# Patient Record
Sex: MALE | Race: WHITE | NOT HISPANIC OR LATINO
[De-identification: names, ages, dates, MRNs, and addresses within clinical notes are randomized per-mention and may not be internally consistent; named-entity substitution may affect disease eponyms.]

---

## 2017-04-03 ENCOUNTER — APPOINTMENT (OUTPATIENT)
Dept: PAIN MANAGEMENT | Facility: CLINIC | Age: 32
End: 2017-04-03

## 2017-04-26 ENCOUNTER — APPOINTMENT (OUTPATIENT)
Dept: PAIN MANAGEMENT | Facility: CLINIC | Age: 32
End: 2017-04-26

## 2017-05-01 ENCOUNTER — APPOINTMENT (OUTPATIENT)
Dept: PAIN MANAGEMENT | Facility: CLINIC | Age: 32
End: 2017-05-01

## 2017-12-29 ENCOUNTER — EMERGENCY (EMERGENCY)
Facility: HOSPITAL | Age: 32
LOS: 1 days | Discharge: HOME | End: 2017-12-29
Attending: EMERGENCY MEDICINE | Admitting: EMERGENCY MEDICINE
Payer: COMMERCIAL

## 2017-12-29 VITALS
SYSTOLIC BLOOD PRESSURE: 155 MMHG | RESPIRATION RATE: 18 BRPM | DIASTOLIC BLOOD PRESSURE: 99 MMHG | HEIGHT: 72 IN | HEART RATE: 56 BPM | WEIGHT: 195.11 LBS | TEMPERATURE: 98 F | OXYGEN SATURATION: 96 %

## 2017-12-29 LAB
RAPID RVP RESULT: DETECTED
RV+EV RNA SPEC QL NAA+PROBE: DETECTED

## 2017-12-29 PROCEDURE — 87486 CHLMYD PNEUM DNA AMP PROBE: CPT

## 2017-12-29 PROCEDURE — 87581 M.PNEUMON DNA AMP PROBE: CPT

## 2017-12-29 PROCEDURE — 87633 RESP VIRUS 12-25 TARGETS: CPT

## 2017-12-29 PROCEDURE — 99283 EMERGENCY DEPT VISIT LOW MDM: CPT | Mod: 25

## 2017-12-29 PROCEDURE — 99283 EMERGENCY DEPT VISIT LOW MDM: CPT

## 2017-12-29 PROCEDURE — 87798 DETECT AGENT NOS DNA AMP: CPT

## 2017-12-29 RX ORDER — ALBUTEROL 90 UG/1
2 AEROSOL, METERED ORAL
Qty: 1 | Refills: 0
Start: 2017-12-29 | End: 2018-01-27

## 2017-12-29 RX ORDER — SODIUM CHLORIDE 9 MG/ML
1000 INJECTION INTRAMUSCULAR; INTRAVENOUS; SUBCUTANEOUS ONCE
Qty: 0 | Refills: 0 | Status: DISCONTINUED | OUTPATIENT
Start: 2017-12-29 | End: 2017-12-29

## 2017-12-29 NOTE — ED ADULT TRIAGE NOTE - CHIEF COMPLAINT QUOTE
Patient complaining of general weakness, productive cough, sore throat with swollen glands, and body aches since this AM. Denies N/V/D. Afebrile

## 2017-12-29 NOTE — ED PROVIDER NOTE - OBJECTIVE STATEMENT
31yo male who presents with cough and sore throat since last nite. pt c/o upper respiratory congestion, no fever, no headache, +sore throat, no vomiting or diarrhea, pt did no take anything for the pain.

## 2020-04-25 ENCOUNTER — MESSAGE (OUTPATIENT)
Age: 35
End: 2020-04-25

## 2020-05-06 LAB
SARS-COV-2 IGG SERPL IA-ACNC: 1.7 AU/ML
SARS-COV-2 IGG SERPL QL IA: NEGATIVE

## 2020-06-05 ENCOUNTER — NON-APPOINTMENT (OUTPATIENT)
Age: 35
End: 2020-06-05

## 2020-06-05 ENCOUNTER — APPOINTMENT (OUTPATIENT)
Dept: OPHTHALMOLOGY | Facility: CLINIC | Age: 35
End: 2020-06-05
Payer: COMMERCIAL

## 2020-06-05 PROCEDURE — 92012 INTRM OPH EXAM EST PATIENT: CPT

## 2020-08-21 ENCOUNTER — TRANSCRIPTION ENCOUNTER (OUTPATIENT)
Age: 35
End: 2020-08-21

## 2020-10-29 ENCOUNTER — APPOINTMENT (OUTPATIENT)
Dept: DERMATOLOGY | Facility: CLINIC | Age: 35
End: 2020-10-29
Payer: COMMERCIAL

## 2020-10-29 DIAGNOSIS — Z91.89 OTHER SPECIFIED PERSONAL RISK FACTORS, NOT ELSEWHERE CLASSIFIED: ICD-10-CM

## 2020-10-29 DIAGNOSIS — D22.9 MELANOCYTIC NEVI, UNSPECIFIED: ICD-10-CM

## 2020-10-29 DIAGNOSIS — L72.0 EPIDERMAL CYST: ICD-10-CM

## 2020-10-29 PROCEDURE — 99203 OFFICE O/P NEW LOW 30 MIN: CPT

## 2020-10-29 PROCEDURE — 99072 ADDL SUPL MATRL&STAF TM PHE: CPT

## 2020-10-29 NOTE — HISTORY OF PRESENT ILLNESS
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

## 2020-10-29 NOTE — PHYSICAL EXAM
[Alert] : alert [Oriented x 3] : ~L oriented x 3 [Well Nourished] : well nourished [Conjunctiva Non-injected] : conjunctiva non-injected [No Visual Lymphadenopathy] : no visual  lymphadenopathy [No Clubbing] : no clubbing [No Edema] : no edema [No Bromhidrosis] : no bromhidrosis [No Chromhidrosis] : no chromhidrosis [Full Body Skin Exam Performed] : performed [FreeTextEntry3] : subcutaneous nodule back\par scattered brown macules and papules trunk and extremities\par

## 2020-11-12 ENCOUNTER — APPOINTMENT (OUTPATIENT)
Dept: DERMATOLOGY | Facility: CLINIC | Age: 35
End: 2020-11-12

## 2020-11-27 ENCOUNTER — TRANSCRIPTION ENCOUNTER (OUTPATIENT)
Age: 35
End: 2020-11-27

## 2021-01-14 ENCOUNTER — RX RENEWAL (OUTPATIENT)
Age: 36
End: 2021-01-14

## 2021-01-28 ENCOUNTER — APPOINTMENT (OUTPATIENT)
Dept: DERMATOLOGY | Facility: CLINIC | Age: 36
End: 2021-01-28
Payer: COMMERCIAL

## 2021-01-28 ENCOUNTER — LABORATORY RESULT (OUTPATIENT)
Age: 36
End: 2021-01-28

## 2021-01-28 DIAGNOSIS — D48.7 NEOPLASM OF UNCERTAIN BEHAVIOR OF OTHER SPECIFIED SITES: ICD-10-CM

## 2021-01-28 PROCEDURE — 64650 CHEMODENERV ECCRINE GLANDS: CPT

## 2021-01-28 PROCEDURE — 11403 EXC TR-EXT B9+MARG 2.1-3CM: CPT

## 2021-01-28 PROCEDURE — 99072 ADDL SUPL MATRL&STAF TM PHE: CPT

## 2021-01-28 PROCEDURE — 12032 INTMD RPR S/A/T/EXT 2.6-7.5: CPT | Mod: 59

## 2021-02-02 ENCOUNTER — TRANSCRIPTION ENCOUNTER (OUTPATIENT)
Age: 36
End: 2021-02-02

## 2021-02-17 ENCOUNTER — EMERGENCY (EMERGENCY)
Facility: HOSPITAL | Age: 36
LOS: 1 days | Discharge: HOME | End: 2021-02-17
Admitting: EMERGENCY MEDICINE
Payer: COMMERCIAL

## 2021-02-17 VITALS
RESPIRATION RATE: 18 BRPM | OXYGEN SATURATION: 99 % | TEMPERATURE: 98 F | HEART RATE: 75 BPM | DIASTOLIC BLOOD PRESSURE: 78 MMHG | SYSTOLIC BLOOD PRESSURE: 140 MMHG | HEIGHT: 72 IN

## 2021-02-17 DIAGNOSIS — J02.9 ACUTE PHARYNGITIS, UNSPECIFIED: ICD-10-CM

## 2021-02-17 DIAGNOSIS — Z20.822 CONTACT WITH AND (SUSPECTED) EXPOSURE TO COVID-19: ICD-10-CM

## 2021-02-17 LAB — S PYO AG SPEC QL IA: NEGATIVE — SIGNIFICANT CHANGE UP

## 2021-02-17 PROCEDURE — 99284 EMERGENCY DEPT VISIT MOD MDM: CPT

## 2021-02-17 RX ORDER — LIDOCAINE 4 G/100G
5 CREAM TOPICAL
Qty: 100 | Refills: 0
Start: 2021-02-17 | End: 2021-02-21

## 2021-02-17 RX ORDER — AZITHROMYCIN 500 MG/1
1 TABLET, FILM COATED ORAL
Qty: 1 | Refills: 0
Start: 2021-02-17 | End: 2021-02-21

## 2021-02-17 RX ORDER — IBUPROFEN 200 MG
1 TABLET ORAL
Qty: 15 | Refills: 0
Start: 2021-02-17 | End: 2021-02-21

## 2021-02-17 NOTE — ED PROVIDER NOTE - OBJECTIVE STATEMENT
Patient presenting to the Emergency Department requesting COVID-19 testing due to concern for exposure. Also reports having sore throat x2 weeks. Was found strep +, no abx, however not improving. Denies the following symptoms: fever, chills, cough, shortness of breath, chest pain or bodyaches.

## 2021-02-17 NOTE — ED PROVIDER NOTE - PHYSICAL EXAMINATION
CONSTITUTIONAL:  Generally well appearing, no acute distress, alert, awake and oriented  HEENT:  Moist mucous membranes, normal voice, airway patent, no exudates, no tonsilar swelling, uvula midline, erythema to posterior pharynx  PULM:  No accessory muscle use, speaking in full sentences  SKIN:  Normal in appearance, normal color

## 2021-02-17 NOTE — ED PROVIDER NOTE - PATIENT PORTAL LINK FT
You can access the FollowMyHealth Patient Portal offered by [facility] by registering at the following website: [email or web address]. By joining [facility]’s FollowMyHealth portal, you will also be able to view your health information using other applications (apps) compatible with our system. 1 or 2

## 2021-02-17 NOTE — ED PROVIDER NOTE - NS ED ROS FT
CONSTITUTIONAL:  No fever, chills or bodyaches  HEENT:  No nasal congestion, reports sore throat  PULM:  No cough or shortness of breath  CARD: No chest pain or palpitations  GI:  No diarrhea or vomiting

## 2021-02-17 NOTE — ED PROVIDER NOTE - CLINICAL SUMMARY MEDICAL DECISION MAKING FREE TEXT BOX
Patient is presenting to the Emergency Department requesting a COVID-19 testing and strep.  Patient looks well and non-septic appearing. Nasopharyngeal PCR, strep swab and culture has been obtained and patient has been guided on how to obtain their results.  General COVID-19 discharge instructions have been given to the patient.

## 2021-02-18 LAB — SARS-COV-2 RNA SPEC QL NAA+PROBE: SIGNIFICANT CHANGE UP

## 2021-07-15 ENCOUNTER — APPOINTMENT (OUTPATIENT)
Dept: DERMATOLOGY | Facility: CLINIC | Age: 36
End: 2021-07-15

## 2021-09-29 ENCOUNTER — RX RENEWAL (OUTPATIENT)
Age: 36
End: 2021-09-29

## 2021-11-24 ENCOUNTER — APPOINTMENT (OUTPATIENT)
Dept: NEUROLOGY | Facility: CLINIC | Age: 36
End: 2021-11-24
Payer: COMMERCIAL

## 2021-11-24 PROCEDURE — 99204 OFFICE O/P NEW MOD 45 MIN: CPT | Mod: 95

## 2021-11-24 NOTE — HISTORY OF PRESENT ILLNESS
[FreeTextEntry1] : Brijesh is a 35 yo man for evaluation for treatment of adult ADHD.\par \par Reports having had ADHD formally diagnosed while in [place] school, when he was seen to be disruptive in classes.\par Seen by neurologist at [facility] - spoke to people from school and parents.\par \par He was started on vyvanse, but he cannot recall the dose, perhaps 10-20mg.  He felt a bit jittery on it, wired.  But is was able to get him through.  Also loss of appetite, no mood.  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  When stressed he will tend to become very impulsive.\par \par His boss has noted he does so well when there are multiple issues that needs to be done right away, but with some of the administrative portions should improve.\par \par No strong family history - mother may have more symptoms.  \par Brother without symptoms as far.\par

## 2021-11-24 NOTE — DISCUSSION/SUMMARY
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  Finding strategies to function but very effortful.\par \par Plan:\par 1) trial of methylphenidate, start at 5mg, increase by 5mg to 20mg/d.\par 2) second option is addition/substitution, and consideration to atomoxteine

## 2021-12-02 ENCOUNTER — APPOINTMENT (OUTPATIENT)
Dept: DERMATOLOGY | Facility: CLINIC | Age: 36
End: 2021-12-02
Payer: COMMERCIAL

## 2021-12-02 PROCEDURE — 64650 CHEMODENERV ECCRINE GLANDS: CPT

## 2021-12-02 PROCEDURE — 99213 OFFICE O/P EST LOW 20 MIN: CPT | Mod: 25

## 2021-12-02 NOTE — HISTORY OF PRESENT ILLNESS
[FreeTextEntry1] : fu acne, hyperhidrosis [de-identified] : here for botox axillae\par wore off after 4 mos last visit, happy with results\par blackheads on nose, breaking out on back

## 2021-12-02 NOTE — PHYSICAL EXAM
[Alert] : alert [Oriented x 3] : ~L oriented x 3 [Well Nourished] : well nourished [Conjunctiva Non-injected] : conjunctiva non-injected [No Visual Lymphadenopathy] : no visual  lymphadenopathy [No Clubbing] : no clubbing [No Edema] : no edema [No Bromhidrosis] : no bromhidrosis [No Chromhidrosis] : no chromhidrosis [FreeTextEntry3] : open comedones nose\par inflammatory papules scattered on back

## 2021-12-16 ENCOUNTER — EMERGENCY (EMERGENCY)
Facility: HOSPITAL | Age: 36
LOS: 1 days | Discharge: HOME | End: 2021-12-16
Attending: EMERGENCY MEDICINE | Admitting: EMERGENCY MEDICINE
Payer: COMMERCIAL

## 2021-12-16 VITALS
HEART RATE: 104 BPM | SYSTOLIC BLOOD PRESSURE: 158 MMHG | HEIGHT: 72 IN | DIASTOLIC BLOOD PRESSURE: 110 MMHG | TEMPERATURE: 101 F | RESPIRATION RATE: 18 BRPM | OXYGEN SATURATION: 97 % | WEIGHT: 214.95 LBS

## 2021-12-16 VITALS
DIASTOLIC BLOOD PRESSURE: 86 MMHG | HEART RATE: 96 BPM | OXYGEN SATURATION: 96 % | RESPIRATION RATE: 16 BRPM | SYSTOLIC BLOOD PRESSURE: 135 MMHG | TEMPERATURE: 99 F

## 2021-12-16 DIAGNOSIS — R50.9 FEVER, UNSPECIFIED: ICD-10-CM

## 2021-12-16 DIAGNOSIS — U07.1 COVID-19: ICD-10-CM

## 2021-12-16 LAB
ALBUMIN SERPL ELPH-MCNC: 4.4 G/DL — SIGNIFICANT CHANGE UP (ref 3.3–5)
ALP SERPL-CCNC: 68 U/L — SIGNIFICANT CHANGE UP (ref 40–120)
ALT FLD-CCNC: 19 U/L — SIGNIFICANT CHANGE UP (ref 10–45)
ANION GAP SERPL CALC-SCNC: 11 MMOL/L — SIGNIFICANT CHANGE UP (ref 5–17)
ANISOCYTOSIS BLD QL: SLIGHT — SIGNIFICANT CHANGE UP
AST SERPL-CCNC: 22 U/L — SIGNIFICANT CHANGE UP (ref 10–40)
BASOPHILS # BLD AUTO: 0 K/UL — SIGNIFICANT CHANGE UP (ref 0–0.2)
BASOPHILS NFR BLD AUTO: 0 % — SIGNIFICANT CHANGE UP (ref 0–2)
BILIRUB SERPL-MCNC: 0.2 MG/DL — SIGNIFICANT CHANGE UP (ref 0.2–1.2)
BUN SERPL-MCNC: 9 MG/DL — SIGNIFICANT CHANGE UP (ref 7–23)
CALCIUM SERPL-MCNC: 9.1 MG/DL — SIGNIFICANT CHANGE UP (ref 8.4–10.5)
CHLORIDE SERPL-SCNC: 104 MMOL/L — SIGNIFICANT CHANGE UP (ref 96–108)
CO2 SERPL-SCNC: 26 MMOL/L — SIGNIFICANT CHANGE UP (ref 22–31)
CREAT SERPL-MCNC: 1.14 MG/DL — SIGNIFICANT CHANGE UP (ref 0.5–1.3)
CRP SERPL-MCNC: 6.5 MG/L — HIGH (ref 0–4)
D DIMER BLD IA.RAPID-MCNC: 228 NG/ML DDU — SIGNIFICANT CHANGE UP
EOSINOPHIL # BLD AUTO: 0.09 K/UL — SIGNIFICANT CHANGE UP (ref 0–0.5)
EOSINOPHIL NFR BLD AUTO: 1.8 % — SIGNIFICANT CHANGE UP (ref 0–6)
FERRITIN SERPL-MCNC: 94 NG/ML — SIGNIFICANT CHANGE UP (ref 30–400)
GIANT PLATELETS BLD QL SMEAR: PRESENT — SIGNIFICANT CHANGE UP
GLUCOSE SERPL-MCNC: 113 MG/DL — HIGH (ref 70–99)
HCT VFR BLD CALC: 48.7 % — SIGNIFICANT CHANGE UP (ref 39–50)
HGB BLD-MCNC: 15.6 G/DL — SIGNIFICANT CHANGE UP (ref 13–17)
LYMPHOCYTES # BLD AUTO: 0.48 K/UL — LOW (ref 1–3.3)
LYMPHOCYTES # BLD AUTO: 9.6 % — LOW (ref 13–44)
MANUAL SMEAR VERIFICATION: SIGNIFICANT CHANGE UP
MCHC RBC-ENTMCNC: 28.9 PG — SIGNIFICANT CHANGE UP (ref 27–34)
MCHC RBC-ENTMCNC: 32 GM/DL — SIGNIFICANT CHANGE UP (ref 32–36)
MCV RBC AUTO: 90.4 FL — SIGNIFICANT CHANGE UP (ref 80–100)
MICROCYTES BLD QL: SLIGHT — SIGNIFICANT CHANGE UP
MONOCYTES # BLD AUTO: 0.58 K/UL — SIGNIFICANT CHANGE UP (ref 0–0.9)
MONOCYTES NFR BLD AUTO: 11.4 % — SIGNIFICANT CHANGE UP (ref 2–14)
NEUTROPHILS # BLD AUTO: 3.72 K/UL — SIGNIFICANT CHANGE UP (ref 1.8–7.4)
NEUTROPHILS NFR BLD AUTO: 71.1 % — SIGNIFICANT CHANGE UP (ref 43–77)
NEUTS BAND # BLD: 2.6 % — SIGNIFICANT CHANGE UP (ref 0–8)
NRBC # BLD: 1 /100 — HIGH (ref 0–0)
NRBC # BLD: SIGNIFICANT CHANGE UP /100 WBCS (ref 0–0)
OVALOCYTES BLD QL SMEAR: SLIGHT — SIGNIFICANT CHANGE UP
PLAT MORPH BLD: ABNORMAL
PLATELET # BLD AUTO: 170 K/UL — SIGNIFICANT CHANGE UP (ref 150–400)
POIKILOCYTOSIS BLD QL AUTO: SLIGHT — SIGNIFICANT CHANGE UP
POLYCHROMASIA BLD QL SMEAR: SLIGHT — SIGNIFICANT CHANGE UP
POTASSIUM SERPL-MCNC: 4.3 MMOL/L — SIGNIFICANT CHANGE UP (ref 3.5–5.3)
POTASSIUM SERPL-SCNC: 4.3 MMOL/L — SIGNIFICANT CHANGE UP (ref 3.5–5.3)
PROCALCITONIN SERPL-MCNC: 0.07 NG/ML — SIGNIFICANT CHANGE UP (ref 0.02–0.1)
PROT SERPL-MCNC: 7.4 G/DL — SIGNIFICANT CHANGE UP (ref 6–8.3)
RBC # BLD: 5.39 M/UL — SIGNIFICANT CHANGE UP (ref 4.2–5.8)
RBC # FLD: 14.6 % — HIGH (ref 10.3–14.5)
RBC BLD AUTO: ABNORMAL
SODIUM SERPL-SCNC: 141 MMOL/L — SIGNIFICANT CHANGE UP (ref 135–145)
VARIANT LYMPHS # BLD: 3.5 % — SIGNIFICANT CHANGE UP (ref 0–6)
WBC # BLD: 5.05 K/UL — SIGNIFICANT CHANGE UP (ref 3.8–10.5)
WBC # FLD AUTO: 5.05 K/UL — SIGNIFICANT CHANGE UP (ref 3.8–10.5)

## 2021-12-16 PROCEDURE — 36415 COLL VENOUS BLD VENIPUNCTURE: CPT

## 2021-12-16 PROCEDURE — 99285 EMERGENCY DEPT VISIT HI MDM: CPT | Mod: 25

## 2021-12-16 PROCEDURE — 93010 ELECTROCARDIOGRAM REPORT: CPT

## 2021-12-16 PROCEDURE — 84145 PROCALCITONIN (PCT): CPT

## 2021-12-16 PROCEDURE — 71045 X-RAY EXAM CHEST 1 VIEW: CPT | Mod: 26

## 2021-12-16 PROCEDURE — 85379 FIBRIN DEGRADATION QUANT: CPT

## 2021-12-16 PROCEDURE — 71045 X-RAY EXAM CHEST 1 VIEW: CPT

## 2021-12-16 PROCEDURE — 80053 COMPREHEN METABOLIC PANEL: CPT

## 2021-12-16 PROCEDURE — 85025 COMPLETE CBC W/AUTO DIFF WBC: CPT

## 2021-12-16 PROCEDURE — 96375 TX/PRO/DX INJ NEW DRUG ADDON: CPT

## 2021-12-16 PROCEDURE — 96374 THER/PROPH/DIAG INJ IV PUSH: CPT

## 2021-12-16 PROCEDURE — 82728 ASSAY OF FERRITIN: CPT

## 2021-12-16 PROCEDURE — 86140 C-REACTIVE PROTEIN: CPT

## 2021-12-16 PROCEDURE — 93005 ELECTROCARDIOGRAM TRACING: CPT

## 2021-12-16 RX ORDER — KETOROLAC TROMETHAMINE 30 MG/ML
30 SYRINGE (ML) INJECTION ONCE
Refills: 0 | Status: DISCONTINUED | OUTPATIENT
Start: 2021-12-16 | End: 2021-12-16

## 2021-12-16 RX ORDER — SODIUM CHLORIDE 9 MG/ML
1000 INJECTION INTRAMUSCULAR; INTRAVENOUS; SUBCUTANEOUS ONCE
Refills: 0 | Status: COMPLETED | OUTPATIENT
Start: 2021-12-16 | End: 2021-12-16

## 2021-12-16 RX ORDER — SODIUM CHLORIDE 9 MG/ML
1000 INJECTION INTRAMUSCULAR; INTRAVENOUS; SUBCUTANEOUS
Refills: 0 | Status: DISCONTINUED | OUTPATIENT
Start: 2021-12-16 | End: 2021-12-16

## 2021-12-16 RX ORDER — AZITHROMYCIN 500 MG/1
1 TABLET, FILM COATED ORAL
Qty: 6 | Refills: 0
Start: 2021-12-16 | End: 2021-12-20

## 2021-12-16 RX ORDER — ACETAMINOPHEN 500 MG
1000 TABLET ORAL ONCE
Refills: 0 | Status: COMPLETED | OUTPATIENT
Start: 2021-12-16 | End: 2021-12-16

## 2021-12-16 RX ADMIN — SODIUM CHLORIDE 1000 MILLILITER(S): 9 INJECTION INTRAMUSCULAR; INTRAVENOUS; SUBCUTANEOUS at 16:17

## 2021-12-16 RX ADMIN — Medication 30 MILLIGRAM(S): at 17:08

## 2021-12-16 RX ADMIN — Medication 400 MILLIGRAM(S): at 16:17

## 2021-12-16 NOTE — ED PROVIDER NOTE - NSFOLLOWUPINSTRUCTIONS_ED_ALL_ED_FT
COVID-19 (Coronavirus Disease 2019)    WHAT YOU NEED TO KNOW:    What do I need to know about COVID-19? COVID-19 is the disease caused by a coronavirus first discovered in December 2019. Coronaviruses generally cause upper respiratory (nose, throat, and lung) infections, such as a cold. The 2019 virus spreads quickly and easily. It can be spread starting 2 to 3 days before symptoms even begin.    What do I need to know about variants? The virus has changed into several new forms (called variants) since it was discovered. The variants may be more contagious (easily spread) than the original form. Some may also cause more severe illness than others.    What are the signs and symptoms of COVID-19? You may not develop any signs or symptoms. Signs and symptoms usually start about 5 days after infection but can take 2 to 14 days. You may feel like you have the flu or a bad cold. Some signs and symptoms go away in a few days. Others can last weeks, months, or possibly years. You may have any of the following:   •A cough      •Shortness of breath or trouble breathing that may become severe      •A fever      •Chills that might include shaking      •Muscle pain, body aches, or a headache      •A sore throat      •Sudden changes or loss of your taste or smell      •Feeling mentally and physically tired (fatigue)      •Congestion (stuffy head and nose), or a runny nose      •Diarrhea, nausea, or vomiting      How is COVID-19 diagnosed? Testing is offered at many sites. You may need to quarantine until you get your results. Any of the following tests may be used:   •A viral PCR test shows if you have a current infection. A sample is taken from your nose or throat with a swab. You may need to wait 1 or more days to get the test results.       •An antigen test shows if you have a protein from the COVID-19 virus. This test is often called a rapid test because the results can be available in 30 minutes or less.      •An antibody test shows if you had a recent or past infection. Blood samples are used for this test. Antibodies are made by your immune system to fight the virus that causes COVID-19. Antibodies form 1 to 3 weeks after you are infected. This test is not used to show if you are immune to the virus.       •A CT, MRI, ultrasound, or x-ray may be used to check for complications of COVID-19. These may include pneumonia, blood clots, or other complications.      How is COVID-19 treated?   •Mild symptoms may get better on their own. Some treatments have emergency use authorization (EUA). Examples include monoclonal antibodies and convalescent plasma. These may be given to help prevent worsening of your symptoms. You may also need any of the following:?Decongestants help reduce nasal congestion and help you breathe more easily. If you take decongestant pills, they may make you feel restless or cause problems with your sleep. Do not use decongestant sprays for more than a few days.      ?Cough suppressants help reduce coughing. Ask your healthcare provider which type of cough medicine is best for you.      ?To soothe a sore throat, gargle with warm salt water, or use throat lozenges or a throat spray. Drink more liquids to thin and loosen mucus and to prevent dehydration.      ?NSAIDs or acetaminophen can help lower a fever and relieve body aches or a headache. Follow directions. If not taken correctly, NSAIDs can cause kidney damage and acetaminophen can cause liver damage.      •Severe or life-threatening symptoms are treated in the hospital. You may need any of the following: ?Medicines may be given to fight the virus or treat inflammation.      ?Blood thinners help prevent or treat blood clots. If you have a deep vein thrombosis (DVT) or pulmonary embolism (PE), you may need to use blood thinners for at least 3 months.      ?Extra oxygen may be given if you have respiratory failure. This means your lungs cannot get enough oxygen into your blood and out to your organs.      ?A ventilator may be used to help you breathe.        What do I need to know about health problems the virus may cause? You may develop long-term health problems caused by the virus. Your risk is higher if you are 65 or older. A weak immune system, obesity, diabetes, chronic kidney disease, or a heart or lung condition can also increase your risk. Your risk is also higher if you are a current or former cigarette smoker. COVID-19 can lead to any of the following:  •Multisymptom inflammatory syndrome in adults (MIS-A) or in children (MIS-C), causing inflammation in the heart, digestive system, skin, or brain      •Shortness of breath, serious lower respiratory conditions, such as pneumonia or acute respiratory distress syndrome (ARDS)      •Blood clots or blood vessel damage      •Organ damage from a lack of oxygen or from blood clots      •Sleep problems      •Problems thinking clearly, remembering information, or concentrating      •Mood changes, depression, or anxiety      •Long-term problems tasting or smelling      •Loss of appetite and weight loss      •Nerve pain      •Fatigue (feeling mentally and physically tired)      How does the 2019 coronavirus spread?   •Droplets are the main way all coronaviruses spread. The virus travels in droplets that form when a person talks, sings, coughs, or sneezes. The droplets can also float in the air for minutes or hours. Infection happens when you breathe in the droplets or get them in your eyes or nose. Close personal contact with an infected person increases your risk for infection. This means being within 6 feet (2 meters) of the person for at least 15 minutes over 24 hours.      •Person-to-person contact can spread the virus. For example, a person with the virus on his or her hands can spread it by shaking hands with someone.      •The virus can stay on objects and surfaces for up to 3 days. You may become infected by touching the object or surface and then touching your eyes or mouth.      What do I need to know about COVID-19 vaccines? Healthcare providers recommend a COVID-19 vaccine, even if you have already had COVID-19. You are considered fully vaccinated against COVID-19 two weeks after the final dose of any COVID-19 vaccine. Let your healthcare provider know when you have received the final dose of the vaccine. Make a copy of your vaccination card. Keep the original with you in case you need to show it. Keep the copy in a safe place.  •COVID-19 vaccines are given as a shot in 1 or 2 doses. The vaccine is recommended for everyone 12 years or older.      •A third dose is recommended for adults with a weakened immune system who get a 2-dose vaccine. The third dose is given at least 28 days after the second.      •A booster (additional) dose is being recommended for other people as well. This is usually given 6 months after the initial doses are complete. Continue social distancing and other measures, even after you get the vaccine. Although it is not common, you can become infected after you get the vaccine. You may also be able to pass the virus to others without knowing you are infected.      •After you get the vaccine, check local, national, and international travel rules. You may need to be tested before you travel. Some countries require proof of a negative test before you travel. You may also need to quarantine after you return.      How can I help lower the risk for COVID-19?   •Wash your hands often throughout the day. Use soap and water. Rub your soapy hands together, lacing your fingers, for at least 20 seconds. Rinse with warm, running water. Dry your hands with a clean towel or paper towel. Use hand  that contains alcohol if soap and water are not available. Teach children how to wash their hands and use hand .  Handwashing           •Cover sneezes and coughs. Turn your face away and cover your mouth and nose with a tissue. Throw the tissue away. Use the bend of your arm if a tissue is not available. Then wash your hands well with soap and water or use hand . Teach children how to cover a cough or sneeze.      •Wear a face covering (mask) when needed. Use a cloth covering with at least 2 layers. You can also create layers by putting a cloth covering over a disposable non-medical mask. Cover your mouth and your nose.  How to Wear a Face Covering (Mask)           •Follow worldwide, national, and local social distancing guidelines. Keep at least 6 feet (2 meters) between you and others.      •Try not to touch your face. If you get the virus on your hands, you can transfer it to your eyes, nose, or mouth and become infected. You can also transfer it to objects, surfaces, or people.      •Clean and disinfect high-touch surfaces and objects often. Use disinfecting wipes, or make a solution of 4 teaspoons of bleach in 1 quart (4 cups) of water.      •Ask about other vaccines you may need. Get the influenza (flu) vaccine as soon as recommended each year, usually starting in September or October. Get the pneumonia vaccine if recommended. Your healthcare provider can tell you if you should also get other vaccines, and when to get them.    Prevent COVID-19 Infection         How do I follow social distancing guidelines to help lower the risk for COVID-19? National and local social distancing rules vary. Rules and restrictions may change over time as restrictions are lifted. The following are general guidelines:   •Stay home if you are sick or think you may have COVID-19. It is important to stay home if you are waiting for a testing appointment or for test results.      •Avoid close physical contact with anyone who does not live in your home. Do not shake hands with, hug, or kiss a person as a greeting. If you must use public transportation (such as a bus or subway), try to sit or stand away from others. Wear your face covering.      •Avoid in-person gatherings and crowds. Attend virtually if possible.      Where can I find more information?   •Centers for Disease Control and Prevention  [address] [place] [address]  Phone: 1-857.716.1837  Web Address: http://www.cdc.gov        Call your local emergency number (911 in the [place]) if:   •You have trouble breathing or shortness of breath at rest.      •You have chest pain or pressure that lasts longer than 5 minutes.      •You become confused or hard to wake.      •Your lips or face are blue.      When should I seek immediate care?   •You have a fever of 104°F (40°C) or higher.          When should I call my doctor?   •You have symptoms of COVID-19.      •You have questions or concerns about your condition or care.      CARE AGREEMENT:    You have the right to help plan your care. Learn about your health condition and how it may be treated. Discuss treatment options with your healthcare providers to decide what care you want to receive. You always have the right to refuse treatment.        © Copyright [employer] 2021

## 2021-12-16 NOTE — ED PROVIDER NOTE - CLINICAL SUMMARY MEDICAL DECISION MAKING FREE TEXT BOX
37 y/o M presents with 1-2 days of fevers, SOB, productive cough, and tachycardia in setting of testing positive for Covid-19. Will Covid swab, send labs, inflammatory markers, give IV fluids and IV Tylenol. 35 y/o M presents with 1-2 days of fevers, SOB, productive cough, and tachycardia in setting of testing positive for Covid-19. Will Covid swab, send labs including inflammatory markers, give IV fluids and IV Tylenol.

## 2021-12-16 NOTE — ED ADULT NURSE NOTE - OBJECTIVE STATEMENT
Pt presents to ED C/O fever, cough, generalized body aches, and fatigue. Tested positive for COVID today. Placed in ISO room, made comfortable, placed on pluse-ox. Arsh ALDRICH, N/V/D, CP.

## 2021-12-16 NOTE — ED PROVIDER NOTE - PATIENT PORTAL LINK FT
You can access the FollowMyHealth Patient Portal offered by [facility] by registering at the following website: [email or web address]. By joining [facility]’s FollowMyHealth portal, you will also be able to view your health information using other applications (apps) compatible with our system.

## 2021-12-16 NOTE — ED PROVIDER NOTE - OBJECTIVE STATEMENT
37 y/o M presents with 1-2 days of fevers, SOB, productive cough, and tachycardia. Positive at home test for Covid-19 so comes to ED for further evaluation. Pt most concerned about fevers.

## 2022-03-03 ENCOUNTER — RX RENEWAL (OUTPATIENT)
Age: 37
End: 2022-03-03

## 2022-03-03 ENCOUNTER — APPOINTMENT (OUTPATIENT)
Dept: DERMATOLOGY | Facility: CLINIC | Age: 37
End: 2022-03-03

## 2022-03-23 ENCOUNTER — APPOINTMENT (OUTPATIENT)
Dept: GASTROENTEROLOGY | Facility: CLINIC | Age: 37
End: 2022-03-23
Payer: COMMERCIAL

## 2022-03-23 VITALS
DIASTOLIC BLOOD PRESSURE: 100 MMHG | BODY MASS INDEX: 29.26 KG/M2 | HEART RATE: 67 BPM | TEMPERATURE: 97.7 F | WEIGHT: 216 LBS | OXYGEN SATURATION: 97 % | HEIGHT: 72 IN | SYSTOLIC BLOOD PRESSURE: 157 MMHG

## 2022-03-23 PROCEDURE — 99203 OFFICE O/P NEW LOW 30 MIN: CPT

## 2022-03-23 RX ORDER — METHYLPHENIDATE HYDROCHLORIDE 10 MG/1
10 TABLET ORAL TWICE DAILY
Qty: 60 | Refills: 0 | Status: DISCONTINUED | COMMUNITY
Start: 2021-11-24 | End: 2022-03-23

## 2022-03-23 RX ORDER — CLINDAMYCIN PHOSPHATE 10 MG/ML
1 LOTION TOPICAL
Qty: 1 | Refills: 11 | Status: DISCONTINUED | COMMUNITY
Start: 2021-12-02 | End: 2022-03-23

## 2022-03-23 RX ORDER — TRETINOIN 0.5 MG/G
0.05 CREAM TOPICAL
Qty: 1 | Refills: 2 | Status: DISCONTINUED | COMMUNITY
Start: 2021-12-02 | End: 2022-03-23

## 2022-03-23 RX ORDER — ATOMOXETINE 40 MG/1
40 CAPSULE ORAL
Qty: 30 | Refills: 2 | Status: DISCONTINUED | COMMUNITY
Start: 2022-01-20 | End: 2022-03-23

## 2022-03-23 RX ORDER — CHROMIUM 200 MCG
TABLET ORAL
Refills: 0 | Status: ACTIVE | COMMUNITY

## 2022-03-23 NOTE — ASSESSMENT
[FreeTextEntry1] : 36 yo male with iron deficiency, recent episode of coffee grounds\par \par - EGD and a colonoscopy at [facility]\par - D/w pt regarding need for COVID vaccination documentation for the procedure as well as escort post-procedure\par - Risks of the procedure including bleeding, perforation, etc d/w the patient\par - Suprep split prep\par

## 2022-03-23 NOTE — HISTORY OF PRESENT ILLNESS
[FreeTextEntry1] : 38 yo male with low ferritin on labs for over a year (3/10/22 ferritin was 18).   TSAT is low as well at 9% with iron of 46 and TIBC of 505.  Not vegan or a vegetarian.  Vomited coffee grounds last week secondary to bad reflux.  Once in a while get reflux, especially after drinking.  Not on a PPI.  Normally no N/V.  No diarrhea or constipation.  No weight loss or change in appetite.  No black stools, no BRBPR.  No dysphagia.  Pt has been following low iron and low ferritin for about a year now.  \par \par No hx of an EGD or a colonoscopy.\par \par No famhx of stomach, colon or pancreatic cancer.  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

## 2022-04-19 ENCOUNTER — APPOINTMENT (OUTPATIENT)
Dept: PSYCHIATRY | Facility: CLINIC | Age: 37
End: 2022-04-19

## 2022-05-10 ENCOUNTER — APPOINTMENT (OUTPATIENT)
Dept: GASTROENTEROLOGY | Facility: CLINIC | Age: 37
End: 2022-05-10

## 2022-05-16 RX ORDER — UBROGEPANT 100 MG/1
100 TABLET ORAL DAILY
Qty: 8 | Refills: 2 | Status: ACTIVE | COMMUNITY
Start: 2022-05-11 | End: 1900-01-01

## 2022-06-16 DIAGNOSIS — Z01.812 ENCOUNTER FOR PREPROCEDURAL LABORATORY EXAMINATION: ICD-10-CM

## 2022-07-19 ENCOUNTER — RESULT REVIEW (OUTPATIENT)
Age: 37
End: 2022-07-19

## 2022-07-19 ENCOUNTER — APPOINTMENT (OUTPATIENT)
Dept: GASTROENTEROLOGY | Facility: CLINIC | Age: 37
End: 2022-07-19

## 2022-07-19 PROCEDURE — 43235 EGD DIAGNOSTIC BRUSH WASH: CPT

## 2022-07-19 PROCEDURE — 45385 COLONOSCOPY W/LESION REMOVAL: CPT

## 2022-07-19 PROCEDURE — 45380 COLONOSCOPY AND BIOPSY: CPT | Mod: 59

## 2022-07-21 ENCOUNTER — APPOINTMENT (OUTPATIENT)
Dept: DERMATOLOGY | Facility: CLINIC | Age: 37
End: 2022-07-21

## 2022-07-27 ENCOUNTER — NON-APPOINTMENT (OUTPATIENT)
Age: 37
End: 2022-07-27

## 2022-08-09 ENCOUNTER — RX RENEWAL (OUTPATIENT)
Age: 37
End: 2022-08-09

## 2022-08-18 ENCOUNTER — RX RENEWAL (OUTPATIENT)
Age: 37
End: 2022-08-18

## 2022-08-22 ENCOUNTER — APPOINTMENT (OUTPATIENT)
Dept: DERMATOLOGY | Facility: CLINIC | Age: 37
End: 2022-08-22

## 2022-08-22 DIAGNOSIS — L70.0 ACNE VULGARIS: ICD-10-CM

## 2022-08-22 DIAGNOSIS — B35.3 TINEA PEDIS: ICD-10-CM

## 2022-08-22 DIAGNOSIS — B36.0 PITYRIASIS VERSICOLOR: ICD-10-CM

## 2022-08-22 DIAGNOSIS — Z12.83 ENCOUNTER FOR SCREENING FOR MALIGNANT NEOPLASM OF SKIN: ICD-10-CM

## 2022-08-22 PROCEDURE — 99214 OFFICE O/P EST MOD 30 MIN: CPT | Mod: 25

## 2022-08-22 PROCEDURE — 64650 CHEMODENERV ECCRINE GLANDS: CPT

## 2022-08-22 RX ORDER — KETOCONAZOLE 20.5 MG/ML
2 SHAMPOO, SUSPENSION TOPICAL
Qty: 1 | Refills: 3 | Status: ACTIVE | COMMUNITY
Start: 2022-08-22 | End: 1900-01-01

## 2022-08-22 RX ORDER — KETOCONAZOLE 20 MG/G
2 CREAM TOPICAL TWICE DAILY
Qty: 1 | Refills: 4 | Status: ACTIVE | COMMUNITY
Start: 2022-08-22 | End: 1900-01-01

## 2022-09-13 ENCOUNTER — EMERGENCY (EMERGENCY)
Facility: HOSPITAL | Age: 37
LOS: 1 days | Discharge: HOME | End: 2022-09-13
Attending: EMERGENCY MEDICINE | Admitting: EMERGENCY MEDICINE
Payer: COMMERCIAL

## 2022-09-13 VITALS
RESPIRATION RATE: 20 BRPM | HEART RATE: 82 BPM | DIASTOLIC BLOOD PRESSURE: 89 MMHG | OXYGEN SATURATION: 99 % | TEMPERATURE: 98 F | SYSTOLIC BLOOD PRESSURE: 146 MMHG

## 2022-09-13 VITALS
WEIGHT: 218.04 LBS | HEART RATE: 102 BPM | SYSTOLIC BLOOD PRESSURE: 171 MMHG | TEMPERATURE: 98 F | RESPIRATION RATE: 16 BRPM | DIASTOLIC BLOOD PRESSURE: 128 MMHG | OXYGEN SATURATION: 95 % | HEIGHT: 72 IN

## 2022-09-13 LAB
ALBUMIN SERPL ELPH-MCNC: 4.7 G/DL — SIGNIFICANT CHANGE UP (ref 3.3–5)
ALP SERPL-CCNC: 85 U/L — SIGNIFICANT CHANGE UP (ref 40–120)
ALT FLD-CCNC: 48 U/L — HIGH (ref 10–45)
ANION GAP SERPL CALC-SCNC: 10 MMOL/L — SIGNIFICANT CHANGE UP (ref 5–17)
APPEARANCE UR: ABNORMAL
AST SERPL-CCNC: 36 U/L — SIGNIFICANT CHANGE UP (ref 10–40)
BACTERIA # UR AUTO: SIGNIFICANT CHANGE UP /HPF
BASOPHILS # BLD AUTO: 0.04 K/UL — SIGNIFICANT CHANGE UP (ref 0–0.2)
BASOPHILS NFR BLD AUTO: 0.7 % — SIGNIFICANT CHANGE UP (ref 0–2)
BILIRUB SERPL-MCNC: 0.3 MG/DL — SIGNIFICANT CHANGE UP (ref 0.2–1.2)
BILIRUB UR-MCNC: NEGATIVE — SIGNIFICANT CHANGE UP
BUN SERPL-MCNC: 10 MG/DL — SIGNIFICANT CHANGE UP (ref 7–23)
CALCIUM SERPL-MCNC: 9 MG/DL — SIGNIFICANT CHANGE UP (ref 8.4–10.5)
CHLORIDE SERPL-SCNC: 103 MMOL/L — SIGNIFICANT CHANGE UP (ref 96–108)
CO2 SERPL-SCNC: 27 MMOL/L — SIGNIFICANT CHANGE UP (ref 22–31)
COLOR SPEC: YELLOW — SIGNIFICANT CHANGE UP
COMMENT - URINE: SIGNIFICANT CHANGE UP
CREAT SERPL-MCNC: 1.01 MG/DL — SIGNIFICANT CHANGE UP (ref 0.5–1.3)
DIFF PNL FLD: ABNORMAL
EGFR: 99 ML/MIN/1.73M2 — SIGNIFICANT CHANGE UP
EOSINOPHIL # BLD AUTO: 0.09 K/UL — SIGNIFICANT CHANGE UP (ref 0–0.5)
EOSINOPHIL NFR BLD AUTO: 1.5 % — SIGNIFICANT CHANGE UP (ref 0–6)
EPI CELLS # UR: SIGNIFICANT CHANGE UP /HPF (ref 0–5)
GLUCOSE SERPL-MCNC: 108 MG/DL — HIGH (ref 70–99)
GLUCOSE UR QL: NEGATIVE — SIGNIFICANT CHANGE UP
HCT VFR BLD CALC: 44.2 % — SIGNIFICANT CHANGE UP (ref 39–50)
HGB BLD-MCNC: 14.6 G/DL — SIGNIFICANT CHANGE UP (ref 13–17)
IMM GRANULOCYTES NFR BLD AUTO: 0.2 % — SIGNIFICANT CHANGE UP (ref 0–1.5)
KETONES UR-MCNC: NEGATIVE — SIGNIFICANT CHANGE UP
LACTATE SERPL-SCNC: 1 MMOL/L — SIGNIFICANT CHANGE UP (ref 0.5–2)
LEUKOCYTE ESTERASE UR-ACNC: NEGATIVE — SIGNIFICANT CHANGE UP
LYMPHOCYTES # BLD AUTO: 0.91 K/UL — LOW (ref 1–3.3)
LYMPHOCYTES # BLD AUTO: 15 % — SIGNIFICANT CHANGE UP (ref 13–44)
MCHC RBC-ENTMCNC: 28.1 PG — SIGNIFICANT CHANGE UP (ref 27–34)
MCHC RBC-ENTMCNC: 33 GM/DL — SIGNIFICANT CHANGE UP (ref 32–36)
MCV RBC AUTO: 85 FL — SIGNIFICANT CHANGE UP (ref 80–100)
MONOCYTES # BLD AUTO: 0.72 K/UL — SIGNIFICANT CHANGE UP (ref 0–0.9)
MONOCYTES NFR BLD AUTO: 11.9 % — SIGNIFICANT CHANGE UP (ref 2–14)
NEUTROPHILS # BLD AUTO: 4.3 K/UL — SIGNIFICANT CHANGE UP (ref 1.8–7.4)
NEUTROPHILS NFR BLD AUTO: 70.7 % — SIGNIFICANT CHANGE UP (ref 43–77)
NITRITE UR-MCNC: NEGATIVE — SIGNIFICANT CHANGE UP
NRBC # BLD: 0 /100 WBCS — SIGNIFICANT CHANGE UP (ref 0–0)
PH UR: 6 — SIGNIFICANT CHANGE UP (ref 5–8)
PLATELET # BLD AUTO: 205 K/UL — SIGNIFICANT CHANGE UP (ref 150–400)
POTASSIUM SERPL-MCNC: 4.3 MMOL/L — SIGNIFICANT CHANGE UP (ref 3.5–5.3)
POTASSIUM SERPL-SCNC: 4.3 MMOL/L — SIGNIFICANT CHANGE UP (ref 3.5–5.3)
PROT SERPL-MCNC: 7.6 G/DL — SIGNIFICANT CHANGE UP (ref 6–8.3)
PROT UR-MCNC: ABNORMAL MG/DL
RAPID RVP RESULT: SIGNIFICANT CHANGE UP
RBC # BLD: 5.2 M/UL — SIGNIFICANT CHANGE UP (ref 4.2–5.8)
RBC # FLD: 14.6 % — HIGH (ref 10.3–14.5)
RBC CASTS # UR COMP ASSIST: > 10 /HPF
SARS-COV-2 RNA SPEC QL NAA+PROBE: SIGNIFICANT CHANGE UP
SODIUM SERPL-SCNC: 140 MMOL/L — SIGNIFICANT CHANGE UP (ref 135–145)
SP GR SPEC: 1.02 — SIGNIFICANT CHANGE UP (ref 1–1.03)
UROBILINOGEN FLD QL: 0.2 E.U./DL — SIGNIFICANT CHANGE UP
WBC # BLD: 6.07 K/UL — SIGNIFICANT CHANGE UP (ref 3.8–10.5)
WBC # FLD AUTO: 6.07 K/UL — SIGNIFICANT CHANGE UP (ref 3.8–10.5)
WBC UR QL: < 5 /HPF — SIGNIFICANT CHANGE UP

## 2022-09-13 PROCEDURE — 96375 TX/PRO/DX INJ NEW DRUG ADDON: CPT

## 2022-09-13 PROCEDURE — 87798 DETECT AGENT NOS DNA AMP: CPT

## 2022-09-13 PROCEDURE — 99285 EMERGENCY DEPT VISIT HI MDM: CPT | Mod: 25

## 2022-09-13 PROCEDURE — 87040 BLOOD CULTURE FOR BACTERIA: CPT

## 2022-09-13 PROCEDURE — 0225U NFCT DS DNA&RNA 21 SARSCOV2: CPT

## 2022-09-13 PROCEDURE — 83605 ASSAY OF LACTIC ACID: CPT

## 2022-09-13 PROCEDURE — 99284 EMERGENCY DEPT VISIT MOD MDM: CPT

## 2022-09-13 PROCEDURE — 36415 COLL VENOUS BLD VENIPUNCTURE: CPT

## 2022-09-13 PROCEDURE — 85025 COMPLETE CBC W/AUTO DIFF WBC: CPT

## 2022-09-13 PROCEDURE — 86753 PROTOZOA ANTIBODY NOS: CPT

## 2022-09-13 PROCEDURE — 81001 URINALYSIS AUTO W/SCOPE: CPT

## 2022-09-13 PROCEDURE — 96374 THER/PROPH/DIAG INJ IV PUSH: CPT

## 2022-09-13 PROCEDURE — 86618 LYME DISEASE ANTIBODY: CPT

## 2022-09-13 PROCEDURE — 86666 EHRLICHIA ANTIBODY: CPT

## 2022-09-13 PROCEDURE — 80053 COMPREHEN METABOLIC PANEL: CPT

## 2022-09-13 RX ORDER — KETOROLAC TROMETHAMINE 30 MG/ML
15 SYRINGE (ML) INJECTION ONCE
Refills: 0 | Status: DISCONTINUED | OUTPATIENT
Start: 2022-09-13 | End: 2022-09-13

## 2022-09-13 RX ORDER — SODIUM CHLORIDE 9 MG/ML
1000 INJECTION INTRAMUSCULAR; INTRAVENOUS; SUBCUTANEOUS ONCE
Refills: 0 | Status: COMPLETED | OUTPATIENT
Start: 2022-09-13 | End: 2022-09-13

## 2022-09-13 RX ORDER — ACETAMINOPHEN 500 MG
1000 TABLET ORAL ONCE
Refills: 0 | Status: COMPLETED | OUTPATIENT
Start: 2022-09-13 | End: 2022-09-13

## 2022-09-13 RX ORDER — AMLODIPINE BESYLATE 2.5 MG/1
1 TABLET ORAL
Qty: 30 | Refills: 0
Start: 2022-09-13 | End: 2022-10-12

## 2022-09-13 RX ORDER — AMLODIPINE BESYLATE 2.5 MG/1
5 TABLET ORAL ONCE
Refills: 0 | Status: COMPLETED | OUTPATIENT
Start: 2022-09-13 | End: 2022-09-13

## 2022-09-13 RX ADMIN — Medication 400 MILLIGRAM(S): at 12:16

## 2022-09-13 RX ADMIN — AMLODIPINE BESYLATE 5 MILLIGRAM(S): 2.5 TABLET ORAL at 12:40

## 2022-09-13 RX ADMIN — SODIUM CHLORIDE 1000 MILLILITER(S): 9 INJECTION INTRAMUSCULAR; INTRAVENOUS; SUBCUTANEOUS at 10:33

## 2022-09-13 RX ADMIN — Medication 15 MILLIGRAM(S): at 10:40

## 2022-09-13 RX ADMIN — SODIUM CHLORIDE 1000 MILLILITER(S): 9 INJECTION INTRAMUSCULAR; INTRAVENOUS; SUBCUTANEOUS at 12:12

## 2022-09-13 NOTE — ED PROVIDER NOTE - NSFOLLOWUPINSTRUCTIONS_ED_ALL_ED_FT
Fever    A fever is an increase in the body's temperature above 100.4°F (38°C) or higher. In adults and children older than three months, a brief mild or moderate fever generally has no long-term effect, and it usually does not require treatment. Many times, fevers are the result of viral infections, which are self-resolving.  However, certain symptoms or diagnostic tests may suggest a bacterial infection that may respond to antibiotics. Take medications as directed by your health care provider.    SEEK IMMEDIATE MEDICAL CARE IF YOU OR YOUR CHILD HAVE ANY OF THE FOLLOWING SYMPTOMS : shortness of breath, seizure, rash/stiff neck/headache, severe abdominal pain, persistent vomiting, any signs of dehydration, or if your child has a fever for over five (5) days.     Headache    A headache is pain or discomfort felt around the head or neck area. The specific cause of a headache may not be found as there are many types including tension headaches, migraine headaches, and cluster headaches. Watch your condition for any changes. Things you can do to manage your pain include taking over the counter and prescription medications as instructed by your health care provider, lying down in a dark quiet room, limiting stress, getting regular sleep, and refraining from alcohol and tobacco products.    SEEK IMMEDIATE MEDICAL CARE IF YOU HAVE ANY OF THE FOLLOWING SYMPTOMS: fever, vomiting, stiff neck, loss of vision, problems with speech, muscle weakness, loss of balance, trouble walking, passing out, or confusion.     Hypertension    Hypertension, commonly called high blood pressure, is when the force of blood pumping through your arteries is too strong. Hypertension forces your heart to work harder to pump blood. Your arteries may become narrow or stiff. Having untreated or uncontrolled hypertension for a long period of time can cause heart attack, stroke, kidney disease, and other problems. If started on a medication, take exactly as prescribed by your health care professional. Maintain a healthy lifestyle and follow up with your primary care physician.    SEEK IMMEDIATE MEDICAL CARE IF YOU HAVE ANY OF THE FOLLOWING SYMPTOMS: severe headache, confusion, chest pain, abdominal pain, vomiting, or shortness of breath.     FOLLOW UP WITH YOUR PCP IN 1 WEEK.

## 2022-09-13 NOTE — ED PROVIDER NOTE - NSICDXPASTMEDICALHX_GEN_ALL_CORE_FT
PAST MEDICAL HISTORY:  Hypertension     Hypertension     Migraine     Migraine     No pertinent past medical history

## 2022-09-13 NOTE — ED PROVIDER NOTE - CLINICAL SUMMARY MEDICAL DECISION MAKING FREE TEXT BOX
38 y/o M pt presents to ED with fever x 4 days, body aches, neck stiffness, and head pressure without evidence of meningeal signs. VS noted, pt found to be very HTN (pt reports similar findings when visiting physicians in the past), currently afebrile with last dose of antipyretics taken last night. Do not suspect bacterial meningitis or SAH/stroke given clinical presentation Symptoms likely caused by viral syndrome, will check labs, cultures, UA, tick panel given pt lives in [place], and [place]. Will give IV fluids, IV Toradol, Tylenol, and reassess.

## 2022-09-13 NOTE — ED ADULT NURSE NOTE - OBJECTIVE STATEMENT
pt presents to ER c/o intermittent fevers x 3 days. states he woke up this AM with neck pain, dizziness, and headaches. alert and oriented x3, responding to questions appropriately.

## 2022-09-13 NOTE — ED PROVIDER NOTE - PATIENT PORTAL LINK FT
You can access the FollowMyHealth Patient Portal offered by [facility] by registering at the following website: [email or web address]. By joining [facility]’s FollowMyHealth portal, you will also be able to view your health information using other applications (apps) compatible with our system.

## 2022-09-13 NOTE — ED PROVIDER NOTE - PROGRESS NOTE DETAILS
Pt feeling much better, looks well, ambulating, and continues to not have meningeal signs. Work up as noted, likely suggestive of viral syndrome. Will discharge, give clear instructions for outpatient f/u, including HTN f/u, start Amlodipine given extreme elevation of BP, and have pt f/u PCP. Pt not septic.

## 2022-09-13 NOTE — ED PROVIDER NOTE - NEUROLOGICAL, MLM
Alert and oriented, no focal deficits, no motor or sensory deficits. Patient is alert, oriented x person, place and time.  Cranial nerves 2-12 are intact.  Normal gait and speech.  Cerebellar testing normal:  negative Romberg, normal coordination and normal finger to nose, heal to shin and rapid alternating movements.  Normal proprioception and sensory exam.  No pronator drift.  5/5 bl upper extremity and lower extremity strength.

## 2022-09-13 NOTE — ED PROVIDER NOTE - ENMT, MLM
Airway patent, Nasal mucosa clear. Mouth with normal mucosa. Throat has no vesicles, no oropharyngeal exudates and uvula is midline. TM bilaterally checked and normal. Neck supple, full range of motion, no meningismus. Airway patent, Nasal mucosa clear. Mouth with normal mucosa. Throat has no vesicles, no oropharyngeal exudates and uvula is midline. TM bilaterally checked and normal. Neck supple, full range of motion, no meningismus.  Neg Kernig's and Brudzinski's signs.

## 2022-09-13 NOTE — ED PROVIDER NOTE - OBJECTIVE STATEMENT
36 y/o M pt with PMHx of HTN and migraines presents to ED c/o 4days of fever, tmax of 102, with associated body aches, neck stiffness, mild head pressure when leaning forward, not described as headache, gradual onset, and not thunderclap or "worst headache of my life." Pt denies nausea, vomiting, diarrhea, abdominal pain, cough, nasal congestion, sore throat, ear pain, vision change, dizziness, photophobia, rash, or any other acute complaints. 36 y/o M pt with PMHx of HTN and migraines presents to ED c/o 4days of fever, tmax of 102, with associated body aches, neck stiffness, mild head pressure when leaning forward, not described as headache, which began with gradual onset, and not thunderclap or "worst headache of my life." Pt denies nausea, vomiting, diarrhea, abdominal pain, cough, nasal congestion, sore throat, ear pain, vision change, dizziness, photophobia, rash, or any other acute complaints.

## 2022-09-14 ENCOUNTER — EMERGENCY (EMERGENCY)
Facility: HOSPITAL | Age: 37
LOS: 1 days | Discharge: HOME | End: 2022-09-14
Attending: EMERGENCY MEDICINE | Admitting: EMERGENCY MEDICINE
Payer: COMMERCIAL

## 2022-09-14 VITALS
SYSTOLIC BLOOD PRESSURE: 165 MMHG | HEIGHT: 72 IN | OXYGEN SATURATION: 100 % | RESPIRATION RATE: 18 BRPM | WEIGHT: 218.04 LBS | TEMPERATURE: 98 F | DIASTOLIC BLOOD PRESSURE: 125 MMHG | HEART RATE: 101 BPM

## 2022-09-14 VITALS
HEART RATE: 72 BPM | OXYGEN SATURATION: 98 % | RESPIRATION RATE: 16 BRPM | SYSTOLIC BLOOD PRESSURE: 152 MMHG | TEMPERATURE: 98 F | DIASTOLIC BLOOD PRESSURE: 90 MMHG

## 2022-09-14 LAB
ALBUMIN SERPL ELPH-MCNC: 4.5 G/DL — SIGNIFICANT CHANGE UP (ref 3.3–5)
ALP SERPL-CCNC: 83 U/L — SIGNIFICANT CHANGE UP (ref 40–120)
ALT FLD-CCNC: 44 U/L — SIGNIFICANT CHANGE UP (ref 10–45)
ANION GAP SERPL CALC-SCNC: 9 MMOL/L — SIGNIFICANT CHANGE UP (ref 5–17)
AST SERPL-CCNC: 33 U/L — SIGNIFICANT CHANGE UP (ref 10–40)
BASOPHILS # BLD AUTO: 0.02 K/UL — SIGNIFICANT CHANGE UP (ref 0–0.2)
BASOPHILS NFR BLD AUTO: 0.4 % — SIGNIFICANT CHANGE UP (ref 0–2)
BILIRUB SERPL-MCNC: 0.2 MG/DL — SIGNIFICANT CHANGE UP (ref 0.2–1.2)
BUN SERPL-MCNC: 9 MG/DL — SIGNIFICANT CHANGE UP (ref 7–23)
CALCIUM SERPL-MCNC: 9 MG/DL — SIGNIFICANT CHANGE UP (ref 8.4–10.5)
CHLORIDE SERPL-SCNC: 104 MMOL/L — SIGNIFICANT CHANGE UP (ref 96–108)
CO2 SERPL-SCNC: 26 MMOL/L — SIGNIFICANT CHANGE UP (ref 22–31)
CREAT SERPL-MCNC: 0.87 MG/DL — SIGNIFICANT CHANGE UP (ref 0.5–1.3)
CULTURE RESULTS: SIGNIFICANT CHANGE UP
EGFR: 115 ML/MIN/1.73M2 — SIGNIFICANT CHANGE UP
EOSINOPHIL # BLD AUTO: 0.06 K/UL — SIGNIFICANT CHANGE UP (ref 0–0.5)
EOSINOPHIL NFR BLD AUTO: 1.3 % — SIGNIFICANT CHANGE UP (ref 0–6)
GLUCOSE SERPL-MCNC: 112 MG/DL — HIGH (ref 70–99)
HCT VFR BLD CALC: 44.1 % — SIGNIFICANT CHANGE UP (ref 39–50)
HGB BLD-MCNC: 14.4 G/DL — SIGNIFICANT CHANGE UP (ref 13–17)
IMM GRANULOCYTES NFR BLD AUTO: 0.2 % — SIGNIFICANT CHANGE UP (ref 0–1.5)
LYMPHOCYTES # BLD AUTO: 0.88 K/UL — LOW (ref 1–3.3)
LYMPHOCYTES # BLD AUTO: 19.3 % — SIGNIFICANT CHANGE UP (ref 13–44)
MAGNESIUM SERPL-MCNC: 2 MG/DL — SIGNIFICANT CHANGE UP (ref 1.6–2.6)
MCHC RBC-ENTMCNC: 27.6 PG — SIGNIFICANT CHANGE UP (ref 27–34)
MCHC RBC-ENTMCNC: 32.7 GM/DL — SIGNIFICANT CHANGE UP (ref 32–36)
MCV RBC AUTO: 84.5 FL — SIGNIFICANT CHANGE UP (ref 80–100)
MONOCYTES # BLD AUTO: 0.52 K/UL — SIGNIFICANT CHANGE UP (ref 0–0.9)
MONOCYTES NFR BLD AUTO: 11.4 % — SIGNIFICANT CHANGE UP (ref 2–14)
NEUTROPHILS # BLD AUTO: 3.07 K/UL — SIGNIFICANT CHANGE UP (ref 1.8–7.4)
NEUTROPHILS NFR BLD AUTO: 67.4 % — SIGNIFICANT CHANGE UP (ref 43–77)
NRBC # BLD: 0 /100 WBCS — SIGNIFICANT CHANGE UP (ref 0–0)
PLATELET # BLD AUTO: 210 K/UL — SIGNIFICANT CHANGE UP (ref 150–400)
POTASSIUM SERPL-MCNC: 4.1 MMOL/L — SIGNIFICANT CHANGE UP (ref 3.5–5.3)
POTASSIUM SERPL-SCNC: 4.1 MMOL/L — SIGNIFICANT CHANGE UP (ref 3.5–5.3)
PROT SERPL-MCNC: 7.5 G/DL — SIGNIFICANT CHANGE UP (ref 6–8.3)
RBC # BLD: 5.22 M/UL — SIGNIFICANT CHANGE UP (ref 4.2–5.8)
RBC # FLD: 14.6 % — HIGH (ref 10.3–14.5)
SARS-COV-2 RNA SPEC QL NAA+PROBE: NEGATIVE — SIGNIFICANT CHANGE UP
SODIUM SERPL-SCNC: 139 MMOL/L — SIGNIFICANT CHANGE UP (ref 135–145)
SPECIMEN SOURCE: SIGNIFICANT CHANGE UP
WBC # BLD: 4.56 K/UL — SIGNIFICANT CHANGE UP (ref 3.8–10.5)
WBC # FLD AUTO: 4.56 K/UL — SIGNIFICANT CHANGE UP (ref 3.8–10.5)

## 2022-09-14 PROCEDURE — 36415 COLL VENOUS BLD VENIPUNCTURE: CPT

## 2022-09-14 PROCEDURE — 71045 X-RAY EXAM CHEST 1 VIEW: CPT

## 2022-09-14 PROCEDURE — 93010 ELECTROCARDIOGRAM REPORT: CPT

## 2022-09-14 PROCEDURE — 96375 TX/PRO/DX INJ NEW DRUG ADDON: CPT

## 2022-09-14 PROCEDURE — 70450 CT HEAD/BRAIN W/O DYE: CPT | Mod: MD

## 2022-09-14 PROCEDURE — 83735 ASSAY OF MAGNESIUM: CPT

## 2022-09-14 PROCEDURE — 80053 COMPREHEN METABOLIC PANEL: CPT

## 2022-09-14 PROCEDURE — 71045 X-RAY EXAM CHEST 1 VIEW: CPT | Mod: 26

## 2022-09-14 PROCEDURE — 85025 COMPLETE CBC W/AUTO DIFF WBC: CPT

## 2022-09-14 PROCEDURE — 99285 EMERGENCY DEPT VISIT HI MDM: CPT | Mod: 25

## 2022-09-14 PROCEDURE — 87635 SARS-COV-2 COVID-19 AMP PRB: CPT

## 2022-09-14 PROCEDURE — 96365 THER/PROPH/DIAG IV INF INIT: CPT

## 2022-09-14 PROCEDURE — 99285 EMERGENCY DEPT VISIT HI MDM: CPT

## 2022-09-14 PROCEDURE — 93005 ELECTROCARDIOGRAM TRACING: CPT

## 2022-09-14 PROCEDURE — 70450 CT HEAD/BRAIN W/O DYE: CPT | Mod: 26,MD

## 2022-09-14 RX ORDER — LABETALOL HCL 100 MG
10 TABLET ORAL ONCE
Refills: 0 | Status: COMPLETED | OUTPATIENT
Start: 2022-09-14 | End: 2022-09-14

## 2022-09-14 RX ORDER — KETOROLAC TROMETHAMINE 30 MG/ML
30 SYRINGE (ML) INJECTION ONCE
Refills: 0 | Status: DISCONTINUED | OUTPATIENT
Start: 2022-09-14 | End: 2022-09-14

## 2022-09-14 RX ORDER — MORPHINE SULFATE 50 MG/1
4 CAPSULE, EXTENDED RELEASE ORAL ONCE
Refills: 0 | Status: DISCONTINUED | OUTPATIENT
Start: 2022-09-14 | End: 2022-09-14

## 2022-09-14 RX ORDER — MAGNESIUM SULFATE 500 MG/ML
2 VIAL (ML) INJECTION ONCE
Refills: 0 | Status: COMPLETED | OUTPATIENT
Start: 2022-09-14 | End: 2022-09-14

## 2022-09-14 RX ORDER — DIPHENHYDRAMINE HCL 50 MG
50 CAPSULE ORAL ONCE
Refills: 0 | Status: COMPLETED | OUTPATIENT
Start: 2022-09-14 | End: 2022-09-14

## 2022-09-14 RX ORDER — SODIUM CHLORIDE 9 MG/ML
1000 INJECTION INTRAMUSCULAR; INTRAVENOUS; SUBCUTANEOUS ONCE
Refills: 0 | Status: COMPLETED | OUTPATIENT
Start: 2022-09-14 | End: 2022-09-14

## 2022-09-14 RX ORDER — METOCLOPRAMIDE HCL 10 MG
10 TABLET ORAL ONCE
Refills: 0 | Status: COMPLETED | OUTPATIENT
Start: 2022-09-14 | End: 2022-09-14

## 2022-09-14 RX ADMIN — Medication 50 MILLIGRAM(S): at 11:49

## 2022-09-14 RX ADMIN — Medication 25 GRAM(S): at 12:38

## 2022-09-14 RX ADMIN — MORPHINE SULFATE 4 MILLIGRAM(S): 50 CAPSULE, EXTENDED RELEASE ORAL at 12:10

## 2022-09-14 RX ADMIN — MORPHINE SULFATE 4 MILLIGRAM(S): 50 CAPSULE, EXTENDED RELEASE ORAL at 11:48

## 2022-09-14 RX ADMIN — Medication 10 MILLIGRAM(S): at 11:55

## 2022-09-14 RX ADMIN — Medication 2 GRAM(S): at 13:09

## 2022-09-14 RX ADMIN — SODIUM CHLORIDE 1000 MILLILITER(S): 9 INJECTION INTRAMUSCULAR; INTRAVENOUS; SUBCUTANEOUS at 12:38

## 2022-09-14 RX ADMIN — Medication 30 MILLIGRAM(S): at 13:05

## 2022-09-14 NOTE — ED PROVIDER NOTE - PHYSICAL EXAMINATION
CONSTITUTIONAL: Uncomfortable appearing, has ice pack on head.   ENMT: Airway patent.  EYES: Clear bilaterally.  CARDIAC: Normal rate, regular rhythm.  Heart sounds S1, S2.   RESPIRATORY: Breath sounds clear and equal bilaterally.  GASTROINTESTINAL: Abdomen soft, non-tender, no guarding.  MUSCULOSKELETAL: Spine appears normal, range of motion is not limited, no muscle or joint tenderness. No meningismus noted.  NEUROLOGICAL: Alert and oriented, no focal deficits, no motor or sensory deficits.   SKIN: Skin normal color for race, warm, dry and intact. No evidence of rash. No cellulitis noted.  PSYCHIATRIC: Alert and oriented. normal mood and affect. no apparent risk to self or others. CONSTITUTIONAL: Uncomfortable appearing, has ice pack on head. Eyes closed.   ENMT: Airway patent.  EYES: Clear bilaterally.  CARDIAC: Normal rate, regular rhythm.  Heart sounds S1, S2.   RESPIRATORY: Breath sounds clear and equal bilaterally.  GASTROINTESTINAL: Abdomen soft, non-tender, no guarding.  MUSCULOSKELETAL: Spine appears normal, range of motion is not limited, no muscle or joint tenderness. No meningismus noted.  NEUROLOGICAL: Alert and oriented, no focal deficits   SKIN: Skin normal color for race, warm, dry and intact. No evidence of rash. No cellulitis noted.  PSYCHIATRIC: Alert and oriented. normal mood and affect. no apparent risk to self or others.

## 2022-09-14 NOTE — ED ADULT NURSE NOTE - OBJECTIVE STATEMENT
Pt A&Ox4, ambulatory with steady gait, speaking in clear/full sentences, pt reporting severe headache. B/P elevated. Pt was seen yesterday at [facility] for r/o meningitis. Pt has bilateral periorbital pressure with fevers x 1 week. TMAX 102. Pt has also been having elevated b/p for the last week. Pt denies N/V, diarrhea, CP/SOB.

## 2022-09-14 NOTE — ED ADULT NURSE NOTE - CAS DISCH ACCOMP BY
LVM to patient in regards to normal lab results. Patient to call the office with any questions. Self

## 2022-09-14 NOTE — ED ADULT NURSE NOTE - ED CARDIAC RATE
Give patient ibuprofen or tylenol as needed for pain. Rest, ice, and elevate your hand. Wear ACE wrap for comfort. Follow-up with your pediatrician in 2-3 days. Return to ER if your symptoms worsen in any way.    normal

## 2022-09-14 NOTE — ED ADULT NURSE NOTE - NSFALLRSKOUTCOME_ED_ALL_ED
"Patient Almaz Mora presents to the ED with chest pain and arm numbness and anxiety.  She states that she asked her aunt to call 911 to have her brought to the ED.  During our conversation, she is noted to be tearful, poor eye contact, and quiet.  She states that she has been very stressed at home lately with her aunt and that she drinks vodka daily (pint to a fifth) to "get away".  She states that she has been having thoughts of not living so aunt took her gun away.  Previous attempt of suicide by overdose and cutting at wrists over a year ago but never sought help or hospitalization.  Has been going to outpatient [facility] for "couple years" to see Dr. Naylor.  Not sure of which medications she has taken in the past or which medications she is taking now.  States that she is non-compliant "because none of them worked".  She is able to only recall trying Zoloft in the recent past.  No upcoming appointments.  States that she is depressed, sad, tearful, loss of interest, loss of motivation.  Passive thoughts of not being alive (no plans of suicide).  Worrisome person.  Panic attacks lately with increased alcohol use.  Denies manic or psychotic history but does feel that "reality isn't right".  Sleep has been poor past few days with restless sleep, pacing floors.  Denies alcohol withdrawals or seizures.  Aunt also made statement that patient made threatening comments on Facebook.  When told this to patient, she says, "now that makes me want to hurt her".  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  " Universal Safety Interventions

## 2022-09-14 NOTE — ED ADULT TRIAGE NOTE - CHIEF COMPLAINT QUOTE
pt was referred in for R/O meningitis , severe headache x 1 day and intermittent fever since Saturday, sensitivity to light  , hypertensive in triage, pt was in ER yesterday

## 2022-09-14 NOTE — ED PROVIDER NOTE - NSFOLLOWUPINSTRUCTIONS_ED_ALL_ED_FT
Please follow up with your primary care doctor ASAP. Return to the ER if you develop any concerning symptoms.     Hypertension    Hypertension, commonly called high blood pressure, is when the force of blood pumping through your arteries is too strong. Hypertension forces your heart to work harder to pump blood. Your arteries may become narrow or stiff. Having untreated or uncontrolled hypertension for a long period of time can cause heart attack, stroke, kidney disease, and other problems. If started on a medication, take exactly as prescribed by your health care professional. Maintain a healthy lifestyle and follow up with your primary care physician.    SEEK IMMEDIATE MEDICAL CARE IF YOU HAVE ANY OF THE FOLLOWING SYMPTOMS: severe headache, confusion, chest pain, abdominal pain, vomiting, or shortness of breath.    Headache    A headache is pain or discomfort felt around the head or neck area. The specific cause of a headache may not be found as there are many types including tension headaches, migraine headaches, and cluster headaches. Watch your condition for any changes. Things you can do to manage your pain include taking over the counter and prescription medications as instructed by your health care provider, lying down in a dark quiet room, limiting stress, getting regular sleep, and refraining from alcohol and tobacco products.    SEEK IMMEDIATE MEDICAL CARE IF YOU HAVE ANY OF THE FOLLOWING SYMPTOMS: fever, vomiting, stiff neck, loss of vision, problems with speech, muscle weakness, loss of balance, trouble walking, passing out, or confusion.

## 2022-09-14 NOTE — ED PROVIDER NOTE - NSFOLLOWUPCLINICS_GEN_ALL_ED_FT
[facility]  Family Medicine  [address]  [place], [place] [address]  Phone: [phone]  Fax:   Follow Up Time: 4-6 Days

## 2022-09-14 NOTE — ED PROVIDER NOTE - PATIENT PORTAL LINK FT
You can access the FollowMyHealth Patient Portal offered by [facility] by registering at the following website: [email or web address]. By joining [employer]’s FollowMyHealth portal, you will also be able to view your health information using other applications (apps) compatible with our system.

## 2022-09-14 NOTE — ED PROVIDER NOTE - OBJECTIVE STATEMENT
36 y/o Male with a PMHx of HTN (was on Cozaar, but stopped taking ten years ago because pressure normalized), Migraines (Cymbalta), ADHD (Strattera), Gastric ulcers with GERD (PPI) presenting to the ED c/o severe HA. Pt states that x4 days ago, he began developing fevers (T-max 102) with associated body aches, HA, head pressure and neck stiffness. Evaluated in ED yesterday at which point, he had blood work including COVID-19 and RVP as well as lactate all of which were negative. Pt was DC home. Presents today with worsening HA with photophobia. Pt noted to be hypertensive with /125. States he last had a fever yesterday, however, did not take temperature. Denies nausea, vomiting, coughing, URI symptoms, urinary symptoms or abdominal pain. 36 y/o Male with a PMHx of HTN (was on Cozaar, but stopped taking ten years ago because pressure normalized), Migraines (Cymbalta), ADHD (Strattera), Gastric ulcers with GERD (PPI) presenting to the ED c/o severe HA. Pt states that x4 days ago, he began developing fevers (T-max 102) with associated body aches, HA, head pressure and neck stiffness. Evaluated in ED yesterday at which point, he had blood work including COVID-19 and RVP as well as lactate and blood cxs, all of which were negative. Was found to be hypertensive and dcd with Rx for Norvasc. Presents today with worsening HA with photophobia. Pt noted to be hypertensive with /125. States he last had a subjective fever yesterday, however, did not take temperature yesterday. Denies nausea, vomiting, coughing, URI symptoms, urinary symptoms or abdominal pain.

## 2022-09-14 NOTE — ED PROVIDER NOTE - CLINICAL SUMMARY MEDICAL DECISION MAKING FREE TEXT BOX
38 y/o Male with a PMHx of HTN (was on Cozaar, but stopped taking ten years ago because pressure normalized), Migraines (Cymbalta), ADHD (Strattera), Gastric ulcers with GERD (PPI) presenting to the ED c/o severe HA.    ED Course: All vital signs noted. Pt is afebrile and hypertensive at 165/125, . Plan will be to obtain repeat blood work, CT head, give pt IV fluids, Reglan, Benadryl, Morphine, Magnesium sulfate and Labetalol in ED today. Will re-evaluate. 36 y/o Male with a PMHx of HTN (was on Cozaar, but stopped taking ten years ago because pressure normalized), Migraines (Cymbalta), ADHD (Strattera), Gastric ulcers with GERD (PPI) presenting to the ED c/o severe HA.    ED Course: All vital signs noted. Pt is afebrile and hypertensive at 165/125, . Plan will be to obtain repeat blood work, CT head, give pt IV fluids, Reglan, Benadryl, Morphine, Magnesium sulfate and Labetalol in ED today. Will re-evaluate.    guven rx gor norvasc yesrome 38 y/o Male with a PMHx of HTN (was on Cozaar, but stopped taking ten years ago because pressure normalized), Migraines (Cymbalta), ADHD (Strattera), Gastric ulcers with GERD (PPI) presenting to the ED c/o severe HA. Plan will be to obtain repeat blood work, CT head, give pt IV fluids, Reglan, Benadryl, Morphine, Magnesium sulfate and Labetalol in ED today. Will re-evaluate.    ED Course: All vital signs noted. Pt is afebrile and hypertensive with /125, . Repeat blood work noted and WNL. CBC remains normal. CT head with NAD. Pt with marked improvement of sxs s/p meds. BPs improved. GIven Toradol post CT scan. Advised to take BP meds that have already been prescribed. Non-toxic appearing and stable for discharge. To follow up outpatient. Strict return precautions given.

## 2022-09-15 ENCOUNTER — EMERGENCY (EMERGENCY)
Facility: HOSPITAL | Age: 37
LOS: 1 days | Discharge: HOME | End: 2022-09-15
Attending: EMERGENCY MEDICINE | Admitting: EMERGENCY MEDICINE
Payer: COMMERCIAL

## 2022-09-15 ENCOUNTER — OUTPATIENT (OUTPATIENT)
Dept: OUTPATIENT SERVICES | Facility: HOSPITAL | Age: 37
LOS: 1 days | End: 2022-09-15
Payer: COMMERCIAL

## 2022-09-15 VITALS
RESPIRATION RATE: 16 BRPM | SYSTOLIC BLOOD PRESSURE: 143 MMHG | DIASTOLIC BLOOD PRESSURE: 86 MMHG | OXYGEN SATURATION: 97 % | HEIGHT: 72 IN | HEART RATE: 85 BPM | WEIGHT: 210.1 LBS | TEMPERATURE: 98 F

## 2022-09-15 DIAGNOSIS — R51.9 HEADACHE, UNSPECIFIED: ICD-10-CM

## 2022-09-15 LAB
A PHAGOCYTOPH IGG TITR SER IF: SIGNIFICANT CHANGE UP TITER
ALBUMIN SERPL ELPH-MCNC: 4.7 G/DL — SIGNIFICANT CHANGE UP (ref 3.3–5)
ALP SERPL-CCNC: 90 U/L — SIGNIFICANT CHANGE UP (ref 40–120)
ALT FLD-CCNC: 43 U/L — SIGNIFICANT CHANGE UP (ref 10–45)
ANION GAP SERPL CALC-SCNC: 11 MMOL/L — SIGNIFICANT CHANGE UP (ref 5–17)
APPEARANCE CSF: CLEAR — SIGNIFICANT CHANGE UP
APPEARANCE SPUN FLD: COLORLESS — SIGNIFICANT CHANGE UP
APTT BLD: 35 SEC — SIGNIFICANT CHANGE UP (ref 27.5–35.5)
AST SERPL-CCNC: 29 U/L — SIGNIFICANT CHANGE UP (ref 10–40)
B BURGDOR AB SER QL IA: NEGATIVE — SIGNIFICANT CHANGE UP
B MICROTI IGG TITR SER: SIGNIFICANT CHANGE UP TITER
BASOPHILS # BLD AUTO: 0.03 K/UL — SIGNIFICANT CHANGE UP (ref 0–0.2)
BASOPHILS NFR BLD AUTO: 0.3 % — SIGNIFICANT CHANGE UP (ref 0–2)
BILIRUB SERPL-MCNC: 0.4 MG/DL — SIGNIFICANT CHANGE UP (ref 0.2–1.2)
BUN SERPL-MCNC: 13 MG/DL — SIGNIFICANT CHANGE UP (ref 7–23)
CALCIUM SERPL-MCNC: 9.9 MG/DL — SIGNIFICANT CHANGE UP (ref 8.4–10.5)
CHLORIDE SERPL-SCNC: 101 MMOL/L — SIGNIFICANT CHANGE UP (ref 96–108)
CO2 SERPL-SCNC: 27 MMOL/L — SIGNIFICANT CHANGE UP (ref 22–31)
COLOR CSF: SIGNIFICANT CHANGE UP
CREAT SERPL-MCNC: 1.14 MG/DL — SIGNIFICANT CHANGE UP (ref 0.5–1.3)
CSF PCR RESULT: DETECTED
E CHAFFEENSIS IGG TITR SER IF: SIGNIFICANT CHANGE UP TITER
EGFR: 86 ML/MIN/1.73M2 — SIGNIFICANT CHANGE UP
EOSINOPHIL # BLD AUTO: 0.02 K/UL — SIGNIFICANT CHANGE UP (ref 0–0.5)
EOSINOPHIL NFR BLD AUTO: 0.2 % — SIGNIFICANT CHANGE UP (ref 0–6)
EV RNA CSF QL NAA+PROBE: DETECTED
GLUCOSE CSF-MCNC: 57 MG/DL — SIGNIFICANT CHANGE UP (ref 40–70)
GLUCOSE SERPL-MCNC: 123 MG/DL — HIGH (ref 70–99)
GRAM STN FLD: SIGNIFICANT CHANGE UP
HCT VFR BLD CALC: 46.6 % — SIGNIFICANT CHANGE UP (ref 39–50)
HGB BLD-MCNC: 15.2 G/DL — SIGNIFICANT CHANGE UP (ref 13–17)
IMM GRANULOCYTES NFR BLD AUTO: 0.2 % — SIGNIFICANT CHANGE UP (ref 0–0.9)
INR BLD: 1.22 — HIGH (ref 0.88–1.16)
LYMPHOCYTES # BLD AUTO: 1.44 K/UL — SIGNIFICANT CHANGE UP (ref 1–3.3)
LYMPHOCYTES # BLD AUTO: 16.3 % — SIGNIFICANT CHANGE UP (ref 13–44)
LYMPHOCYTES # CSF: 31 % — LOW (ref 40–80)
MCHC RBC-ENTMCNC: 27.6 PG — SIGNIFICANT CHANGE UP (ref 27–34)
MCHC RBC-ENTMCNC: 32.6 GM/DL — SIGNIFICANT CHANGE UP (ref 32–36)
MCV RBC AUTO: 84.7 FL — SIGNIFICANT CHANGE UP (ref 80–100)
MONOCYTES # BLD AUTO: 0.9 K/UL — SIGNIFICANT CHANGE UP (ref 0–0.9)
MONOCYTES NFR BLD AUTO: 10.2 % — SIGNIFICANT CHANGE UP (ref 2–14)
MONOS+MACROS NFR CSF: 16 % — SIGNIFICANT CHANGE UP (ref 15–45)
NEUTROPHILS # BLD AUTO: 6.4 K/UL — SIGNIFICANT CHANGE UP (ref 1.8–7.4)
NEUTROPHILS # CSF: 1 % — SIGNIFICANT CHANGE UP (ref 0–6)
NEUTROPHILS NFR BLD AUTO: 72.8 % — SIGNIFICANT CHANGE UP (ref 43–77)
NRBC # BLD: 0 /100 WBCS — SIGNIFICANT CHANGE UP (ref 0–0)
NRBC NFR CSF: 48 /UL — HIGH (ref 0–5)
PLATELET # BLD AUTO: 240 K/UL — SIGNIFICANT CHANGE UP (ref 150–400)
POTASSIUM SERPL-MCNC: 4.3 MMOL/L — SIGNIFICANT CHANGE UP (ref 3.5–5.3)
POTASSIUM SERPL-SCNC: 4.3 MMOL/L — SIGNIFICANT CHANGE UP (ref 3.5–5.3)
PROT CSF-MCNC: 53 MG/DL — HIGH (ref 15–45)
PROT SERPL-MCNC: 7.9 G/DL — SIGNIFICANT CHANGE UP (ref 6–8.3)
PROTHROM AB SERPL-ACNC: 14.5 SEC — HIGH (ref 10.5–13.4)
RBC # BLD: 5.5 M/UL — SIGNIFICANT CHANGE UP (ref 4.2–5.8)
RBC # CSF: 3 /UL — HIGH (ref 0–0)
RBC # FLD: 14.6 % — HIGH (ref 10.3–14.5)
SARS-COV-2 RNA SPEC QL NAA+PROBE: NEGATIVE — SIGNIFICANT CHANGE UP
SODIUM SERPL-SCNC: 139 MMOL/L — SIGNIFICANT CHANGE UP (ref 135–145)
SPECIMEN SOURCE: SIGNIFICANT CHANGE UP
TUBE TYPE: SIGNIFICANT CHANGE UP
WBC # BLD: 8.81 K/UL — SIGNIFICANT CHANGE UP (ref 3.8–10.5)
WBC # FLD AUTO: 8.81 K/UL — SIGNIFICANT CHANGE UP (ref 3.8–10.5)

## 2022-09-15 PROCEDURE — A9585: CPT

## 2022-09-15 PROCEDURE — 99291 CRITICAL CARE FIRST HOUR: CPT | Mod: 25

## 2022-09-15 PROCEDURE — 96374 THER/PROPH/DIAG INJ IV PUSH: CPT

## 2022-09-15 PROCEDURE — 99291 CRITICAL CARE FIRST HOUR: CPT

## 2022-09-15 PROCEDURE — 36415 COLL VENOUS BLD VENIPUNCTURE: CPT

## 2022-09-15 PROCEDURE — 87070 CULTURE OTHR SPECIMN AEROBIC: CPT

## 2022-09-15 PROCEDURE — 85610 PROTHROMBIN TIME: CPT

## 2022-09-15 PROCEDURE — 85730 THROMBOPLASTIN TIME PARTIAL: CPT

## 2022-09-15 PROCEDURE — 70553 MRI BRAIN STEM W/O & W/DYE: CPT

## 2022-09-15 PROCEDURE — 86617 LYME DISEASE ANTIBODY: CPT

## 2022-09-15 PROCEDURE — 89051 BODY FLUID CELL COUNT: CPT

## 2022-09-15 PROCEDURE — 87483 CNS DNA AMP PROBE TYPE 12-25: CPT

## 2022-09-15 PROCEDURE — 70553 MRI BRAIN STEM W/O & W/DYE: CPT | Mod: 26

## 2022-09-15 PROCEDURE — 84157 ASSAY OF PROTEIN OTHER: CPT

## 2022-09-15 PROCEDURE — 87205 SMEAR GRAM STAIN: CPT

## 2022-09-15 PROCEDURE — 85025 COMPLETE CBC W/AUTO DIFF WBC: CPT

## 2022-09-15 PROCEDURE — 87635 SARS-COV-2 COVID-19 AMP PRB: CPT

## 2022-09-15 PROCEDURE — 80053 COMPREHEN METABOLIC PANEL: CPT

## 2022-09-15 PROCEDURE — 87529 HSV DNA AMP PROBE: CPT

## 2022-09-15 PROCEDURE — 82945 GLUCOSE OTHER FLUID: CPT

## 2022-09-15 RX ORDER — SODIUM CHLORIDE 9 MG/ML
1000 INJECTION INTRAMUSCULAR; INTRAVENOUS; SUBCUTANEOUS ONCE
Refills: 0 | Status: COMPLETED | OUTPATIENT
Start: 2022-09-15 | End: 2022-09-15

## 2022-09-15 RX ORDER — MORPHINE SULFATE 50 MG/1
2 CAPSULE, EXTENDED RELEASE ORAL ONCE
Refills: 0 | Status: DISCONTINUED | OUTPATIENT
Start: 2022-09-15 | End: 2022-09-15

## 2022-09-15 RX ADMIN — SODIUM CHLORIDE 1000 MILLILITER(S): 9 INJECTION INTRAMUSCULAR; INTRAVENOUS; SUBCUTANEOUS at 18:29

## 2022-09-15 RX ADMIN — SODIUM CHLORIDE 1000 MILLILITER(S): 9 INJECTION INTRAMUSCULAR; INTRAVENOUS; SUBCUTANEOUS at 21:19

## 2022-09-15 RX ADMIN — MORPHINE SULFATE 2 MILLIGRAM(S): 50 CAPSULE, EXTENDED RELEASE ORAL at 19:15

## 2022-09-15 NOTE — ED PROVIDER NOTE - CARE PROVIDER_API CALL
Kyle Weston)  Neurology; Vascular Neurology  [address], [address] [place] [address]  Phone: [phone]  Fax: [phone]  Follow Up Time:

## 2022-09-15 NOTE — ED ADULT TRIAGE NOTE - CHIEF COMPLAINT QUOTE
Pt presents to the ED c/o severe headache x 5 days. Pt sent to ED by Dr. Lopez for LP to r/o viral meningitis.

## 2022-09-15 NOTE — ED PROVIDER NOTE - OBJECTIVE STATEMENT
37M with a h/o HTN and migraines who p/w persistent headache and head/eye pressure with associated body aches, fevers, fatigue, and photophobia since Saturday (5 days ago), pt has been taking tylenol, ibuprofen and his migraines meds at home with minimal relief. This is his 3rd visit for similar sx - he was seen by Dr. Weston today and had a reportedly normal MRI - sent for LP by Dr. Weston today in the ER.

## 2022-09-15 NOTE — ED PROVIDER NOTE - PHYSICAL EXAMINATION
used GEN: Well nourished, well developed, awake, alert, oriented to person, place, time/situation and uncomfortable appearing 2/2 symptoms, NTAF  ENT: Airway patent, Nasal mucosa clear. Mouth with normal mucosa.  EYES: Clear bilaterally. PERRL, EOMI  RESPIRATORY: Breathing comfortably with normal RR.    CARDIAC: Regular rate and rhythm   MSK: Range of motion is not limited, no deformities noted.  NEURO: Alert and oriented x 3. Cn 2-12 intact. Strength 5/5 and sensation intact in all 4 extremities. Gait normal.   SKIN: Skin normal color for race, warm, dry and intact. No evidence of rash.  PSYCH: Alert and oriented to person, place, time/situation. normal mood and affect. no apparent risk to self or others.

## 2022-09-15 NOTE — ED ADULT NURSE REASSESSMENT NOTE - NS ED NURSE REASSESS COMMENT FT1
Pt received in no acute distress, a&ox3, breathing with ease on room air. Pt with 20g heplock to LAC, intact, no edema or redness noted. Pt denies any pain at this time, no nuero deficit noted. Pt pending CSF results and dispo. Pt otherwise comfortable and assisted with all needs. Denies any other c/o. Will monitor.

## 2022-09-15 NOTE — ED ADULT NURSE NOTE - OBJECTIVE STATEMENT
Patient is a 36yo male reporting headache, facial pain, and dizziness x5 days. Pt had MRI that was negative, as per patient. Denies vomiting, vision changes. Reports fever Tmax 101 today.

## 2022-09-15 NOTE — ED PROVIDER NOTE - CLINICAL SUMMARY MEDICAL DECISION MAKING FREE TEXT BOX
37M with a h/o HTN and migraines who p/w persistent headache and head/eye pressure with associated body aches, fevers, fatigue, and photophobia since Saturday (5 days ago), pt has been taking tylenol, ibuprofen and his migraines meds at home with minimal relief. This is his 3rd visit for similar sx - he was seen by Dr. Weston today and had a reportedly normal MRI - sent for LP by Dr. Weston today in the ER.    Pt is well-appearing on exam, VSS, no focal neuro deficits, Labs sent and LP performed in the ED by Dr. Weston. Dispo pending LP results. 37M with a h/o HTN and migraines who p/w persistent headache and head/eye pressure with associated body aches, fevers, fatigue, and photophobia since Saturday (5 days ago), pt has been taking tylenol, ibuprofen and his migraines meds at home with minimal relief. This is his 3rd visit for similar sx - he was seen by Dr. Weston today and had a reportedly normal MRI - sent for LP by Dr. Weston today in the ER.    Pt is well-appearing on exam, VSS, no focal neuro deficits, Labs sent and LP performed in the ED by Dr. Weston. Dispo pending LP results.    LP c/w viral meningitis. CSF PCR +for eneterovirus. D/w Dr. Weston and pt ok for DC with recs for supportive care.   Pt given 2L NS in the ER. Declines additional meds in the ER at this time.     Pt feeling improved, he is happy to have a diagnosis, and is stable for DC. ED evaluation and management discussed with the patient in detail.  Close PMD/neuro follow up encouraged. Patient given the opportunity to ask any questions about their discharge diagnosis and instructions. Patient verbalized understanding.

## 2022-09-15 NOTE — ED PROVIDER NOTE - NSFOLLOWUPINSTRUCTIONS_ED_ALL_ED_FT
Viral Meningitis    WHAT YOU NEED TO KNOW:    Viral meningitis is inflammation of the lining that surrounds and protects your brain and spinal cord. Viral meningitis is also called aseptic meningitis.    DISCHARGE INSTRUCTIONS:    Call your local emergency number (911 in the [place]) or have someone call if:   •You are hard to wake.      •You have a seizure.      Return to the emergency department if:   •Your symptoms get worse.      •You are confused.      •You have a new red or purple rash.      Call your doctor if:   •You have a fever.      •You think someone in your family has viral meningitis.      •You have questions or concerns about your condition or care.      Medicines: You may need any of the following:   •Acetaminophen decreases pain and fever. It is available without a doctor's order. Ask how much to take and how often to take it. Follow directions. Read the labels of all other medicines you are using to see if they also contain acetaminophen, or ask your doctor or pharmacist. Acetaminophen can cause liver damage if not taken correctly.      •Prescription pain medicine may be given. Ask your healthcare provider how to take this medicine safely. Some prescription pain medicines contain acetaminophen. Do not take other medicines that contain acetaminophen without talking to your healthcare provider. Too much acetaminophen may cause liver damage. Prescription pain medicine may cause constipation. Ask your healthcare provider how to prevent or treat constipation.       •Nausea medicine helps calm your stomach and control vomiting.      •Antiviral medicine helps treat an infection caused by a virus.      •Take your medicine as directed. Contact your healthcare provider if you think your medicine is not helping or if you have side effects. Tell your provider if you are allergic to any medicine. Keep a list of the medicines, vitamins, and herbs you take. Include the amounts, and when and why you take them. Bring the list or the pill bottles to follow-up visits. Carry your medicine list with you in case of an emergency.      Manage your symptoms:   •Rest as much as possible. A dark, quiet room is best if you have headaches or your eyes are sensitive to light.      •Drink liquids as directed. You may need extra liquids to help prevent dehydration. Ask how much liquid to drink each day and which liquids are best for you.      Prevent the spread of viral meningitis:          •Wash your hands often. Wash your hands several times each day. Wash after you use the bathroom, change a child's diaper, and before you prepare or eat food. Use soap and water every time. Rub your soapy hands together, lacing your fingers. Wash the front and back of your hands, and in between your fingers. Use the fingers of one hand to scrub under the fingernails of the other hand. Wash for at least 20 seconds. Rinse with warm, running water for several seconds. Then dry your hands with a clean towel or paper towel. Use hand  that contains alcohol if soap and water are not available. Do not touch your eyes, nose, or mouth without washing your hands first.  Handwashing           •Cover a sneeze or cough. Use a tissue that covers your mouth and nose. Throw the tissue away in a trash can right away. Use the bend of your arm if a tissue is not available. Wash your hands well with soap and water or use a hand .      •Do not share items with anyone. This includes food and drinks.      •Get vaccines as directed. Vaccines help protect you and others around you from diseases caused by viruses or bacteria. Get the influenza (flu) vaccine as soon as recommended each year. The flu vaccine is usually available starting in September or October. Flu viruses change, so it is important to get a flu vaccine every year. Get the pneumonia vaccine if recommended. This vaccine is usually recommended every 5 years. Your provider will tell you when to get this vaccine, if needed. He or she can tell you if you should get other vaccines, and when to get them.      Follow up with your doctor as directed: Write down your questions so you remember to ask them during your visits.

## 2022-09-15 NOTE — ED PROVIDER NOTE - PATIENT PORTAL LINK FT
You can access the FollowMyHealth Patient Portal offered by [facility] by registering at the following website: [email or web address]. By joining [employer]’s FollowMyHealth portal, you will also be able to view your health information using other applications (apps) compatible with our system.

## 2022-09-16 DIAGNOSIS — R50.9 FEVER, UNSPECIFIED: ICD-10-CM

## 2022-09-16 DIAGNOSIS — G43.909 MIGRAINE, UNSPECIFIED, NOT INTRACTABLE, WITHOUT STATUS MIGRAINOSUS: ICD-10-CM

## 2022-09-16 DIAGNOSIS — M54.2 CERVICALGIA: ICD-10-CM

## 2022-09-16 DIAGNOSIS — M79.10 MYALGIA, UNSPECIFIED SITE: ICD-10-CM

## 2022-09-16 DIAGNOSIS — Z20.822 CONTACT WITH AND (SUSPECTED) EXPOSURE TO COVID-19: ICD-10-CM

## 2022-09-16 DIAGNOSIS — I10 ESSENTIAL (PRIMARY) HYPERTENSION: ICD-10-CM

## 2022-09-16 LAB
LABORATORY COMMENT REPORT: SIGNIFICANT CHANGE UP
SOURCE HSV 1/2: SIGNIFICANT CHANGE UP

## 2022-09-17 DIAGNOSIS — R51.9 HEADACHE, UNSPECIFIED: ICD-10-CM

## 2022-09-17 DIAGNOSIS — I10 ESSENTIAL (PRIMARY) HYPERTENSION: ICD-10-CM

## 2022-09-17 DIAGNOSIS — Z86.69 PERSONAL HISTORY OF OTHER DISEASES OF THE NERVOUS SYSTEM AND SENSE ORGANS: ICD-10-CM

## 2022-09-17 DIAGNOSIS — Z20.822 CONTACT WITH AND (SUSPECTED) EXPOSURE TO COVID-19: ICD-10-CM

## 2022-09-17 DIAGNOSIS — F90.9 ATTENTION-DEFICIT HYPERACTIVITY DISORDER, UNSPECIFIED TYPE: ICD-10-CM

## 2022-09-17 DIAGNOSIS — I45.10 UNSPECIFIED RIGHT BUNDLE-BRANCH BLOCK: ICD-10-CM

## 2022-09-18 LAB
CULTURE RESULTS: SIGNIFICANT CHANGE UP
CULTURE RESULTS: SIGNIFICANT CHANGE UP
SPECIMEN SOURCE: SIGNIFICANT CHANGE UP
SPECIMEN SOURCE: SIGNIFICANT CHANGE UP

## 2022-09-19 DIAGNOSIS — H53.149 VISUAL DISCOMFORT, UNSPECIFIED: ICD-10-CM

## 2022-09-19 DIAGNOSIS — B34.1 ENTEROVIRUS INFECTION, UNSPECIFIED: ICD-10-CM

## 2022-09-19 DIAGNOSIS — M79.10 MYALGIA, UNSPECIFIED SITE: ICD-10-CM

## 2022-09-19 DIAGNOSIS — R51.9 HEADACHE, UNSPECIFIED: ICD-10-CM

## 2022-09-19 DIAGNOSIS — Z20.822 CONTACT WITH AND (SUSPECTED) EXPOSURE TO COVID-19: ICD-10-CM

## 2022-09-19 DIAGNOSIS — R50.9 FEVER, UNSPECIFIED: ICD-10-CM

## 2022-09-19 DIAGNOSIS — R53.83 OTHER FATIGUE: ICD-10-CM

## 2022-09-19 DIAGNOSIS — I10 ESSENTIAL (PRIMARY) HYPERTENSION: ICD-10-CM

## 2022-09-21 LAB
CULTURE RESULTS: NO GROWTH — SIGNIFICANT CHANGE UP
SPECIMEN SOURCE: SIGNIFICANT CHANGE UP

## 2022-09-27 ENCOUNTER — NON-APPOINTMENT (OUTPATIENT)
Age: 37
End: 2022-09-27

## 2022-09-27 LAB — B BURGDOR AB CSF-ACNC: SIGNIFICANT CHANGE UP

## 2022-09-28 ENCOUNTER — OUTPATIENT (OUTPATIENT)
Dept: OUTPATIENT SERVICES | Facility: HOSPITAL | Age: 37
LOS: 1 days | End: 2022-09-28
Payer: COMMERCIAL

## 2022-09-28 ENCOUNTER — APPOINTMENT (OUTPATIENT)
Dept: MRI IMAGING | Facility: HOSPITAL | Age: 37
End: 2022-09-28

## 2022-09-28 PROCEDURE — 70553 MRI BRAIN STEM W/O & W/DYE: CPT

## 2022-09-28 PROCEDURE — A9585: CPT

## 2022-09-28 PROCEDURE — 70553 MRI BRAIN STEM W/O & W/DYE: CPT | Mod: 26

## 2022-10-25 ENCOUNTER — APPOINTMENT (OUTPATIENT)
Dept: GASTROENTEROLOGY | Facility: CLINIC | Age: 37
End: 2022-10-25

## 2022-11-14 NOTE — ED ADULT TRIAGE NOTE - TEMPERATURE IN FAHRENHEIT (DEGREES F)
98.2 FAMILY HISTORY:  Father  Still living? Unknown  Family history of essential hypertension, Age at diagnosis: Age Unknown

## 2023-01-13 ENCOUNTER — APPOINTMENT (OUTPATIENT)
Dept: NEUROLOGY | Facility: CLINIC | Age: 38
End: 2023-01-13
Payer: COMMERCIAL

## 2023-01-13 VITALS
TEMPERATURE: 98.5 F | WEIGHT: 212 LBS | OXYGEN SATURATION: 98 % | SYSTOLIC BLOOD PRESSURE: 150 MMHG | HEART RATE: 86 BPM | HEIGHT: 72 IN | BODY MASS INDEX: 28.71 KG/M2 | DIASTOLIC BLOOD PRESSURE: 103 MMHG

## 2023-01-13 DIAGNOSIS — H81.4 VERTIGO OF CENTRAL ORIGIN: ICD-10-CM

## 2023-01-13 PROCEDURE — 99213 OFFICE O/P EST LOW 20 MIN: CPT | Mod: 1L

## 2023-01-13 RX ORDER — MECLIZINE HYDROCHLORIDE 12.5 MG/1
12.5 TABLET ORAL
Qty: 90 | Refills: 0 | Status: ACTIVE | COMMUNITY
Start: 2023-01-13 | End: 1900-01-01

## 2023-01-25 ENCOUNTER — EMERGENCY (EMERGENCY)
Facility: HOSPITAL | Age: 38
LOS: 1 days | Discharge: HOME | End: 2023-01-25
Attending: STUDENT IN AN ORGANIZED HEALTH CARE EDUCATION/TRAINING PROGRAM | Admitting: STUDENT IN AN ORGANIZED HEALTH CARE EDUCATION/TRAINING PROGRAM
Payer: COMMERCIAL

## 2023-01-25 VITALS
RESPIRATION RATE: 16 BRPM | DIASTOLIC BLOOD PRESSURE: 94 MMHG | TEMPERATURE: 98 F | HEART RATE: 80 BPM | SYSTOLIC BLOOD PRESSURE: 157 MMHG | OXYGEN SATURATION: 100 %

## 2023-01-25 VITALS
TEMPERATURE: 98 F | HEART RATE: 81 BPM | OXYGEN SATURATION: 100 % | RESPIRATION RATE: 16 BRPM | SYSTOLIC BLOOD PRESSURE: 176 MMHG | DIASTOLIC BLOOD PRESSURE: 113 MMHG | WEIGHT: 104.94 LBS

## 2023-01-25 PROCEDURE — 99284 EMERGENCY DEPT VISIT MOD MDM: CPT

## 2023-01-25 PROCEDURE — 96375 TX/PRO/DX INJ NEW DRUG ADDON: CPT

## 2023-01-25 PROCEDURE — 96374 THER/PROPH/DIAG INJ IV PUSH: CPT

## 2023-01-25 PROCEDURE — 99284 EMERGENCY DEPT VISIT MOD MDM: CPT | Mod: 25

## 2023-01-25 RX ORDER — DIPHENHYDRAMINE HCL 50 MG
25 CAPSULE ORAL ONCE
Refills: 0 | Status: COMPLETED | OUTPATIENT
Start: 2023-01-25 | End: 2023-01-25

## 2023-01-25 RX ORDER — METOCLOPRAMIDE HCL 10 MG
10 TABLET ORAL ONCE
Refills: 0 | Status: COMPLETED | OUTPATIENT
Start: 2023-01-25 | End: 2023-01-25

## 2023-01-25 RX ORDER — KETOROLAC TROMETHAMINE 30 MG/ML
15 SYRINGE (ML) INJECTION ONCE
Refills: 0 | Status: DISCONTINUED | OUTPATIENT
Start: 2023-01-25 | End: 2023-01-25

## 2023-01-25 RX ORDER — BUTALBITAL, ACETAMINOPHEN AND CAFFEINE 300; 50; 40 MG/1; MG/1; MG/1
50-300-40 CAPSULE ORAL EVERY 6 HOURS
Qty: 30 | Refills: 0 | Status: ACTIVE | COMMUNITY
Start: 2023-01-25 | End: 1900-01-01

## 2023-01-25 RX ORDER — SODIUM CHLORIDE 9 MG/ML
1000 INJECTION INTRAMUSCULAR; INTRAVENOUS; SUBCUTANEOUS ONCE
Refills: 0 | Status: COMPLETED | OUTPATIENT
Start: 2023-01-25 | End: 2023-01-25

## 2023-01-25 RX ADMIN — Medication 15 MILLIGRAM(S): at 13:34

## 2023-01-25 RX ADMIN — Medication 25 MILLIGRAM(S): at 13:34

## 2023-01-25 RX ADMIN — SODIUM CHLORIDE 1000 MILLILITER(S): 9 INJECTION INTRAMUSCULAR; INTRAVENOUS; SUBCUTANEOUS at 13:34

## 2023-01-25 RX ADMIN — Medication 104 MILLIGRAM(S): at 13:34

## 2023-01-25 NOTE — ED PROVIDER NOTE - NS ED ATTENDING STATEMENT MOD
This was a shared visit with the DREW. I reviewed and verified the documentation and independently performed the documented:

## 2023-01-25 NOTE — ED PROVIDER NOTE - OBJECTIVE STATEMENT
37yo male with a pmhx of migraines presents with frontal headache, photophobia, mild nausea today. He states this is consistent with prior headaches. He is followed by Dr. Weston, has been taking fioricet for migraines, but is planning to adjust medication. Of note, was diagnosed with viral meningitis in Sept 2022. Reports increased headaches since that time. Denies fever, chills, neck stiffness, vomiting, numbness, weakness, dizziness today.

## 2023-01-25 NOTE — ED PROVIDER NOTE - CLINICAL SUMMARY MEDICAL DECISION MAKING FREE TEXT BOX
Pt hypertensive on arrival, otherwise hemodynamically stable. States he has elevated BP with headaches. Improved after medical management of headache in ED. Denies infectious sx, chest pain, shortness of breath today. Discussed CTH but will defer. Pt has neurology follow up with Dr. Weston. Symptoms improved after IVF, toradol, reglan, benadryl. Return precautions given.

## 2023-01-25 NOTE — ED ADULT NURSE NOTE - OBJECTIVE STATEMENT
39 y/o male c/o migraine since this morning, as per pt worse when in computer, pt denies vision changes and dizziness. 37 y/o male c/o migraine since this morning, as per pt worse when on computer, pt denies vision changes and dizziness.

## 2023-01-25 NOTE — ED PROVIDER NOTE - ATTENDING APP SHARED VISIT CONTRIBUTION OF CARE
37yo male with a pmhx of migraines and menigitis (s/p tx, last year) presents with gradual onset, progress frontal headache, photophobia, mild nausea today that feels similar to prior headaches. no bloodthinner use. denies f/c/rash/cp/sob. On exam, VS wnl, awake alert nad rrr lungs ctab abdomen soft ndnt NEURO: pupils 3 mm, PERRL, EOMI (CN III, IV, VI), facial sensation intact to light touch in all 3 divisions bilat (CN V), face is symmetric with normal eye closure, eye opening, and smile (CN VII), hearing is normal to rubbing fingers (CN VII), palate elevates symmetrically, phonation is normal (CN IX, X),  shoulder shrug intact bilat (CN XI), tongue is midline with nl movements and no atrophy (CN XII), finger to nose test nl bilat, negative pronator drift bilat,, speech is clear; 5/5 motor strength BUE and BLE: deltoids, biceps, triceps, wrist flexors/extensors, hand , hip flexors, knee flexors/extensors, plantar/dorsiflexors, hallux flexors/extensors; sensation intact to light touch BUE and BLE: C5-T1 and L3-S1 gait wnl skin warm and dry no rashes no meningismus. ddx: tension ha vs migraine. no s/s c/w sah or meningitis. pt given pain meds and on assessment states he feels improved will dc with pmd followup

## 2023-01-25 NOTE — ED PROVIDER NOTE - PATIENT PORTAL LINK FT
You can access the FollowMyHealth Patient Portal offered by [facility] by registering at the following website: [email or web address]. By joining [employer]’s FollowMyHealth portal, you will also be able to view your health information using other applications (apps) compatible with our system.

## 2023-01-25 NOTE — ED PROVIDER NOTE - PHYSICAL EXAMINATION
VITAL SIGNS: I have reviewed nursing notes and confirm.  CONSTITUTIONAL: Well-developed; in no acute distress.   SKIN:  warm and dry, no acute rash.   HEAD:  normocephalic, atraumatic.  EYES: PERRL, EOM intact; conjunctiva and sclera clear.  ENT: No nasal discharge; airway clear.   NECK: Supple; non tender.  CARD: S1, S2 normal; no murmurs, gallops, or rubs. Regular rate and rhythm.   RESP:  Clear to auscultation b/l, no wheezes, rales or rhonchi.  ABD: Normal bowel sounds; soft; non-distended; non-tender; no guarding/ rebound.  EXT: Normal ROM. No clubbing, cyanosis or edema. 2+ pulses to b/l ue/le.  NEURO: Alert, oriented, grossly unremarkable. CN II-XII intact. Ambulatory. 5/5 strength in all extremities. Sensation equal and intact.  PSYCH: Cooperative, mood and affect appropriate.

## 2023-01-27 RX ORDER — SODIUM SULFATE, POTASSIUM SULFATE, MAGNESIUM SULFATE 17.5; 3.13; 1.6 G/ML; G/ML; G/ML
17.5-3.13-1.6 SOLUTION, CONCENTRATE ORAL
Qty: 1 | Refills: 0 | Status: DISCONTINUED | COMMUNITY
Start: 2022-03-23 | End: 2023-01-27

## 2023-01-28 DIAGNOSIS — H53.149 VISUAL DISCOMFORT, UNSPECIFIED: ICD-10-CM

## 2023-01-28 DIAGNOSIS — G43.909 MIGRAINE, UNSPECIFIED, NOT INTRACTABLE, WITHOUT STATUS MIGRAINOSUS: ICD-10-CM

## 2023-01-28 DIAGNOSIS — R51.9 HEADACHE, UNSPECIFIED: ICD-10-CM

## 2023-01-28 DIAGNOSIS — I10 ESSENTIAL (PRIMARY) HYPERTENSION: ICD-10-CM

## 2023-01-28 DIAGNOSIS — R11.0 NAUSEA: ICD-10-CM

## 2023-01-31 ENCOUNTER — APPOINTMENT (OUTPATIENT)
Age: 38
End: 2023-01-31

## 2023-01-31 ENCOUNTER — APPOINTMENT (OUTPATIENT)
Dept: GASTROENTEROLOGY | Facility: CLINIC | Age: 38
End: 2023-01-31

## 2023-02-08 NOTE — ED ADULT NURSE NOTE - SUICIDE SCREENING QUESTION 3
Dear Cristiana Curran    After reviewing your responses, I've been able to diagnose you with a urinary tract infection, which is a common infection of the bladder with bacteria.  This is not a sexually transmitted infection, though urinating immediately after intercourse can help prevent infections.  Drinking lots of fluids is also helpful to clear your current infection and prevent the next one.      I have sent a prescription for antibiotics to your pharmacy to treat this infection.    It is important that you take all of your prescribed medication even if your symptoms are improving after a few doses.  Taking all of your medicine helps prevent the symptoms from returning.     If your symptoms worsen, you develop pain in your back or stomach, develop fevers, or are not improving in 5 days, please contact your primary care provider for an appointment or visit any of our convenient Walk-in or Urgent Care Centers to be seen, which can be found on our website here.    Thanks again for choosing us as your health care partner,    Zachariah Davis MD    Urinary Tract Infections in Women  Urinary tract infections (UTIs) are most often caused by bacteria. These bacteria enter the urinary tract. The bacteria may come from inside the body. Or they may travel from the skin outside the rectum or vagina into the urethra. Female anatomy makes it easy for bacteria from the bowel to enter a woman s urinary tract, which is the most common source of UTI. This means women develop UTIs more often than men. Pain in or around the urinary tract is a common UTI symptom. But the only way to know for sure if you have a UTI for the healthcare provider to test your urine. The two tests that may be done are the urinalysis and urine culture.     Types of UTIs    Cystitis. A bladder infection (cystitis) is the most common UTI in women. You may have urgent or frequent need to pee. You may also have pain, burning when you pee, and bloody  urine.    Urethritis. This is an inflamed urethra, which is the tube that carries urine from the bladder to outside the body. You may have lower stomach or back pain. You may also have urgent or frequent need to pee.    Pyelonephritis. This is a kidney infection. If not treated, it can be serious and damage your kidneys. In severe cases, you may need to stay in the hospital. You may have a fever and lower back pain.    Medicines to treat a UTI  Most UTIs are treated with antibiotics. These kill the bacteria. The length of time you need to take them depends on the type of infection. It may be as short as 3 days. If you have repeated UTIs, you may need a low-dose antibiotic for several months. Take antibiotics exactly as directed. Don t stop taking them until all of the medicine is gone. If you stop taking the antibiotic too soon, the infection may not go away. You may also develop a resistance to the antibiotic. This can make it much harder to treat.   Lifestyle changes to treat and prevent UTIs   The lifestyle changes below will help get rid of your UTI. They may also help prevent future UTIs.     Drink plenty of fluids. This includes water, juice, or other caffeine-free drinks. Fluids help flush bacteria out of your body.    Empty your bladder. Always empty your bladder when you feel the urge to pee. And always pee before going to sleep. Urine that stays in your bladder can lead to infection. Try to pee before and after sex as well.    Practice good personal hygiene. Wipe yourself from front to back after using the toilet. This helps keep bacteria from getting into the urethra.    Use condoms during sex. These help prevent UTIs caused by sexually transmitted bacteria. Also don't use spermicides during sex. These can increase the risk for UTIs. Choose other forms of birth control instead. For women who tend to get UTIs after sex, a low-dose of a preventive antibiotic may be used. Be sure to discuss this option with  your healthcare provider.    Follow up with your healthcare provider as directed. He or she may test to make sure the infection has cleared. If needed, more treatment may be started.  [place] last reviewed this educational content on 7/1/2019 2000-2021 [employer]. All rights reserved. This information is not intended as a substitute for professional medical care. Always follow your healthcare professional's instructions.         No

## 2023-02-27 ENCOUNTER — TRANSCRIPTION ENCOUNTER (OUTPATIENT)
Age: 38
End: 2023-02-27

## 2023-03-14 LAB
ALBUMIN SERPL ELPH-MCNC: 4.7 G/DL
ALP BLD-CCNC: 77 U/L
ALT SERPL-CCNC: 35 U/L
ANION GAP SERPL CALC-SCNC: 14 MMOL/L
AST SERPL-CCNC: 34 U/L
BASOPHILS # BLD AUTO: 0.05 K/UL
BASOPHILS NFR BLD AUTO: 0.8 %
BILIRUB SERPL-MCNC: 0.3 MG/DL
BUN SERPL-MCNC: 15 MG/DL
CALCIUM SERPL-MCNC: 9.6 MG/DL
CHLORIDE SERPL-SCNC: 104 MMOL/L
CO2 SERPL-SCNC: 22 MMOL/L
CREAT SERPL-MCNC: 1.07 MG/DL
CRP SERPL HS-MCNC: 1.08 MG/L
EGFR: 93 ML/MIN/1.73M2
EOSINOPHIL # BLD AUTO: 0.09 K/UL
EOSINOPHIL NFR BLD AUTO: 1.5 %
ERYTHROCYTE [SEDIMENTATION RATE] IN BLOOD BY WESTERGREN METHOD: 47 MM/HR
GLUCOSE SERPL-MCNC: 108 MG/DL
HCT VFR BLD CALC: 44 %
HGB BLD-MCNC: 13.8 G/DL
IMM GRANULOCYTES NFR BLD AUTO: 0.3 %
LYMPHOCYTES # BLD AUTO: 1.46 K/UL
LYMPHOCYTES NFR BLD AUTO: 23.9 %
MAN DIFF?: NORMAL
MCHC RBC-ENTMCNC: 26.3 PG
MCHC RBC-ENTMCNC: 31.4 GM/DL
MCV RBC AUTO: 83.8 FL
MONOCYTES # BLD AUTO: 0.46 K/UL
MONOCYTES NFR BLD AUTO: 7.5 %
NEUTROPHILS # BLD AUTO: 4.02 K/UL
NEUTROPHILS NFR BLD AUTO: 66 %
PLATELET # BLD AUTO: 295 K/UL
POTASSIUM SERPL-SCNC: 4.2 MMOL/L
PROT SERPL-MCNC: 7.6 G/DL
RBC # BLD: 5.25 M/UL
RBC # FLD: 14.4 %
SODIUM SERPL-SCNC: 139 MMOL/L
WBC # FLD AUTO: 6.1 K/UL

## 2023-04-05 ENCOUNTER — APPOINTMENT (OUTPATIENT)
Dept: GASTROENTEROLOGY | Facility: CLINIC | Age: 38
End: 2023-04-05
Payer: COMMERCIAL

## 2023-04-05 VITALS
WEIGHT: 210 LBS | OXYGEN SATURATION: 97 % | HEART RATE: 88 BPM | HEIGHT: 72 IN | RESPIRATION RATE: 16 BRPM | TEMPERATURE: 97.8 F | SYSTOLIC BLOOD PRESSURE: 142 MMHG | DIASTOLIC BLOOD PRESSURE: 88 MMHG | BODY MASS INDEX: 28.44 KG/M2

## 2023-04-05 DIAGNOSIS — K20.90 ESOPHAGITIS, UNSPECIFIED WITHOUT BLEEDING: ICD-10-CM

## 2023-04-05 DIAGNOSIS — E61.1 IRON DEFICIENCY: ICD-10-CM

## 2023-04-05 PROCEDURE — 99214 OFFICE O/P EST MOD 30 MIN: CPT

## 2023-04-07 ENCOUNTER — NON-APPOINTMENT (OUTPATIENT)
Age: 38
End: 2023-04-07

## 2023-04-14 PROBLEM — K20.90 ESOPHAGITIS: Status: ACTIVE | Noted: 2022-07-19

## 2023-04-14 PROBLEM — E61.1 IRON DEFICIENCY: Status: ACTIVE | Noted: 2022-03-23

## 2023-04-14 NOTE — ASSESSMENT
[FreeTextEntry1] : 39 yo male here for f/u of esophagitis and poor prep on colonoscopy done for low ferritin\par \par - Continue omeprazole 40 mg PO daily\par - EGD and colon at [facility]\par - D/w pt regarding escort post-procedure\par - Risks of the procedure including bleeding, perforation, etc d/w the patient\par - Two day GoLYTELY split prep, low residue diet for one week prior\par

## 2023-04-14 NOTE — HISTORY OF PRESENT ILLNESS
[de-identified] : 7/19/2022\par Medium-sized hiatal hernia, 5 cm; severe esophagitis, gastric erosions -- repeat in three months (bx --> ulcerated squamous mucosa, largely denuded, with granulation tissue formation and overlying fibrinopurulent exudate) [FreeTextEntry1] : 7/19/2022\par BBPS of 3, normal mucosa noted in the whole colon but a poor prep -- repeat in three months

## 2023-05-03 ENCOUNTER — FORM ENCOUNTER (OUTPATIENT)
Age: 38
End: 2023-05-03

## 2023-05-11 ENCOUNTER — APPOINTMENT (OUTPATIENT)
Dept: NEUROLOGY | Facility: CLINIC | Age: 38
End: 2023-05-11
Payer: COMMERCIAL

## 2023-05-11 PROCEDURE — 64615 CHEMODENERV MUSC MIGRAINE: CPT

## 2023-05-11 RX ORDER — ZOLMITRIPTAN 5 MG/1
5 TABLET, ORALLY DISINTEGRATING ORAL
Qty: 20 | Refills: 11 | Status: ACTIVE | COMMUNITY
Start: 2023-05-11 | End: 1900-01-01

## 2023-05-11 RX ORDER — PREDNISONE 20 MG/1
20 TABLET ORAL AS DIRECTED
Qty: 18 | Refills: 0 | Status: ACTIVE | COMMUNITY
Start: 2023-05-11 | End: 1900-01-01

## 2023-05-11 NOTE — PROCEDURE
[FreeTextEntry1] : Botox for  headaches [FreeTextEntry2] : Severe migraine [FreeTextEntry4] : Ethyl chloride [FreeTextEntry3] : Patient was consented with explanation of risks and benefits including black box warnings and explanation of alternative treatments. \par \par Patient was injected in the sitting position with ethyl chloride spray used before each injection.  not injected to reduce risk of ptosis.\par Muscles injected:\par Procerus - 5 units\par  - 0 units in each side = 0 units\par Frontalis - 5 units in 2 injections on each side = 20 units\par Temporalis - 5 units in 4 injection in each side = 40 units\par Occipitalis - 5 units in 3 injections on each side = 30 units\par Splenius capitis - 5 units in 2 injections on each side = 20\par Trapezius - 5 units in 3 injections on each side = 30\par \par Total used 145 units Onabotulinum toxin\par Total wasted = 55\par Total billable = 200 units\par \par Patient tolerated procedure well with less pain. I have asked the patient to continue keeping track of headaches.\par

## 2023-05-16 ENCOUNTER — NON-APPOINTMENT (OUTPATIENT)
Age: 38
End: 2023-05-16

## 2023-05-16 ENCOUNTER — OUTPATIENT (OUTPATIENT)
Dept: OUTPATIENT SERVICES | Facility: HOSPITAL | Age: 38
LOS: 1 days | Discharge: HOME | End: 2023-05-16

## 2023-05-16 ENCOUNTER — APPOINTMENT (OUTPATIENT)
Dept: PSYCHIATRY | Facility: CLINIC | Age: 38
End: 2023-05-16
Payer: COMMERCIAL

## 2023-05-16 DIAGNOSIS — F90.9 ATTENTION-DEFICIT HYPERACTIVITY DISORDER, UNSPECIFIED TYPE: ICD-10-CM

## 2023-05-16 DIAGNOSIS — I10 ESSENTIAL (PRIMARY) HYPERTENSION: ICD-10-CM

## 2023-05-16 DIAGNOSIS — K25.9 GASTRIC ULCER, UNSPECIFIED AS ACUTE OR CHRONIC, W/OUT HEMORRHAGE OR PERFORATION: ICD-10-CM

## 2023-05-16 PROCEDURE — 90792 PSYCH DIAG EVAL W/MED SRVCS: CPT | Mod: 95

## 2023-05-16 RX ORDER — NORTRIPTYLINE HYDROCHLORIDE 10 MG/1
10 CAPSULE ORAL
Qty: 30 | Refills: 5 | Status: DISCONTINUED | COMMUNITY
Start: 2022-10-07 | End: 2023-05-16

## 2023-05-16 RX ORDER — ATOMOXETINE 40 MG/1
40 CAPSULE ORAL
Qty: 30 | Refills: 0 | Status: DISCONTINUED | COMMUNITY
End: 2023-05-16

## 2023-05-16 NOTE — END OF VISIT
[Time Spent: ___ minutes] : I have spent [unfilled] minutes of time on the encounter. [FreeTextEntry3] : Case discussed with Karolyn Black and patient interviewed.\par \par

## 2023-05-16 NOTE — DISCUSSION/SUMMARY
[Low acute suicide risk] : Low acute suicide risk [No] : No [Not clinically indicated] : Safety Plan completed/updated (for individuals at risk): Not clinically indicated [FreeTextEntry1] : Pt is at low risk for suicide. Static risk factors include family hx of depression, hx of mood disorder, impulsivity, and ADHD. Modifiable risk factors include recent health problems/neurological issues and sequelae, difficulties with sleep, pt report of anxiety and depression. Protective factors include no current/past SI, engaged in work, good social support, good familial relationships, stable income/financial situation.

## 2023-05-16 NOTE — FAMILY HISTORY
[FreeTextEntry1] : PSYCHIATRIC ILLNESS:\par [Depression - mother and brother]\par SUICIDE:\par [Denied]\par SUBSTANCE ABUSE:\par  [Denied]\par

## 2023-05-16 NOTE — PLAN
[Admit to Program     (Add Program Admission information to a new column in the Admit/Discharge Flowsheet)] : Admit to program [FreeTextEntry4] : Patient was advised that psychopharmacological treatment alone is not appropriate for his history and symptoms.  He is currently seeing a psychologist for weekly psychotherapy but agreed to be in psychotherapy while in treatment at [facility].  He requested to be added to [facility] psychotherapy wait list.\par \par Patient will receive psychiatric services from Jay Schumacher MD.

## 2023-05-16 NOTE — RISK ASSESSMENT
[Clinical Interview] : Clinical Interview [No] : No [ADHD] : ADHD [Depressed mood/Anhedonia] : depressed mood/anhedonia [Impulsivity] : impulsivity [History of Impulsivity] : history of impulsivity [Chronic pain/other acute medical condition] : chronic pain or other acute medical condition [Identifies reasons for living] : identifies reasons for living [Supportive social network of family or friends] : supportive social network of family or friends [Cultural, spiritual and/or moral attitudes against suicide] : cultural, spiritual and/or moral attitudes against suicide [Ability to cope with stress] : ability to cope with stress [Responsibility to children, family, or others] : responsibility to children, family, or others [Frustration tolerance] : frustration tolerance [Engaged in work or school] : engaged in work or school [None in the patient's lifetime] : None in the patient's lifetime [None Known] : none known [FreeTextEntry2] : 0 [FreeTextEntry4] : "I can be very direct and aggressive in language if things are not optimal or I feel like I am being taken advantage of, but never physical."

## 2023-05-16 NOTE — PSYCHOSOCIAL ASSESSMENT
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 [had nightmares about the event(s) or thought about the event(s) when you did not want] : did not have nightmares and/or unwanted thoughts about the events [tried hard not to think about the event(s) or went out of your way to avoid situations that reminded you of the event] : did not need to avoid thinking about events, did not need to avoid situations that might remind patient of events [has been constantly on guard, watchful, or easily startled] : has not been constantly on guard, watchful, or easily startled [felt numb or detached from people, activities, or your surrounding] : has not felt numb or detached from people, activities, or surroundings [felt guilty or unable to stop blaming yourself or others for the event(s) or any problems the event(s) may have caused] : has not felt guilty or unable to stop blaming self or others for event(s), or any problems the event(s) may have caused

## 2023-05-16 NOTE — PAST MEDICAL HISTORY
[FreeTextEntry1] : Viral Meningitis in September. Chronic migraines preceded meningitis but exacerbate by it. High blood pressure. Takes Cozaar (50 mg daily). Recently diagnosed with peptic ulcer disease and gastric ulcers in the summer to fall. Will return for follow up in June because still have chronically low iron.\par \par Last annual physical: December 20th, 2022. With Ilana Saunders

## 2023-05-16 NOTE — REASON FOR VISIT
[Telehealth (audio & video) - Individual/Group] : This visit was provided via telehealth using real-time 2-way audio visual technology. [Home] : The patient, [unfilled], was located at home, [unfilled], at the time of the visit. [[facility] Provider/Facility] : [facility] Provider/Facility [Patient] : Patient [Other Location: e.g. Home (Enter Location, City,State)___] : The provider was located at [unfilled]. [Verbal consent obtained from patient/other participant(s)] : Verbal consent for telehealth/telephonic services obtained from patient/other participant(s) [FreeTextEntry4] : 1:15 [FreeTextEntry5] : English [FreeTextEntry7] : He/Him/His [FreeTextEntry2] : Medication management to treat anxiety and feelings of sadness/depression [FreeTextEntry1] : Symptoms of anxiety and depression

## 2023-05-16 NOTE — PHYSICAL EXAM
[Well groomed] : well groomed [Cooperative] : cooperative [Euthymic] : euthymic [Anxious] : anxious [Full] : full [Clear] : clear [Linear/Goal Directed] : linear/goal directed [None] : none [None Reported] : none reported [Average] : average [WNL] : within normal limits [FreeTextEntry8] : "I feel pretty good, anxious, awkward for me if you want to know the truth; overwhelmed, I have a lot going on today."

## 2023-05-16 NOTE — SOCIAL HISTORY
[FreeTextEntry1] : RACE/ETHNICITY:   \par [White]\par GENDER IDENTITY:     \par [Male ]\par SEXUAL IDENTITY:\par [ Straight]\par MARITAL STATUS:  \par [Single ]\par PREFERRED LANGUAGE:    \par [English]\par OTHER LANGUAGES SPOKEN:\par [No ]\par Jehovah's witness AFFILIATION:\par [Quaker]\par PLACE OF BIRTH:\par [[place], [place]]\par DEVELOPMENTAL HISTORY (DELAYED MILESTONES: SPEECH, MOTOR, FINE MOTOR, SOCIAL; HISTORY OF IN UTERO EXPOSURE, BIRTH HISTORY, SIBLING RIVALRY)\par [Normal development; normal delivery]\par SCHOOL TYPE/GRADE:\par [[X ]] PUBLIC\par [[ ]] PRIVATE\par [[ ]] CHARTER\par [[ ]] OTHER:  [ ]\par GRADE:  [ ]\par PROBLEMS IN SCHOOL (LEARNING AND BEHAVIORAL PROBLEMS, HISTORY OF IEP/504):\par [ADHD difficulties - always struggled with keeping attention  and being stimulated in class]\par SIGNIFICANT MEMBERS OF HOUSEHOLD (CHILDREN, PARENTS, SIBLINGS):\par [Mother and brother; parents  when 11 yo, dad lived next town over and came over a lot, "normal, good divorce"]\par PAST/CURRENT RELATIONSHIP WITH FAMILY:\par [ "Fine/normal relationship; parents live in [place]; mom is remarried; brother  with child in [place]; sees them a few times a year; father sick but not diagnosed; 3rd degree heart block and required pacemaker and stent, decompensating in last few years, still having these symptoms that are debilitating him]\par PAST/CURRENT RELATIONSHIPS WITH SIGNIFICANT OTHERS:\par [Dated and had couple long term relationships, single for a few years]\par SIGNIFICANT LOSSES (DIVORCE, NEGLECT, ETC.):\par [parents divorce at 11 yo, grandparents and friends from high school, no immediate family]\par WORK HISTORY (PAST AND CURRENT EMPLOYMENT):\par [Trained as PA in 2010, worked clinically and now administratively since 2013, but always maintained clinical component]\par SERVED IN :\par [No]\par SOCIAL SUPPORT SYSTEM:\par [Good social support, really close friends from high school, live part time in [facility] and [place] on weekends, has house on same block as friends]\par PAST/CURRENT LEGAL PROBLEMS:\par [Denied ]\par OTHER:\par [ ]\par

## 2023-05-17 NOTE — DISCUSSION/SUMMARY
[Initial Plan] : Initial Plan [Able to manage surrounding demands and opportunities] : able to manage surrounding demands and opportunities [Able to exercise self-direction] : able to exercise self-direction [Able to set and pursue goals] : able to set and pursue goals [Articulate] : articulate [Cognitively intact] : cognitively intact [Insightful] : insightful [Motivated to participate in treatment] : motivated to participate in treatment [Health literacy] : health literacy [Financially stable] : financially stable [Has vocational interests or hobbies] : has vocational interests or hobbies [Steady employment] : steady employment [Housing stability] : housing stability [High level of education] : high level of education [Connected to healthcare] : connected to healthcare [Access to safe outdoor spaces] : access to safe outdoor spaces [Social supports] : social supports [Mental Health] : Mental Health [Initial] : Initial [every ___ months] : every [unfilled] months [___ times a week] : [unfilled] times a week [Improvement in symptoms as evidenced by:] : Improvement in symptoms as evidenced by: [Yes] : Yes [Psychiatric Provider/Prescriber] : Psychiatric Provider/Prescriber [FreeTextEntry2] : 6 mo [FreeTextEntry3] : 5/16/23 [FreeTextEntry1] : Anxiety/Depression [FreeTextEntry4] : Reduce anxiety/depression symptoms [de-identified] : Participate in treatment as evidenced by at least 75% attendance [de-identified] : 6 mo [de-identified] : Explore emotions, thoughts, events, and behaviors associated with symptoms [de-identified] : 6 mo [FreeTextEntry5] : Monthly psychopharmacology with Dr. Schumacher and weekly psychodynamic/integrative psychotherapy [de-identified] : patient report of reduced symptoms for more days than not for 6 mo

## 2023-05-24 ENCOUNTER — APPOINTMENT (OUTPATIENT)
Dept: PSYCHIATRY | Facility: CLINIC | Age: 38
End: 2023-05-24

## 2023-05-24 ENCOUNTER — NON-APPOINTMENT (OUTPATIENT)
Age: 38
End: 2023-05-24

## 2023-05-25 ENCOUNTER — APPOINTMENT (OUTPATIENT)
Dept: DERMATOLOGY | Facility: CLINIC | Age: 38
End: 2023-05-25

## 2023-05-25 RX ORDER — SODIUM SULFATE, POTASSIUM SULFATE AND MAGNESIUM SULFATE 1.6; 3.13; 17.5 G/177ML; G/177ML; G/177ML
17.5-3.13-1.6 SOLUTION ORAL
Qty: 1 | Refills: 0 | Status: DISCONTINUED | COMMUNITY
Start: 2022-03-24 | End: 2023-05-25

## 2023-06-06 ENCOUNTER — RESULT REVIEW (OUTPATIENT)
Age: 38
End: 2023-06-06

## 2023-06-06 ENCOUNTER — APPOINTMENT (OUTPATIENT)
Age: 38
End: 2023-06-06
Payer: COMMERCIAL

## 2023-06-06 PROCEDURE — 43239 EGD BIOPSY SINGLE/MULTIPLE: CPT

## 2023-06-06 PROCEDURE — 45378 DIAGNOSTIC COLONOSCOPY: CPT

## 2023-06-08 ENCOUNTER — APPOINTMENT (OUTPATIENT)
Dept: PSYCHIATRY | Facility: CLINIC | Age: 38
End: 2023-06-08
Payer: COMMERCIAL

## 2023-06-08 DIAGNOSIS — F90.9 ATTENTION-DEFICIT HYPERACTIVITY DISORDER, UNSPECIFIED TYPE: ICD-10-CM

## 2023-06-08 PROCEDURE — 99215 OFFICE O/P EST HI 40 MIN: CPT | Mod: 95

## 2023-06-08 NOTE — SOCIAL HISTORY
[FreeTextEntry1] : RACE/ETHNICITY:   \par [White]\par GENDER IDENTITY:     \par [Male ]\par SEXUAL IDENTITY:\par [ Straight]\par MARITAL STATUS:  \par [Single ]\par PREFERRED LANGUAGE:    \par [English]\par OTHER LANGUAGES SPOKEN:\par [No ]\par Mormon AFFILIATION:\par [[employer]]\par PLACE OF BIRTH:\par [[place], [place]]\par DEVELOPMENTAL HISTORY (DELAYED MILESTONES: SPEECH, MOTOR, FINE MOTOR, SOCIAL; HISTORY OF IN UTERO EXPOSURE, BIRTH HISTORY, SIBLING RIVALRY)\par [Normal development; normal delivery]\par SCHOOL TYPE/GRADE:\par [[X ]] PUBLIC\par [[ ]] PRIVATE\par [[ ]] CHARTER\par [[ ]] OTHER:  [ ]\par GRADE:  [ ]\par PROBLEMS IN SCHOOL (LEARNING AND BEHAVIORAL PROBLEMS, HISTORY OF IEP/504):\par [ADHD difficulties - always struggled with keeping attention  and being stimulated in class]\par SIGNIFICANT MEMBERS OF HOUSEHOLD (CHILDREN, PARENTS, SIBLINGS):\par [Mother and brother; parents  when 11 yo, dad lived next town over and came over a lot, "normal, good divorce"]\par PAST/CURRENT RELATIONSHIP WITH FAMILY:\par [ "Fine/normal relationship; parents live in [place]; mom is remarried; brother  with child in [place]; sees them a few times a year; father sick but not diagnosed; 3rd degree heart block and required pacemaker and stent, decompensating in last few years, still having these symptoms that are debilitating him]\par PAST/CURRENT RELATIONSHIPS WITH SIGNIFICANT OTHERS:\par [Dated and had couple long term relationships, single for a few years]\par SIGNIFICANT LOSSES (DIVORCE, NEGLECT, ETC.):\par [parents divorce at 11 yo, grandparents and friends from high school, no immediate family]\par WORK HISTORY (PAST AND CURRENT EMPLOYMENT):\par [Trained as PA in 2010, worked clinically and now administratively since 2013, but always maintained clinical component]\par SERVED IN :\par [No]\par SOCIAL SUPPORT SYSTEM:\par [Good social support, really close friends from high school, live part time in [facility] and [place] on weekends, has house on same block as friends]\par PAST/CURRENT LEGAL PROBLEMS:\par [Denied ]\par OTHER:\par [ ]\par

## 2023-06-08 NOTE — RISK ASSESSMENT
[Clinical Interview] : Clinical Interview [No] : No [ADHD] : ADHD [Depressed mood/Anhedonia] : depressed mood/anhedonia [Impulsivity] : impulsivity [History of Impulsivity] : history of impulsivity [Chronic pain/other acute medical condition] : chronic pain or other acute medical condition [Identifies reasons for living] : identifies reasons for living [Supportive social network of family or friends] : supportive social network of family or friends [Cultural, spiritual and/or moral attitudes against suicide] : cultural, spiritual and/or moral attitudes against suicide [Ability to cope with stress] : ability to cope with stress [Responsibility to children, family, or others] : responsibility to children, family, or others [Frustration tolerance] : frustration tolerance [Engaged in work or school] : engaged in work or school [FreeTextEntry2] : 0 [No, patient denies ideation or behavior] : No, patient denies ideation or behavior

## 2023-06-08 NOTE — DISCUSSION/SUMMARY
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 Pt is seeking medication management for symptoms of anxiety, depression, insomnia that correlate with stress from work.  These symptoms are absent when away from work.   Pt reports related difficulties concentrating since childhood.  Viral meningitis in September 2022 with sequela, including chronic migraines. 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 dose - has erx from PCP)\par continue clonazapam 0.5 mg prns for anxiety and sleep - takes 1-2x/wk\par next annual PE/labs 12/2023\par next IAP 2/2024\par assignment for 1:1 psychotherapy pending\par RT 1 week

## 2023-06-08 NOTE — REASON FOR VISIT
[Telehealth (audio & video) - Individual/Group] : This visit was provided via telehealth using real-time 2-way audio visual technology. [Other Location: e.g. Home (Enter Location, City,State)___] : The provider was located at [unfilled]. [Home] : The patient, [unfilled], was located at home, [unfilled], at the time of the visit. [Verbal consent obtained from patient/other participant(s)] : Verbal consent for telehealth/telephonic services obtained from patient/other participant(s) [FreeTextEntry1] : f/u for treatment of anxiety and depression

## 2023-06-08 NOTE — PSYCHOSOCIAL ASSESSMENT
[All substances negative except as specified below] : All substances negative except as specified below [_____] : First Use: [unfilled] [FreeTextEntry1] : Deneis any negative consequences from alcohol use.  Does not drink and drive.

## 2023-06-08 NOTE — HISTORY OF PRESENT ILLNESS
[FreeTextEntry1] : Past 2 wks:  "Daily anxiety from work stress. My mind races/body tension all of the time." This causes DFA.  "When  away from work - there is a wt off my chest.  After meningitis in October 2023  I I enjoyed not going to work, not being connected.  Was not depressed or anxious and sleep was good.  "Felt like a normal person."  "I wake up to 100 emails  - have to be on the whole day/non-stop."   Was very limited in what could do physically at that time but "felt nice mentally."  Tried to continue this mood/sleep but changed once returned to work stress.  Off shore fishing "because there is no cell phone service" - has a device to be able to text.  80% of time is weekend and only uses for tracking purposes.  "Feels amazing during this time away, sureal, freeing"  Buying things provides some relief from stress.  Anxiety is situational to the trip, only.  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  Feels that difficulty concentrating is present even when away from work.  Does not think ADD impacts professional functioning.  viral meningitis in September with sequela of HAs, fatigue, and vestibular sx - recently had botox shots.   including chronic migraines.\par Was reported for saying something inappropriate after a holiday party by a female (unknown who it was) - this was not at meetings. This was late night while still under influence of alcohol.  Does not recall it.   Response from [facility] was a . \par Has been on duloxetine since approx 2018. This was rx for post break up anxiety (much more than depression) and migraine tx.  \par \par  [FreeTextEntry2] : Pt saw a psychologist as a child around 12-12 yo for "depression, related to difficult relationship with mom and acting out."\par At 25 yo, in 2011, went through a bad break up and depression, and saw a psychiatrist (does not recall name ). "Feel like he was kind of a quack," because he prescribed a lot of different pills at once and doing adjunctive therapy. \par Saw an [facility] based psychiatrist in 2015-16 for about a year, weened down to just Cymbalta. Left because changed jobs and insurance no longer covers. Saw him for CBT and medication. (Does not recall name)\par Prescription for Cymbalta has gone from 20-40 mg and back down to 20 mg.\par Primary care doctor, Dr. Callum Bruce in private practice in [place], prescribes Cymbalta now.\par Working with , Jazlyn Morgan, PhD (psychologist), paid for by [employer], as a hired contractor. Meet once a week for one hour. 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 Spears - Pt reports previous diagnosis with ADHD and trying stimulants in the past, but this being ineffective. pt reports difficulties concentrating since childhood, unable to sustain focus for more than 20 minutes, and needing to feel mentally stimulated to remain focused.  [FreeTextEntry3] : Previously prescribed stimulants around 2021-22, not effective. Switched to Strattera. "Pretty good, didn’t feel an amazing change, but have so much neurological stuff going on so left it on the side burner until i can get other stuff figured out."\par Takes Cymbalta (20 mg) since 5/1/23 prescribed by PCP Dr. Callum Bruce.\par Prescribed Klonopin (.5 mg) for anxiety, sometimes takes for sleep. Takes 1-2x per week.\par

## 2023-06-09 NOTE — ED PROVIDER NOTE - NS ED ROS FT
Left message to return call. Constitutional: No fever. No chills.  Eyes: No redness. No discharge. No vision change.   ENT: No sore throat. No ear pain.  Cardiovascular: No chest pain. No leg swelling.  Respiratory: No cough. No shortness of breath.  GI: No abdominal pain. No vomiting. No diarrhea.   MSK: No joint pain. No back pain.   Skin: No rash. No abrasions.   Neuro: No numbness. No weakness. +headache.  Psych: No known mental health issues.

## 2023-06-14 ENCOUNTER — APPOINTMENT (OUTPATIENT)
Dept: PSYCHIATRY | Facility: CLINIC | Age: 38
End: 2023-06-14
Payer: COMMERCIAL

## 2023-06-14 DIAGNOSIS — Z86.61 PERSONAL HISTORY OF INFECTIONS OF THE CENTRAL NERVOUS SYSTEM: ICD-10-CM

## 2023-06-14 DIAGNOSIS — F43.23 ADJUSTMENT DISORDER WITH MIXED ANXIETY AND DEPRESSED MOOD: ICD-10-CM

## 2023-06-14 PROCEDURE — 99215 OFFICE O/P EST HI 40 MIN: CPT | Mod: 95

## 2023-06-14 NOTE — DISCUSSION/SUMMARY
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 Pt is seeking medication management for symptoms of anxiety, depression, insomnia that correlate with stress from work.  These symptoms are absent when away from work.   Pt reports related difficulties concentrating since childhood.  Viral meningitis in September 2022 with sequela, including chronic migraines. Pt has been on duloxetine for past 5 years - initially rx for anxiety post break up and migraines.\par \par Diagnostic impression:  ADD, adjustment d/o with anxiety; MDE, in remission, r/o HOLLIE\par \par Discussed tapering off duloxetine as is at dose that is below usual effective dose and has been on it x 5 years.  If sx increase this will be useful in clarifying diagnosis and tx plan.  Pt will attempt to remove 50% of sprinkles and then close up capsule.  Has never had w/d sx so could also d/c.  I reviewed potential d/c sx w pt.\par \par As sx are very situational I see this more as an adjustment dx than HOLLIE.  We discussed that prns may be most likely to help.  Pt wants to try standing meds and we discussed possible buspirone or pregabalin trials.\par \par I will send DIVA-5 to pt.\par \par \par Date of Last Physical Exam:  12/20/2022\par Date of Last Annual Labs:  1/13/2023\par Annual Review of Systems Completed (Y/N):  deferred\par Tobacco Screening Completed (Y/N):  deferred\par \par Plan:\par taper of duloxetine 20 mg (may stop abruptly as well)\par next annual PE/labs 12/2023\par next IAP 2/2024\par assignment for 1:1 psychotherapy pending\par RT 1 week

## 2023-06-14 NOTE — SOCIAL HISTORY
[FreeTextEntry1] : RACE/ETHNICITY:   \par [White]\par GENDER IDENTITY:     \par [Male ]\par SEXUAL IDENTITY:\par [ Straight]\par MARITAL STATUS:  \par [Single ]\par PREFERRED LANGUAGE:    \par [English]\par OTHER LANGUAGES SPOKEN:\par [No ]\par Confucianist AFFILIATION:\par [Islam]\par PLACE OF BIRTH:\par [[place], [place]]\par DEVELOPMENTAL HISTORY (DELAYED MILESTONES: SPEECH, MOTOR, FINE MOTOR, SOCIAL; HISTORY OF IN UTERO EXPOSURE, BIRTH HISTORY, SIBLING RIVALRY)\par [Normal development; normal delivery]\par SCHOOL TYPE/GRADE:\par [[X ]] PUBLIC\par [[ ]] PRIVATE\par [[ ]] CHARTER\par [[ ]] OTHER:  [ ]\par GRADE:  [ ]\par PROBLEMS IN SCHOOL (LEARNING AND BEHAVIORAL PROBLEMS, HISTORY OF IEP/504):\par [ADHD difficulties - always struggled with keeping attention  and being stimulated in class]\par SIGNIFICANT MEMBERS OF HOUSEHOLD (CHILDREN, PARENTS, SIBLINGS):\par [Mother and brother; parents  when 9 yo, dad lived next town over and came over a lot, "normal, good divorce"]\par PAST/CURRENT RELATIONSHIP WITH FAMILY:\par [ "Fine/normal relationship; parents live in [place]; mom is remarried; brother  with child in [place]; sees them a few times a year; father sick but not diagnosed; 3rd degree heart block and required pacemaker and stent, decompensating in last few years, still having these symptoms that are debilitating him]\par PAST/CURRENT RELATIONSHIPS WITH SIGNIFICANT OTHERS:\par [Dated and had couple long term relationships, single for a few years]\par SIGNIFICANT LOSSES (DIVORCE, NEGLECT, ETC.):\par [parents divorce at 9 yo, grandparents and friends from high school, no immediate family]\par WORK HISTORY (PAST AND CURRENT EMPLOYMENT):\par [Trained as PA in 2010, worked clinically and now administratively since 2013, but always maintained clinical component]\par SERVED IN :\par [No]\par SOCIAL SUPPORT SYSTEM:\par [Good social support, really close friends from high school, live part time in [place] and [place] on weekends, has house on same block as friends]\par PAST/CURRENT LEGAL PROBLEMS:\par [Denied ]\par OTHER:\par [ ]\par

## 2023-06-14 NOTE — HISTORY OF PRESENT ILLNESS
[FreeTextEntry1] : Past 1 wk:  "Less stress because working in [employer] since 6/9"  Was feeling good, no complaints, up until today.  When at work pt has  tension and chest tightness. \par Duloxetine was rx for post break 2012.  Was a dark time for 6+ months.  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   Lost 30 lbs.  Did not have SI.  Was tried on several meds  (can't recall details) and duloxetine was best.  Doses unknown.  Was restarted 2018 or anxiety (more than depression)  and migraine tx.  At 40 mg pt had jitteriness and sexual dysfct.  \par History of ADD - "always had problems with attention.  can't focus on one thing, always doing multiple things at once."  "learning has never been difficult."  Went to school late and tried to avoid going.  Never did homework, everything last minute.  Parents got  at age 10 but was cordial.  \par Has not used clonazepam 0.5 for several months.  Previously had used daily prn jan-feb 2023 for anxiety related to menigitis sx - mostly at night.  Previous to mengitis had used alprazolam in 2011.  \par \par  [FreeTextEntry2] : Pt saw a psychologist as a child around 12-12 yo for "depression, related to difficult relationship with mom and acting out."\par At 25 yo, in 2011, went through a bad break up and depression, and saw a psychiatrist (does not recall name ). "Feel like he was kind of a quack," because he prescribed a lot of different pills at once and doing adjunctive therapy. \par Saw an [facility] based psychiatrist in 2015-16 for about a year, weened down to just Cymbalta. Left because changed jobs and insurance no longer covers. Saw him for CBT and medication. (Does not recall name)\par Prescription for Cymbalta has gone from 20-40 mg and back down to 20 mg.\par Primary care doctor, Dr. Callum Bruce in private practice in [place], prescribes Cymbalta now.\par Working with , Jazlyn Morgan, PhD (psychologist), paid for by "[employer]", as a hired contractor. Meet once a week for one hour. 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 Spears - Pt reports previous diagnosis with ADHD and trying stimulants in the past, but this being ineffective. pt reports difficulties concentrating since childhood, unable to sustain focus for more than 20 minutes, and needing to feel mentally stimulated to remain focused. \par   Daily anxiety from work stress. My mind races/body tension all of the time." This causes DFA.  "When  away from work - there is a wt off my chest.  After meningitis in October 2023  I I enjoyed not going to work, not being connected.  Was not depressed or anxious and sleep was good.  "Felt like a normal person."  "I wake up to 100 emails  - have to be on the whole day/non-stop."   Was very limited in what could do physically at that time but "felt nice mentally."  Tried to continue this mood/sleep but changed once returned to work stress.  Off shore fishing "because there is no cell phone service" - has a device to be able to text.  80% of time is weekend and only uses for tracking purposes.  "Feels amazing during this time away, sureal, freeing"  Buying things provides some relief from stress.  Anxiety is situational to the trip, only.  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  Feels that difficulty concentrating is present even when away from work.  Does not think ADD impacts professional functioning.  viral meningitis in September with sequela of HAs, fatigue, and vestibular sx - recently had botox shots.   including chronic migraines.  Was reported for saying something inappropriate after a holiday party by a female (unknown who it was) - this was not at meetings. This was late night while still under influence of alcohol.  Does not recall it.   Response from [facility] was a .  [FreeTextEntry3] : Previously prescribed stimulants around 2021-22, not effective. Switched to Strattera. "Pretty good, didn’t feel an amazing change, but have so much neurological stuff going on so left it on the side burner until i can get other stuff figured out."\par On duloxetine 20 mg x "years.  At 40 mg pt had jitteriness and sexual dysfct.\par \par

## 2023-06-21 ENCOUNTER — NON-APPOINTMENT (OUTPATIENT)
Age: 38
End: 2023-06-21

## 2023-06-21 ENCOUNTER — APPOINTMENT (OUTPATIENT)
Dept: PSYCHIATRY | Facility: CLINIC | Age: 38
End: 2023-06-21

## 2023-06-26 NOTE — ED PROVIDER NOTE - COVID-19 RESULT
The patient was encouraged to increase her exercise, decrease her caloric intake, and reduce her weight.   NEGATIVE

## 2023-07-10 DIAGNOSIS — F41.9 ANXIETY DISORDER, UNSPECIFIED: ICD-10-CM

## 2023-07-18 ENCOUNTER — APPOINTMENT (OUTPATIENT)
Dept: PSYCHIATRY | Facility: CLINIC | Age: 38
End: 2023-07-18
Payer: COMMERCIAL

## 2023-07-18 DIAGNOSIS — F32.5 MAJOR DEPRESSIVE DISORDER, SINGLE EPISODE, IN FULL REMISSION: ICD-10-CM

## 2023-07-18 DIAGNOSIS — F41.1 GENERALIZED ANXIETY DISORDER: ICD-10-CM

## 2023-07-18 PROCEDURE — 99215 OFFICE O/P EST HI 40 MIN: CPT | Mod: 95

## 2023-07-18 NOTE — PHYSICAL EXAM
[Well groomed] : well groomed [Cooperative] : cooperative [Euthymic] : euthymic [Anxious] : anxious [Full] : full [Clear] : clear [Linear/Goal Directed] : linear/goal directed [None] : none [None Reported] : none reported [Average] : average [WNL] : within normal limits [FreeTextEntry8] : anxious

## 2023-07-18 NOTE — HISTORY OF PRESENT ILLNESS
[FreeTextEntry1] : Past 4 wk:  Stopped duloxetine 20 about 4 wk ago and after 1 week had increased anxiety, irritability, increased migraines.  The capsule was too small to open up and remove 50% of contents.  Restarted last week - after several weeks.  Has had about 25% decrease in sx.  Has had HAs since 2015.  Has been dx w migraines - got botox 4-6 wks ago.  Triptans help.  Neurology thought duloxetine may help migraines. Pt reports that Dr. Cabral thinks he has a propensity for OCD.   Meets w a psychology  on a quarterly basis.\par Criteria for HOLLIE: AY; BY;  C1N, 2Y, 3Y, 4Y, 5N, 6Y.\par Discussed that difficult to get a sense of anxiety level when relaxing on boat w friends as often has beers when on the boat.\par \par \par  [FreeTextEntry2] : Pt saw a psychologist as a child around 12-12 yo for "depression, related to difficult relationship with mom and acting out.".  History of ADD - "always had problems with attention.  can't focus on one thing, always doing multiple things at once."  "learning has never been difficult."  Went to school late and tried to avoid going.  Never did homework, everything last minute.  Parents got  at age 10 but was cordial.  \par Duloxetine was rx for post break 2012.  Was a dark time for 6+ months.  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   Lost 30 lbs.  Did not have SI.  Was tried on several meds  (can't recall details) and duloxetine was best.  Doses unknown.  Was restarted 2018 or anxiety (more than depression)  and migraine tx.  At 42 mg pt had jitteriness and sexual dysfct. \par Saw an [facility] based psychiatrist in 2015-16 for about a year, weened down to just Cymbalta. Left because changed jobs and insurance no longer covers.\par Prescription for Cymbalta has gone from 20-40 mg and back down to 20 mg.\par Primary care doctor, Dr. Callum Bruce in private practice in [place], prescribes Cymbalta now.\par Working with , Jazlyn Morgan, PhD (psychologist), paid for by [employer], as a hired contractor. Meet once a week for one hour. 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 Spears - Pt reports previous diagnosis with ADHD and trying stimulants in the past, but this being ineffective. pt reports difficulties concentrating since childhood, unable to sustain focus for more than 20 minutes, and needing to feel mentally stimulated to remain focused. \par   Daily anxiety from work stress. My mind races/body tension all of the time." This causes DFA.  "When  away from work - there is a wt off my chest.  After meningitis in October 2023  I I enjoyed not going to work, not being connected.  Was not depressed or anxious and sleep was good.  "Felt like a normal person."  "I wake up to 100 emails  - have to be on the whole day/non-stop."   Was very limited in what could do physically at that time but "felt nice mentally."  Tried to continue this mood/sleep but changed once returned to work stress.  Off shore fishing "because there is no cell phone service" - has a device to be able to text.  80% of time is weekend and only uses for tracking purposes.  "Feels amazing during this time away, sureal, freeing"  Buying things provides some relief from stress.  Anxiety is situational to the trip, only.  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  Feels that difficulty concentrating is present even when away from work.  Does not think ADD impacts professional functioning.  viral meningitis in September with sequela of HAs, fatigue, and vestibular sx - recently had botox shots.   including chronic migraines.  Was reported for saying something inappropriate after a holiday party by a female (unknown who it was) - this was not at meetings. This was late night while still under influence of alcohol.  Does not recall it.   Response from [facility] was a .  [FreeTextEntry3] : At 27 yo, in 2011, "a lot of different pills (no details available)"  \par Previously prescribed stimulants around 2021-22, not effective. Switched to Strattera. "Pretty good, didn’t feel an amazing change, but have so much neurological stuff going on so left it on the side burner until i can get other stuff figured out."\par On duloxetine 20 mg x "years.  At 42 mg pt had jitteriness and sexual dysfct.\par Has not used clonazepam 0.5 for several months.  Previously had used daily prn jan-feb 2023 for anxiety related to menigitis sx - mostly at night.  Previous to mengitis had used alprazolam in 2011.  \par

## 2023-07-18 NOTE — REASON FOR VISIT
[Telehealth (audio & video) - Individual/Group] : This visit was provided via telehealth using real-time 2-way audio visual technology. [Other Location: e.g. Home (Enter Location, City,State)___] : The patient, [unfilled], was located at [unfilled] at the time of the visit. [Verbal consent obtained from patient/other participant(s)] : Verbal consent for telehealth/telephonic services obtained from patient/other participant(s) [FreeTextEntry1] : f/u for treatment of anxiety and depression

## 2023-07-18 NOTE — SOCIAL HISTORY
[FreeTextEntry1] : RACE/ETHNICITY:   \par [White]\par GENDER IDENTITY:     \par [Male ]\par SEXUAL IDENTITY:\par [ Straight]\par MARITAL STATUS:  \par [Single ]\par PREFERRED LANGUAGE:    \par [English]\par OTHER LANGUAGES SPOKEN:\par [No ]\par Cheondoism AFFILIATION:\par [Spiritism]\par PLACE OF BIRTH:\par [[place], [place]]\par DEVELOPMENTAL HISTORY (DELAYED MILESTONES: SPEECH, MOTOR, FINE MOTOR, SOCIAL; HISTORY OF IN UTERO EXPOSURE, BIRTH HISTORY, SIBLING RIVALRY)\par [Normal development; normal delivery]\par SCHOOL TYPE/GRADE:\par [[X ]] PUBLIC\par [[ ]] PRIVATE\par [[ ]] CHARTER\par [[ ]] OTHER:  [ ]\par GRADE:  [ ]\par PROBLEMS IN SCHOOL (LEARNING AND BEHAVIORAL PROBLEMS, HISTORY OF IEP/504):\par [ADHD difficulties - always struggled with keeping attention  and being stimulated in class]\par SIGNIFICANT MEMBERS OF HOUSEHOLD (CHILDREN, PARENTS, SIBLINGS):\par [Mother and brother; parents  when 9 yo, dad lived next town over and came over a lot, "normal, good divorce"]\par PAST/CURRENT RELATIONSHIP WITH FAMILY:\par [ "Fine/normal relationship; parents live in [place]; mom is remarried; brother  with child in [place]; sees them a few times a year; father sick but not diagnosed; 3rd degree heart block and required pacemaker and stent, decompensating in last few years, still having these symptoms that are debilitating him]\par PAST/CURRENT RELATIONSHIPS WITH SIGNIFICANT OTHERS:\par [Dated and had couple long term relationships, single for a few years]\par SIGNIFICANT LOSSES (DIVORCE, NEGLECT, ETC.):\par [parents divorce at 9 yo, grandparents and friends from high school, no immediate family]\par WORK HISTORY (PAST AND CURRENT EMPLOYMENT):\par [Trained as PA in 2010, worked clinically and now administratively since 2013, but always maintained clinical component]\par SERVED IN :\par [No]\par SOCIAL SUPPORT SYSTEM:\par [Good social support, really close friends from high school, live part time in [place] and [place] on weekends, has house on same block as friends]\par PAST/CURRENT LEGAL PROBLEMS:\par [Denied ]\par OTHER:\par [ ]\par

## 2023-07-18 NOTE — DISCUSSION/SUMMARY
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 Pt is seeking medication management for symptoms of anxiety, depression, insomnia that correlate with stress from work.  These symptoms are absent when away from work.   Pt reports related difficulties concentrating since childhood.  Viral meningitis in September 2022 with sequela, including chronic migraines. Pt has been on duloxetine for past 5 years - initially rx for anxiety post break up and migraines.\par \par Current Diagnostic impression:  ADD,  MDE, in remission, HOLLIE vs (or both) adjustment d/o with anxiety;\par \par We discussed that  adjustment dx could be +/-  HOLLIE.  He prefers a standing dose of a med as sx are much more often than no sx.   We discussed tryiung inc snry, buspirone, and pregabalin trials.  We agreed to increase snri as unsure if prior agitation was due to polypharmacy/external evemts in past and willing to endure some decrease in sexual fct (orgasms).\par \par pt states he will complete DIVA-5 to pt.\par \par I sent email to Dr. Rendon as pt has only 2 sessions left with therapist.\par \par \par Date of Last Physical Exam:  12/20/2022\par Date of Last Annual Labs:  1/13/2023\par Annual Review of Systems Completed (Y/N):  deferred\par Tobacco Screening Completed (Y/N):  deferred\par \par Plan:\par increase duloxetine 20 mg to 40 mg daily\par next annual PE/labs 12/2023\par next IAP 2/2024\par assignment for 1:1 psychotherapy pending\par RT 4 week

## 2023-08-03 NOTE — ED ADULT TRIAGE NOTE - BP NONINVASIVE SYSTOLIC (MM HG)
75 YOmale  with PMH HTN, HLD, type 2 DM, moderate axonal sensory motor neuropathy, left MCA CVA, BPH, gout who presented to [facility] 7/20/23 with acute/subacute lacunar infarct IC.    # Right  posterior limb of the internal capsule CVA  - MRI brain +acute/subacute lacunar infarct posterior limbic R IC with chronic infarct R cerebellum and L centrum semiovale  - c/w ASA, Lipitor and Plavix  x 3 weeks (last dose: 8/10)  - Continue Comprehensive Rehab Program: PT/OT/ST, 3hours daily and 5 days weekly  - patient cleared to shower  - Precautions: cardiac, DM, sensory deficits, fall    # Adjustment disorder  - Sertraline 25mg daily  - Neuropsychology support    # HLD  - Lipitor decreased to 40 mg 2/2 mild transaminitis  - Monitor LFT: , ast 20 alt 26 on 8/3  - CMP 8/7    # DM II  - A1c 6.6 on 7/23  - ISS and FS  - lantus 10u qhs--> increased to 15 u qhs 8/2  - admelog 3 u QAC added 8/2  - continue diabetic education, likely need for oral med on dc discussed with patient    # CKD 3  - Monitor, GFR (07/27) 47, Cr 1.54  - encourage PO intake. Avoid nephrotoxic meds  - BUN 27/1.49--> 26/1.33 7/31 improved. BMP 8/3 -> BUN 32 Cr 1.57 - repeat BMP 8/4    # Gout  - Zyloprim 300mg daily    # Left achilles tendonitis  - Diclofenac gel 1% 4 grams BID standing--> dc 82  - lidocaine patch daily 8/2,   - PT evaluation stability ankle during gait; footwear without good stabilization ankle, may benefit from ACE wrapping    # neuropathic pain  - gabapentin 100 mg qhs 7/31     # Pain management  - Tylenol PRN  - voltaren gel  - gabapentin     # GI ppx  - Protonix 40mgn  - Senna, dulcolax nightly  - miralax PRN    # FEN   - Diet: DASH     # BPH  - Flomax 0.4mg nightly    # DVT ppx  - Heparin, SCD, TEDs    # Case discussed in IDT rounds 7/31 (initial):  - mod-max assist transfers, max assist LB dressing and toileting, CG UB dressing, setup/supervision eating and hygiene with cues. Reduced motor planning and poor endurance. Tolerating regular solid/thin liquids, mild language deficits  - goals: supervision iADLs, min assist transfers, mod assist household ambulation  - target dc home 8/16 with caregiver support and home Pt OT SLP  - caregiver training    # LABS  CBC CMP 8/7  BMP 8/4  monitor FS    #GOC  CODE STATUS: FULL CODE     Outpatient Follow-up (Specialty/Name of physician):    Roberta Sinclair  Neurology  [address]  [place] [address]  Phone: [phone]  Fax: [phone]  Follow Up Time: 2 weeks    Kimi Hill  Internal Medicine  [address]  [place] [address]  Phone: [phone]  Fax: [phone]  Follow Up Time: 2 weeks    Courtney Euceda  Cardiac Electrophysiology  [id number] [place] [address], [address]  [place] [address]  Phone: [phone]  Fax: [phone]  Follow Up Time: 2 weeks   75 YOmale  with PMH HTN, HLD, type 2 DM, moderate axonal sensory motor neuropathy, left MCA CVA, BPH, gout who presented to [facility] 7/20/23 with acute/subacute lacunar infarct IC.    # Right  posterior limb of the internal capsule CVA  - MRI brain +acute/subacute lacunar infarct posterior limbic R IC with chronic infarct R cerebellum and L centrum semiovale  - c/w ASA, Lipitor and Plavix  x 3 weeks (last dose: 8/10)  - Continue Comprehensive Rehab Program: PT/OT/ST, 3hours daily and 5 days weekly  - Precautions: cardiac, DM, sensory deficits, fall    # Adjustment disorder  - Sertraline 25mg daily  - Neuropsychology support    # HLD  - Lipitor decreased to 40 mg 2/2 mild transaminitis  - Monitor LFT: , ast 20 alt 26 on 8/3  - CMP 8/7    # DM II  - A1c 6.6 on 7/23  - ISS and FS  - lantus increased to 15 u qhs 8/2  - admelog 3 u QAC added 8/2  - -314 8/3. hospitalist f/u  - continue diabetic education, likely need for oral med on dc discussed with patient, metformin held due to renal function    # CKD 3  - Monitor, GFR (07/27) 47, Cr 1.54  - Avoid nephrotoxic meds  - BUN 27/1.49--> 26/1.33 7/31 -> BUN 32 Cr 1.57 8/3  - encourage fluids, discussed with patient, repeat BMP 8/4    # Gout  - Zyloprim 300mg daily    # Left achilles tendonitis  - Diclofenac gel 1% dc 8/2 due to inefficacy  - lidocaine patch daily 8/2, imrpoved 8/3    # neuropathic pain  - gabapentin 100 mg qhs 7/31     # Pain management  - Tylenol PRN  - voltaren gel  - gabapentin     # GI ppx  - Protonix 40mgn  - Senna, dulcolax nightly  - miralax PRN    # FEN   - Diet: DASH     # BPH  - Flomax 0.4mg nightly    # DVT ppx  - Heparin, SCD, TEDs    # Case discussed in IDT rounds 7/31 (initial):  - mod-max assist transfers, max assist LB dressing and toileting, CG UB dressing, setup/supervision eating and hygiene with cues. Reduced motor planning and poor endurance. Tolerating regular solid/thin liquids, mild language deficits  - goals: supervision iADLs, min assist transfers, mod assist household ambulation  - target dc home 8/16 with caregiver support and home Pt OT SLP  - caregiver training    # LABS  BMP 8/4  monitor FS  CBC CMP 8/7    #GOC  CODE STATUS: FULL CODE     Outpatient Follow-up (Specialty/Name of physician):    Roberta Sinclair  Neurology  [address] [facility] [address]  [place] [address]  Phone: [phone]  Fax: [phone]  Follow Up Time: 2 weeks    Kimi Hill  Internal Medicine  [address], [address]  [place] [address]  Phone: [phone]  Fax: [phone]  Follow Up Time: 2 weeks    Courtney Euceda  Cardiac Electrophysiology  [address] [place] [address], [address]  [place] [address]  Phone: [phone]  Fax: [phone]  Follow Up Time: 2 weeks   140

## 2023-08-04 ENCOUNTER — NON-APPOINTMENT (OUTPATIENT)
Age: 38
End: 2023-08-04

## 2023-08-09 ENCOUNTER — APPOINTMENT (OUTPATIENT)
Dept: DERMATOLOGY | Facility: CLINIC | Age: 38
End: 2023-08-09

## 2023-08-10 ENCOUNTER — NON-APPOINTMENT (OUTPATIENT)
Age: 38
End: 2023-08-10

## 2023-08-11 ENCOUNTER — NON-APPOINTMENT (OUTPATIENT)
Age: 38
End: 2023-08-11

## 2023-08-14 ENCOUNTER — NON-APPOINTMENT (OUTPATIENT)
Age: 38
End: 2023-08-14

## 2023-08-17 ENCOUNTER — NON-APPOINTMENT (OUTPATIENT)
Age: 38
End: 2023-08-17

## 2023-08-17 ENCOUNTER — APPOINTMENT (OUTPATIENT)
Dept: PSYCHIATRY | Facility: CLINIC | Age: 38
End: 2023-08-17

## 2023-08-24 ENCOUNTER — APPOINTMENT (OUTPATIENT)
Dept: PSYCHIATRY | Facility: CLINIC | Age: 38
End: 2023-08-24

## 2023-08-31 ENCOUNTER — NON-APPOINTMENT (OUTPATIENT)
Age: 38
End: 2023-08-31

## 2023-08-31 ENCOUNTER — APPOINTMENT (OUTPATIENT)
Dept: PSYCHIATRY | Facility: CLINIC | Age: 38
End: 2023-08-31

## 2023-09-07 ENCOUNTER — APPOINTMENT (OUTPATIENT)
Dept: PSYCHIATRY | Facility: CLINIC | Age: 38
End: 2023-09-07

## 2023-09-13 ENCOUNTER — APPOINTMENT (OUTPATIENT)
Dept: OPHTHALMOLOGY | Facility: CLINIC | Age: 38
End: 2023-09-13
Payer: COMMERCIAL

## 2023-09-13 ENCOUNTER — NON-APPOINTMENT (OUTPATIENT)
Age: 38
End: 2023-09-13

## 2023-09-13 PROCEDURE — 92012 INTRM OPH EXAM EST PATIENT: CPT

## 2023-09-14 ENCOUNTER — APPOINTMENT (OUTPATIENT)
Dept: PSYCHIATRY | Facility: CLINIC | Age: 38
End: 2023-09-14

## 2023-09-21 ENCOUNTER — APPOINTMENT (OUTPATIENT)
Dept: PSYCHIATRY | Facility: CLINIC | Age: 38
End: 2023-09-21

## 2023-09-25 ENCOUNTER — APPOINTMENT (OUTPATIENT)
Dept: OPHTHALMOLOGY | Facility: CLINIC | Age: 38
End: 2023-09-25

## 2023-09-26 ENCOUNTER — RESULT REVIEW (OUTPATIENT)
Age: 38
End: 2023-09-26

## 2023-09-26 ENCOUNTER — OUTPATIENT (OUTPATIENT)
Dept: OUTPATIENT SERVICES | Facility: HOSPITAL | Age: 38
LOS: 1 days | End: 2023-09-26
Payer: COMMERCIAL

## 2023-09-26 ENCOUNTER — APPOINTMENT (OUTPATIENT)
Dept: ORTHOPEDIC SURGERY | Facility: CLINIC | Age: 38
End: 2023-09-26
Payer: COMMERCIAL

## 2023-09-26 ENCOUNTER — APPOINTMENT (OUTPATIENT)
Dept: GASTROENTEROLOGY | Facility: AMBULATORY SURGERY CENTER | Age: 38
End: 2023-09-26

## 2023-09-26 VITALS
SYSTOLIC BLOOD PRESSURE: 116 MMHG | HEIGHT: 72 IN | HEART RATE: 90 BPM | TEMPERATURE: 98.4 F | WEIGHT: 210 LBS | OXYGEN SATURATION: 97 % | DIASTOLIC BLOOD PRESSURE: 81 MMHG | BODY MASS INDEX: 28.44 KG/M2

## 2023-09-26 DIAGNOSIS — M22.2X2 PATELLOFEMORAL DISORDERS, LEFT KNEE: ICD-10-CM

## 2023-09-26 DIAGNOSIS — Z86.79 PERSONAL HISTORY OF OTHER DISEASES OF THE CIRCULATORY SYSTEM: ICD-10-CM

## 2023-09-26 PROCEDURE — 73564 X-RAY EXAM KNEE 4 OR MORE: CPT

## 2023-09-26 PROCEDURE — 73564 X-RAY EXAM KNEE 4 OR MORE: CPT | Mod: 26,50

## 2023-09-26 PROCEDURE — 99203 OFFICE O/P NEW LOW 30 MIN: CPT

## 2023-09-26 RX ORDER — OMEPRAZOLE 40 MG/1
40 CAPSULE, DELAYED RELEASE ORAL
Qty: 30 | Refills: 5 | Status: COMPLETED | COMMUNITY
Start: 2022-07-19 | End: 2023-09-26

## 2023-09-26 RX ORDER — POLYETHYLENE GLYCOL 3350 AND ELECTROLYTES WITH LEMON FLAVOR 236; 22.74; 6.74; 5.86; 2.97 G/4L; G/4L; G/4L; G/4L; G/4L
236 POWDER, FOR SOLUTION ORAL
Qty: 1 | Refills: 0 | Status: COMPLETED | COMMUNITY
Start: 2023-04-05 | End: 2023-09-26

## 2023-09-26 RX ORDER — OMEPRAZOLE 40 MG/1
40 CAPSULE, DELAYED RELEASE ORAL
Qty: 90 | Refills: 1 | Status: COMPLETED | COMMUNITY
Start: 2022-07-19 | End: 2023-09-26

## 2023-09-26 RX ORDER — SUCRALFATE 1 G/10ML
1 SUSPENSION ORAL 4 TIMES DAILY
Qty: 2 | Refills: 0 | Status: COMPLETED | COMMUNITY
Start: 2022-07-19 | End: 2023-09-26

## 2023-09-26 RX ORDER — FAMOTIDINE 10 MG/1
TABLET, FILM COATED ORAL
Refills: 0 | Status: ACTIVE | COMMUNITY

## 2023-09-26 RX ORDER — ZOLMITRIPTAN 5 MG/1
5 TABLET, FILM COATED ORAL
Qty: 10 | Refills: 3 | Status: COMPLETED | COMMUNITY
Start: 2022-05-11 | End: 2023-09-26

## 2023-09-28 ENCOUNTER — APPOINTMENT (OUTPATIENT)
Dept: PSYCHIATRY | Facility: CLINIC | Age: 38
End: 2023-09-28

## 2023-10-04 ENCOUNTER — APPOINTMENT (OUTPATIENT)
Dept: OPHTHALMOLOGY | Facility: CLINIC | Age: 38
End: 2023-10-04
Payer: COMMERCIAL

## 2023-10-04 ENCOUNTER — NON-APPOINTMENT (OUTPATIENT)
Age: 38
End: 2023-10-04

## 2023-10-04 PROCEDURE — 99213 OFFICE O/P EST LOW 20 MIN: CPT

## 2023-10-05 ENCOUNTER — APPOINTMENT (OUTPATIENT)
Dept: PSYCHIATRY | Facility: CLINIC | Age: 38
End: 2023-10-05

## 2023-10-05 ENCOUNTER — NON-APPOINTMENT (OUTPATIENT)
Age: 38
End: 2023-10-05

## 2023-10-12 ENCOUNTER — APPOINTMENT (OUTPATIENT)
Dept: PSYCHIATRY | Facility: CLINIC | Age: 38
End: 2023-10-12

## 2023-10-13 ENCOUNTER — NON-APPOINTMENT (OUTPATIENT)
Age: 38
End: 2023-10-13

## 2023-10-19 ENCOUNTER — APPOINTMENT (OUTPATIENT)
Dept: PSYCHIATRY | Facility: CLINIC | Age: 38
End: 2023-10-19

## 2023-10-26 ENCOUNTER — APPOINTMENT (OUTPATIENT)
Dept: DERMATOLOGY | Facility: CLINIC | Age: 38
End: 2023-10-26
Payer: COMMERCIAL

## 2023-10-26 ENCOUNTER — APPOINTMENT (OUTPATIENT)
Dept: PSYCHIATRY | Facility: CLINIC | Age: 38
End: 2023-10-26

## 2023-10-26 PROCEDURE — 64650 CHEMODENERV ECCRINE GLANDS: CPT

## 2023-11-02 ENCOUNTER — APPOINTMENT (OUTPATIENT)
Dept: PSYCHIATRY | Facility: CLINIC | Age: 38
End: 2023-11-02

## 2023-11-08 RX ORDER — OMEPRAZOLE 40 MG/1
40 CAPSULE, DELAYED RELEASE ORAL
Qty: 180 | Refills: 2 | Status: ACTIVE | COMMUNITY
Start: 2023-06-06 | End: 1900-01-01

## 2023-11-09 ENCOUNTER — APPOINTMENT (OUTPATIENT)
Dept: PSYCHIATRY | Facility: CLINIC | Age: 38
End: 2023-11-09

## 2023-11-16 ENCOUNTER — APPOINTMENT (OUTPATIENT)
Dept: PSYCHIATRY | Facility: CLINIC | Age: 38
End: 2023-11-16

## 2023-11-21 ENCOUNTER — APPOINTMENT (OUTPATIENT)
Dept: NEUROLOGY | Facility: CLINIC | Age: 38
End: 2023-11-21
Payer: COMMERCIAL

## 2023-11-21 DIAGNOSIS — G43.709 CHRONIC MIGRAINE W/OUT AURA, NOT INTRACTABLE, W/OUT STATUS MIGRAINOSUS: ICD-10-CM

## 2023-11-21 PROCEDURE — 64405 NJX AA&/STRD GR OCPL NRV: CPT

## 2023-11-24 ENCOUNTER — APPOINTMENT (OUTPATIENT)
Dept: NEUROLOGY | Facility: CLINIC | Age: 38
End: 2023-11-24

## 2023-11-27 ENCOUNTER — APPOINTMENT (OUTPATIENT)
Dept: NEUROLOGY | Facility: CLINIC | Age: 38
End: 2023-11-27
Payer: COMMERCIAL

## 2023-11-27 VITALS
HEART RATE: 91 BPM | SYSTOLIC BLOOD PRESSURE: 162 MMHG | HEIGHT: 72 IN | WEIGHT: 205 LBS | BODY MASS INDEX: 27.77 KG/M2 | TEMPERATURE: 98.2 F | OXYGEN SATURATION: 99 % | DIASTOLIC BLOOD PRESSURE: 114 MMHG

## 2023-11-27 DIAGNOSIS — G43.719 CHRONIC MIGRAINE W/OUT AURA, INTRACTABLE, W/OUT STATUS MIGRAINOSUS: ICD-10-CM

## 2023-11-27 PROCEDURE — 64615 CHEMODENERV MUSC MIGRAINE: CPT

## 2023-11-30 ENCOUNTER — APPOINTMENT (OUTPATIENT)
Dept: PSYCHIATRY | Facility: CLINIC | Age: 38
End: 2023-11-30

## 2023-12-05 PROBLEM — G43.719 INTRACTABLE CHRONIC MIGRAINE WITHOUT AURA AND WITHOUT STATUS MIGRAINOSUS: Status: ACTIVE | Noted: 2023-12-05

## 2023-12-07 ENCOUNTER — APPOINTMENT (OUTPATIENT)
Dept: PSYCHIATRY | Facility: CLINIC | Age: 38
End: 2023-12-07

## 2023-12-14 ENCOUNTER — APPOINTMENT (OUTPATIENT)
Dept: PSYCHIATRY | Facility: CLINIC | Age: 38
End: 2023-12-14

## 2023-12-14 ENCOUNTER — APPOINTMENT (OUTPATIENT)
Dept: DERMATOLOGY | Facility: CLINIC | Age: 38
End: 2023-12-14

## 2023-12-14 NOTE — ED ADULT NURSE NOTE - PRO INTERPRETER NEED 2
Pt wheel chaired into triage  Reports \"coughing attacks\" x 3 weeks. Completed abx and steroids with no improvement. No fever. English

## 2023-12-21 ENCOUNTER — APPOINTMENT (OUTPATIENT)
Dept: PSYCHIATRY | Facility: CLINIC | Age: 38
End: 2023-12-21

## 2023-12-28 ENCOUNTER — APPOINTMENT (OUTPATIENT)
Dept: PSYCHIATRY | Facility: CLINIC | Age: 38
End: 2023-12-28

## 2024-01-04 ENCOUNTER — APPOINTMENT (OUTPATIENT)
Dept: PSYCHIATRY | Facility: CLINIC | Age: 39
End: 2024-01-04

## 2024-01-04 NOTE — ED ADULT NURSE NOTE - NS ED NURSE LEVEL OF CONSCIOUSNESS MENTAL STATUS
Alert/Awake If you are a smoker, it is important for your health to stop smoking. Please be aware that second hand smoke is also harmful.

## 2024-02-05 ENCOUNTER — APPOINTMENT (OUTPATIENT)
Dept: NEUROLOGY | Facility: CLINIC | Age: 39
End: 2024-02-05
Payer: COMMERCIAL

## 2024-02-05 DIAGNOSIS — M54.81 OCCIPITAL NEURALGIA: ICD-10-CM

## 2024-02-05 PROCEDURE — 64405 NJX AA&/STRD GR OCPL NRV: CPT | Mod: 50

## 2024-02-16 ENCOUNTER — APPOINTMENT (OUTPATIENT)
Dept: DERMATOLOGY | Facility: CLINIC | Age: 39
End: 2024-02-16

## 2024-02-21 RX ORDER — VERAPAMIL HYDROCHLORIDE 120 MG/1
120 CAPSULE, EXTENDED RELEASE ORAL DAILY
Qty: 30 | Refills: 5 | Status: DISCONTINUED | COMMUNITY
Start: 2024-02-21 | End: 2024-02-21

## 2024-02-26 ENCOUNTER — APPOINTMENT (OUTPATIENT)
Dept: FAMILY MEDICINE | Facility: CLINIC | Age: 39
End: 2024-02-26
Payer: COMMERCIAL

## 2024-02-26 VITALS
BODY MASS INDEX: 27.9 KG/M2 | WEIGHT: 206 LBS | HEART RATE: 88 BPM | TEMPERATURE: 98.1 F | HEIGHT: 72 IN | SYSTOLIC BLOOD PRESSURE: 130 MMHG | DIASTOLIC BLOOD PRESSURE: 90 MMHG | OXYGEN SATURATION: 97 %

## 2024-02-26 DIAGNOSIS — M23.8X2 OTHER INTERNAL DERANGEMENTS OF LEFT KNEE: ICD-10-CM

## 2024-02-26 PROCEDURE — 99385 PREV VISIT NEW AGE 18-39: CPT

## 2024-02-26 NOTE — HISTORY OF PRESENT ILLNESS
[FreeTextEntry1] : establish care  [de-identified] : 40 yo m presents to establish care  hx of high blood pressure, recently restarted meds  hx of viral meningitis last year, was OOO for 4 months recovering, now with residual neuro complaints  has been on vitamins as well-- tumeric, zinc, b vitamins

## 2024-02-26 NOTE — HEALTH RISK ASSESSMENT
[Good] : ~his/her~  mood as  good [Yes] : Yes [2 - 3 times a week (3 pts)] : 2 - 3  times a week (3 points) [1 or 2 (0 pts)] : 1 or 2 (0 points) [Never (0 pts)] : Never (0 points) [No] : In the past 12 months have you used drugs other than those required for medical reasons? No [0] : 2) Feeling down, depressed, or hopeless: Not at all (0) [PHQ-2 Negative - No further assessment needed] : PHQ-2 Negative - No further assessment needed [HIV test declined] : HIV test declined [Hepatitis C test declined] : Hepatitis C test declined [None] : None [Employed] : employed [Alone] : lives alone [Feels Safe at Home] : Feels safe at home [Fully functional (bathing, dressing, toileting, transferring, walking, feeding)] : Fully functional (bathing, dressing, toileting, transferring, walking, feeding) [Fully functional (using the telephone, shopping, preparing meals, housekeeping, doing laundry, using] : Fully functional and needs no help or supervision to perform IADLs (using the telephone, shopping, preparing meals, housekeeping, doing laundry, using transportation, managing medications and managing finances) [Never] : Never [Audit-CScore] : 3 [de-identified] : exercise limited  [[id number]] : 0 [de-identified] : fruits, veggies  [Change in mental status noted] : No change in mental status noted [Language] : denies difficulty with language [Reports changes in hearing] : Reports no changes in hearing [Reports changes in vision] : Reports no changes in vision [FreeTextEntry2] : [facility]

## 2024-02-26 NOTE — PLAN
[FreeTextEntry1] : anxiety  will do trial of klonopin  has worked in the past  reviewed risks, benefits, side effects, regimen, alternatives  reviewed cbc, cmp, a1c, lipids, tsh, iron  low iron- supplementing  lipids-- elevated, encouraged low fat diet, exercise, repeat fasting  high bp-- cont meds, better on repeat

## 2024-02-27 ENCOUNTER — RESULT CHARGE (OUTPATIENT)
Age: 39
End: 2024-02-27

## 2024-03-01 ENCOUNTER — RESULT REVIEW (OUTPATIENT)
Age: 39
End: 2024-03-01

## 2024-03-01 ENCOUNTER — APPOINTMENT (OUTPATIENT)
Dept: NUCLEAR MEDICINE | Facility: HOSPITAL | Age: 39
End: 2024-03-01

## 2024-03-01 ENCOUNTER — OUTPATIENT (OUTPATIENT)
Dept: OUTPATIENT SERVICES | Facility: HOSPITAL | Age: 39
LOS: 1 days | End: 2024-03-01
Payer: COMMERCIAL

## 2024-03-01 LAB
APTT BLD: 31.7 SEC — SIGNIFICANT CHANGE UP (ref 24.5–35.6)
GLUCOSE BLDC GLUCOMTR-MCNC: 89 MG/DL — SIGNIFICANT CHANGE UP (ref 70–99)
INR BLD: 1.06 — SIGNIFICANT CHANGE UP (ref 0.85–1.18)
PROTHROM AB SERPL-ACNC: 12.1 SEC — SIGNIFICANT CHANGE UP (ref 9.5–13)

## 2024-03-01 PROCEDURE — 78999 UNLISTED MISC PX DX NUC MED: CPT | Mod: 26

## 2024-03-01 PROCEDURE — 36415 COLL VENOUS BLD VENIPUNCTURE: CPT

## 2024-03-01 PROCEDURE — A9552: CPT

## 2024-03-01 PROCEDURE — 78608 BRAIN IMAGING (PET): CPT

## 2024-03-01 PROCEDURE — 78608 BRAIN IMAGING (PET): CPT | Mod: 26

## 2024-03-01 PROCEDURE — 85610 PROTHROMBIN TIME: CPT

## 2024-03-01 PROCEDURE — 82962 GLUCOSE BLOOD TEST: CPT

## 2024-03-01 PROCEDURE — 78999 UNLISTED MISC PX DX NUC MED: CPT

## 2024-03-01 PROCEDURE — 85730 THROMBOPLASTIN TIME PARTIAL: CPT

## 2024-03-04 ENCOUNTER — OUTPATIENT (OUTPATIENT)
Dept: OUTPATIENT SERVICES | Facility: HOSPITAL | Age: 39
LOS: 1 days | End: 2024-03-04
Payer: COMMERCIAL

## 2024-03-04 ENCOUNTER — RESULT REVIEW (OUTPATIENT)
Age: 39
End: 2024-03-04

## 2024-03-04 ENCOUNTER — INPATIENT (INPATIENT)
Facility: HOSPITAL | Age: 39
LOS: 5 days | Discharge: HOME | DRG: 99 | End: 2024-03-10
Attending: PSYCHIATRY & NEUROLOGY | Admitting: PSYCHIATRY & NEUROLOGY
Payer: COMMERCIAL

## 2024-03-04 ENCOUNTER — APPOINTMENT (OUTPATIENT)
Dept: INTERVENTIONAL RADIOLOGY/VASCULAR | Facility: HOSPITAL | Age: 39
End: 2024-03-04

## 2024-03-04 VITALS
RESPIRATION RATE: 18 BRPM | HEART RATE: 98 BPM | OXYGEN SATURATION: 95 % | DIASTOLIC BLOOD PRESSURE: 86 MMHG | SYSTOLIC BLOOD PRESSURE: 136 MMHG

## 2024-03-04 LAB
APPEARANCE CSF: CLEAR — SIGNIFICANT CHANGE UP
APPEARANCE SPUN FLD: COLORLESS — SIGNIFICANT CHANGE UP
COLOR CSF: SIGNIFICANT CHANGE UP
CSF COMMENTS: SIGNIFICANT CHANGE UP
CSF PCR RESULT: SIGNIFICANT CHANGE UP
GLUCOSE CSF-MCNC: 65 MG/DL — SIGNIFICANT CHANGE UP (ref 40–70)
GRAM STN FLD: SIGNIFICANT CHANGE UP
LYMPHOCYTES # CSF: 1 % — LOW (ref 40–80)
NEUTROPHILS # CSF: 1 % — SIGNIFICANT CHANGE UP (ref 0–6)
NRBC NFR CSF: 2 /UL — SIGNIFICANT CHANGE UP (ref 0–5)
PROT CSF-MCNC: 39 MG/DL — SIGNIFICANT CHANGE UP (ref 15–45)
RBC # CSF: 0 /UL — SIGNIFICANT CHANGE UP (ref 0–0)
SPECIMEN SOURCE: SIGNIFICANT CHANGE UP
TUBE TYPE: SIGNIFICANT CHANGE UP

## 2024-03-04 PROCEDURE — 83873 ASSAY OF CSF PROTEIN: CPT

## 2024-03-04 PROCEDURE — 62328 DX LMBR SPI PNXR W/FLUOR/CT: CPT

## 2024-03-04 PROCEDURE — 87070 CULTURE OTHR SPECIMN AEROBIC: CPT

## 2024-03-04 PROCEDURE — 83916 OLIGOCLONAL BANDS: CPT

## 2024-03-04 PROCEDURE — 82945 GLUCOSE OTHER FLUID: CPT

## 2024-03-04 PROCEDURE — 82040 ASSAY OF SERUM ALBUMIN: CPT

## 2024-03-04 PROCEDURE — 86341 ISLET CELL ANTIBODY: CPT

## 2024-03-04 PROCEDURE — 86255 FLUORESCENT ANTIBODY SCREEN: CPT

## 2024-03-04 PROCEDURE — 82042 OTHER SOURCE ALBUMIN QUAN EA: CPT

## 2024-03-04 PROCEDURE — 87483 CNS DNA AMP PROBE TYPE 12-25: CPT

## 2024-03-04 PROCEDURE — 87205 SMEAR GRAM STAIN: CPT

## 2024-03-04 PROCEDURE — 87798 DETECT AGENT NOS DNA AMP: CPT

## 2024-03-04 PROCEDURE — 87476 LYME DIS DNA AMP PROBE: CPT

## 2024-03-04 PROCEDURE — 36415 COLL VENOUS BLD VENIPUNCTURE: CPT

## 2024-03-04 PROCEDURE — 84157 ASSAY OF PROTEIN OTHER: CPT

## 2024-03-04 PROCEDURE — 83519 RIA NONANTIBODY: CPT

## 2024-03-04 PROCEDURE — 82784 ASSAY IGA/IGD/IGG/IGM EACH: CPT

## 2024-03-04 PROCEDURE — 89051 BODY FLUID CELL COUNT: CPT

## 2024-03-04 PROCEDURE — 83520 IMMUNOASSAY QUANT NOS NONAB: CPT

## 2024-03-04 RX ORDER — ACETAMINOPHEN 500 MG
650 TABLET ORAL EVERY 6 HOURS
Refills: 0 | Status: DISCONTINUED | OUTPATIENT
Start: 2024-03-04 | End: 2024-03-10

## 2024-03-04 RX ORDER — PANTOPRAZOLE SODIUM 20 MG/1
40 TABLET, DELAYED RELEASE ORAL
Refills: 0 | Status: DISCONTINUED | OUTPATIENT
Start: 2024-03-04 | End: 2024-03-04

## 2024-03-04 RX ORDER — DIPHENHYDRAMINE HCL 50 MG
25 CAPSULE ORAL ONCE
Refills: 0 | Status: COMPLETED | OUTPATIENT
Start: 2024-03-04 | End: 2024-03-04

## 2024-03-04 RX ORDER — DEXAMETHASONE 0.5 MG/5ML
10 ELIXIR ORAL ONCE
Refills: 0 | Status: COMPLETED | OUTPATIENT
Start: 2024-03-04 | End: 2024-03-04

## 2024-03-04 RX ORDER — METOCLOPRAMIDE HCL 10 MG
10 TABLET ORAL EVERY 12 HOURS
Refills: 0 | Status: DISCONTINUED | OUTPATIENT
Start: 2024-03-04 | End: 2024-03-07

## 2024-03-04 RX ORDER — KETOROLAC TROMETHAMINE 30 MG/ML
15 SYRINGE (ML) INJECTION EVERY 12 HOURS
Refills: 0 | Status: DISCONTINUED | OUTPATIENT
Start: 2024-03-04 | End: 2024-03-09

## 2024-03-04 RX ORDER — LORATADINE 10 MG/1
10 TABLET ORAL DAILY
Refills: 0 | Status: DISCONTINUED | OUTPATIENT
Start: 2024-03-04 | End: 2024-03-05

## 2024-03-04 RX ORDER — PANTOPRAZOLE SODIUM 20 MG/1
40 TABLET, DELAYED RELEASE ORAL ONCE
Refills: 0 | Status: COMPLETED | OUTPATIENT
Start: 2024-03-04 | End: 2024-03-04

## 2024-03-04 RX ORDER — PANTOPRAZOLE SODIUM 20 MG/1
40 TABLET, DELAYED RELEASE ORAL EVERY 12 HOURS
Refills: 0 | Status: DISCONTINUED | OUTPATIENT
Start: 2024-03-05 | End: 2024-03-07

## 2024-03-04 RX ORDER — DIPHENHYDRAMINE HCL 50 MG
25 CAPSULE ORAL ONCE
Refills: 0 | Status: DISCONTINUED | OUTPATIENT
Start: 2024-03-04 | End: 2024-03-04

## 2024-03-04 RX ADMIN — Medication 25 MILLIGRAM(S): at 22:22

## 2024-03-04 RX ADMIN — PANTOPRAZOLE SODIUM 40 MILLIGRAM(S): 20 TABLET, DELAYED RELEASE ORAL at 20:04

## 2024-03-04 RX ADMIN — Medication 50 MILLIGRAM(S): at 21:38

## 2024-03-04 NOTE — PACU DISCHARGE NOTE - NAUSEA/VOMITING:
None
Family complaining that PEG tube has not been changed and is currently not functioning.  - IR consult for PEG tube replacement

## 2024-03-04 NOTE — H&P ADULT - NSHPREVIEWOFSYSTEMS_GEN_ALL_CORE
Constitutional: No fever, weight loss or fatigue  Eyes: No eye pain, visual disturbances, or discharge  ENMT:  No difficulty hearing, tinnitus; No sinus or throat pain  Neck: No pain   Respiratory: No cough, wheezing, chills or hemoptysis  Cardiovascular: No chest pain, palpitations, shortness of breath, or leg swelling  Gastrointestinal: No abdominal pain. No nausea, vomiting or hematemesis; No diarrhea or constipation. No hematochezia.  Genitourinary: No dysuria, frequency, hematuria or incontinence  Neurological: As per HPI  Skin: No itching, burning, rashes or lesions   Endocrine: No heat or cold intolerance; No hair loss  Musculoskeletal: No joint pain or swelling; No muscle, back or extremity pain  Heme/Lymph: No easy bruising or bleeding gums

## 2024-03-04 NOTE — H&P ADULT - NS ATTEND AMEND GEN_ALL_CORE FT
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     On exam, fluent, fc, repeats nearly all sentences well though has slight difficulty with "spy fled to [place]"; Difficulty with serial 7s; WORLD front and back; 3/3 remote recall; No drift x4.     PET scan 3/1: Abnormal hypometabolism particularly pronounced in the parieto-occipital and temporal regions (right greater than left), and also involving the anteromedial temporal lobes with extension into the orbitofrontal and  paramedian frontal regions bilaterally. 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     Plan:   Stopped solumedrol due to allergic reaction; Decadrpm 6mg q6h then 4mg q6h tomorrow   ?VEEG given autonomic symtoms?   May send serological autoimmune panel   f/u remainder of CSF studies  Tele  PT    75  minutes spent on total encounter. The necessity of the time spent during the encounter on this date of service was due to:     Review of imaging and chart; obtaining history; examination of pt; discussion and coordination of care, and discussion of lifestyle modification and risk factor control. 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     On exam, fluent, fc, repeats nearly all sentences well though has slight difficulty with "spy fled to [place]"; Difficulty with serial 7s; WORLD front and back; 3/3 remote recall; No drift x4.     PET scan 3/1: Abnormal hypometabolism particularly pronounced in the parieto-occipital and temporal regions (right greater than left), and also involving the anteromedial temporal lobes with extension into the orbitofrontal and  paramedian frontal regions bilaterally. 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     Plan:   Stopped solumedrol due to allergic reaction; Decadrpm 6mg q6h then 4mg q6h tomorrow   ?VEEG given autonomic symtoms?   May send serological autoimmune panel   f/u remainder of CSF studies  Tele  PT    75  minutes spent on total encounter. The necessity of the time spent during the encounter on this date of service was due to:     Review of imaging and chart; obtaining history; examination of pt; discussion and coordination of care, and discussion of lifestyle modification and risk factor control..

## 2024-03-04 NOTE — H&P ADULT - NSHPLABSRESULTS_GEN_ALL_CORE
Fingerstick Blood Glucose: CAPILLARY BLOOD GLUCOSE        LABS:                    RADIOLOGY & ADDITIONAL STUDIES:  < from: NM PET Brain FDG Encephalitis (03.01.24 @ 14:03) >    IMPRESSION:    Abnormal hypometabolism particularly pronounced in the parieto-occipital  and temporal regions (right greater than left), and also involving the   anteromedial temporal lobes with extension into the orbitofrontal and   paramedian frontal regions bilaterally.    Findings suggestive of autoimmune encephalitis (AIE) sequelae, likely   with contributing neurotropic medication effects, in the proper clinical   setting.    Correlation with clinical exam, CSF sampling, and neuropsychological   assessment is advised.    < end of copied text >

## 2024-03-04 NOTE — H&P ADULT - HISTORY OF PRESENT ILLNESS
39y Male with PMHx of EBV meningitis 1.5 years ago, intractable headaches for which he follows up with Dr. Weston outpatient who was sent in for admission after outpatient PET scan and LP concerning for possible encephalitis. Patient has been     T(C): 37 (03-04-24 @ 21:51), Max: 37 (03-04-24 @ 21:51)  HR: 98 (03-04-24 @ 19:11) (98 - 98)  BP: 136/86 (03-04-24 @ 19:11) (136/86 - 136/86)  RR: 18 (03-04-24 @ 19:11) (18 - 18)  SpO2: 95% (03-04-24 @ 19:11) (95% - 95%)    PAST MEDICAL & SURGICAL HISTORY:  Hypertension      Migraine      Hypertension      Migraine      No pertinent past medical history          FAMILY HISTORY:      SOCIAL HISTORY:   Patient lives with *** at ***.   Smoking status:  Drinking:  Drug Use:     ROS: ***  Constitutional: No fever, weight loss or fatigue  Eyes: No eye pain, visual disturbances, or discharge  ENMT:  No difficulty hearing, tinnitus; No sinus or throat pain  Neck: No pain or stiffness  Respiratory: No cough, wheezing, chills or hemoptysis  Cardiovascular: No chest pain, palpitations, shortness of breath, or leg swelling  Gastrointestinal: No abdominal pain. No nausea, vomiting or hematemesis; No diarrhea or constipation. Nohematochezia.  Genitourinary: No dysuria, frequency, hematuria or incontinence  Neurological: As per HPI  Skin: No itching, burning, rashes or lesions   Endocrine: No heat or cold intolerance; No hair loss  Musculoskeletal: No joint pain or swelling; No muscle, back or extremity pain  Heme/Lymph: No easy bruising or bleeding gums    MEDICATIONS  (STANDING):    MEDICATIONS  (PRN):  acetaminophen     Tablet .. 650 milliGRAM(s) Oral every 6 hours PRN Mild Pain (1 - 3)  ketorolac   Injectable 15 milliGRAM(s) IV Push every 12 hours PRN Severe Pain (7 - 10)  LORazepam   Injectable 2 milliGRAM(s) IV Push once PRN Anxiety  metoclopramide Injectable 10 milliGRAM(s) IV Push every 12 hours PRN severe headache    Allergies    No Known Allergies    Intolerances      Vital Signs Last 24 Hrs  T(C): 37 (04 Mar 2024 21:51), Max: 37 (04 Mar 2024 21:51)  T(F): 98.6 (04 Mar 2024 21:51), Max: 98.6 (04 Mar 2024 21:51)  HR: 98 (04 Mar 2024 19:11) (98 - 98)  BP: 136/86 (04 Mar 2024 19:11) (136/86 - 136/86)  BP(mean): 106 (04 Mar 2024 19:11) (106 - 106)  RR: 18 (04 Mar 2024 19:11) (18 - 18)  SpO2: 95% (04 Mar 2024 19:11) (95% - 95%)    Parameters below as of 04 Mar 2024 19:11  Patient On (Oxygen Delivery Method): room air        Physical exam:  Constitutional: No acute distress, conversant  Eyes: Anicteric sclerae, moist conjunctivae, see below for CNs  Neck: trachea midline, FROM, supple, no thyromegaly or lymphadenopathy  Cardiovascular: Regular rate and rhythm, no murmurs, rubs, or gallops. No carotid bruits.   Pulmonary: Anterior breath sounds clear bilaterally, no crackles or wheezing. No use of accessory muscles  GI: Abdomen soft, non-distended, non-tender  Extremities: Radial and DP pulses +2, no edema    Neurologic:  -Mental status: Awake, alert, oriented to person, place, and time. Speech is fluent with intact naming, repetition, and comprehension, no dysarthria. Recent and remote memory intact. Follows commands. Attention/concentration intact. Fund of knowledge appropriate.  -Cranial nerves:   II: Visual fields are full to confrontation.  III, IV, VI: Extraocular movements are intact without nystagmus. Pupils equally round and reactive to light  V:  Facial sensation V1-V3 equal and intact   VII: Face is symmetric with normal eye closure and smile  VIII: Hearing is bilaterally intact to finger rub  IX, X: Uvula is midline and soft palate rises symmetrically  XI: Head turning and shoulder shrug are intact.  XII: Tongue protrudes midline  Motor: Normal bulk and tone. No pronator drift. Strength bilateral upper extremity 5/5, bilateral lower extremities 5/5.  Rapid alternating movements intact and symmetric  Sensation: Intact to light touch bilaterally. No neglect or extinction on double simultaneous testing.  Coordination: No dysmetria on finger-to-nose and heel-to-shin bilaterally  Reflexes: Downgoing toes bilaterally   Gait: Narrow gait and steady    NIHSS: **** ASPECT Score: ***** ICH Score: ****** (GCS)    Fingerstick Blood Glucose: CAPILLARY BLOOD GLUCOSE        LABS:                    RADIOLOGY & ADDITIONAL STUDIES:      -----------------------------------------------------------------------------------------------------------------  IV-tPA (Y/N):    ***                              Bolus time:    Alteplase Dose Verification w/ RN:  Reason IV-tPA not given: ***   39y Male with PMHx of meningitis 1.5 years ago, HTN, intractable headaches for which he follows up with Dr. Weston outpatient who was sent in for admission after outpatient PET scan and LP concerning for possible encephalitis. Patient has undergone multiple treatments for his headaches in the past, including botox injections. Noticed increasing frequency and severity of his headaches over the last couple of days/weeks with associated neck stiffness. Has also noticed increased lethargy and cognitive changes over the same period of time. Describes it as a similar feeling to dyslexia, noticed it when he was responding to emails. Was sent for an outpatient PET scan on 03/01 which showed "abnormal hypometabolism particularly pronounced in the parieto-occipital and temporal regions (right greater than left), and also involving the anteromedial temporal lobes with extension into the orbitofrontal and paramedian frontal regions bilaterally. Findings suggestive of autoimmune encephalitis (AIE) sequelae, likely with contributing neurotropic medication effects, in the proper clinical   setting." Underwent an IR guided LP today and was found to have an elevated opening pressure of 30 mmHg, remainder of CSF negative. 39y Male with PMHx of meningitis 1.5 years ago, HTN, intractable headaches for which he follows up with Dr. Weston outpatient who was sent in for admission after outpatient PET scan and LP concerning for possible encephalitis. Patient has undergone multiple treatments for his headaches in the past, including botox injections. Noticed increasing frequency and severity of his headaches over the last couple of days/weeks with associated neck stiffness. Has also noticed increased lethargy and cognitive changes over the same period of time. Describes it as a similar feeling to dyslexia, noticed it when he was responding to emails. Was sent for an outpatient PET scan on 03/01 which showed "abnormal hypometabolism particularly pronounced in the parieto-occipital and temporal regions (right greater than left), and also involving the anteromedial temporal lobes with extension into the orbitofrontal and paramedian frontal regions bilaterally. Findings suggestive of autoimmune encephalitis (AIE) sequelae, likely with contributing neurotropic medication effects, in the proper clinical setting." Underwent an IR guided LP today and was found to have an elevated opening pressure of 30 mmHg, remainder of CSF negative.

## 2024-03-04 NOTE — H&P ADULT - ASSESSMENT
39y Male with PMHx of meningitis 1.5 years ago, HTN, intractable headaches for which he follows up with Dr. Weston outpatient who was sent in for admission after outpatient PET scan and LP concerning for possible encephalitis.     Neuro  #concern for encephalitis  - Outpatient PET scan 3/1: Abnormal hypometabolism particularly pronounced in the parieto-occipital and temporal regions (right greater than left), and also involving the anteromedial temporal lobes with extension into the orbitofrontal and   paramedian frontal regions bilaterally. Findings suggestive of autoimmune encephalitis (AIE) sequelae, likely with contributing neurotropic medication effects, in the proper clinical   setting.  - s/p LP 3/4 with elevated opening pressure at 30 mmHg, remainder of CSF negative  - start 1 g IV Solumedrol, monitor for any adverse rxn  - continue home PPI   - PRN Tylenol, Toradol, and Relgan ordered for headache management  - q8hr general neuro checks and vitals    Cards  #HTN  - permissive hypertension, Goal -180  - restart home Verapamil and Cozaar PRN    Pulm  - call provider if SPO2 < 94%    GI  #Nutrition/Fluids/Electrolytes   - replete K<4 and Mg <2  - Diet: Regular    Renal  - daily BMPs    Infectious Disease  - afebrile on admission    Endocrine  #DM  - A1C results: pending  - continue FS monitoring while on steroids    - TSH results: pending    DVT Prophylaxis  - lovenox sq for DVT prophylaxis   - SCDs for DVT prophylaxis     Dispo: pending PT/OT      Discussed daily hospital plans and goals with patient bedside    Discussed with Neurology Attending, Dr. Weston

## 2024-03-04 NOTE — H&P ADULT - NSHPPHYSICALEXAM_GEN_ALL_CORE
Constitutional: No acute distress, conversant  Eyes: Anicteric sclerae, moist conjunctivae, see below for CNs  Extremities: No edema    Neurologic:  -Mental status: Awake, alert, oriented to person, place, and time. Speech is fluent with intact naming, repetition, and comprehension, no dysarthria. Recent and remote memory intact. Follows commands. Attention/concentration intact. Fund of knowledge appropriate.  -Cranial nerves:   II: Visual fields are full to confrontation.  III, IV, VI: Extraocular movements are intact without nystagmus  V:  Facial sensation V1-V3 equal and intact   VII: Face is symmetric with normal eye closure and smile  XII: Tongue protrudes midline  Motor: Normal bulk and tone. No pronator drift. Strength bilateral upper extremity 5/5, bilateral lower extremities 5/5.  Rapid alternating movements intact and symmetric  Sensation: Intact to light touch bilaterally. No neglect or extinction on double simultaneous testing.  Coordination: No dysmetria on finger-to-nose bilaterally  Gait: Narrow gait and steady    NIHSS: 0

## 2024-03-05 LAB
A1C WITH ESTIMATED AVERAGE GLUCOSE RESULT: 5.2 % — SIGNIFICANT CHANGE UP (ref 4–5.6)
ALBUMIN SERPL ELPH-MCNC: 4.4 G/DL — SIGNIFICANT CHANGE UP (ref 3.3–5)
ALP SERPL-CCNC: 66 U/L — SIGNIFICANT CHANGE UP (ref 40–120)
ALT FLD-CCNC: 29 U/L — SIGNIFICANT CHANGE UP (ref 10–45)
ANION GAP SERPL CALC-SCNC: 12 MMOL/L — SIGNIFICANT CHANGE UP (ref 5–17)
APTT BLD: 26.7 SEC — SIGNIFICANT CHANGE UP (ref 24.5–35.6)
AST SERPL-CCNC: 20 U/L — SIGNIFICANT CHANGE UP (ref 10–40)
BASOPHILS # BLD AUTO: 0.01 K/UL — SIGNIFICANT CHANGE UP (ref 0–0.2)
BASOPHILS NFR BLD AUTO: 0.1 % — SIGNIFICANT CHANGE UP (ref 0–2)
BILIRUB SERPL-MCNC: 0.3 MG/DL — SIGNIFICANT CHANGE UP (ref 0.2–1.2)
BUN SERPL-MCNC: 12 MG/DL — SIGNIFICANT CHANGE UP (ref 7–23)
CALCIUM SERPL-MCNC: 9.4 MG/DL — SIGNIFICANT CHANGE UP (ref 8.4–10.5)
CHLORIDE SERPL-SCNC: 102 MMOL/L — SIGNIFICANT CHANGE UP (ref 96–108)
CHOLEST SERPL-MCNC: 191 MG/DL — SIGNIFICANT CHANGE UP
CO2 SERPL-SCNC: 24 MMOL/L — SIGNIFICANT CHANGE UP (ref 22–31)
CREAT SERPL-MCNC: 0.81 MG/DL — SIGNIFICANT CHANGE UP (ref 0.5–1.3)
CRP SERPL-MCNC: <3 MG/L — SIGNIFICANT CHANGE UP (ref 0–4)
EGFR: 116 ML/MIN/1.73M2 — SIGNIFICANT CHANGE UP
EOSINOPHIL # BLD AUTO: 0 K/UL — SIGNIFICANT CHANGE UP (ref 0–0.5)
EOSINOPHIL NFR BLD AUTO: 0 % — SIGNIFICANT CHANGE UP (ref 0–6)
ERYTHROCYTE [SEDIMENTATION RATE] IN BLOOD: 5 MM/HR — SIGNIFICANT CHANGE UP
ESTIMATED AVERAGE GLUCOSE: 103 MG/DL — SIGNIFICANT CHANGE UP (ref 68–114)
GLUCOSE BLDC GLUCOMTR-MCNC: 89 MG/DL — SIGNIFICANT CHANGE UP (ref 70–99)
GLUCOSE SERPL-MCNC: 156 MG/DL — HIGH (ref 70–99)
HCT VFR BLD CALC: 44.3 % — SIGNIFICANT CHANGE UP (ref 39–50)
HDLC SERPL-MCNC: 54 MG/DL — SIGNIFICANT CHANGE UP
HGB BLD-MCNC: 13.9 G/DL — SIGNIFICANT CHANGE UP (ref 13–17)
IMM GRANULOCYTES NFR BLD AUTO: 0.4 % — SIGNIFICANT CHANGE UP (ref 0–0.9)
LIPID PNL WITH DIRECT LDL SERPL: 131 MG/DL — HIGH
LYMPHOCYTES # BLD AUTO: 0.54 K/UL — LOW (ref 1–3.3)
LYMPHOCYTES # BLD AUTO: 6.9 % — LOW (ref 13–44)
MAGNESIUM SERPL-MCNC: 2 MG/DL — SIGNIFICANT CHANGE UP (ref 1.6–2.6)
MCHC RBC-ENTMCNC: 26.3 PG — LOW (ref 27–34)
MCHC RBC-ENTMCNC: 31.4 GM/DL — LOW (ref 32–36)
MCV RBC AUTO: 83.7 FL — SIGNIFICANT CHANGE UP (ref 80–100)
MONOCYTES # BLD AUTO: 0.05 K/UL — SIGNIFICANT CHANGE UP (ref 0–0.9)
MONOCYTES NFR BLD AUTO: 0.6 % — LOW (ref 2–14)
NEUTROPHILS # BLD AUTO: 7.14 K/UL — SIGNIFICANT CHANGE UP (ref 1.8–7.4)
NEUTROPHILS NFR BLD AUTO: 92 % — HIGH (ref 43–77)
NON HDL CHOLESTEROL: 137 MG/DL — HIGH
NRBC # BLD: 0 /100 WBCS — SIGNIFICANT CHANGE UP (ref 0–0)
PHOSPHATE SERPL-MCNC: 2.8 MG/DL — SIGNIFICANT CHANGE UP (ref 2.5–4.5)
PLATELET # BLD AUTO: 285 K/UL — SIGNIFICANT CHANGE UP (ref 150–400)
POTASSIUM SERPL-MCNC: 4.2 MMOL/L — SIGNIFICANT CHANGE UP (ref 3.5–5.3)
POTASSIUM SERPL-SCNC: 4.2 MMOL/L — SIGNIFICANT CHANGE UP (ref 3.5–5.3)
PROT SERPL-MCNC: 7.2 G/DL — SIGNIFICANT CHANGE UP (ref 6–8.3)
RBC # BLD: 5.29 M/UL — SIGNIFICANT CHANGE UP (ref 4.2–5.8)
RBC # FLD: 19.4 % — HIGH (ref 10.3–14.5)
SODIUM SERPL-SCNC: 138 MMOL/L — SIGNIFICANT CHANGE UP (ref 135–145)
TRIGL SERPL-MCNC: 31 MG/DL — SIGNIFICANT CHANGE UP
TSH SERPL-MCNC: 0.59 UIU/ML — SIGNIFICANT CHANGE UP (ref 0.27–4.2)
WBC # BLD: 7.77 K/UL — SIGNIFICANT CHANGE UP (ref 3.8–10.5)
WBC # FLD AUTO: 7.77 K/UL — SIGNIFICANT CHANGE UP (ref 3.8–10.5)

## 2024-03-05 PROCEDURE — 74018 RADEX ABDOMEN 1 VIEW: CPT | Mod: 26

## 2024-03-05 PROCEDURE — 99223 1ST HOSP IP/OBS HIGH 75: CPT

## 2024-03-05 RX ORDER — DIPHENHYDRAMINE HCL 50 MG
25 CAPSULE ORAL ONCE
Refills: 0 | Status: COMPLETED | OUTPATIENT
Start: 2024-03-05 | End: 2024-03-05

## 2024-03-05 RX ORDER — VERAPAMIL HCL 240 MG
120 CAPSULE, EXTENDED RELEASE PELLETS 24 HR ORAL ONCE
Refills: 0 | Status: COMPLETED | OUTPATIENT
Start: 2024-03-05 | End: 2024-03-05

## 2024-03-05 RX ORDER — DIPHENHYDRAMINE HCL 50 MG
25 CAPSULE ORAL ONCE
Refills: 0 | Status: DISCONTINUED | OUTPATIENT
Start: 2024-03-05 | End: 2024-03-05

## 2024-03-05 RX ORDER — SUMATRIPTAN SUCCINATE 4 MG/.5ML
100 INJECTION, SOLUTION SUBCUTANEOUS
Refills: 0 | Status: DISCONTINUED | OUTPATIENT
Start: 2024-03-05 | End: 2024-03-10

## 2024-03-05 RX ORDER — DEXAMETHASONE 0.5 MG/5ML
6 ELIXIR ORAL EVERY 6 HOURS
Refills: 0 | Status: DISCONTINUED | OUTPATIENT
Start: 2024-03-05 | End: 2024-03-05

## 2024-03-05 RX ORDER — DIPHENHYDRAMINE HCL 50 MG
25 CAPSULE ORAL ONCE
Refills: 0 | Status: DISCONTINUED | OUTPATIENT
Start: 2024-03-06 | End: 2024-03-07

## 2024-03-05 RX ORDER — CLONAZEPAM 1 MG
0.5 TABLET ORAL
Refills: 0 | Status: DISCONTINUED | OUTPATIENT
Start: 2024-03-05 | End: 2024-03-07

## 2024-03-05 RX ORDER — ACETAMINOPHEN 500 MG
650 TABLET ORAL ONCE
Refills: 0 | Status: DISCONTINUED | OUTPATIENT
Start: 2024-03-06 | End: 2024-03-07

## 2024-03-05 RX ORDER — METHOCARBAMOL 500 MG/1
2 TABLET, FILM COATED ORAL
Refills: 0 | DISCHARGE

## 2024-03-05 RX ORDER — IMMUNE GLOBULIN (HUMAN) 10 G/100ML
40 INJECTION INTRAVENOUS; SUBCUTANEOUS EVERY 24 HOURS
Refills: 0 | Status: DISCONTINUED | OUTPATIENT
Start: 2024-03-05 | End: 2024-03-08

## 2024-03-05 RX ORDER — TRAMADOL HYDROCHLORIDE 50 MG/1
25 TABLET ORAL
Refills: 0 | Status: DISCONTINUED | OUTPATIENT
Start: 2024-03-05 | End: 2024-03-10

## 2024-03-05 RX ORDER — CLONAZEPAM 1 MG
0.5 TABLET ORAL DAILY
Refills: 0 | Status: DISCONTINUED | OUTPATIENT
Start: 2024-03-05 | End: 2024-03-05

## 2024-03-05 RX ORDER — ACETAMINOPHEN 500 MG
650 TABLET ORAL ONCE
Refills: 0 | Status: DISCONTINUED | OUTPATIENT
Start: 2024-03-08 | End: 2024-03-10

## 2024-03-05 RX ORDER — ACETAMINOPHEN 500 MG
650 TABLET ORAL ONCE
Refills: 0 | Status: DISCONTINUED | OUTPATIENT
Start: 2024-03-07 | End: 2024-03-08

## 2024-03-05 RX ORDER — LOSARTAN POTASSIUM 100 MG/1
100 TABLET, FILM COATED ORAL DAILY
Refills: 0 | Status: DISCONTINUED | OUTPATIENT
Start: 2024-03-05 | End: 2024-03-05

## 2024-03-05 RX ORDER — DEXAMETHASONE 0.5 MG/5ML
4 ELIXIR ORAL EVERY 6 HOURS
Refills: 0 | Status: COMPLETED | OUTPATIENT
Start: 2024-03-05 | End: 2024-03-06

## 2024-03-05 RX ORDER — ACETAMINOPHEN 500 MG
650 TABLET ORAL ONCE
Refills: 0 | Status: COMPLETED | OUTPATIENT
Start: 2024-03-05 | End: 2024-03-05

## 2024-03-05 RX ORDER — BACLOFEN 100 %
10 POWDER (GRAM) MISCELLANEOUS EVERY 8 HOURS
Refills: 0 | Status: DISCONTINUED | OUTPATIENT
Start: 2024-03-05 | End: 2024-03-08

## 2024-03-05 RX ORDER — ONDANSETRON 8 MG/1
4 TABLET, FILM COATED ORAL ONCE
Refills: 0 | Status: COMPLETED | OUTPATIENT
Start: 2024-03-05 | End: 2024-03-05

## 2024-03-05 RX ORDER — SODIUM CHLORIDE 9 MG/ML
1000 INJECTION INTRAMUSCULAR; INTRAVENOUS; SUBCUTANEOUS ONCE
Refills: 0 | Status: COMPLETED | OUTPATIENT
Start: 2024-03-05 | End: 2024-03-05

## 2024-03-05 RX ORDER — ONABOTULINUMTOXINA 100 UNIT
100 VIAL (EA) INJECTION
Refills: 0 | DISCHARGE

## 2024-03-05 RX ORDER — DIPHENHYDRAMINE HCL 50 MG
25 CAPSULE ORAL ONCE
Refills: 0 | Status: DISCONTINUED | OUTPATIENT
Start: 2024-03-08 | End: 2024-03-10

## 2024-03-05 RX ORDER — VERAPAMIL HCL 240 MG
120 CAPSULE, EXTENDED RELEASE PELLETS 24 HR ORAL DAILY
Refills: 0 | Status: DISCONTINUED | OUTPATIENT
Start: 2024-03-06 | End: 2024-03-09

## 2024-03-05 RX ORDER — DIPHENHYDRAMINE HCL 50 MG
25 CAPSULE ORAL ONCE
Refills: 0 | Status: DISCONTINUED | OUTPATIENT
Start: 2024-03-07 | End: 2024-03-08

## 2024-03-05 RX ADMIN — Medication 0.5 MILLIGRAM(S): at 07:10

## 2024-03-05 RX ADMIN — IMMUNE GLOBULIN (HUMAN) 50 GRAM(S): 10 INJECTION INTRAVENOUS; SUBCUTANEOUS at 19:23

## 2024-03-05 RX ADMIN — Medication 10 MILLIGRAM(S): at 19:35

## 2024-03-05 RX ADMIN — Medication 25 MILLIGRAM(S): at 19:57

## 2024-03-05 RX ADMIN — Medication 6 MILLIGRAM(S): at 09:18

## 2024-03-05 RX ADMIN — Medication 4 MILLIGRAM(S): at 22:14

## 2024-03-05 RX ADMIN — Medication 102 MILLIGRAM(S): at 00:30

## 2024-03-05 RX ADMIN — Medication 2 MILLIGRAM(S): at 16:39

## 2024-03-05 RX ADMIN — Medication 4 MILLIGRAM(S): at 15:42

## 2024-03-05 RX ADMIN — Medication 1 MILLIGRAM(S): at 12:06

## 2024-03-05 RX ADMIN — Medication 15 MILLIGRAM(S): at 12:07

## 2024-03-05 RX ADMIN — Medication 10 MILLIGRAM(S): at 18:09

## 2024-03-05 RX ADMIN — Medication 2 MILLIGRAM(S): at 02:02

## 2024-03-05 RX ADMIN — LORATADINE 10 MILLIGRAM(S): 10 TABLET ORAL at 11:40

## 2024-03-05 RX ADMIN — Medication 0.5 MILLIGRAM(S): at 17:53

## 2024-03-05 RX ADMIN — Medication 650 MILLIGRAM(S): at 19:30

## 2024-03-05 RX ADMIN — Medication 15 MILLIGRAM(S): at 12:30

## 2024-03-05 RX ADMIN — Medication 120 MILLIGRAM(S): at 09:19

## 2024-03-05 RX ADMIN — SODIUM CHLORIDE 1000 MILLILITER(S): 9 INJECTION INTRAMUSCULAR; INTRAVENOUS; SUBCUTANEOUS at 14:19

## 2024-03-05 RX ADMIN — Medication 650 MILLIGRAM(S): at 18:17

## 2024-03-05 RX ADMIN — PANTOPRAZOLE SODIUM 40 MILLIGRAM(S): 20 TABLET, DELAYED RELEASE ORAL at 17:53

## 2024-03-05 RX ADMIN — Medication 1 MILLIGRAM(S): at 07:42

## 2024-03-05 RX ADMIN — ONDANSETRON 4 MILLIGRAM(S): 8 TABLET, FILM COATED ORAL at 13:48

## 2024-03-05 RX ADMIN — Medication 25 MILLIGRAM(S): at 18:17

## 2024-03-05 RX ADMIN — PANTOPRAZOLE SODIUM 40 MILLIGRAM(S): 20 TABLET, DELAYED RELEASE ORAL at 06:45

## 2024-03-05 NOTE — PROVIDER CONTACT NOTE (OTHER) - ASSESSMENT
Pt is A/Ox4, often forgetful. Reoriented as needed. Pt is on RA. Sinus rhythm to sinus tach on tele. Afebrile. Denies pain.

## 2024-03-05 NOTE — BH CONSULTATION LIAISON ASSESSMENT NOTE - NSBHCHARTREVIEWLAB_PSY_A_CORE FT
13.9   7.77  )-----------( 285      ( 05 Mar 2024 05:30 )             44.3   03-05    138  |  102  |  12  ----------------------------<  156<H>  4.2   |  24  |  0.81    Ca    9.4      05 Mar 2024 05:30  Phos  2.8     03-05  Mg     2.0     03-05    TPro  7.2  /  Alb  4.4  /  TBili  0.3  /  DBili  x   /  AST  20  /  ALT  29  /  AlkPhos  66  03-05

## 2024-03-05 NOTE — BH CONSULTATION LIAISON ASSESSMENT NOTE - NSBHATTESTCOMMENTATTENDFT_PSY_A_CORE
38yo man, domiciled, employed as PA at [employer], with PPH of anxiety and ADHD, PMH of headaches (recently worsening with intractable neck pain), viral meningitis (9/2022), HTN, admitted for workup of possible autoimmune encephalitis after concerning findings on outpatient PET and LP. Pt receiving high dose steroids and home clonazepam, with PRN lorazepam for acute anxiety. Psychiatry consulted for further management of anxiety at pt's request.     On interview with several friends/hospital employees present per pt preference, pt found awake, alert, fully oriented, with organized TP and euthymic affect, no SI/HI/AVH, no focal cognitive deficits. Pt reports recent elevated anxiety related to headaches and neck pain, with intermittently poor sleep, impaired concentration, and forgetfulness. Pt reports isolated panic attack prior to admission while receiving from anesthesia after LP. Pt reports desire for more RTC treatment of anxiety while in hospital and on steroids, rather than needing to request PRN doses. Pt also interested in trial of SSRI, with past good response to duloxetine but self-tapered months ago due to concern for effect on hypertension. Pt denies past sx suggestive of emely or psychosis. Reports alcohol use of ~4+ drinks 4x/week without past reported withdrawal sx. Reports cannabis edible use ~2x/month. No other reported substance use.   ISTOP reviewed with no record of benzodiazepine Rx found.    Impression is of an unspecified anxiety disorder, possible HOLLIE v. adjustment disorder with anxiety, r/o anxiety secondary to a general medical condition, r/o alcohol misuse.    Recommendations: Please confirm clonazepam Rx with PCP or pharmacy (unable to find on ISTOP). Consider using clonazepam 0.5mg-1mg po BID PRN for acute anxiety (pt reports outpatient use 1-2x/day as prescribed) until OP Rx confirmed. Consider starting sertraline 50mg po daily for long-term management of anxiety, to be titrated as OP. Monitor for any alcohol withdrawal.  Will follow.

## 2024-03-05 NOTE — CONSULT NOTE ADULT - SUBJECTIVE AND OBJECTIVE BOX
HPI:  39y Male with PMHx of meningitis 1.5 years ago, HTN, intractable headaches for which he follows up with Dr. Weston outpatient who was sent in for admission after outpatient PET scan and LP concerning for possible encephalitis. Patient has undergone multiple treatments for his headaches in the past, including botox injections. Noticed increasing frequency and severity of his headaches over the last couple of days/weeks with associated neck stiffness. Has also noticed increased lethargy and cognitive changes over the same period of time. Describes it as a similar feeling to dyslexia, noticed it when he was responding to emails. Was sent for an outpatient PET scan on 03/01 which showed "abnormal hypometabolism particularly pronounced in the parieto-occipital and temporal regions (right greater than left), and also involving the anteromedial temporal lobes with extension into the orbitofrontal and paramedian frontal regions bilaterally. Findings suggestive of autoimmune encephalitis (AIE) sequelae, likely with contributing neurotropic medication effects, in the proper clinical setting." Underwent an IR guided LP today and was found to have an elevated opening pressure of 30 mmHg, remainder of CSF negative. (04 Mar 2024 22:03)    INTERVAL EVENTS:  Overnight events reviewed.    SUBJECTIVE:  Patient was seen and examined at bedside. Patient with reported skin rash with methylprednisolone, reports hives in his arms extending to chest pain back, denies SOB or airway discomfort. symptoms resolved with discontinuation of Methyl and receiving Benadryl. Patient reports persistent migraines at baseline, has chronic photophobia, no N/V. He reports worsening anxiety over the last weeks, has been using Klonipin and has decreased and stopped his Cymbalta. No anxiety on evaluation but he reports would benefit from resumption of Benzo while inpatient. No other complaints or events reported Telemetry reviewed     Review of systems: No fever, chills, dizziness, HA, Changes in vision, CP, dyspnea, nausea or vomiting, dysuria, changes in bowel movements, LE edema. Rest of 12 point Review of systems negative unless otherwise documented elsewhere in note.     Diet, Regular (03-04-24 @ 19:37) [Active]      MEDICATIONS:  MEDICATIONS  (STANDING):  clonazePAM  Tablet 0.5 milliGRAM(s) Oral daily  dexAMETHasone  Injectable 6 milliGRAM(s) IV Push every 6 hours  losartan 100 milliGRAM(s) Oral daily  pantoprazole    Tablet 40 milliGRAM(s) Oral every 12 hours    MEDICATIONS  (PRN):  acetaminophen     Tablet .. 650 milliGRAM(s) Oral every 6 hours PRN Mild Pain (1 - 3)  ketorolac   Injectable 15 milliGRAM(s) IV Push every 12 hours PRN Severe Pain (7 - 10)  metoclopramide Injectable 10 milliGRAM(s) IV Push every 12 hours PRN severe headache      Allergies    methylPREDNISolone (Rash; Urticaria; Flushing; Hives)    Intolerances        OBJECTIVE:  Vital Signs Last 24 Hrs  T(C): 36.4 (05 Mar 2024 05:57), Max: 37 (04 Mar 2024 21:51)  T(F): 97.5 (05 Mar 2024 05:57), Max: 98.6 (04 Mar 2024 21:51)  HR: 122 (05 Mar 2024 13:50) (88 - 124)  BP: 107/65 (05 Mar 2024 13:50) (107/65 - 137/86)  BP(mean): 80 (05 Mar 2024 13:50) (80 - 108)  RR: 18 (05 Mar 2024 09:07) (16 - 18)  SpO2: 97% (05 Mar 2024 09:07) (94% - 97%)    Parameters below as of 05 Mar 2024 09:07  Patient On (Oxygen Delivery Method): room air      I&O's Summary      PHYSICAL EXAM:  Genera: AOX3, NAD, lying in bed, speaking in full sentences, no labored breathing on RA  HEENT: AT/NC, no facial asymmetry   Lungs: no crackles, no wheezes, limited anterior exam  Heart: RRR  Abdomen: soft, non-tender  Extremities:  warm, no edema, no tenderness, no calf tenderness, no focal deficit                             13.9   7.77  )-----------( 285      ( 05 Mar 2024 05:30 )             44.3     03-05    138  |  102  |  12  ----------------------------<  156<H>  4.2   |  24  |  0.81    Ca    9.4      05 Mar 2024 05:30  Phos  2.8     03-05  Mg     2.0     03-05    TPro  7.2  /  Alb  4.4  /  TBili  0.3  /  DBili  x   /  AST  20  /  ALT  29  /  AlkPhos  66  03-05    LIVER FUNCTIONS - ( 05 Mar 2024 05:30 )  Alb: 4.4 g/dL / Pro: 7.2 g/dL / ALK PHOS: 66 U/L / ALT: 29 U/L / AST: 20 U/L / GGT: x             CAPILLARY BLOOD GLUCOSE      POCT Blood Glucose.: 89 mg/dL (05 Mar 2024 00:30)    Urinalysis Basic - ( 05 Mar 2024 05:30 )    Color: x / Appearance: x / SG: x / pH: x  Gluc: 156 mg/dL / Ketone: x  / Bili: x / Urobili: x   Blood: x / Protein: x / Nitrite: x   Leuk Esterase: x / RBC: x / WBC x   Sq Epi: x / Non Sq Epi: x / Bacteria: x        MICRODATA:    Culture - CSF with Gram Stain (collected 04 Mar 2024 14:40)  Source: .CSF  Gram Stain (04 Mar 2024 15:58):    No organisms seen    Rare White blood cells  Preliminary Report (05 Mar 2024 08:52):    No growth to date        RADIOLOGY/OTHER STUDIES:

## 2024-03-05 NOTE — BH CONSULTATION LIAISON ASSESSMENT NOTE - NSSUICPROTFACT_PSY_ALL_CORE
Responsibility to children, family, or others/Fear of death or the actual act of killing self
Supportive social network of family or friends/Engaged in work or school

## 2024-03-05 NOTE — BH CONSULTATION LIAISON ASSESSMENT NOTE - NSBHCHARTREVIEWVS_PSY_A_CORE FT
Vital Signs Last 24 Hrs  T(C): 36.4 (05 Mar 2024 05:57), Max: 37 (04 Mar 2024 21:51)  T(F): 97.5 (05 Mar 2024 05:57), Max: 98.6 (04 Mar 2024 21:51)  HR: 124 (05 Mar 2024 11:56) (88 - 124)  BP: 130/85 (05 Mar 2024 11:56) (124/81 - 137/86)  BP(mean): 104 (05 Mar 2024 11:56) (97 - 108)  RR: 18 (05 Mar 2024 09:07) (16 - 18)  SpO2: 97% (05 Mar 2024 09:07) (94% - 97%)    Parameters below as of 05 Mar 2024 09:07  Patient On (Oxygen Delivery Method): room air    
Vital Signs Last 24 Hrs  T(C): 36.4 (05 Mar 2024 05:57), Max: 37 (04 Mar 2024 21:51)  T(F): 97.5 (05 Mar 2024 05:57), Max: 98.6 (04 Mar 2024 21:51)  HR: 124 (05 Mar 2024 11:56) (88 - 124)  BP: 130/85 (05 Mar 2024 11:56) (124/81 - 137/86)  BP(mean): 104 (05 Mar 2024 11:56) (97 - 108)  RR: 18 (05 Mar 2024 09:07) (16 - 18)  SpO2: 97% (05 Mar 2024 09:07) (94% - 97%)    Parameters below as of 05 Mar 2024 09:07  Patient On (Oxygen Delivery Method): room air

## 2024-03-05 NOTE — BH CONSULTATION LIAISON ASSESSMENT NOTE - HPI (INCLUDE ILLNESS QUALITY, SEVERITY, DURATION, TIMING, CONTEXT, MODIFYING FACTORS, ASSOCIATED SIGNS AND SYMPTOMS)
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 Weston outpatient, PPH HOLLIE, ADHD, denies suicide attempts, denies NSSIB, denies inpt psychiatry, denies substance abuse. BIBS for encephalitis work up. Psychiatry consulted for evaluation of anxiety and medication management.    Patient was seen and evaluated this morning with Attending, Resident and PA student. Chart was reviewed and no acute events were noted. Upon approach, patient is lying in bed comfortably. Patient is alert and oriented and engages with interviewer. Patient is cooperative and answers all questions appropriately. Patient had visitors in the room, stated that it was okay for them to stay. Patient reports having a panic attack after waking up from anesthesia. Patient reports decline in cognitive thinking, such as forgetting stuff from a month ago. Patient also reports symptoms has worsen the last 2 month, where he has been more fatigue and increased lack of motivation due to the fatigue. Patient reports drinking socially, 4-6 drinks per event. Patient denies any current passive or active suicidal ideation, intent or plan and denies any homicidal ideation. Patient denies any persecutory delusions and denies any auditory or visual hallucinations. Patient reports good sleep and appetite. Denies side effects from medications.  
Patient is a 38yo M, PMH migraines, HTN, viral meningitis, GERD, PPH reported anxiety/depression, denies inpatient psychiatric hospitalizations, denies suicide attempts, BIBS for outpatient PET scan findings suspicious of autoimmune encephalitis and admitted for further workup. Psychiatry consulted for anxiety.    Upon approach, patient is lying in bed comfortably. Patient is AOx4 and engages with interviewer. Patient is cooperative and answers all questions appropriately. Patient reports worsening anxiety given worsening fatigue and headaches. Reports feeling "dyslexic on emails" and says at times "I can't remember anything." Patient reports waking up from anesthesia yesterday and "losing it." Patient reports waking up today from benadryl and "spiraling." Patient suspects he had a panic attack at that time. Reports never having panic attack before in past.     Patient denies any current passive or active suicidal ideation, intent or plan and denies any homicidal ideation. Patient denies any auditory or visual hallucinations. Patient reports mood as anxious. Reports sleep quality is inconsistent and can alternate between good and bad. Reports fatigue in the setting of medical illness; denies anhedonia. Reports fair appetite. Reports previously tried cymbalta; has not taken in around four weeks. Reports currently taking klonopin daily prescribed by PCP Dr. Weston; around 0.5mg to 1mg daily. Patient requesting increase to standing klonopin during hospitalization. Provider searched prescription drug monitoring database (I-STOP) but was unable to find any history of prescription benzodiazepines.

## 2024-03-05 NOTE — BH CONSULTATION LIAISON ASSESSMENT NOTE - OTHER PAST PSYCHIATRIC HISTORY (INCLUDE DETAILS REGARDING ONSET, COURSE OF ILLNESS, INPATIENT/OUTPATIENT TREATMENT)
Reports prior treatment with psychiatrist 3-4 years ago for which patient was started on Strattera for ADHD. Patient reports he is no longer on this medication. Patient reports trialing cymbalta for migraines; reports taking it for 4 weeks and is currently still taking this medication. Reports diagnoses in past of HOLLIE/depression. Reports prior treatment with psychiatrist 3-4 years ago for which patient was started on Strattera for ADHD. Patient reports he is no longer on this medication. Patient reports trialing cymbalta for migraines/anxiety and self-tapered due to concern for worsening hypertension. Reports diagnoses in past of HOLLIE/depression.

## 2024-03-05 NOTE — PROVIDER CONTACT NOTE (OTHER) - ACTION/TREATMENT ORDERED:
Pt educated on safety precautions, verbalized understanding but remains noncompliant. ALMA ROSA Sharp made aware.

## 2024-03-05 NOTE — BH CONSULTATION LIAISON ASSESSMENT NOTE - SUMMARY
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 Weston outpatient, PPH HOLLIE, ADHD, denies suicide attempts, denies NSSIB, denies inpt psychiatry, denies substance abuse. BIBS for encephalitis work up. Psychiatry consulted for evaluation of anxiety and medication management.

## 2024-03-05 NOTE — BH CONSULTATION LIAISON ASSESSMENT NOTE - SUMMARY
Patient is a 40yo M, PMH migraines, HTN, viral meningitis, GERD, PPH reported anxiety/depression, denies inpatient psychiatric hospitalizations, denies suicide attempts, BIBS for outpatient PET scan findings suspicious of autoimmune encephalitis and admitted for further workup. Psychiatry consulted for anxiety. Patient is a 38yo M, PMH migraines, HTN, viral meningitis, GERD, PPH reported anxiety/depression, denies inpatient psychiatric hospitalizations, denies suicide attempts, BIBS for outpatient PET scan findings suspicious of autoimmune encephalitis and admitted for further workup. Psychiatry consulted for anxiety.    Upon assessment, patient is calm, linear, reporting anxiety in the setting of hospitalization and panic attack yesterday. Patient reports home klonopin use. Patient will benefit from low dose benzodiazepine as needed for anxiety while in hospital. Patient does not meet criteria for inpatient psychiatric hospitalization at this time, and does not require constant observation. No psychiatric contraindications to discharge. [facility] psychiatry will sign off on this patient, please feel free to call back with any questions.

## 2024-03-05 NOTE — PROVIDER CONTACT NOTE (OTHER) - SITUATION
Pt expressed intent to take home meds if they are not ordered for this AM. Stroke ACP aware and ANM Dilma aware.

## 2024-03-05 NOTE — PROVIDER CONTACT NOTE (OTHER) - SITUATION
Pt gets OOB w/o using the call bell, pt insists on bed alarm being turned off. Pt refuses spo2 sensor. RN explained safety precautions to pt, pt verbalizes understanding but he is still noncompliant.

## 2024-03-05 NOTE — CHART NOTE - NSCHARTNOTEFT_GEN_A_CORE
Discussed patient with Dr. Weston. Per family and staff, patient cognition worse over the past 1-2 days. Patient also stating that he was admitted today instead of yesterday. Decision made to start IVIG 0.5mg/kg x 4 days with  premedication with tylenol and benadryl. Will continue with decadron 4mg q6. Anticipate lowering dosing of decadron to 4g q12 tomorrow pending on patient improvement. Medications ordered for anxiety: klonopin 0.5mg BID and ativan 1mg IVP q6 prn. Will continue verapamil daily for HA management. EEG currently in place Discussed patient with Dr. Weston. Per family and staff, patient cognition worse over the past 1-2 days. Patient also stating that he was admitted today instead of yesterday. Decision made to start IVIG 0.5mg/kg x 4 days with  premedication with tylenol and benadryl. Will continue with decadron 4mg q6. Anticipate lowering dosing of decadron to 4g q12 tomorrow pending on patient improvement. Medications ordered for anxiety: klonopin 0.5mg BID and ativan 1mg IVP q6 prn. Will continue verapamil daily for HA management. EEG currently in place.    Also ordered autoimmune encephalitis workup (serum):  ESR, CRP, LAKSHMI, RF  Lupus anticoagulant IgG, IgM, anticardiolipin A, IgG, IgM  B2 glycoprotein IgG, IgM  C3, C4, CH50  TPO, thyroglobulin Ab, thyroid function  Cytokine panel  Anti-SSA, Anti-SSB, pANCA, cANCA  ds-DNA  cryoglobulin, ACE, vitamin D  SPEP with immunofixation  Serum immunoglobulin quantity  macroneuronal: anti-HOLLIE, VGKC ab, anti-NMDA  LGI-1, VGCC, CASPR-2  MMP9  paraneoplastic panel   IgA endomysial ab (EMA)  deaminated gliadin peptide DGP IgA, IgG

## 2024-03-05 NOTE — PROGRESS NOTE ADULT - SUBJECTIVE AND OBJECTIVE BOX
Neurology Stroke Progress Note    INTERVAL HPI/OVERNIGHT EVENTS:  Patient seen and examined. Reaction to solumedrol with hives, erythema. Stopped infusion, given benedryl and loratidine. Switched to decardon 10mg IVP x1. Continued on decadron 6mg q6. In the afternoon, patient with 2 episodes of vomiting/retching; noted to be hypotensive to . 1L fluids started. Started on sumatriptan 100mg for headache/pain control. Verapamil stopped. Decadron decreased to 4mg q6.     MEDICATIONS  (STANDING):  clonazePAM  Tablet 0.5 milliGRAM(s) Oral daily  dexAMETHasone  Injectable 6 milliGRAM(s) IV Push every 6 hours  losartan 100 milliGRAM(s) Oral daily  pantoprazole    Tablet 40 milliGRAM(s) Oral every 12 hours    MEDICATIONS  (PRN):  acetaminophen     Tablet .. 650 milliGRAM(s) Oral every 6 hours PRN Mild Pain (1 - 3)  ketorolac   Injectable 15 milliGRAM(s) IV Push every 12 hours PRN Severe Pain (7 - 10)  metoclopramide Injectable 10 milliGRAM(s) IV Push every 12 hours PRN severe headache  SUMAtriptan 100 milliGRAM(s) Oral two times a day PRN Headache      Allergies    methylPREDNISolone (Rash; Urticaria; Flushing; Hives)    Intolerances        Vital Signs Last 24 Hrs  T(C): 36.4 (05 Mar 2024 05:57), Max: 37 (04 Mar 2024 21:51)  T(F): 97.5 (05 Mar 2024 05:57), Max: 98.6 (04 Mar 2024 21:51)  HR: 122 (05 Mar 2024 13:50) (88 - 124)  BP: 107/65 (05 Mar 2024 13:50) (107/65 - 137/86)  BP(mean): 80 (05 Mar 2024 13:50) (80 - 108)  RR: 18 (05 Mar 2024 09:07) (16 - 18)  SpO2: 97% (05 Mar 2024 09:07) (94% - 97%)    Parameters below as of 05 Mar 2024 09:07  Patient On (Oxygen Delivery Method): room air        Physical exam:  General: No acute distress, awake and alert  Eyes: Anicteric sclerae, moist conjunctivae, see below for CNs  Neck: trachea midline, FROM, supple, no thyromegaly or lymphadenopathy  Pulmonary: No use of accessory muscles  Extremities: no edema    Neurologic:  -Mental status: Awake, alert, oriented to person, place, and time. Speech is fluent with intact naming, repetition, and comprehension, no dysarthria. Follows cross commands. Attention/concentration intact. Able to complete serial 7s to 77. Able to recall 3 items.  -Cranial nerves:   II: Visual fields are full to confrontation.  VII: Face is symmetric   VIII: Hearing is bilaterally intact   Motor: Normal bulk and tone. No pronator drift. Strength bilateral upper extremity at least 3/5, bilateral lower extremities at least 3/5.  Reflexes: Intact LE reflexes      LABS:                        13.9   7.77  )-----------( 285      ( 05 Mar 2024 05:30 )             44.3     03-05    138  |  102  |  12  ----------------------------<  156<H>  4.2   |  24  |  0.81    Ca    9.4      05 Mar 2024 05:30  Phos  2.8     03-05  Mg     2.0     03-05    TPro  7.2  /  Alb  4.4  /  TBili  0.3  /  DBili  x   /  AST  20  /  ALT  29  /  AlkPhos  66  03-05      Urinalysis Basic - ( 05 Mar 2024 05:30 )    Color: x / Appearance: x / SG: x / pH: x  Gluc: 156 mg/dL / Ketone: x  / Bili: x / Urobili: x   Blood: x / Protein: x / Nitrite: x   Leuk Esterase: x / RBC: x / WBC x   Sq Epi: x / Non Sq Epi: x / Bacteria: x        RADIOLOGY & ADDITIONAL TESTS:

## 2024-03-05 NOTE — BH CONSULTATION LIAISON ASSESSMENT NOTE - NSBHCONSULTRECOMMENDOTHER_PSY_A_CORE FT
1. Can continue with low dose klonopin 0.5mg to 1mg q12h PRN for anxiety short-term while patient in hospital

## 2024-03-05 NOTE — BH CONSULTATION LIAISON ASSESSMENT NOTE - CURRENT MEDICATION
MEDICATIONS  (STANDING):  clonazePAM  Tablet 0.5 milliGRAM(s) Oral daily  dexAMETHasone  Injectable 6 milliGRAM(s) IV Push every 6 hours  losartan 100 milliGRAM(s) Oral daily  pantoprazole    Tablet 40 milliGRAM(s) Oral every 12 hours    MEDICATIONS  (PRN):  acetaminophen     Tablet .. 650 milliGRAM(s) Oral every 6 hours PRN Mild Pain (1 - 3)  ketorolac   Injectable 15 milliGRAM(s) IV Push every 12 hours PRN Severe Pain (7 - 10)  metoclopramide Injectable 10 milliGRAM(s) IV Push every 12 hours PRN severe headache  
MEDICATIONS  (STANDING):  clonazePAM  Tablet 0.5 milliGRAM(s) Oral daily  dexAMETHasone  Injectable 6 milliGRAM(s) IV Push every 6 hours  losartan 100 milliGRAM(s) Oral daily  pantoprazole    Tablet 40 milliGRAM(s) Oral every 12 hours    MEDICATIONS  (PRN):  acetaminophen     Tablet .. 650 milliGRAM(s) Oral every 6 hours PRN Mild Pain (1 - 3)  ketorolac   Injectable 15 milliGRAM(s) IV Push every 12 hours PRN Severe Pain (7 - 10)  metoclopramide Injectable 10 milliGRAM(s) IV Push every 12 hours PRN severe headache

## 2024-03-05 NOTE — PROGRESS NOTE ADULT - ASSESSMENT
39y Male with PMHx of viral meningitis with sequela of HTN and intractable headaches (followed by Dr. Weston) presents for autoimmune encephalitis management/treatment seen on outpatient PET 3/4. LP done on the same day with opening pressures of 30mmHg; CSF unremarkable. C/C/b by allergic reaction to solumedrol which was switched to decadron     Neuro  #autoimmune encephalitis  - Outpatient PET scan 3/1: Abnormal hypometabolism particularly pronounced in the parieto-occipital and temporal regions (right greater than left), and also involving the anteromedial temporal lobes with extension into the orbitofrontal and   paramedian frontal regions bilaterally. Findings suggestive of autoimmune encephalitis (AIE) sequelae, likely with contributing neurotropic medication effects, in the proper clinical   setting.  - s/p LP 3/4 with elevated opening pressure at 30 mmHg, remainder of CSF negative  - c/w decardon 4mg q6  - continue home PPI   - PRN Tylenol, Toradol, and Relgan, sumatriptan ordered for headache management  - q8hr general neuro checks and vitals    Cards  #HTN  - goal normotension  - will hold losartan and verapamil for now due to hx of autonomic dysfunction and episode of hypotension () with retching     Pulm  - call provider if SPO2 < 94%    GI  #Nutrition/Fluids/Electrolytes   - replete K<4 and Mg <2  - Diet: Regular    Infectious Disease  - afebrile on admission    Endocrine  #DM  - A1C results: 5.2  - continue FS monitoring while on steroids    - TSH results: 0.594    Psych  #anxiety  - psych consulted, f/u results  - c/w klonopin 0,5mg BID prn and ativan 1mg prn     DVT Prophylaxis  - SCDs for DVT prophylaxis     Dispo: aure tomorrow      Discussed daily hospital plans and goals with patient bedside    Discussed with Neurology Attending, Dr. Sánchez

## 2024-03-05 NOTE — CONSULT NOTE ADULT - SUBJECTIVE AND OBJECTIVE BOX
Patient is a 39y old  Male who presents with a chief complaint of     HPI:  39y Male with PMHx of meningitis 1.5 years ago, HTN, intractable headaches for which he follows up with Dr. Weston outpatient who was sent in for admission after outpatient PET scan and LP concerning for possible encephalitis. Patient has undergone multiple treatments for his headaches in the past, including botox injections. Noticed increasing frequency and severity of his headaches over the last couple of days/weeks with associated neck stiffness. Has also noticed increased lethargy and cognitive changes over the same period of time. Describes it as a similar feeling to dyslexia, noticed it when he was responding to emails. Was sent for an outpatient PET scan on 03/01 which showed "abnormal hypometabolism particularly pronounced in the parieto-occipital and temporal regions (right greater than left), and also involving the anteromedial temporal lobes with extension into the orbitofrontal and paramedian frontal regions bilaterally. Findings suggestive of autoimmune encephalitis (AIE) sequelae, likely with contributing neurotropic medication effects, in the proper clinical setting." Underwent an IR guided LP today and was found to have an elevated opening pressure of 30 mmHg, remainder of CSF negative. (04 Mar 2024 22:03)    PAST MEDICAL & SURGICAL HISTORY:  Hypertension      Migraine      Meningitis        MEDICATIONS  (STANDING):  clonazePAM  Tablet 0.5 milliGRAM(s) Oral daily  loratadine 10 milliGRAM(s) Oral daily  losartan 100 milliGRAM(s) Oral daily  pantoprazole    Tablet 40 milliGRAM(s) Oral every 12 hours  verapamil 120 milliGRAM(s) Oral once    MEDICATIONS  (PRN):  acetaminophen     Tablet .. 650 milliGRAM(s) Oral every 6 hours PRN Mild Pain (1 - 3)  ketorolac   Injectable 15 milliGRAM(s) IV Push every 12 hours PRN Severe Pain (7 - 10)  metoclopramide Injectable 10 milliGRAM(s) IV Push every 12 hours PRN severe headache          FAMILY HISTORY:        Radiology :             Review of Systems : per HPI         Vital Signs Last 24 Hrs  T(C): 36.4 (05 Mar 2024 05:57), Max: 37 (04 Mar 2024 21:51)  T(F): 97.5 (05 Mar 2024 05:57), Max: 98.6 (04 Mar 2024 21:51)  HR: 114 (05 Mar 2024 06:48) (88 - 114)  BP: 137/86 (05 Mar 2024 06:48) (124/81 - 137/86)  BP(mean): 105 (05 Mar 2024 06:48) (97 - 106)  RR: 16 (05 Mar 2024 06:48) (16 - 18)  SpO2: 94% (05 Mar 2024 06:48) (94% - 96%)    Parameters below as of 05 Mar 2024 06:48  Patient On (Oxygen Delivery Method): room air            Physical Exam:   39 y o man lying comfortably in semi Tillman's position , awake , alert , no new complaints     Head: normocephalic , atraumatic    Eyes: PERRLA , EOMI , no nystagmus , sclera anicteric    ENT / FACE: neg nasal discharge , uvula midline , no oropharyngeal erythema / exudate    Neck: supple , negative JVD , negative carotid bruits , no thyromegaly    Chest: CTA bilaterally , neg wheeze / rhonchi / rales / crackles / egophany    Cardiovascular: regular rate and rhythm , neg murmurs / rubs / gallops    Abdomen: soft , non distended , non tender to palpation in all 4 quadrants ,  normal bowel sounds     Extremities: WWP , neg cyanosis /clubbing / edema      Neurologic Exam:     Alert and oriented to person , place , date/year , speech fluent w/o dysarthria     Cranial Nerves:           II:                         pupils equal , round and reactive to light , visual fields intact         III/ IV/VI:             extraocular movements intact , neg nystagmus , neg ptosis        V:                        facial sensation intact , V1-3 normal        VII:                      face symmetric , no droop , normal eye closure and smile        VIII:                     hearing intact to finger rub bilaterally        IX and X:             no hoarseness , gag intact , palate/ uvula rise symmetrically        XI:                       SCM / trapezius strength intact bilateral        XII:                      no tongue deviation       Motor Exam:                 Right UE:                      5/5:           5/5:   wrist extensors/ flexors        5/5:   biceps        5/5:   triceps        5/5:   deltoid          negative pronator drift    Left UE:                        5/5:           5/5:   wrist extensors/ flexors        5/5:   biceps        5/5:   triceps        5/5:   deltoid          negative pronator drift        Right LE:           5/: dorsiflexors         5/5: plantar flexors        5/5: quadriceps        5/5: hamstrings        5/5: hip flexors      Left LE:           5/5: dorsiflexors         5/5: plantar flexors        5/5: quadriceps        5/5: hamstrings        5/5: hip flexors      Sensation:         intact to light touch x 4 extremities                            no neglect or extinction on double simultaneous testing    DTR:           biceps/brachioradialis: equal                            patella/ankle: equal           neg Babinski        Coordination:            Finger to Nose:  neg dysmetria bilaterally           Heel to Shin:    neg dysmetria bilaterally          Gait:  not tested          PM&R Impression: admitted for c/o HA, r/o encephalitis          Recommendations / Plan:       1) Physical / Occupational therapy focusing on therapeutic exercises , equipment evaluation , bed mobility/transfer out of bed evaluation , progressive ambulation with assistive devices prn .    2) Current disposition plan recommendation:   d/c home , no PT/OT needs

## 2024-03-05 NOTE — CHART NOTE - NSCHARTNOTEFT_GEN_A_CORE
Received call from primary RN that patient was complaining of erythema, pruritis, and hives after starting IV steroids. RN reports that Solumedrol was hanging for approximately 25 minutes, received about 1/3 of dosing before he started to complain of symptoms. Evaluated patient at bedside, noted to have erythema to the face and neck. Wheal(s) present along the left forearm, stomach, and back. No signs of respiratory distress or angioedema appreciated, denies any shortness of breath. Saturating at 96% on RA. Solumedrol paused, IV Benadryl given. Daily antihistamine ordered. Dr. Casiano evaluated the patient as well. Noted to have improvement of erythema and pruritis however hives still present. Plan to discontinue Solumedrol for now and administer 10 mg IV of Decadron. Will continue to closely monitor and discuss remainder of treatment plan with Dr. Weston.

## 2024-03-05 NOTE — BH CONSULTATION LIAISON ASSESSMENT NOTE - RISK ASSESSMENT
Modifiable risk factors include medical illness/pain, substance use  Static risk factors include male gender,  race, chronic medical illness  Protective factors include social support, seeking out treatment
Static: HX of Anxiety   Modifiable:   Risk: LOW

## 2024-03-05 NOTE — BH CONSULTATION LIAISON ASSESSMENT NOTE - NSBHCHARTREVIEWINVESTIGATE_PSY_A_CORE FT
ACC: [id number] EXAM:  NM BRAIN FUSION WITH MR   ORDERED BY: ELIAS DO     ACC: [id number] EXAM:  PET BRAIN FDG ENCEPHALITIS   ORDERED BY: ELIAS DO     PROCEDURE DATE:  03/01/2024          INTERPRETATION:  HISTORY: Autoimmune encephalitis.    TECHNIQUE: On March 1st, 2024 patient was given 7.2 millicuries of FDG   intravenously while semisupine in the resting state. Fasting blood sugar   measured prior to injection of radiopharmaceutical was 89 mg/dl.   Approximately one hour post injection of radiotracer, dedicated PET and   CT images of the brain were obtained as per routine protocol. The CT   protocol was optimized for PET attenuation correction and to provide   anatomic detail for localization of PET abnormalities. 3-D   reconstructions and PET-MRI fusions were performed utilizing Vivorte and   reviewed.    COMPARISON: MRI brain 9/28/2022.    FINDINGS:    PET FINDINGS:    Visually, there is abnormal FDG distribution pattern with moderate to   severe decreased radiotracer uptake in the parieto-occipital and temporal   regions, findings more pronounced on the right side. Note, there is   severe anteromedial temporal hypometabolism bilaterally, which extends   into the adjacent orbitofrontal and paramedian frontal lobes, including   in the anterior cingulate gyri bilaterally. There is preserved relatively   normal tracer uptake in the precuneus and posterior cingulate gyri,   regions typically involved early in the course of Alzheimer's disease.   There is moderate to severe hypometabolism in the sensorimotor cortex,   cerebellum and brainstem, nonspecific finding, favored to reflect effects   of neurotropic medications.    There is preserved relatively normal brain metabolism in the primary   visual cortex and subcortical structures including the basal ganglia and   thalami bilaterally.    Semiquantitative analysis using the Z scores calculated in comparison to   age-matched normal controls revealed significantly decreased values in   the right temporal lobe, right superior and inferior temporal gyri, right   lateral temporal lobe, left amygdala, right rolandic operculum, right   globus pallidus, basis pontis, bilateral middle cerebellar peduncles.      MRI FINDINGS:    No acute infarction, intracranial hemorrhage or mass. There is no   abnormal intracranial enhancement.    There is no evidence of hydrocephalus. There are no extra-axial fluid   collections. The skull base flow voids are present.    The visualized intraorbital contents are normal. The imaged portions of   the paranasal sinuses are aerated. The mastoid air cells are clear. The   visualized soft tissues and osseous structures appear unremarkable.      IMPRESSION:    Abnormal hypometabolism particularly pronounced in the parieto-occipital  and temporal regions (right greater than left), and also involving the   anteromedial temporal lobes with extension into the orbitofrontal and   paramedian frontal regions bilaterally.    Findings suggestive of autoimmune encephalitis (AIE) sequelae, likely   with contributing neurotropic medication effects, in the proper clinical   setting.    Correlation with clinical exam, CSF sampling, and neuropsychological   assessment is advised.    --- End of Report ---            LAKSHMI AWAN MD; Attending Radiologist  This document has been electronically signed. Mar  1 2024  9:41PM

## 2024-03-06 ENCOUNTER — APPOINTMENT (OUTPATIENT)
Dept: NEUROLOGY | Facility: CLINIC | Age: 39
End: 2024-03-06

## 2024-03-06 DIAGNOSIS — F43.20 ADJUSTMENT DISORDER, UNSPECIFIED: ICD-10-CM

## 2024-03-06 LAB
24R-OH-CALCIDIOL SERPL-MCNC: 32.6 NG/ML — SIGNIFICANT CHANGE UP (ref 30–80)
ALBUMIN CSF-MCNC: 23.6 MG/DL — SIGNIFICANT CHANGE UP (ref 14–25)
ALBUMIN SERPL ELPH-MCNC: 3771 MG/DL — SIGNIFICANT CHANGE UP (ref 3500–5200)
ANION GAP SERPL CALC-SCNC: 11 MMOL/L — SIGNIFICANT CHANGE UP (ref 5–17)
B2 GLYCOPROT1 AB SER QL: NEGATIVE — SIGNIFICANT CHANGE UP
BASOPHILS # BLD AUTO: 0.01 K/UL — SIGNIFICANT CHANGE UP (ref 0–0.2)
BASOPHILS NFR BLD AUTO: 0.1 % — SIGNIFICANT CHANGE UP (ref 0–2)
BUN SERPL-MCNC: 14 MG/DL — SIGNIFICANT CHANGE UP (ref 7–23)
C3 SERPL-MCNC: 113 MG/DL — SIGNIFICANT CHANGE UP (ref 81–157)
C4 SERPL-MCNC: 27 MG/DL — SIGNIFICANT CHANGE UP (ref 13–39)
CALCIUM SERPL-MCNC: 9.2 MG/DL — SIGNIFICANT CHANGE UP (ref 8.4–10.5)
CARDIOLIPIN AB SER-ACNC: NEGATIVE — SIGNIFICANT CHANGE UP
CHLORIDE SERPL-SCNC: 105 MMOL/L — SIGNIFICANT CHANGE UP (ref 96–108)
CO2 SERPL-SCNC: 22 MMOL/L — SIGNIFICANT CHANGE UP (ref 22–31)
CREAT SERPL-MCNC: 0.94 MG/DL — SIGNIFICANT CHANGE UP (ref 0.5–1.3)
DSDNA AB FLD-ACNC: <0.2 AI — SIGNIFICANT CHANGE UP
EGFR: 107 ML/MIN/1.73M2 — SIGNIFICANT CHANGE UP
ENA SS-A AB FLD IA-ACNC: <0.2 AI — SIGNIFICANT CHANGE UP
EOSINOPHIL # BLD AUTO: 0 K/UL — SIGNIFICANT CHANGE UP (ref 0–0.5)
EOSINOPHIL NFR BLD AUTO: 0 % — SIGNIFICANT CHANGE UP (ref 0–6)
GLUCOSE BLDC GLUCOMTR-MCNC: 127 MG/DL — HIGH (ref 70–99)
GLUCOSE SERPL-MCNC: 137 MG/DL — HIGH (ref 70–99)
HCT VFR BLD CALC: 41.9 % — SIGNIFICANT CHANGE UP (ref 39–50)
HGB BLD-MCNC: 13.6 G/DL — SIGNIFICANT CHANGE UP (ref 13–17)
IGG CSF-MCNC: 2.8 MG/DL — SIGNIFICANT CHANGE UP
IGG FLD-MCNC: 949 MG/DL — SIGNIFICANT CHANGE UP (ref 610–1660)
IGG SYNTH RATE SER+CSF CALC-MRATE: -2.8 MG/DAY — SIGNIFICANT CHANGE UP
IGG/ALB CLEAR SER+CSF-RTO: 0.5 — SIGNIFICANT CHANGE UP
IGG/ALB CSF: 0.12 RATIO — SIGNIFICANT CHANGE UP
IGG/ALB SER: 0.25 RATIO — SIGNIFICANT CHANGE UP
IMM GRANULOCYTES NFR BLD AUTO: 0.6 % — SIGNIFICANT CHANGE UP (ref 0–0.9)
LYMPHOCYTES # BLD AUTO: 0.77 K/UL — LOW (ref 1–3.3)
LYMPHOCYTES # BLD AUTO: 4.8 % — LOW (ref 13–44)
MAGNESIUM SERPL-MCNC: 2 MG/DL — SIGNIFICANT CHANGE UP (ref 1.6–2.6)
MCHC RBC-ENTMCNC: 26.9 PG — LOW (ref 27–34)
MCHC RBC-ENTMCNC: 32.5 GM/DL — SIGNIFICANT CHANGE UP (ref 32–36)
MCV RBC AUTO: 82.8 FL — SIGNIFICANT CHANGE UP (ref 80–100)
MONOCYTES # BLD AUTO: 0.78 K/UL — SIGNIFICANT CHANGE UP (ref 0–0.9)
MONOCYTES NFR BLD AUTO: 4.8 % — SIGNIFICANT CHANGE UP (ref 2–14)
NEUTROPHILS # BLD AUTO: 14.45 K/UL — HIGH (ref 1.8–7.4)
NEUTROPHILS NFR BLD AUTO: 89.7 % — HIGH (ref 43–77)
NRBC # BLD: 0 /100 WBCS — SIGNIFICANT CHANGE UP (ref 0–0)
PHOSPHATE SERPL-MCNC: 3.6 MG/DL — SIGNIFICANT CHANGE UP (ref 2.5–4.5)
PLATELET # BLD AUTO: 287 K/UL — SIGNIFICANT CHANGE UP (ref 150–400)
POTASSIUM SERPL-MCNC: 3.9 MMOL/L — SIGNIFICANT CHANGE UP (ref 3.5–5.3)
POTASSIUM SERPL-SCNC: 3.9 MMOL/L — SIGNIFICANT CHANGE UP (ref 3.5–5.3)
PROT SERPL-MCNC: 6.7 G/DL — SIGNIFICANT CHANGE UP (ref 6–8.3)
RBC # BLD: 5.06 M/UL — SIGNIFICANT CHANGE UP (ref 4.2–5.8)
RBC # FLD: 19.7 % — HIGH (ref 10.3–14.5)
RHEUMATOID FACT SERPL-ACNC: <10 IU/ML — SIGNIFICANT CHANGE UP (ref 0–13)
SODIUM SERPL-SCNC: 138 MMOL/L — SIGNIFICANT CHANGE UP (ref 135–145)
THYROGLOB AB FLD IA-ACNC: <20 IU/ML — SIGNIFICANT CHANGE UP
THYROGLOB AB SERPL-ACNC: <20 IU/ML — SIGNIFICANT CHANGE UP
THYROGLOB SERPL-MCNC: 38.7 NG/ML — SIGNIFICANT CHANGE UP (ref 1.6–59.9)
THYROPEROXIDASE AB SERPL-ACNC: <10 IU/ML — SIGNIFICANT CHANGE UP
TOTAL HEM COMP BLD-ACNC: 86 U/ML — SIGNIFICANT CHANGE UP (ref 42–95)
WBC # BLD: 16.1 K/UL — HIGH (ref 3.8–10.5)
WBC # FLD AUTO: 16.1 K/UL — HIGH (ref 3.8–10.5)

## 2024-03-06 PROCEDURE — 99233 SBSQ HOSP IP/OBS HIGH 50: CPT

## 2024-03-06 PROCEDURE — 99222 1ST HOSP IP/OBS MODERATE 55: CPT | Mod: GC

## 2024-03-06 PROCEDURE — 72141 MRI NECK SPINE W/O DYE: CPT | Mod: 26

## 2024-03-06 PROCEDURE — 70553 MRI BRAIN STEM W/O & W/DYE: CPT | Mod: 26

## 2024-03-06 PROCEDURE — 99232 SBSQ HOSP IP/OBS MODERATE 35: CPT

## 2024-03-06 RX ORDER — SODIUM CHLORIDE 9 MG/ML
500 INJECTION INTRAMUSCULAR; INTRAVENOUS; SUBCUTANEOUS
Refills: 0 | Status: DISCONTINUED | OUTPATIENT
Start: 2024-03-06 | End: 2024-03-07

## 2024-03-06 RX ORDER — METOCLOPRAMIDE HCL 10 MG
10 TABLET ORAL ONCE
Refills: 0 | Status: COMPLETED | OUTPATIENT
Start: 2024-03-06 | End: 2024-03-06

## 2024-03-06 RX ORDER — ENOXAPARIN SODIUM 100 MG/ML
40 INJECTION SUBCUTANEOUS EVERY 24 HOURS
Refills: 0 | Status: DISCONTINUED | OUTPATIENT
Start: 2024-03-06 | End: 2024-03-10

## 2024-03-06 RX ORDER — MAGNESIUM SULFATE 500 MG/ML
1 VIAL (ML) INJECTION ONCE
Refills: 0 | Status: COMPLETED | OUTPATIENT
Start: 2024-03-06 | End: 2024-03-06

## 2024-03-06 RX ORDER — DIPHENHYDRAMINE HCL 50 MG
25 CAPSULE ORAL ONCE
Refills: 0 | Status: COMPLETED | OUTPATIENT
Start: 2024-03-06 | End: 2024-03-06

## 2024-03-06 RX ORDER — QUETIAPINE FUMARATE 200 MG/1
25 TABLET, FILM COATED ORAL EVERY 6 HOURS
Refills: 0 | Status: DISCONTINUED | OUTPATIENT
Start: 2024-03-06 | End: 2024-03-07

## 2024-03-06 RX ORDER — CHLORPROMAZINE HCL 10 MG
10 TABLET ORAL ONCE
Refills: 0 | Status: COMPLETED | OUTPATIENT
Start: 2024-03-06 | End: 2024-03-07

## 2024-03-06 RX ORDER — DEXAMETHASONE 0.5 MG/5ML
4 ELIXIR ORAL EVERY 12 HOURS
Refills: 0 | Status: COMPLETED | OUTPATIENT
Start: 2024-03-07 | End: 2024-03-07

## 2024-03-06 RX ORDER — QUETIAPINE FUMARATE 200 MG/1
25 TABLET, FILM COATED ORAL EVERY 12 HOURS
Refills: 0 | Status: DISCONTINUED | OUTPATIENT
Start: 2024-03-06 | End: 2024-03-07

## 2024-03-06 RX ADMIN — SUMATRIPTAN SUCCINATE 100 MILLIGRAM(S): 4 INJECTION, SOLUTION SUBCUTANEOUS at 21:03

## 2024-03-06 RX ADMIN — Medication 15 MILLIGRAM(S): at 19:31

## 2024-03-06 RX ADMIN — Medication 15 MILLIGRAM(S): at 06:41

## 2024-03-06 RX ADMIN — Medication 10 MILLIGRAM(S): at 10:01

## 2024-03-06 RX ADMIN — Medication 25 MILLIGRAM(S): at 21:37

## 2024-03-06 RX ADMIN — Medication 4 MILLIGRAM(S): at 10:26

## 2024-03-06 RX ADMIN — SUMATRIPTAN SUCCINATE 100 MILLIGRAM(S): 4 INJECTION, SOLUTION SUBCUTANEOUS at 07:41

## 2024-03-06 RX ADMIN — Medication 10 MILLIGRAM(S): at 15:34

## 2024-03-06 RX ADMIN — Medication 1 MILLIGRAM(S): at 13:14

## 2024-03-06 RX ADMIN — Medication 10 MILLIGRAM(S): at 21:37

## 2024-03-06 RX ADMIN — Medication 0.5 MILLIGRAM(S): at 18:07

## 2024-03-06 RX ADMIN — SODIUM CHLORIDE 125 MILLILITER(S): 9 INJECTION INTRAMUSCULAR; INTRAVENOUS; SUBCUTANEOUS at 18:07

## 2024-03-06 RX ADMIN — Medication 10 MILLIGRAM(S): at 22:57

## 2024-03-06 RX ADMIN — Medication 1 MILLIGRAM(S): at 21:16

## 2024-03-06 RX ADMIN — Medication 100 GRAM(S): at 21:51

## 2024-03-06 RX ADMIN — SUMATRIPTAN SUCCINATE 100 MILLIGRAM(S): 4 INJECTION, SOLUTION SUBCUTANEOUS at 22:03

## 2024-03-06 RX ADMIN — Medication 15 MILLIGRAM(S): at 07:41

## 2024-03-06 RX ADMIN — Medication 0.5 MILLIGRAM(S): at 05:43

## 2024-03-06 RX ADMIN — PANTOPRAZOLE SODIUM 40 MILLIGRAM(S): 20 TABLET, DELAYED RELEASE ORAL at 18:07

## 2024-03-06 RX ADMIN — IMMUNE GLOBULIN (HUMAN) 50 GRAM(S): 10 INJECTION INTRAVENOUS; SUBCUTANEOUS at 18:33

## 2024-03-06 RX ADMIN — PANTOPRAZOLE SODIUM 40 MILLIGRAM(S): 20 TABLET, DELAYED RELEASE ORAL at 05:43

## 2024-03-06 RX ADMIN — Medication 4 MILLIGRAM(S): at 03:20

## 2024-03-06 RX ADMIN — QUETIAPINE FUMARATE 25 MILLIGRAM(S): 200 TABLET, FILM COATED ORAL at 21:03

## 2024-03-06 RX ADMIN — ENOXAPARIN SODIUM 40 MILLIGRAM(S): 100 INJECTION SUBCUTANEOUS at 21:02

## 2024-03-06 RX ADMIN — Medication 10 MILLIGRAM(S): at 11:46

## 2024-03-06 RX ADMIN — Medication 4 MILLIGRAM(S): at 15:16

## 2024-03-06 RX ADMIN — SUMATRIPTAN SUCCINATE 100 MILLIGRAM(S): 4 INJECTION, SOLUTION SUBCUTANEOUS at 06:41

## 2024-03-06 NOTE — CONSULT NOTE ADULT - TIME BILLING
Bedside exam and interview   Reviewed vitals, labs   Discussed patient's plan of care with primary team   Documentation of encounter
Time spent includes direct patient care  (interview and examination of patient), discussion with other providers, support staff and/or patient's family members, review of medical records, ordering diagnostic tests and analyzing results, and documentation.

## 2024-03-06 NOTE — OCCUPATIONAL THERAPY INITIAL EVALUATION ADULT - DIAGNOSIS, OT EVAL
Patient brought to [facility] 2/2 presents with autoimmune encephalitis management/treatment, presents with no physical deficits, c/o of increased headaches/neck pain and mental status throughout the day impacting independence with functional activities and mobility. MRS 1. Patient brought to [facility] 2/2 presents with autoimmune encephalitis management/treatment, presents with no physical deficits, c/o of increased headaches/neck pain and mental status throughout the day impacting independence with functional activities and mobility.

## 2024-03-06 NOTE — PROVIDER CONTACT NOTE (OTHER) - SITUATION
Pt removed VEEG and cardiac monitor as per pt he does not need them. Pt rude and cursing staff. Pt refused to answer orientation questions

## 2024-03-06 NOTE — PHYSICAL THERAPY INITIAL EVALUATION ADULT - ADDITIONAL COMMENTS
Prior to admission pt reports living alone in an elevator apt, no steps to enter, indep with ADLs and mobility, no use of AD.

## 2024-03-06 NOTE — PROGRESS NOTE ADULT - ASSESSMENT
I M    39 y o Male with PMHx of meningitis 1.5 years ago, HTN, intractable headaches for which he follows up with Dr. Weston outpatient who was sent in for admission after outpatient PET scan and LP concerning for possible encephalitis.     Neuro  #concern for encephalitis  - Outpatient PET scan 3/1: Abnormal hypometabolism particularly pronounced in the parieto-occipital and temporal regions (right greater than left), and also involving the anteromedial temporal lobes with extension into the orbitofrontal and   paramedian frontal regions bilaterally. Findings suggestive of autoimmune encephalitis (AIE) sequelae, likely with contributing neurotropic medication effects, in the proper clinical   setting.  - s/p LP 3/4 with elevated opening pressure at 30 mmHg, remainder of CSF negative  - start 1 g IV Solumedrol, monitor for any adverse rxn  - continue home PPI   - PRN Tylenol, Toradol, and Relgan ordered for headache management  - q8hr general neuro checks and vitals    Cards  #HTN  - permissive hypertension, Goal -180  - restart home Verapamil and Cozaar PRN    Pulm  - call provider if SPO2 < 94%    GI  #Nutrition/Fluids/Electrolytes   - replete K<4 and Mg <2  - Diet: Regular    Renal  - daily BMPs    Infectious Disease  - afebrile on admission    Endocrine  #DM  - A1C results: pending  - continue FS monitoring while on steroids    - TSH results: pending    DVT Prophylaxis  - lovenox sq for DVT prophylaxis   - SCDs for DVT prophylaxis

## 2024-03-06 NOTE — PROGRESS NOTE ADULT - SUBJECTIVE AND OBJECTIVE BOX
Neurology Progress Note    INTERVAL HPI/OVERNIGHT EVENTS:  Patient seen and examined. Overnight events as per chart note.  Pt does not remember much of last night, but is apologetic this morning. He wants to get rid of his hiccups and to know the plan for the day.     MEDICATIONS  (STANDING):  acetaminophen     Tablet .. 650 milliGRAM(s) Oral once  clonazePAM  Tablet 0.5 milliGRAM(s) Oral two times a day  diphenhydrAMINE 25 milliGRAM(s) Oral once  enoxaparin Injectable 40 milliGRAM(s) SubCutaneous every 24 hours  immune   globulin 10% (GAMMAGARD) IVPB 40 Gram(s) IV Intermittent every 24 hours  pantoprazole    Tablet 40 milliGRAM(s) Oral every 12 hours  QUEtiapine 25 milliGRAM(s) Oral every 12 hours  verapamil  milliGRAM(s) Oral daily    MEDICATIONS  (PRN):  acetaminophen     Tablet .. 650 milliGRAM(s) Oral every 6 hours PRN Mild Pain (1 - 3)  baclofen 10 milliGRAM(s) Oral every 8 hours PRN Muscle Spasm  ketorolac   Injectable 15 milliGRAM(s) IV Push every 12 hours PRN Severe Pain (7 - 10)  LORazepam   Injectable 1 milliGRAM(s) IV Push every 6 hours PRN Anxiety  metoclopramide Injectable 10 milliGRAM(s) IV Push every 12 hours PRN severe headache  SUMAtriptan 100 milliGRAM(s) Oral two times a day PRN Headache  traMADol 25 milliGRAM(s) Oral two times a day PRN Moderate Pain (4 - 6)      Allergies    methylPREDNISolone (Rash; Urticaria; Flushing; Hives)    Intolerances        Vital Signs Last 24 Hrs  T(C): 36.6 (06 Mar 2024 10:28), Max: 36.6 (06 Mar 2024 06:00)  T(F): 97.9 (06 Mar 2024 10:28), Max: 97.9 (06 Mar 2024 06:00)  HR: 102 (06 Mar 2024 15:03) (102 - 112)  BP: 124/68 (06 Mar 2024 15:03) (111/58 - 140/77)  BP(mean): 88 (06 Mar 2024 15:03) (78 - 107)  RR: 18 (06 Mar 2024 15:03) (18 - 18)  SpO2: 94% (05 Mar 2024 19:13) (93% - 94%)    Parameters below as of 06 Mar 2024 15:03  Patient On (Oxygen Delivery Method): room air        Physical exam:  General: No acute distress, awake and alert  Eyes: Anicteric sclerae, moist conjunctivae, see below for CNs  Neck: trachea midline,  Cardiovascular: Regular rate and rhythm, no murmurs  Pulmonary: Anterior breath sounds clear bilaterally No use of accessory muscles  GI: Abdomen soft, non-distended, non-tender  Extremities: no edema    Neurologic:  -Mental status: Awake, alert, oriented to person, place, and time. Speech is fluent with intact naming, repetition, and comprehension, no dysarthria. Recent and remote memory intact. Follows commands. Attention/concentration intact. Fund of knowledge appropriate. Able to count backwards from 10 and do serial 7s.  -Cranial nerves:   II: Visual fields are full to confrontation.  III, IV, VI: Extraocular movements are intact without nystagmus.   Motor: Normal bulk and tone. No pronator drift. Strength bilateral upper extremity 5/5, bilateral lower extremities 5/5.  Sensation: Intact to light touch bilaterally  LABS:                        13.6   16.10 )-----------( 287      ( 06 Mar 2024 11:02 )             41.9     03-06    138  |  105  |  14  ----------------------------<  137<H>  3.9   |  22  |  0.94    Ca    9.2      06 Mar 2024 11:02  Phos  3.6     03-06  Mg     2.0     03-06    TPro  6.7  /  Alb  x   /  TBili  x   /  DBili  x   /  AST  x   /  ALT  x   /  AlkPhos  x   03-05    PTT - ( 05 Mar 2024 18:28 )  PTT:26.7 sec  Urinalysis Basic - ( 06 Mar 2024 11:02 )    Color: x / Appearance: x / SG: x / pH: x  Gluc: 137 mg/dL / Ketone: x  / Bili: x / Urobili: x   Blood: x / Protein: x / Nitrite: x   Leuk Esterase: x / RBC: x / WBC x   Sq Epi: x / Non Sq Epi: x / Bacteria: x        RADIOLOGY & ADDITIONAL TESTS:    < from: MR Brain-Seizure, Epilepsy w/wo IV Cont (03.06.24 @ 14:56) >  IMPRESSION: No acute hemorrhage mass mass effect or abnormal enhancement.    < end of copied text >

## 2024-03-06 NOTE — CONSULT NOTE ADULT - SUBJECTIVE AND OBJECTIVE BOX
GASTROENTEROLOGY CONSULT NOTE  HPI:  39y Male with PMHx of meningitis 1.5 years ago, HTN, intractable headaches for which he follows up with Dr. Weston outpatient who was sent in for admission after outpatient PET scan and LP concerning for possible encephalitis. Patient has undergone multiple treatments for his headaches in the past, including botox injections. Noticed increasing frequency and severity of his headaches over the last couple of days/weeks with associated neck stiffness. Has also noticed increased lethargy and cognitive changes over the same period of time. Describes it as a similar feeling to dyslexia, noticed it when he was responding to emails. Was sent for an outpatient PET scan on  which showed "abnormal hypometabolism particularly pronounced in the parieto-occipital and temporal regions (right greater than left), and also involving the anteromedial temporal lobes with extension into the orbitofrontal and paramedian frontal regions bilaterally. Findings suggestive of autoimmune encephalitis (AIE) sequelae, likely with contributing neurotropic medication effects, in the proper clinical setting." Underwent an IR guided LP today and was found to have an elevated opening pressure of 30 mmHg, remainder of CSF negative.    GI was consulted for worsening reflux symptoms after starting steroids, including reflux, hiccups, and epigastric discomfort/ fullness. PPI was increased from daily to BID. Baclofen was added for hiccups. Patient denies any nausea.     Follows with outpatient GI Dr. John Tuttle.  Last EGD  with conclusive evidence of GERD  History of gastritis and severe esophagitis s/p biopsy  Remains on Omeprazole 40mg daily.    Allergies    methylPREDNISolone (Rash; Urticaria; Flushing; Hives)    Intolerances      Home Medications:  Botox 100 units injection: 100 unit(s) injectable every 3 months 100 units into each axilla (05 Mar 2024 05:05)  butalbital/aspirin/caffeine 50 mg-325 mg-40 mg oral tablet: 2 tab(s) orally every 6 hours as needed for  headache (05 Mar 2024 05:07)  Flexeril 10 mg oral tablet: 1 tab(s) orally once a day (at bedtime) as needed for  muscle spasm (05 Mar 2024 05:08)  KlonoPIN 0.5 mg oral tablet: 1 tab(s) orally once a day as needed for  anxiety (05 Mar 2024 05:10)  losartan 100 mg oral tablet: 1 tab(s) orally once a day (05 Mar 2024 05:21)  methocarbamol 500 mg oral tablet: 2 tab(s) orally every 8 hours (05 Mar 2024 05:13)  PriLOSEC 40 mg oral delayed release capsule: 1 cap(s) orally every 12 hours (05 Mar 2024 05:13)  verapamil 120 mg/24 hours oral capsule, extended release: 1 cap(s) orally once a day (05 Mar 2024 05:13)  ZOLMitriptan 5 mg oral tablet, disintegratin tab(s) orally once a day as needed for  headache (05 Mar 2024 05:13)    MEDICATIONS:  MEDICATIONS  (STANDING):  acetaminophen     Tablet .. 650 milliGRAM(s) Oral once  clonazePAM  Tablet 0.5 milliGRAM(s) Oral two times a day  dexAMETHasone  Injectable 4 milliGRAM(s) IV Push every 6 hours  diphenhydrAMINE 25 milliGRAM(s) Oral once  immune   globulin 10% (GAMMAGARD) IVPB 40 Gram(s) IV Intermittent every 24 hours  pantoprazole    Tablet 40 milliGRAM(s) Oral every 12 hours  verapamil  milliGRAM(s) Oral daily    MEDICATIONS  (PRN):  acetaminophen     Tablet .. 650 milliGRAM(s) Oral every 6 hours PRN Mild Pain (1 - 3)  baclofen 10 milliGRAM(s) Oral every 8 hours PRN Muscle Spasm  ketorolac   Injectable 15 milliGRAM(s) IV Push every 12 hours PRN Severe Pain (7 - 10)  LORazepam   Injectable 1 milliGRAM(s) IV Push every 6 hours PRN Anxiety  metoclopramide Injectable 10 milliGRAM(s) IV Push every 12 hours PRN severe headache  SUMAtriptan 100 milliGRAM(s) Oral two times a day PRN Headache  traMADol 25 milliGRAM(s) Oral two times a day PRN Moderate Pain (4 - 6)    PAST MEDICAL & SURGICAL HISTORY:  Hypertension      Migraine      Meningitis        FAMILY HISTORY:    SOCIAL HISTORY:  Tobacco: [ ] Current, [ ] Former, [ ] Never; Pack Years:  Alcohol:  Illicit Drugs:    REVIEW OF SYSTEMS:  CONSTITUTIONAL: No weakness, fevers or chills  HEENT: No visual changes; No vertigo or throat pain   NECK: No pain or stiffness  RESPIRATORY: No cough, wheezing, hemoptysis; No shortness of breath  CARDIOVASCULAR: No chest pain or palpitations  GASTROINTESTINAL: As above.  GENITOURINARY: No dysuria, frequency or hematuria  NEUROLOGICAL: No numbness or weakness  SKIN: No itching, burning, rashes, or lesions   All other 10 review of systems is negative unless indicated above.    Vital Signs Last 24 Hrs  T(C): 36.6 (06 Mar 2024 10:28), Max: 36.6 (06 Mar 2024 06:00)  T(F): 97.9 (06 Mar 2024 10:28), Max: 97.9 (06 Mar 2024 06:00)  HR: 110 (05 Mar 2024 19:13) (110 - 124)  BP: 140/77 (06 Mar 2024 08:59) (107/65 - 140/77)  BP(mean): 102 (06 Mar 2024 08:59) (78 - 107)  RR: 18 (06 Mar 2024 08:59) (18 - 18)  SpO2: 94% (05 Mar 2024 19:13) (93% - 94%)    Parameters below as of 06 Mar 2024 08:59  Patient On (Oxygen Delivery Method): room air          PHYSICAL EXAM:    General: in no acute distress  Eyes: Anicteric sclerae, moist conjunctivae  HENT: Moist mucous membranes  Neck: Trachea midline, supple  Lungs: Normal respiratory effort, no intercostal retractions  Cardiovascular: RRR  Abdomen: Soft, non-tender non-distended; No rebound or guarding  Extremities: Normal range of motion, No clubbing, cyanosis or edema  Neurological: Alert and oriented x3  Skin: Warm and dry. No obvious rash    LABS:                        13.9   7.77  )-----------( 285      ( 05 Mar 2024 05:30 )             44.3     03-    138  |  102  |  12  ----------------------------<  156<H>  4.2   |  24  |  0.81    Ca    9.4      05 Mar 2024 05:30  Phos  2.8     03-05  Mg     2.0     03-05    TPro  6.7  /  Alb  x   /  TBili  x   /  DBili  x   /  AST  x   /  ALT  x   /  AlkPhos  x   03-05        PTT - ( 05 Mar 2024 18:28 )  PTT:26.7 sec    Culture - CSF with Gram Stain (collected 04 Mar 2024 14:40)  Source: .CSF  Gram Stain (04 Mar 2024 15:58):    No organisms seen    Rare White blood cells  Preliminary Report (05 Mar 2024 08:52):    No growth to date      RADIOLOGY & ADDITIONAL STUDIES:     Reviewed

## 2024-03-06 NOTE — PROGRESS NOTE ADULT - ASSESSMENT
39y Male with PMHx of viral meningitis with sequela of HTN and intractable headaches (followed by Dr. Weston) presents for possible autoimmune encephalitis management/treatment seen on outpatient PET 3/4. LP done on the same day with opening pressures of 30mmHg; CSF unremarkable. C/C/b by allergic reaction to solumedrol which was switched to decadron. MRI epilepsy protocol done with no abnormalities or enhancements. Also started on IVIG on 3/7.    Neuro  #autoimmune encephalitis  - Outpatient PET scan 3/1: Abnormal hypometabolism particularly pronounced in the parieto-occipital and temporal regions (right greater than left), and also involving the anteromedial temporal lobes with extension into the orbitofrontal and   paramedian frontal regions bilaterally. Findings suggestive of autoimmune encephalitis (AIE) sequelae, likely with contributing neurotropic medication effects, in the proper clinical   setting.  - s/p LP 3/4 with elevated opening pressure at 30 mmHg, remainder of CSF negative  - c/w decadron 4mg q6  - continue home pantoprazole  - PRN Tylenol, Toradol, and Relgan, sumatriptan ordered for headache management  - q8hr general neuro checks and vitals    #hiccups  -continue baclofen  -consider the addition of Thorazine    Cards  #HTN  - goal normotension  - will hold losartan for now due to hx of autonomic dysfunction  -continue home verapamil    Pulm  - call provider if SPO2 < 94%    GI  #Nutrition/Fluids/Electrolytes   - replete K<4 and Mg <2  - Diet: Regular    Infectious Disease  - afebrile on admission    Endocrine  #DM  - A1C results: 5.2  - continue FS monitoring while on steroids    - TSH results: 0.594    Psych  #anxiety  - psych consulted, f/u recs  - c/w klonopin 0,5mg BID prn and ativan 1mg prn   -start on seroquel 25BID standing    DVT Prophylaxis  - SCDs for DVT prophylaxis     Dispo: aure tomorrow      Discussed daily hospital plans and goals with patient bedside    Discussed with Neurology Attending, Dr. Weston 39y Male with PMHx of viral meningitis with sequela of HTN and intractable headaches (followed by Dr. Weston) presents for possible autoimmune encephalitis management/treatment seen on outpatient PET 3/4. LP done on the same day with opening pressures of 30mmHg; CSF unremarkable. C/C/b by allergic reaction to solumedrol which was switched to decadron. MRI epilepsy protocol done with no abnormalities or enhancements. Also started on IVIG on 3/7.    Neuro  #autoimmune encephalitis  - Outpatient PET scan 3/1: Abnormal hypometabolism particularly pronounced in the parieto-occipital and temporal regions (right greater than left), and also involving the anteromedial temporal lobes with extension into the orbitofrontal and   paramedian frontal regions bilaterally. Findings suggestive of autoimmune encephalitis (AIE) sequelae, likely with contributing neurotropic medication effects, in the proper clinical   setting.  - s/p LP 3/4 with elevated opening pressure at 30 mmHg, remainder of CSF negative  - lower Decadron to 4g q12 since MRI shows no enhancement  -MRI brain epilepsy protocol done - no abnormalities or enhancement  - continue home pantoprazole  - PRN Tylenol, Toradol, and Relgan, sumatriptan ordered for headache management  - q8hr general neuro checks and vitals    #hiccups  -continue baclofen  -consider the addition of Thorazine    Cards  #HTN  - goal normotension  - will hold losartan for now due to hx of autonomic dysfunction  -continue home verapamil    Pulm  - call provider if SPO2 < 94%    GI  #Nutrition/Fluids/Electrolytes   - replete K<4 and Mg <2  - Diet: Regular    Infectious Disease  - afebrile on admission    Endocrine  #DM  - A1C results: 5.2  - continue FS monitoring while on steroids    - TSH results: 0.594    Psych  #anxiety  - psych consulted, f/u recs  - c/w klonopin 0,5mg BID prn and ativan 1mg prn   -start on seroquel 25BID standing    DVT Prophylaxis  - SCDs for DVT prophylaxis     Dispo: aure tomorrow      Discussed daily hospital plans and goals with patient bedside    Discussed with Neurology Attending, Dr. Weston

## 2024-03-06 NOTE — OCCUPATIONAL THERAPY INITIAL EVALUATION ADULT - ORIENTATION, REHAB EVAL
Patient given 3 words to recall from beginning of session to end of session "bird, red, chair"- patient able to successful recall with no verbal hints/oriented to person, place, time and situation

## 2024-03-06 NOTE — OCCUPATIONAL THERAPY INITIAL EVALUATION ADULT - ADDITIONAL COMMENTS
Patient reports living in both a private home and elevator access apartment building with no ANGELO. Patient states he was independent with all ADL's, IADL's and functional mobility with no AD prior to admission. Patient is R hand dominant and does not wear glasses.

## 2024-03-06 NOTE — PROGRESS NOTE ADULT - SUBJECTIVE AND OBJECTIVE BOX
Physical Medicine and Rehabilitation Progress Note :       Patient is a 39y old  Male who presents with a chief complaint of Encephalitis (05 Mar 2024 11:53)      HPI:  39y Male with PMHx of meningitis 1.5 years ago, HTN, intractable headaches for which he follows up with Dr. Trista maguire who was sent in for admission after outpatient PET scan and LP concerning for possible encephalitis. Patient has undergone multiple treatments for his headaches in the past, including botox injections. Noticed increasing frequency and severity of his headaches over the last couple of days/weeks with associated neck stiffness. Has also noticed increased lethargy and cognitive changes over the same period of time. Describes it as a similar feeling to dyslexia, noticed it when he was responding to emails. Was sent for an outpatient PET scan on 03/01 which showed "abnormal hypometabolism particularly pronounced in the parieto-occipital and temporal regions (right greater than left), and also involving the anteromedial temporal lobes with extension into the orbitofrontal and paramedian frontal regions bilaterally. Findings suggestive of autoimmune encephalitis (AIE) sequelae, likely with contributing neurotropic medication effects, in the proper clinical setting." Underwent an IR guided LP today and was found to have an elevated opening pressure of 30 mmHg, remainder of CSF negative. (04 Mar 2024 22:03)                            13.6   16.10 )-----------( 287      ( 06 Mar 2024 11:02 )             41.9       03-06    138  |  105  |  14  ----------------------------<  137<H>  3.9   |  22  |  0.94    Ca    9.2      06 Mar 2024 11:02  Phos  3.6     03-06  Mg     2.0     03-06    TPro  6.7  /  Alb  x   /  TBili  x   /  DBili  x   /  AST  x   /  ALT  x   /  AlkPhos  x   03-05    Vital Signs Last 24 Hrs  T(C): 36.6 (06 Mar 2024 10:28), Max: 36.6 (06 Mar 2024 06:00)  T(F): 97.9 (06 Mar 2024 10:28), Max: 97.9 (06 Mar 2024 06:00)  HR: 110 (05 Mar 2024 19:13) (110 - 122)  BP: 140/77 (06 Mar 2024 08:59) (107/65 - 140/77)  BP(mean): 102 (06 Mar 2024 08:59) (78 - 107)  RR: 18 (06 Mar 2024 08:59) (18 - 18)  SpO2: 94% (05 Mar 2024 19:13) (93% - 94%)    Parameters below as of 06 Mar 2024 08:59  Patient On (Oxygen Delivery Method): room air        MEDICATIONS  (STANDING):  acetaminophen     Tablet .. 650 milliGRAM(s) Oral once  clonazePAM  Tablet 0.5 milliGRAM(s) Oral two times a day  dexAMETHasone  Injectable 4 milliGRAM(s) IV Push every 6 hours  diphenhydrAMINE 25 milliGRAM(s) Oral once  immune   globulin 10% (GAMMAGARD) IVPB 40 Gram(s) IV Intermittent every 24 hours  pantoprazole    Tablet 40 milliGRAM(s) Oral every 12 hours  verapamil  milliGRAM(s) Oral daily    MEDICATIONS  (PRN):  acetaminophen     Tablet .. 650 milliGRAM(s) Oral every 6 hours PRN Mild Pain (1 - 3)  baclofen 10 milliGRAM(s) Oral every 8 hours PRN Muscle Spasm  ketorolac   Injectable 15 milliGRAM(s) IV Push every 12 hours PRN Severe Pain (7 - 10)  LORazepam   Injectable 1 milliGRAM(s) IV Push every 6 hours PRN Anxiety  metoclopramide Injectable 10 milliGRAM(s) IV Push every 12 hours PRN severe headache  SUMAtriptan 100 milliGRAM(s) Oral two times a day PRN Headache  traMADol 25 milliGRAM(s) Oral two times a day PRN Moderate Pain (4 - 6)          Initial Functional Status Assessment :       Previous Level of Function:     · Ambulation Skills	independent  · Transfer Skills	independent  · ADL Skills	independent  · Work/Leisure Activity	independent  · Additional Comments	Prior to admission pt reports living alone in an elevator apt, no steps to enter, indep with ADLs and mobility, no use of AD.    Cognitive Status Examination:   · Orientation	oriented to person, place, time and situation  · Level of Consciousness	alert  · Follows Commands and Answers Questions	100% of the time  · Personal Safety and Judgment	intact  · Short Term Memory	intact    Range of Motion Exam:   · Range of Motion Examination	bilateral upper extremity ROM was WFL (within functional limits); bilateral lower extremity ROM was WFL (within functional limits)    Manual Muscle Testing:   · Manual Muscle Testing Results	no strength deficits were identified    Bed Mobility: Rolling/Turning:     · Level of Forrest	independent    Bed Mobility: Sit to Supine:     · Level of Forrest	independent    Bed Mobility: Supine to Sit:     · Level of Forrest	independent    Transfer: Sit to Stand:     · Level of Forrest	independent    Transfer: Stand to Sit:     · Level of Forrest	independent    Gait Skills:     · Level of Forrest	independent  · Assistive Device	none  · Gait Distance	200 feet    Gait Analysis:     · Gait Pattern Used	2-point gait    Stair Negotiation:     · Level of Forrest	independent  · Physical Assist/Nonphysical Assist	1 person assist  · Assistive Device	none  · Stair Pattern	step over step  · Number of Stairs	12    Balance Skills Assessment:     · Sitting Balance: Static	normal balance  · Sitting Balance: Dynamic	normal balance  · Standing Balance: Static	normal balance  · Standing Balance: Dynamic	normal balance    Sensory Examination:   Sensory Examination:    Grossly Intact:   · Gross Sensory Examination	Grossly Intact      Visual Assessment:   · Visual Tracking	normal  · Visual Neglect	none  · Visual Field Cuts	none  · Visual Scanning	WFL  · Visual Convergence	normal    Fine Motor Coordination:     Grossly Intact:   · Grossly Intact	Left UE; Right UE      Fine Motor Coordination:   · Left Hand, Finger to Nose	normal performance  · Right Hand, Finger to Nose	normal performance  · Left Hand Thumb/Finger Opposition Skills	normal performance  · Right Hand Thumb/Finger Opposition Skills	normal performance  · Left Hand, Manipulation of Objects	normal performance  · Right Hand, Manipulation of Objects	normal performance    Lower Body Dressing Training:     · Level of Forrest	independent; donning b/l sneakers while standing      PM&R Impression : as above    Current disposition plan recommendation :     d/c home with no PT/OT needs

## 2024-03-06 NOTE — OCCUPATIONAL THERAPY INITIAL EVALUATION ADULT - PERTINENT HX OF CURRENT PROBLEM, REHAB EVAL
39y Male with PMHx of viral meningitis with sequela of HTN and intractable headaches (followed by Dr. Weston) presents for autoimmune encephalitis management/treatment seen on outpatient PET 3/4. LP done on the same day with opening pressures of 30mmHg; CSF unremarkable. C/C/b by allergic reaction to solumedrol which was switched to decadron

## 2024-03-06 NOTE — BH CONSULTATION LIAISON PROGRESS NOTE - OTHER
poor recall of events of agitation and confusion overnight stable posture sitting up in bed worry about health and recent symptoms, no hopelessness, no SI/HI Not formally assessed

## 2024-03-06 NOTE — OCCUPATIONAL THERAPY INITIAL EVALUATION ADULT - GENERAL OBSERVATIONS, REHAB EVAL
PT Ray present. Patient co-worker present. Patient received semisupine in bed +tele, +heplock IV, room air, NAD.

## 2024-03-06 NOTE — BH CONSULTATION LIAISON PROGRESS NOTE - NSBHASSESSMENTFT_PSY_ALL_CORE
40yo man, domiciled, employed as PA at [employer], with PPH of anxiety and ADHD, PMH of headaches (recently worsening with intractable neck pain), viral meningitis (9/2022), HTN, admitted for workup of possible autoimmune encephalitis after concerning findings on outpatient PET and LP, now receiving high dose steroids and started overnight on IVIG. Psychiatry consulted for further management of anxiety at pt's request and following today for management of anxiety and mental status changes overnight concerning for acute delirium.      RECOMMENDATIONS  -recommend starting quetiapine 25mg po BID for anxiety and agitation associated with delirium, hold for oversedation  -recommend additional quetiapine 25mg po Q6H PRN for acute agitation or distressing anxiety - prefer over additional benzodiazepine for anxiety in setting of delirium.  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 Psychiatry consulted for further management of anxiety at pt's request and following today for management of anxiety and mental status changes overnight concerning for acute delirium in setting of multiple risk factors include encephalitis, steroid use. Pt presents today with poor recollection of overnight events, currently calm and well-oriented, organized TP, with reported periods of elevated anxiety and forgetfulness, no described psychotic sx, no SI/HI. Given concern for exacerbation of confusion with use of benzodiazepines, would recommend trial of quetiapine to target anxiety, agitation, and mood lability.     DDx: acute multifactorial delirium, steroid-exacerbated mood and anxiety d/o, adjustment disorder, r/o generalized anxiety disorder, r/o alcohol misuse, ADHD by history    RECOMMENDATIONS  -supervised room given recent confusional state  -recommend starting quetiapine 25mg po BID for anxiety and agitation associated with delirium and steroid use, hold for oversedation  -recommend additional quetiapine 25mg po Q6H PRN for acute agitation or distressing anxiety - prefer over additional benzodiazepine for anxiety in setting of delirium.  -recommend olanzapine 2.5mg IM Q6H PRN for acute agitation that is not verbally redirectable, in the case oral medication is refused  -monitor for QTc prolongation with use of antipsychotic  -ok to continue clonazepam 0.5mg po BID for acute anxiety for now given reported use as OP; please confirm OP Rx (as not seen on ISTOP) and would consider discontinuation if delirium persists as may exacerbate sx  -CIWA monitoring  -delirium precautions  -will follow

## 2024-03-06 NOTE — EEG REPORT - NS EEG TEXT BOX
[facility] Department of Neurology  Inpatient Continuous video-Electroencephalogram  [address] [place], [address] [place] [id number], T: [phone]    Patient Name:	FARZAD STEWART    :	1985  MRN:	[id number]    Study Start Date/Time:	3/5/2024, 4:43:17 PM (off from 10:39pm to 5:40pm the next day)  Study End Date/Time:	3/6/2024, 10:50 PM    Referred by:  Kyle Weston MD    Brief Clinical History:  FARZAD STEWART  is a 39 year old Male with past history of a mild encephalitis and continued symptoms, headaches, jerks during sleep, panic episodes, abnormal CSF opening pressure; study performed to investigate for seizures or markers of epilepsy.   Technologist notes: -  Diagnosis Code:  R56.9 convulsions/seizure    Pertinent Medication:  n/a    Acquisition Details:  Electroencephalography was acquired using a minimum of 21 channels on an "[employer] Neurology system v 9.3.1 with electrode placement according to the standard International 10-20 system following ACNS (American Clinical Neurophysiology Society) guidelines.  Anterior temporal T1 and T2 electrodes were utilized whenever possible.  The [employer] automated spike & seizure detections were all reviewed in detail, in addition to the entire raw EEG.    Findings:  Day 1:  3/5/2024, 4:43:17 PM to 10:39 PM  Background:  continuous, with predominantly alpha and beta frequencies.  Generalized Slowing:  None  Symmetry/Focality: No persistent asymmetries of voltage or frequency.     Voltage:  Normal (20+ uV)  Organization:  Appropriate anterior-posterior gradient  Posterior Dominant Rhythm:  9 Hz symmetric, well-organized, and well-modulated  Sleep:  Symmetric, synchronous spindles and K complexes.  Variability:   Yes		Reactivity:  Yes    Spontaneous Activity:  No epileptiform discharges   Events:  •	No electrographic seizures or significant clinical events occurred during this study.  Provocations:  •	Hyperventilation: was not performed.  •	Photic stimulation: was not performed.  Daily Summary:    •	No typical events were captured during the recording.  •	There were no findings of active epilepsy, however this alone does not rule out the diagnosis.       Ernesto Spears MD  Attending Neurologist, [facility] Epilepsy Program

## 2024-03-06 NOTE — BH CONSULTATION LIAISON PROGRESS NOTE - NSBHCONSULTFOLLOWAFTERCARE_PSY_A_CORE FT
pending clinical course. Pt reports recent treatment at [facility] - will clarify if wishes to return

## 2024-03-06 NOTE — PHYSICAL THERAPY INITIAL EVALUATION ADULT - PERTINENT HX OF CURRENT PROBLEM, REHAB EVAL
39y Male with PMHx of meningitis 1.5 years ago, HTN, intractable headaches for which he follows up with Dr. Weston outpatient who was sent in for admission after outpatient PET scan and LP concerning for possible encephalitis.

## 2024-03-07 LAB
ACE SERPL-CCNC: 30 U/L — SIGNIFICANT CHANGE UP (ref 14–82)
ANA TITR SER: NEGATIVE — SIGNIFICANT CHANGE UP
CONFIRM APTT STACLOT: NEGATIVE — SIGNIFICANT CHANGE UP
DRVVT RATIO: 1.02 RATIO — SIGNIFICANT CHANGE UP (ref 0–1.03)
DRVVT SCREEN TO CONFIRM RATIO: SIGNIFICANT CHANGE UP
DSDNA AB SER-ACNC: <12 IU/ML — SIGNIFICANT CHANGE UP
ENDOMYSIUM IGA TITR SER IF: NEGATIVE — SIGNIFICANT CHANGE UP
ENDOMYSIUM IGA TITR SER: SIGNIFICANT CHANGE UP
GLIADIN PEPTIDE IGA SER-ACNC: 7.7 UNITS — SIGNIFICANT CHANGE UP
GLIADIN PEPTIDE IGA SER-ACNC: NEGATIVE — SIGNIFICANT CHANGE UP
GLIADIN PEPTIDE IGG SER-ACNC: <5 UNITS — SIGNIFICANT CHANGE UP
GLIADIN PEPTIDE IGG SER-ACNC: NEGATIVE — SIGNIFICANT CHANGE UP

## 2024-03-07 PROCEDURE — 93010 ELECTROCARDIOGRAM REPORT: CPT | Mod: 76

## 2024-03-07 PROCEDURE — 96136 PSYCL/NRPSYC TST PHY/QHP 1ST: CPT

## 2024-03-07 PROCEDURE — 96132 NRPSYC TST EVAL PHYS/QHP 1ST: CPT

## 2024-03-07 PROCEDURE — 96116 NUBHVL XM PHYS/QHP 1ST HR: CPT

## 2024-03-07 PROCEDURE — 96137 PSYCL/NRPSYC TST PHY/QHP EA: CPT

## 2024-03-07 PROCEDURE — 96133 NRPSYC TST EVAL PHYS/QHP EA: CPT

## 2024-03-07 PROCEDURE — 99233 SBSQ HOSP IP/OBS HIGH 50: CPT

## 2024-03-07 RX ORDER — GABAPENTIN 400 MG/1
100 CAPSULE ORAL EVERY 8 HOURS
Refills: 0 | Status: DISCONTINUED | OUTPATIENT
Start: 2024-03-07 | End: 2024-03-07

## 2024-03-07 RX ORDER — LIDOCAINE 4 G/100G
1 CREAM TOPICAL ONCE
Refills: 0 | Status: DISCONTINUED | OUTPATIENT
Start: 2024-03-07 | End: 2024-03-07

## 2024-03-07 RX ORDER — IMMUNE GLOBULIN (HUMAN) 10 G/100ML
40 INJECTION INTRAVENOUS; SUBCUTANEOUS ONCE
Refills: 0 | Status: COMPLETED | OUTPATIENT
Start: 2024-03-08 | End: 2024-03-08

## 2024-03-07 RX ORDER — OLANZAPINE 15 MG/1
5 TABLET, FILM COATED ORAL EVERY 24 HOURS
Refills: 0 | Status: DISCONTINUED | OUTPATIENT
Start: 2024-03-07 | End: 2024-03-10

## 2024-03-07 RX ORDER — ONDANSETRON 8 MG/1
4 TABLET, FILM COATED ORAL ONCE
Refills: 0 | Status: COMPLETED | OUTPATIENT
Start: 2024-03-07 | End: 2024-03-07

## 2024-03-07 RX ORDER — OLANZAPINE 15 MG/1
2.5 TABLET, FILM COATED ORAL ONCE
Refills: 0 | Status: COMPLETED | OUTPATIENT
Start: 2024-03-07 | End: 2024-03-07

## 2024-03-07 RX ORDER — OLANZAPINE 15 MG/1
2.5 TABLET, FILM COATED ORAL EVERY 24 HOURS
Refills: 0 | Status: DISCONTINUED | OUTPATIENT
Start: 2024-03-07 | End: 2024-03-07

## 2024-03-07 RX ORDER — GABAPENTIN 400 MG/1
100 CAPSULE ORAL THREE TIMES A DAY
Refills: 0 | Status: DISCONTINUED | OUTPATIENT
Start: 2024-03-07 | End: 2024-03-07

## 2024-03-07 RX ORDER — CHLORPROMAZINE HCL 10 MG
15 TABLET ORAL ONCE
Refills: 0 | Status: COMPLETED | OUTPATIENT
Start: 2024-03-07 | End: 2024-03-07

## 2024-03-07 RX ORDER — PANTOPRAZOLE SODIUM 20 MG/1
40 TABLET, DELAYED RELEASE ORAL EVERY 12 HOURS
Refills: 0 | Status: DISCONTINUED | OUTPATIENT
Start: 2024-03-07 | End: 2024-03-10

## 2024-03-07 RX ORDER — CLONAZEPAM 1 MG
0.5 TABLET ORAL ONCE
Refills: 0 | Status: DISCONTINUED | OUTPATIENT
Start: 2024-03-07 | End: 2024-03-07

## 2024-03-07 RX ORDER — OLANZAPINE 15 MG/1
2.5 TABLET, FILM COATED ORAL EVERY 24 HOURS
Refills: 0 | Status: DISCONTINUED | OUTPATIENT
Start: 2024-03-07 | End: 2024-03-10

## 2024-03-07 RX ORDER — SODIUM CHLORIDE 9 MG/ML
1000 INJECTION INTRAMUSCULAR; INTRAVENOUS; SUBCUTANEOUS
Refills: 0 | Status: DISCONTINUED | OUTPATIENT
Start: 2024-03-07 | End: 2024-03-07

## 2024-03-07 RX ORDER — OLANZAPINE 15 MG/1
5 TABLET, FILM COATED ORAL AT BEDTIME
Refills: 0 | Status: DISCONTINUED | OUTPATIENT
Start: 2024-03-07 | End: 2024-03-07

## 2024-03-07 RX ORDER — OLANZAPINE 15 MG/1
2.5 TABLET, FILM COATED ORAL EVERY 24 HOURS
Refills: 0 | Status: DISCONTINUED | OUTPATIENT
Start: 2024-03-08 | End: 2024-03-10

## 2024-03-07 RX ORDER — GABAPENTIN 400 MG/1
100 CAPSULE ORAL EVERY 8 HOURS
Refills: 0 | Status: DISCONTINUED | OUTPATIENT
Start: 2024-03-07 | End: 2024-03-09

## 2024-03-07 RX ORDER — DIPHENHYDRAMINE HCL 50 MG
25 CAPSULE ORAL ONCE
Refills: 0 | Status: COMPLETED | OUTPATIENT
Start: 2024-03-07 | End: 2024-03-07

## 2024-03-07 RX ORDER — CHLORPROMAZINE HCL 10 MG
10 TABLET ORAL ONCE
Refills: 0 | Status: COMPLETED | OUTPATIENT
Start: 2024-03-07 | End: 2024-03-07

## 2024-03-07 RX ADMIN — Medication 0.5 MILLIGRAM(S): at 05:33

## 2024-03-07 RX ADMIN — Medication 25 MILLIGRAM(S): at 04:16

## 2024-03-07 RX ADMIN — Medication 4 MILLIGRAM(S): at 21:00

## 2024-03-07 RX ADMIN — IMMUNE GLOBULIN (HUMAN) 50 GRAM(S): 10 INJECTION INTRAVENOUS; SUBCUTANEOUS at 18:03

## 2024-03-07 RX ADMIN — OLANZAPINE 2.5 MILLIGRAM(S): 15 TABLET, FILM COATED ORAL at 16:35

## 2024-03-07 RX ADMIN — Medication 15 MILLIGRAM(S): at 20:00

## 2024-03-07 RX ADMIN — Medication 1 MILLIGRAM(S): at 01:38

## 2024-03-07 RX ADMIN — Medication 650 MILLIGRAM(S): at 21:00

## 2024-03-07 RX ADMIN — OLANZAPINE 5 MILLIGRAM(S): 15 TABLET, FILM COATED ORAL at 21:00

## 2024-03-07 RX ADMIN — Medication 1 MILLIGRAM(S): at 01:11

## 2024-03-07 RX ADMIN — Medication 10 MILLIGRAM(S): at 13:29

## 2024-03-07 RX ADMIN — GABAPENTIN 100 MILLIGRAM(S): 400 CAPSULE ORAL at 20:24

## 2024-03-07 RX ADMIN — Medication 1 MILLIGRAM(S): at 03:22

## 2024-03-07 RX ADMIN — Medication 120 MILLIGRAM(S): at 11:54

## 2024-03-07 RX ADMIN — ENOXAPARIN SODIUM 40 MILLIGRAM(S): 100 INJECTION SUBCUTANEOUS at 21:00

## 2024-03-07 RX ADMIN — ONDANSETRON 4 MILLIGRAM(S): 8 TABLET, FILM COATED ORAL at 01:43

## 2024-03-07 RX ADMIN — PANTOPRAZOLE SODIUM 40 MILLIGRAM(S): 20 TABLET, DELAYED RELEASE ORAL at 11:52

## 2024-03-07 RX ADMIN — Medication 650 MILLIGRAM(S): at 21:30

## 2024-03-07 RX ADMIN — Medication 10 MILLIGRAM(S): at 05:33

## 2024-03-07 RX ADMIN — Medication 4 MILLIGRAM(S): at 11:52

## 2024-03-07 RX ADMIN — Medication 15 MILLIGRAM(S): at 19:07

## 2024-03-07 RX ADMIN — GABAPENTIN 100 MILLIGRAM(S): 400 CAPSULE ORAL at 14:05

## 2024-03-07 RX ADMIN — Medication 15 MILLIGRAM(S): at 01:38

## 2024-03-07 RX ADMIN — Medication 25 MILLIGRAM(S): at 03:21

## 2024-03-07 RX ADMIN — Medication 10 MILLIGRAM(S): at 00:44

## 2024-03-07 RX ADMIN — PANTOPRAZOLE SODIUM 40 MILLIGRAM(S): 20 TABLET, DELAYED RELEASE ORAL at 21:26

## 2024-03-07 RX ADMIN — OLANZAPINE 2.5 MILLIGRAM(S): 15 TABLET, FILM COATED ORAL at 12:14

## 2024-03-07 NOTE — PROVIDER CONTACT NOTE (OTHER) - SITUATION
Pt removed tele monitoring. Explained to pt the need for continuous monitoring, but pt still refused.

## 2024-03-07 NOTE — EEG REPORT - NS EEG TEXT BOX
[facility] Department of Neurology  Inpatient Continuous video-Electroencephalogram  [address] [place], [address] [place] [address], T: [phone]    Patient Name:	FARZAD STEWART    :	1985  MRN:	[id number]    Study Start Date/Time:	3/5/2024, 4:43:17 PM (off from 10:39pm to 5:40pm the next day)  Study End Date/Time:	3/6/2024, 10:50 PM    Referred by:  Kyle Weston MD    Brief Clinical History:  FARZAD STEWART  is a 39 year old Male with past history of a mild encephalitis and continued symptoms, headaches, jerks during sleep, panic episodes, abnormal CSF opening pressure; study performed to investigate for seizures or markers of epilepsy.   Technologist notes: -  Diagnosis Code:  R56.9 convulsions/seizure    Pertinent Medication:  n/a    Acquisition Details:  Electroencephalography was acquired using a minimum of 21 channels on an [employer] Neurology system v 9.3.1 with electrode placement according to the standard International 10-20 system following ACNS (American Clinical Neurophysiology Society) guidelines.  Anterior temporal T1 and T2 electrodes were utilized whenever possible.  The XLTEK automated spike & seizure detections were all reviewed in detail, in addition to the entire raw EEG.    Findings:  Day 1:  3/5/2024, 4:43:17 PM to 10:39 PM  Background:  continuous, with predominantly alpha and beta frequencies.  Generalized Slowing:  None  Symmetry/Focality: No persistent asymmetries of voltage or frequency.     Voltage:  Normal (20+ uV)  Organization:  Appropriate anterior-posterior gradient  Posterior Dominant Rhythm:  9 Hz symmetric, well-organized, and well-modulated  Sleep:  Symmetric, synchronous spindles and K complexes.  Variability:   Yes		Reactivity:  Yes    Spontaneous Activity:  No epileptiform discharges   Events:  •	No electrographic seizures or significant clinical events occurred during this study.  Provocations:  •	Hyperventilation: was not performed.  •	Photic stimulation: was not performed.  Daily Summary:    •	No typical events were captured during the recording.  •	There were no findings of active epilepsy, however this alone does not rule out the diagnosis.       Ernesto Spears MD  Attending Neurologist, [facility] Epilepsy Program        Daily Updates  Day 2   5:40pm – 10:50pm  :   Background:  continuous, with predominantly alpha and beta frequencies.  Generalized Slowing:  None  Symmetry/Focality: No persistent asymmetries of voltage or frequency.     Voltage:  Normal (20+ uV)  Organization:  Appropriate anterior-posterior gradient  Posterior Dominant Rhythm:  9 Hz symmetric, well-organized, and well-modulated  Sleep:  Symmetric, synchronous spindles and K complexes.  Variability:   Yes		Reactivity:  Yes    Spontaneous Activity:  No epileptiform discharges   Events:  •	No electrographic seizures or significant clinical events occurred during this study.  Provocations:  •	Hyperventilation: was not performed.  •	Photic stimulation: was not performed.  Daily Summary:    •	No typical events were captured during the recording.  •	There were no findings of active epilepsy, however this alone does not rule out the diagnosis.       Ernesto Spears MD  Attending Neurologist, [facility] Epilepsy Program      FINAL Impression:  Normal Continuous/long-term video-EEG  1)	No typical events were captured during the recording.  2)	There were no findings of active epilepsy, however this alone does not rule out the diagnosis.       Final Clinical Correlation:  1)	There were no findings of active epilepsy, however none of the targeted events occurred during the recording.      Ernesto Spears MD   Attending Neurologist, [facility] Epilepsy Program

## 2024-03-07 NOTE — CHART NOTE - NSCHARTNOTEFT_GEN_A_CORE
Received call by primary RN that patient was complaining of a headache, PRN triptan given. Was still complaining of headaches, given IV Reglan, Benadryl and Magnesium per Dr. Weston. Shortly after, patient began complaining of hiccups. PRN Baclofen given per GI recommendations. Patient evaluated at bedside noted to have ripped VEEG and tele leads off. Began to complain of shortness of breath, tele leads replaced and pulse ox put on. Satting in the 80s with good wave form, appeared to have increased RR. Started on 4 L/min NC with improvement to the high 90s. HR in the 50-60s, SBP in the 130s. Patient and provider spoke with Dr. Weston, concern for panic attack. Ativan and thorazine ordered. A couple of minutes later, patient noted to be resting comfortably, sleeping. Ativan and thorazine held at that time. Approximately 1 hour later, patient complaining of worsening hiccups and shortness of breath. Pulse ox remained stable (97-99%) on 4 L/min NC, HR ranging between the 80-90s. Having fluent conversation with provider, no signs of respiratory compromise. Given 10 mg of Thorazine and 1 mg of Ativan. Requesting anesthesia and neurointensivist to come to the beside so that they can "intubate and paralyze" him until the hiccups go away. Dr. Casiano evaluated the patient at bedside. Decision made to give an additional Thorazine 15 mg (total of 25 mg), another 1 mg of Ativan and 4 mg of Zofran. Ordered EKG to assess QTC prior to medication administration however patient refused. Medications given. EKG obtained shortly after patient became more calm, . Received call by primary RN that patient was complaining of a headache, PRN triptan given. Was still complaining of headaches, given IV Reglan, Benadryl and Magnesium per Dr. Weston. Shortly after, patient began complaining of hiccups. PRN Baclofen given per GI recommendations. Patient evaluated at bedside noted to have ripped VEEG and tele leads off. Began to complain of shortness of breath, tele leads replaced and pulse ox put on. Satting in the 80s with good wave form, appeared to have increased RR. Started on 4 L/min NC with improvement to the high 90s. HR in the 50-60s, SBP in the 130s. Patient and provider spoke with Dr. Wetson, concern for panic attack. Ativan and thorazine ordered. A couple of minutes later, patient noted to be resting comfortably, sleeping. Ativan and thorazine held at that time. Approximately 1 hour later, patient complaining of worsening hiccups and shortness of breath. Pulse ox remained stable (97-99%) on 4 L/min NC, HR ranging between the 80-90s. Having fluent conversation with provider, no signs of respiratory compromise. Given 10 mg of Thorazine and 1 mg of Ativan. Requesting anesthesia and neurointensivist to come to the beside so that they can "intubate and paralyze" him until the hiccups go away. Dr. Casiano evaluated the patient at bedside. Decision made to give an additional Thorazine 15 mg (total of 25 mg), another 1 mg of Ativan and 4 mg of Zofran. Ordered EKG to assess QTC prior to medication administration however patient refused. Medications given. EKG obtained shortly after patient became more calm, . At around 0300, pt sitting up in bed continuing to complain of hiccups. Yelling at hospital staff, requesting to speak with neurointensivist and MICU attending about being sedated and intubated. Pt now off oxygen satting > 95%, HR in the 70s, speaking in full sentences, without evidence of increased respiratory effort. Dr. Casiano again evaluated patient at bedside, patient given an additional Ativan 1 mg and Benadryl 25 mg. Received call by primary RN that patient was complaining of a headache, PRN triptan given. Was still complaining of headaches, given IV Reglan, Benadryl and Magnesium per Dr. Weston. Shortly after, patient began complaining of hiccups. PRN Baclofen given per GI recommendations. Patient evaluated at bedside noted to have ripped VEEG and tele leads off. Began to complain of shortness of breath, tele leads replaced and pulse ox put on. Satting in the 80s with good wave form, appeared to have increased RR. Started on 4 L/min NC with improvement to the high 90s. HR in the 50-60s, SBP in the 130s. Patient and provider spoke with Dr. Weston, concern for panic attack. Ativan and thorazine ordered. A couple of minutes later, patient noted to be resting comfortably, sleeping. Ativan and thorazine held at that time. Approximately 1 hour later, patient complaining of worsening hiccups and shortness of breath. Pulse ox remained stable (97-99%) on 4 L/min NC, HR ranging between the 80-90s. Having fluent conversation with provider, no signs of respiratory compromise. Given 10 mg of Thorazine and 1 mg of Ativan. Requesting anesthesia and neurointensivist to come to the beside so that they can "intubate and paralyze" him until the hiccups go away. Dr. Casiano evaluated the patient at bedside. Decision made to give an additional Thorazine 15 mg (total of 25 mg), another 1 mg of Ativan and 4 mg of Zofran. Ordered EKG to assess QTC prior to medication administration however patient refused. Medications given. EKG obtained shortly after patient became more calm, . At around 0300, pt sitting up in bed continuing to complain of hiccups. Yelling at hospital staff, requesting to speak with neurointensivist and MICU attending about being sedated and intubated. Pt now off oxygen satting > 95%, HR in the 70s, speaking in full sentences, without evidence of increased respiratory effort. Dr. Casiano again evaluated patient at bedside, patient given an additional Ativan 1 mg and Benadryl 25 mg. MICU attending made aware of patient however with unstable patient in MICU, will speak with patient once other patient is stabilized. At approximately 0415, patient again complaining of hiccups and requesting to be intubated. Dr. Weston spoke with patient at length via telephone that he does not meet criteria for intubation as he's breathing well on RA. Additional 25 mg of Benadryl given. At around 0500, patient again sitting up in bed with the same complaints. Received call by primary RN that patient was complaining of a headache, PRN triptan given. Was still complaining of headaches, given IV Reglan, Benadryl and Magnesium per Dr. Weston. Shortly after, patient began complaining of hiccups. PRN Baclofen given per GI recommendations. Patient evaluated at bedside noted to have ripped VEEG and tele leads off. Began to complain of shortness of breath, tele leads replaced and pulse ox put on. Satting in the 80s with good wave form, appeared to have increased RR. Started on 4 L/min NC with improvement to the high 90s. HR in the 50-60s, SBP in the 130s. Patient and provider spoke with Dr. Weston, concern for panic attack. Ativan and thorazine ordered. A couple of minutes later, patient noted to be resting comfortably, sleeping. Ativan and thorazine held at that time. Approximately 1 hour later, patient complaining of worsening hiccups and shortness of breath. Pulse ox remained stable (97-99%) on 4 L/min NC, HR ranging between the 80-90s. Having fluent conversation with provider, no signs of respiratory compromise. Given 10 mg of Thorazine and 1 mg of Ativan. Requesting anesthesia and neurointensivist to come to the beside so that they can "intubate and paralyze" him until the hiccups go away. Dr. Casiano evaluated the patient at bedside. Decision made to give an additional Thorazine 15 mg (total of 25 mg), another 1 mg of Ativan and 4 mg of Zofran. Ordered EKG to assess QTC prior to medication administration however patient refused. Medications given. EKG obtained shortly after patient became more calm, . At around 0300, pt sitting up in bed continuing to complain of hiccups. Yelling at hospital staff, requesting to speak with neurointensivist and MICU attending about being sedated and intubated. Pt now off oxygen satting > 95%, HR in the 70s, speaking in full sentences, without evidence of increased respiratory effort. Dr. Casiano again evaluated patient at bedside, patient given an additional Ativan 1 mg and Benadryl 25 mg. MICU attending made aware of patient however with unstable patient in MICU, will speak with patient once other patient is stabilized. At approximately 0415, patient again complaining of hiccups and requesting to be intubated. Dr. Weston spoke with patient at length via telephone that he does not meet criteria for intubation as he's breathing well on RA. Additional 25 mg of Benadryl given. At around 0500, patient again sitting up in bed with the same complaints. MICU attending spoke with patient at bedside at this time, again reiterating that patient does not meet criteria for admission at this time. Klonopin and Reglan given. Shortly after medications were given, patient requesting to be transferred to a different facility. Received call by primary RN that patient was complaining of a headache, PRN triptan given. Was still complaining of headaches, given IV Reglan, Benadryl and Magnesium per Dr. Weston. Shortly after, patient began complaining of hiccups. PRN Baclofen given per GI recommendations. Patient evaluated at bedside noted to have ripped VEEG and tele leads off. Began to complain of shortness of breath, tele leads replaced and pulse ox put on. Satting in the 80s with good wave form, appeared to have increased RR. Started on 4 L/min NC with improvement to the high 90s. HR in the 50-60s, SBP in the 130s. Patient and provider spoke with Dr. Weston, concern for panic attack. Ativan and thorazine ordered. A couple of minutes later, patient noted to be resting comfortably, sleeping. Ativan and thorazine held at that time. Approximately 1 hour later, patient complaining of worsening hiccups and shortness of breath. Pulse ox remained stable (97-99%) on 4 L/min NC, HR ranging between the 80-90s. Having fluent conversation with provider, no signs of respiratory compromise. Given 10 mg of Thorazine and 1 mg of Ativan. Requesting anesthesia and neurointensivist to come to the beside so that they can "intubate and paralyze" him until the hiccups go away. Dr. Casiano evaluated the patient at bedside. Decision made to give an additional Thorazine 15 mg (total of 25 mg), another 1 mg of Ativan and 4 mg of Zofran. Ordered EKG to assess QTC prior to medication administration however patient refused. Medications given. EKG obtained shortly after patient became more calm, . At around 0300, pt sitting up in bed continuing to complain of hiccups. Yelling at hospital staff, requesting to speak with neurointensivist and MICU attending about being sedated and intubated. Pt now off oxygen satting > 95%, HR in the 70s, speaking in full sentences, without evidence of increased respiratory effort. Dr. Casiano again evaluated patient at bedside, patient given an additional Ativan 1 mg and Benadryl 25 mg. MICU attending made aware of patient however with unstable patient in MICU, will speak with patient once other patient is stabilized. At approximately 0415, patient again complaining of hiccups and requesting to be intubated. Dr. Weston spoke with patient at length via telephone that he does not meet criteria for intubation as he's breathing well on RA. Additional 25 mg of Benadryl given, ripped off tele leads and pulse ox. At around 0500, patient again sitting up in bed with the same complaints. MICU attending spoke with patient at bedside at this time, again reiterating that patient does not meet criteria for intubation at this time. Klonopin and Reglan given. Shortly after medications were given, patient requesting to be transferred to a different facility.

## 2024-03-07 NOTE — PROGRESS NOTE ADULT - ASSESSMENT
39y Male with PMHx of viral meningitis with sequela of HTN and intractable headaches (followed by Dr. Weston) presents for possible autoimmune encephalitis management/treatment seen on outpatient PET 3/4. LP done on the same day with opening pressures of 30mmHg; CSF unremarkable. C/C/b by allergic reaction to solumedrol which was switched to decadron. MRI epilepsy protocol done with no abnormalities or enhancements. Also started on IVIG on 3/7.    Neuro  #autoimmune encephalitis  - Outpatient PET scan 3/1: Abnormal hypometabolism particularly pronounced in the parieto-occipital and temporal regions (right greater than left), and also involving the anteromedial temporal lobes with extension into the orbitofrontal and   paramedian frontal regions bilaterally. Findings suggestive of autoimmune encephalitis (AIE) sequelae, likely with contributing neurotropic medication effects, in the proper clinical   setting.  - s/p LP 3/4 with elevated opening pressure at 30 mmHg, remainder of CSF negative  - lower Decadron to 4g q12 since MRI shows no enhancement  -MRI brain epilepsy protocol done - no abnormalities or enhancement  - continue home pantoprazole  - PRN Tylenol, Toradol, and Relgan, sumatriptan ordered for headache management  - q8hr general neuro checks and vitals  -add Zyprexa standing (2.5 am, 2.5 pm, 5 at bedtime) and allow for additional 2.5mg PRN as needed for agitation  -STOP ativan    #hiccups  -continue baclofen, added Gabapentin TID  -Only thorazine and reglan after approval from Dr. Weston    Cards  #HTN  - goal normotension  - will hold losartan for now due to hx of autonomic dysfunction  -continue home verapamil    Pulm  - call provider if SPO2 < 94%    GI  #Nutrition/Fluids/Electrolytes   - replete K<4 and Mg <2  - Diet: Regular    Infectious Disease  - afebrile on admission    Endocrine  #DM  - A1C results: 5.2  - continue FS monitoring while on steroids    - TSH results: 0.594    Psych  #anxiety  - psych consulted, f/u recs  - c/w klonopin 0,5mg BID prn, stopped ativan  - Zyprexa started as above    DVT Prophylaxis  - SCDs for DVT prophylaxis     Dispo: home when cleared     Discussed daily hospital plans and goals with patient bedside    Discussed with Neurology Attending, Dr. Weston

## 2024-03-07 NOTE — PROVIDER CONTACT NOTE (OTHER) - ACTION/TREATMENT ORDERED:
ALMA ROSA Rivera at the bedside. ALMA ROSA Rivera informed and at the bedside to assess pt. ALMA ROSA Rivera informed and at the bedside to assess pt. pt given PO klonopin and IV zofran. EKG done as ordered. Will continue to monitor.

## 2024-03-07 NOTE — PROVIDER CONTACT NOTE (OTHER) - BACKGROUND
Pt p/w increased lethargy and cognitive changes suggestive of autoimmune encephalitis.
Pt p/w increased lethargy and cognitive changes suggestive of autoimmune encephalitis.
P/w increased lethargy and cognitive changes suggestive of autoimmune encephalitis
Pt p/w increased lethargy and cognitive changes suggestive of autoimmune encephalitis.

## 2024-03-07 NOTE — CHART NOTE - NSCHARTNOTEFT_GEN_A_CORE
Attempted to see patient in afternoon as patient was reported to be tearful. Upon approach, patient was undergoing bedside neuropsych evaluation. Once evaluation was completed, provider attempted to assess patient but patient politely refused at this time. Patient asked for team to come back later as he was tired. Patient agreeable to visit from psychiatry team tomorrow.  psychiatry will continue to follow.

## 2024-03-07 NOTE — PROVIDER CONTACT NOTE (OTHER) - ACTION/TREATMENT ORDERED:
ALMA ROSA Rivera informed and was at the bedside to deescalate situation and assess pt. pt given baclofen ALMA ROSA Rivera informed and was at the bedside to deescalate situation and assess pt. pt given baclofen, reglan, magnesium, and ativan. ALMA ROSA Rivera informed and was at the bedside to deescalate situation and assessed pt. pt given baclofen, reglan, magnesium sulfate, and ativan. VEEG monitoring d/c as ordered. Will continue to monitor. ALMA ROSA Rivera informed and was at the bedside to deescalate situation and assessed pt. pt given baclofen, reglan, magnesium sulfate, and ativan. VEEG monitoring d/c as ordered. Pt placed on NRB.

## 2024-03-07 NOTE — PROVIDER CONTACT NOTE (OTHER) - SITUATION
Pt was observed having hiccups and had another episode agitation and being verbally aggressive towards staff. Pt disoriented on assessment. Pt took off VEEG leads. Pt was observed having hiccups and had another episode of agitation and being verbally aggressive towards staff. Pt disoriented on assessment. Pt took and refusing VEEG monitoring. Pt was observed having hiccups and had another episode of agitation and being verbally aggressive towards staff. Pt removed and refusing VEEG monitoring. Pt c/o holliday.

## 2024-03-07 NOTE — PROGRESS NOTE ADULT - SUBJECTIVE AND OBJECTIVE BOX
Neurology Stroke Progress Note    INTERVAL HPI/OVERNIGHT EVENTS:  overnight events - see chart note  Patient seen and examined. Pt sleeping some of the morning, woke up and agreeable to change medications and put tele back on.     MEDICATIONS  (STANDING):  acetaminophen     Tablet .. 650 milliGRAM(s) Oral once  acetaminophen     Tablet .. 650 milliGRAM(s) Oral once  dexAMETHasone  Injectable 4 milliGRAM(s) IV Push every 12 hours  diphenhydrAMINE 25 milliGRAM(s) Oral once  diphenhydrAMINE 25 milliGRAM(s) Oral once  enoxaparin Injectable 40 milliGRAM(s) SubCutaneous every 24 hours  gabapentin 100 milliGRAM(s) Oral three times a day  immune   globulin 10% (GAMMAGARD) IVPB 40 Gram(s) IV Intermittent every 24 hours  OLANZapine 2.5 milliGRAM(s) Oral every 24 hours  OLANZapine 5 milliGRAM(s) Oral at bedtime  pantoprazole    Tablet 40 milliGRAM(s) Oral every 12 hours  sodium chloride 0.9%. 1000 milliLiter(s) (75 mL/Hr) IV Continuous <Continuous>  verapamil  milliGRAM(s) Oral daily    MEDICATIONS  (PRN):  acetaminophen     Tablet .. 650 milliGRAM(s) Oral every 6 hours PRN Mild Pain (1 - 3)  baclofen 10 milliGRAM(s) Oral every 8 hours PRN Muscle Spasm  ketorolac   Injectable 15 milliGRAM(s) IV Push every 12 hours PRN Severe Pain (7 - 10)  SUMAtriptan 100 milliGRAM(s) Oral two times a day PRN Headache  traMADol 25 milliGRAM(s) Oral two times a day PRN Moderate Pain (4 - 6)      Allergies    methylPREDNISolone (Rash; Urticaria; Flushing; Hives)    Intolerances        Vital Signs Last 24 Hrs  T(C): --  T(F): --  HR: 110 (07 Mar 2024 11:53) (78 - 128)  BP: 141/93 (07 Mar 2024 11:53) (124/68 - 165/95)  BP(mean): 112 (07 Mar 2024 11:53) (88 - 125)  RR: 18 (07 Mar 2024 01:06) (18 - 18)  SpO2: 100% (07 Mar 2024 01:06) (100% - 100%)    Parameters below as of 07 Mar 2024 11:53  Patient On (Oxygen Delivery Method): room air        Physical exam:  General: No acute distress    Neurologic:  -Mental status: Slightly lethargic but wakens, tearful. Slightly dysarthric. Speech is intact, follows commands  -Cranial nerves:   II: Visual fields are full to confrontation.  III, IV, VI: Extraocular movements are intact without nystagmus.   VII: Face is symmetric   Motor: Normal bulk and tone. No pronator drift. Strength bilateral upper extremity 5/5, bilateral lower extremities 5/5.      LABS:                        13.6   16.10 )-----------( 287      ( 06 Mar 2024 11:02 )             41.9     03-06    138  |  105  |  14  ----------------------------<  137<H>  3.9   |  22  |  0.94    Ca    9.2      06 Mar 2024 11:02  Phos  3.6     03-06  Mg     2.0     03-06    TPro  6.7  /  Alb  x   /  TBili  x   /  DBili  x   /  AST  x   /  ALT  x   /  AlkPhos  x   03-05    PTT - ( 05 Mar 2024 18:28 )  PTT:26.7 sec  Urinalysis Basic - ( 06 Mar 2024 11:02 )    Color: x / Appearance: x / SG: x / pH: x  Gluc: 137 mg/dL / Ketone: x  / Bili: x / Urobili: x   Blood: x / Protein: x / Nitrite: x   Leuk Esterase: x / RBC: x / WBC x   Sq Epi: x / Non Sq Epi: x / Bacteria: x        RADIOLOGY & ADDITIONAL TESTS:    < from: MR Brain-Seizure, Epilepsy w/wo IV Cont (03.06.24 @ 14:56) >  IMPRESSION: No acute hemorrhage mass mass effect or abnormal enhancement.    < end of copied text >

## 2024-03-07 NOTE — CHART NOTE - NSCHARTNOTEFT_GEN_A_CORE
Attempted to assess patient in morning but patient was asleep. Spoke with primary PA and discussed alternative medication options for agitation and discussed benzodiazepines potentially contributing to delirious state. Provider was asked to not wake patient up given overnight events of agitation and to allow him to sleep. [facility] psychiatry will continue to follow.

## 2024-03-07 NOTE — PROVIDER CONTACT NOTE (OTHER) - ASSESSMENT
Pt alert, irritable, and verbally aggressive. Pt awake and alert, irritable, and verbally aggressive. Pt having frequent hiccups. Pt on RA. Denies any pain.

## 2024-03-07 NOTE — PROVIDER CONTACT NOTE (OTHER) - ASSESSMENT
Pt awake and alert, but disoriented. Pt refusing tele monitoring. Pt agitated, irritable, verbally aggressive. Pt on RA. Pt given pain regimen Pt awake and alert, but disoriented. Pt refusing tele monitoring. Pt agitated, irritable, verbally aggressive. Pt on RA. Pt given pain regimen. Pt awake and alert, but disoriented. Pt refusing tele monitoring. Pt agitated, irritable, verbally aggressive. Pt on RA. Pt given pain regimen. Pt c/o holliday despite saturating above 95% on 2L O2.

## 2024-03-08 ENCOUNTER — TRANSCRIPTION ENCOUNTER (OUTPATIENT)
Age: 39
End: 2024-03-08

## 2024-03-08 LAB
A-TUMOR NECROSIS FACT SERPL-MCNC: <1.7 PG/ML — SIGNIFICANT CHANGE UP
ANION GAP SERPL CALC-SCNC: 3 MMOL/L — LOW (ref 5–17)
BUN SERPL-MCNC: 13 MG/DL — SIGNIFICANT CHANGE UP (ref 7–23)
CALCIUM SERPL-MCNC: 8.4 MG/DL — SIGNIFICANT CHANGE UP (ref 8.4–10.5)
CHLORIDE SERPL-SCNC: 106 MMOL/L — SIGNIFICANT CHANGE UP (ref 96–108)
CO2 SERPL-SCNC: 26 MMOL/L — SIGNIFICANT CHANGE UP (ref 22–31)
CREAT SERPL-MCNC: 0.86 MG/DL — SIGNIFICANT CHANGE UP (ref 0.5–1.3)
CRYOGLOB SERPL-MCNC: NEGATIVE — SIGNIFICANT CHANGE UP
EGFR: 114 ML/MIN/1.73M2 — SIGNIFICANT CHANGE UP
GLUCOSE BLDC GLUCOMTR-MCNC: 99 MG/DL — SIGNIFICANT CHANGE UP (ref 70–99)
GLUCOSE SERPL-MCNC: 95 MG/DL — SIGNIFICANT CHANGE UP (ref 70–99)
HCT VFR BLD CALC: 37.2 % — LOW (ref 39–50)
HGB BLD-MCNC: 12.2 G/DL — LOW (ref 13–17)
IL10 SERPL-MCNC: 5.3 PG/ML — HIGH
IL12 SERPL-MCNC: <1.9 PG/ML — SIGNIFICANT CHANGE UP
IL13 SERPL-MCNC: <1.7 PG/ML — SIGNIFICANT CHANGE UP
IL17A SERPL-MCNC: <1.4 PG/ML — SIGNIFICANT CHANGE UP
IL2 SERPL-MCNC: 171.3 PG/ML — LOW (ref 175.3–858.2)
IL2 SERPL-MCNC: <2.1 PG/ML — SIGNIFICANT CHANGE UP
IL4 SERPL-MCNC: <2.2 PG/ML — SIGNIFICANT CHANGE UP
IL6 SERPL-MCNC: <2 PG/ML — SIGNIFICANT CHANGE UP
IL8 SERPL-MCNC: <3 PG/ML — SIGNIFICANT CHANGE UP
INTERFERON GAMMA, BLOOD: <4.2 PG/ML — SIGNIFICANT CHANGE UP
INTERLEUKIN 1 BETA: <6.5 PG/ML — SIGNIFICANT CHANGE UP
INTERLEUKIN 5: <2.1 PG/ML — SIGNIFICANT CHANGE UP
MAGNESIUM SERPL-MCNC: 2.4 MG/DL — SIGNIFICANT CHANGE UP (ref 1.6–2.6)
MCHC RBC-ENTMCNC: 26.6 PG — LOW (ref 27–34)
MCHC RBC-ENTMCNC: 32.8 GM/DL — SIGNIFICANT CHANGE UP (ref 32–36)
MCV RBC AUTO: 81 FL — SIGNIFICANT CHANGE UP (ref 80–100)
N-METHYL-DA RECEPTOR AB IGG BY CBA-IFA, SERUM W/RFLX TITER: SIGNIFICANT CHANGE UP
NMDAR IGG TITR CSF IF: SIGNIFICANT CHANGE UP
NRBC # BLD: 0 /100 WBCS — SIGNIFICANT CHANGE UP (ref 0–0)
PHOSPHATE SERPL-MCNC: 3.4 MG/DL — SIGNIFICANT CHANGE UP (ref 2.5–4.5)
PLATELET # BLD AUTO: 227 K/UL — SIGNIFICANT CHANGE UP (ref 150–400)
POTASSIUM SERPL-MCNC: 4.1 MMOL/L — SIGNIFICANT CHANGE UP (ref 3.5–5.3)
POTASSIUM SERPL-SCNC: 4.1 MMOL/L — SIGNIFICANT CHANGE UP (ref 3.5–5.3)
RBC # BLD: 4.59 M/UL — SIGNIFICANT CHANGE UP (ref 4.2–5.8)
RBC # FLD: 19.4 % — HIGH (ref 10.3–14.5)
SODIUM SERPL-SCNC: 135 MMOL/L — SIGNIFICANT CHANGE UP (ref 135–145)
VGCC-P/Q BIND AB SER-SCNC: 0.4 PMOL/L — SIGNIFICANT CHANGE UP (ref 0–24.5)
WBC # BLD: 11.1 K/UL — HIGH (ref 3.8–10.5)
WBC # FLD AUTO: 11.1 K/UL — HIGH (ref 3.8–10.5)

## 2024-03-08 PROCEDURE — 78815 PET IMAGE W/CT SKULL-THIGH: CPT | Mod: 26,PI

## 2024-03-08 PROCEDURE — 99233 SBSQ HOSP IP/OBS HIGH 50: CPT

## 2024-03-08 PROCEDURE — 74177 CT ABD & PELVIS W/CONTRAST: CPT | Mod: 26

## 2024-03-08 PROCEDURE — 99253 IP/OBS CNSLTJ NEW/EST LOW 45: CPT

## 2024-03-08 PROCEDURE — 71260 CT THORAX DX C+: CPT | Mod: 26

## 2024-03-08 RX ORDER — OLANZAPINE 15 MG/1
2.5 TABLET, FILM COATED ORAL ONCE
Refills: 0 | Status: DISCONTINUED | OUTPATIENT
Start: 2024-03-08 | End: 2024-03-10

## 2024-03-08 RX ORDER — BACLOFEN 100 %
5 POWDER (GRAM) MISCELLANEOUS EVERY 8 HOURS
Refills: 0 | Status: DISCONTINUED | OUTPATIENT
Start: 2024-03-08 | End: 2024-03-09

## 2024-03-08 RX ORDER — LOSARTAN POTASSIUM 100 MG/1
1 TABLET, FILM COATED ORAL
Refills: 0 | DISCHARGE

## 2024-03-08 RX ORDER — IOHEXOL 300 MG/ML
30 INJECTION, SOLUTION INTRAVENOUS ONCE
Refills: 0 | Status: COMPLETED | OUTPATIENT
Start: 2024-03-08 | End: 2024-03-08

## 2024-03-08 RX ORDER — ONDANSETRON 8 MG/1
4 TABLET, FILM COATED ORAL ONCE
Refills: 0 | Status: COMPLETED | OUTPATIENT
Start: 2024-03-08 | End: 2024-03-08

## 2024-03-08 RX ORDER — POLYETHYLENE GLYCOL 3350 17 G/17G
17 POWDER, FOR SOLUTION ORAL
Refills: 0 | Status: DISCONTINUED | OUTPATIENT
Start: 2024-03-08 | End: 2024-03-10

## 2024-03-08 RX ORDER — OLANZAPINE 15 MG/1
1 TABLET, FILM COATED ORAL
Qty: 60 | Refills: 0
Start: 2024-03-08 | End: 2024-04-06

## 2024-03-08 RX ORDER — CHLORPROMAZINE HCL 10 MG
50 TABLET ORAL ONCE
Refills: 0 | Status: COMPLETED | OUTPATIENT
Start: 2024-03-08 | End: 2024-03-08

## 2024-03-08 RX ORDER — DEXAMETHASONE 0.5 MG/5ML
4 ELIXIR ORAL
Refills: 0 | Status: COMPLETED | OUTPATIENT
Start: 2024-03-08 | End: 2024-03-09

## 2024-03-08 RX ORDER — BACLOFEN 100 %
1 POWDER (GRAM) MISCELLANEOUS
Qty: 90 | Refills: 0
Start: 2024-03-08 | End: 2024-04-06

## 2024-03-08 RX ORDER — CYCLOBENZAPRINE HYDROCHLORIDE 10 MG/1
1 TABLET, FILM COATED ORAL
Refills: 0 | DISCHARGE

## 2024-03-08 RX ORDER — VERAPAMIL HCL 240 MG
1 CAPSULE, EXTENDED RELEASE PELLETS 24 HR ORAL
Refills: 0 | DISCHARGE

## 2024-03-08 RX ORDER — KETOROLAC TROMETHAMINE 30 MG/ML
15 SYRINGE (ML) INJECTION ONCE
Refills: 0 | Status: DISCONTINUED | OUTPATIENT
Start: 2024-03-08 | End: 2024-03-08

## 2024-03-08 RX ORDER — GABAPENTIN 400 MG/1
1 CAPSULE ORAL
Qty: 90 | Refills: 0
Start: 2024-03-08 | End: 2024-04-06

## 2024-03-08 RX ORDER — VERAPAMIL HCL 240 MG
1 CAPSULE, EXTENDED RELEASE PELLETS 24 HR ORAL
Qty: 30 | Refills: 0
Start: 2024-03-08 | End: 2024-04-06

## 2024-03-08 RX ORDER — OMEPRAZOLE 10 MG/1
1 CAPSULE, DELAYED RELEASE ORAL
Refills: 0 | DISCHARGE

## 2024-03-08 RX ORDER — PANTOPRAZOLE SODIUM 20 MG/1
1 TABLET, DELAYED RELEASE ORAL
Qty: 60 | Refills: 0
Start: 2024-03-08 | End: 2024-04-06

## 2024-03-08 RX ORDER — SODIUM CHLORIDE 9 MG/ML
500 INJECTION INTRAMUSCULAR; INTRAVENOUS; SUBCUTANEOUS ONCE
Refills: 0 | Status: COMPLETED | OUTPATIENT
Start: 2024-03-08 | End: 2024-03-08

## 2024-03-08 RX ORDER — SODIUM CHLORIDE 9 MG/ML
1000 INJECTION INTRAMUSCULAR; INTRAVENOUS; SUBCUTANEOUS
Refills: 0 | Status: DISCONTINUED | OUTPATIENT
Start: 2024-03-08 | End: 2024-03-10

## 2024-03-08 RX ORDER — CHLORPROMAZINE HCL 10 MG
25 TABLET ORAL ONCE
Refills: 0 | Status: COMPLETED | OUTPATIENT
Start: 2024-03-08 | End: 2024-03-08

## 2024-03-08 RX ORDER — CLONAZEPAM 1 MG
1 TABLET ORAL
Refills: 0 | DISCHARGE

## 2024-03-08 RX ORDER — BACLOFEN 100 %
5 POWDER (GRAM) MISCELLANEOUS EVERY 8 HOURS
Refills: 0 | Status: DISCONTINUED | OUTPATIENT
Start: 2024-03-08 | End: 2024-03-08

## 2024-03-08 RX ORDER — BUTALBITAL/ASPIRIN/CAFFEINE 50-325-40
2 TABLET ORAL
Refills: 0 | DISCHARGE

## 2024-03-08 RX ORDER — OLANZAPINE 15 MG/1
1 TABLET, FILM COATED ORAL
Qty: 30 | Refills: 0
Start: 2024-03-08 | End: 2024-04-06

## 2024-03-08 RX ADMIN — ONDANSETRON 4 MILLIGRAM(S): 8 TABLET, FILM COATED ORAL at 23:34

## 2024-03-08 RX ADMIN — Medication 5 MILLIGRAM(S): at 21:09

## 2024-03-08 RX ADMIN — PANTOPRAZOLE SODIUM 40 MILLIGRAM(S): 20 TABLET, DELAYED RELEASE ORAL at 20:55

## 2024-03-08 RX ADMIN — Medication 15 MILLIGRAM(S): at 10:55

## 2024-03-08 RX ADMIN — Medication 120 MILLIGRAM(S): at 07:16

## 2024-03-08 RX ADMIN — Medication 650 MILLIGRAM(S): at 08:30

## 2024-03-08 RX ADMIN — GABAPENTIN 100 MILLIGRAM(S): 400 CAPSULE ORAL at 11:13

## 2024-03-08 RX ADMIN — SODIUM CHLORIDE 1000 MILLILITER(S): 9 INJECTION INTRAMUSCULAR; INTRAVENOUS; SUBCUTANEOUS at 20:30

## 2024-03-08 RX ADMIN — IMMUNE GLOBULIN (HUMAN) 50 GRAM(S): 10 INJECTION INTRAVENOUS; SUBCUTANEOUS at 13:36

## 2024-03-08 RX ADMIN — GABAPENTIN 100 MILLIGRAM(S): 400 CAPSULE ORAL at 20:54

## 2024-03-08 RX ADMIN — POLYETHYLENE GLYCOL 3350 17 GRAM(S): 17 POWDER, FOR SOLUTION ORAL at 20:55

## 2024-03-08 RX ADMIN — Medication 4 MILLIGRAM(S): at 20:54

## 2024-03-08 RX ADMIN — Medication 15 MILLIGRAM(S): at 10:15

## 2024-03-08 RX ADMIN — Medication 5 MILLIGRAM(S): at 16:49

## 2024-03-08 RX ADMIN — Medication 15 MILLIGRAM(S): at 19:29

## 2024-03-08 RX ADMIN — IOHEXOL 30 MILLILITER(S): 300 INJECTION, SOLUTION INTRAVENOUS at 15:42

## 2024-03-08 RX ADMIN — OLANZAPINE 2.5 MILLIGRAM(S): 15 TABLET, FILM COATED ORAL at 09:21

## 2024-03-08 RX ADMIN — Medication 10 MILLIGRAM(S): at 10:54

## 2024-03-08 RX ADMIN — TRAMADOL HYDROCHLORIDE 25 MILLIGRAM(S): 50 TABLET ORAL at 20:40

## 2024-03-08 RX ADMIN — SODIUM CHLORIDE 500 MILLILITER(S): 9 INJECTION INTRAMUSCULAR; INTRAVENOUS; SUBCUTANEOUS at 15:06

## 2024-03-08 RX ADMIN — Medication 15 MILLIGRAM(S): at 19:00

## 2024-03-08 RX ADMIN — PANTOPRAZOLE SODIUM 40 MILLIGRAM(S): 20 TABLET, DELAYED RELEASE ORAL at 09:21

## 2024-03-08 RX ADMIN — Medication 650 MILLIGRAM(S): at 07:19

## 2024-03-08 RX ADMIN — OLANZAPINE 5 MILLIGRAM(S): 15 TABLET, FILM COATED ORAL at 20:54

## 2024-03-08 RX ADMIN — SUMATRIPTAN SUCCINATE 100 MILLIGRAM(S): 4 INJECTION, SOLUTION SUBCUTANEOUS at 19:37

## 2024-03-08 RX ADMIN — ENOXAPARIN SODIUM 40 MILLIGRAM(S): 100 INJECTION SUBCUTANEOUS at 20:55

## 2024-03-08 RX ADMIN — OLANZAPINE 2.5 MILLIGRAM(S): 15 TABLET, FILM COATED ORAL at 15:42

## 2024-03-08 RX ADMIN — Medication 25 MILLIGRAM(S): at 14:07

## 2024-03-08 RX ADMIN — Medication 4 MILLIGRAM(S): at 13:34

## 2024-03-08 RX ADMIN — GABAPENTIN 100 MILLIGRAM(S): 400 CAPSULE ORAL at 07:00

## 2024-03-08 NOTE — BH CONSULTATION LIAISON PROGRESS NOTE - NSBHCHARTREVIEWVS_PSY_A_CORE FT
Vital Signs Last 24 Hrs  T(C): 36.6 (06 Mar 2024 10:28), Max: 36.6 (06 Mar 2024 06:00)  T(F): 97.9 (06 Mar 2024 10:28), Max: 97.9 (06 Mar 2024 06:00)  HR: 110 (05 Mar 2024 19:13) (110 - 124)  BP: 140/77 (06 Mar 2024 08:59) (107/65 - 140/77)  BP(mean): 102 (06 Mar 2024 08:59) (78 - 107)  RR: 18 (06 Mar 2024 08:59) (18 - 18)  SpO2: 94% (05 Mar 2024 19:13) (93% - 94%)    Parameters below as of 06 Mar 2024 08:59  Patient On (Oxygen Delivery Method): room air    
Vital Signs Last 24 Hrs  T(C): 37.2 (07 Mar 2024 22:00), Max: 37.2 (07 Mar 2024 22:00)  T(F): 98.9 (07 Mar 2024 22:00), Max: 98.9 (07 Mar 2024 22:00)  HR: 104 (08 Mar 2024 07:24) (86 - 110)  BP: 134/81 (08 Mar 2024 07:24) (134/81 - 141/93)  BP(mean): 104 (08 Mar 2024 07:24) (104 - 112)  RR: 18 (08 Mar 2024 07:24) (18 - 18)  SpO2: 100% (08 Mar 2024 07:24) (100% - 100%)    Parameters below as of 08 Mar 2024 07:24  Patient On (Oxygen Delivery Method): room air

## 2024-03-08 NOTE — BH CONSULTATION LIAISON PROGRESS NOTE - NSBHFUPINTERVALHXFT_PSY_A_CORE
Psychiatry f/u for management of anxiety. Interim hx reviewed - pt started on standing clonazepam, also started on IVIG. Overnight observed with periods of lethargy, periods of agitation, confusion, with forgetfulness, verbal aggression, removed vEEG leads. Has been receiving PRN lorazepam for anxiety, last dose at 1639 yesterday per MAR. No charted medication for agitation overnight.    On interview today with friend/colleague present per pt preference, pt found awake, alert, oriented to self, location, date, situation. Reports periods of confusion (waking up briefly disoriented), forgerfulness, elevated anxiety and mood lability (with easy tearfulness) since yesterday. Also reports episode of confusion and agitation overnight, does not recall details but was told he was confused, angry, removing lines, not cooperating with care. Denies hallucinations, paranoia, SI/HI. We discussed alternatives to benzodiazepines for acute anxiety, agitation and mood lability, including quetiapine.
Patient was seen and evaluated this morning. Chart was reviewed and no acute events were noted. No PRN medications were administered overnight for agitation. Upon approach, patient is lying in bed comfortably, neurology team at bedside. Patient is AOx4 and engages with interviewer. Patient is cooperative and answers all questions appropriately. Patient reports some continued anxiety, but reports it is improving. Patient reports sleeping well overnight; reports sleeping most of yesterday. Patient reports he does not recall overnight outbursts prior nights, but was told he was saying things that weren't making sense per family at bedside who were present at the time. Patient denies any current visual hallucinations. Discussed regimen of zyprexa with neurology team at bedside.

## 2024-03-08 NOTE — BH CONSULTATION LIAISON PROGRESS NOTE - NSBHTIMEACTIVITIESPERFORMED_PSY_A_CORE
chart review, pt interview, psychoeducation, medical decision making, coordination of care with primary team
chart review, pt interview, medical decision making, coordination of care with primary team and outpatient psychiatry for referral    time spent excludes time spent teaching resident.

## 2024-03-08 NOTE — BH CONSULTATION LIAISON PROGRESS NOTE - NSBHCONSULTMEDAGITATION_PSY_A_CORE FT
#Severe agitation/aggression  -For severe agitation not responding to behavioral intervention, may give zyprexa 5 mg po q6h prn, with escalation to IM if patient refusing PO and remains an imminent danger to self or others.   -If IM antipsychotic is administered, please perform follow-up ECG for QTc monitoring (<500). 
as above

## 2024-03-08 NOTE — BH CONSULTATION LIAISON PROGRESS NOTE - NSBHCHARTREVIEWLAB_PSY_A_CORE FT
13.6   16.10 )-----------( 287      ( 06 Mar 2024 11:02 )             41.9   03-06    138  |  105  |  14  ----------------------------<  137<H>  3.9   |  22  |  0.94    Ca    9.2      06 Mar 2024 11:02  Phos  3.6     03-06  Mg     2.0     03-06    
                      13.6   16.10 )-----------( 287      ( 06 Mar 2024 11:02 )             41.9     03-06    138  |  105  |  14  ----------------------------<  137<H>  3.9   |  22  |  0.94    Ca    9.2      06 Mar 2024 11:02  Phos  3.6     03-06  Mg     2.0     03-06    TPro  6.7  /  Alb  x   /  TBili  x   /  DBili  x   /  AST  x   /  ALT  x   /  AlkPhos  x   03-05

## 2024-03-08 NOTE — DISCHARGE NOTE PROVIDER - CARE PROVIDER_API CALL
Kyle Weston  Neurology  [address] [address]  Phone: [phone]  Fax: [phone]  Follow Up Time: 1 month   Kyle Weston  Neurology  [address] [address]  Phone: [phone]  Fax: [phone]  Scheduled Appointment: 03/13/2024

## 2024-03-08 NOTE — BH CONSULTATION LIAISON PROGRESS NOTE - NSBHCONSULTRECOMMENDOTHER_PSY_A_CORE FT
1. Can continue with current regimen of zyprexa 2.5mg morning/ 2.5mg afternoon/ 5mg qhs.   2. Strict delirium precautions such as regulating sleep wake cycle, constant reorientation by staff, opening blinds during the day, out of bed to chair as tolerated, avoiding medications that can worsen delirium such as anticholinergics, antihistamines, benzodiazapines, opioids.

## 2024-03-08 NOTE — CONSULT NOTE ADULT - SUBJECTIVE AND OBJECTIVE BOX
PULMONARY SERVICE INITIAL CONSULT NOTE    HPI:  39y Male with PMHx of meningitis 1.5 years ago, HTN, intractable headaches for which he follows up with Dr. Weston outpatient who was sent in for admission after outpatient PET scan and LP concerning for possible encephalitis. Patient has undergone multiple treatments for his headaches in the past, including botox injections. Noticed increasing frequency and severity of his headaches over the last couple of days/weeks with associated neck stiffness. Has also noticed increased lethargy and cognitive changes over the same period of time. Describes it as a similar feeling to dyslexia, noticed it when he was responding to emails. Was sent for an outpatient PET scan on 03/01 which showed "abnormal hypometabolism particularly pronounced in the parieto-occipital and temporal regions (right greater than left), and also involving the anteromedial temporal lobes with extension into the orbitofrontal and paramedian frontal regions bilaterally. Findings suggestive of autoimmune encephalitis (AIE) sequelae, likely with contributing neurotropic medication effects, in the proper clinical setting." Underwent an IR guided LP today and was found to have an elevated opening pressure of 30 mmHg, remainder of CSF negative. (04 Mar 2024 22:03)      Pulmonary is consulted for    Follows with  __ outpatient for Pulmonary.     REVIEW OF SYSTEMS:  10 point ROS negative aside from what is mentioned in HPI    PAST MEDICAL & SURGICAL HISTORY:  Hypertension      Migraine      Meningitis          FAMILY HISTORY:      SOCIAL HISTORY:  Smoking Status: [ ] Current, [ ] Former, [ ] Never  Pack Years:    MEDICATIONS:  Pulmonary:    Antimicrobials:    Anticoagulants:  enoxaparin Injectable 40 milliGRAM(s) SubCutaneous every 24 hours    Onc:    GI/:  pantoprazole    Tablet 40 milliGRAM(s) Oral every 12 hours    Endocrine:  dexAMETHasone  Injectable 4 milliGRAM(s) IV Push two times a day    Cardiac:  verapamil  milliGRAM(s) Oral daily    Other Medications:  acetaminophen     Tablet .. 650 milliGRAM(s) Oral every 6 hours PRN  acetaminophen     Tablet .. 650 milliGRAM(s) Oral once  baclofen 5 milliGRAM(s) Oral every 8 hours  baclofen 5 milliGRAM(s) Oral every 8 hours PRN  diphenhydrAMINE 25 milliGRAM(s) Oral once  gabapentin 100 milliGRAM(s) Oral every 8 hours  ketorolac   Injectable 15 milliGRAM(s) IV Push every 12 hours PRN  OLANZapine 2.5 milliGRAM(s) Oral every 24 hours  OLANZapine 2.5 milliGRAM(s) Oral once PRN  OLANZapine 2.5 milliGRAM(s) Oral every 24 hours  OLANZapine 5 milliGRAM(s) Oral every 24 hours  sodium chloride 0.9%. 1000 milliLiter(s) IV Continuous <Continuous>  SUMAtriptan 100 milliGRAM(s) Oral two times a day PRN  traMADol 25 milliGRAM(s) Oral two times a day PRN      Allergies    methylPREDNISolone (Rash; Urticaria; Flushing; Hives)    Intolerances        Vital Signs Last 24 Hrs  T(C): 36.4 (08 Mar 2024 10:05), Max: 37.2 (07 Mar 2024 22:00)  T(F): 97.6 (08 Mar 2024 10:05), Max: 98.9 (07 Mar 2024 22:00)  HR: 76 (08 Mar 2024 11:10) (76 - 104)  BP: 139/98 (08 Mar 2024 11:10) (134/81 - 139/98)  BP(mean): 115 (08 Mar 2024 11:10) (104 - 115)  RR: 18 (08 Mar 2024 07:24) (18 - 18)  SpO2: 100% (08 Mar 2024 07:24) (100% - 100%)    Parameters below as of 08 Mar 2024 11:10  Patient On (Oxygen Delivery Method): room air        03-07 @ 07:01  -  03-08 @ 07:00  --------------------------------------------------------  IN: 675 mL / OUT: 500 mL / NET: 175 mL    03-08 @ 07:01  -  03-08 @ 16:57  --------------------------------------------------------  IN: 375 mL / OUT: 0 mL / NET: 375 mL            PHYSICAL EXAM:  Constitutional: well-appearing, in no apparent distress  Head: NC/AT  EENT: PERRL, anicteric sclera; oropharynx clear with moist mucous membranes  Neck: supple, ROM intact, no appreciable JVD or LAD  Respiratory: Lungs clear to auscultation bilaterally, no wheezes, rhonchi or rales  Cardiovascular: +S1/S2 intact, regular rhythm and rate. No murmurs appreciated  Gastrointestinal: soft, non-tender, non distended, bowel sounds normoactive   Extremities: no edema, clubbing or cyanosis  Vascular: 2+ radial pulses B/L  Neurological: awake and alert; oriented with no appreciable focal neuro defecits    LABS:      CBC Full  -  ( 08 Mar 2024 10:00 )  WBC Count : 11.10 K/uL  RBC Count : 4.59 M/uL  Hemoglobin : 12.2 g/dL  Hematocrit : 37.2 %  Platelet Count - Automated : 227 K/uL  Mean Cell Volume : 81.0 fl  Mean Cell Hemoglobin : 26.6 pg  Mean Cell Hemoglobin Concentration : 32.8 gm/dL  Auto Neutrophil # : x  Auto Lymphocyte # : x  Auto Monocyte # : x  Auto Eosinophil # : x  Auto Basophil # : x  Auto Neutrophil % : x  Auto Lymphocyte % : x  Auto Monocyte % : x  Auto Eosinophil % : x  Auto Basophil % : x    03-08    135  |  106  |  13  ----------------------------<  95  4.1   |  26  |  0.86    Ca    8.4      08 Mar 2024 10:00  Phos  3.4     03-08  Mg     2.4     03-08                        RADIOLOGY & ADDITIONAL STUDIES:    reviewed PULMONARY SERVICE INITIAL CONSULT NOTE    HPI:  39y Male with PMHx of meningitis 1.5 years ago (related to enterovirus), HTN, intractable headaches for which he follows up with Dr. Weston outpatient who was sent in for admission after outpatient PET scan brain and LP concerning for possible encephalitis. outpatient brain PET scan on 03/01 which showed "abnormal hypometabolism particularly pronounced in the parieto-occipital and temporal regions (right greater than left), and also involving the anteromedial temporal lobes with extension into the orbitofrontal and paramedian frontal regions bilaterally. Findings suggestive of autoimmune encephalitis (AIE) sequelae. presented to [facility] and underwent an IR guided LP and was found to have an elevated opening pressure of 30 mmHg, remainder of CSF negative. 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 PET scan was done to evaluate for underlying malignancy in case his presentation was related to a paraneoplastic syndrome. His PET scan showed a RUL, anterior segment lesion that was PET avid for which Pulmonary is consulted.     Patient denies any personal pulmonary history or respiratory complaints now or leading up to his admission. Never smoker, denies vaping. Denies fever, chills, URTI symptoms, or cough. NO family hx of lung cancer.     REVIEW OF SYSTEMS:  10 point ROS negative aside from what is mentioned in HPI    PAST MEDICAL & SURGICAL HISTORY:  Hypertension  Migraine  Meningitis      FAMILY HISTORY:      SOCIAL HISTORY:  Smoking Status: [ ] Current, [ ] Former, [x ] Never  Pack Years:    MEDICATIONS:  Pulmonary:    Antimicrobials:    Anticoagulants:  enoxaparin Injectable 40 milliGRAM(s) SubCutaneous every 24 hours    Onc:    GI/:  pantoprazole    Tablet 40 milliGRAM(s) Oral every 12 hours    Endocrine:  dexAMETHasone  Injectable 4 milliGRAM(s) IV Push two times a day    Cardiac:  verapamil  milliGRAM(s) Oral daily    Other Medications:  acetaminophen     Tablet .. 650 milliGRAM(s) Oral every 6 hours PRN  acetaminophen     Tablet .. 650 milliGRAM(s) Oral once  baclofen 5 milliGRAM(s) Oral every 8 hours  baclofen 5 milliGRAM(s) Oral every 8 hours PRN  diphenhydrAMINE 25 milliGRAM(s) Oral once  gabapentin 100 milliGRAM(s) Oral every 8 hours  ketorolac   Injectable 15 milliGRAM(s) IV Push every 12 hours PRN  OLANZapine 2.5 milliGRAM(s) Oral every 24 hours  OLANZapine 2.5 milliGRAM(s) Oral once PRN  OLANZapine 2.5 milliGRAM(s) Oral every 24 hours  OLANZapine 5 milliGRAM(s) Oral every 24 hours  sodium chloride 0.9%. 1000 milliLiter(s) IV Continuous <Continuous>  SUMAtriptan 100 milliGRAM(s) Oral two times a day PRN  traMADol 25 milliGRAM(s) Oral two times a day PRN      Allergies    methylPREDNISolone (Rash; Urticaria; Flushing; Hives)    Intolerances        Vital Signs Last 24 Hrs  T(C): 36.4 (08 Mar 2024 10:05), Max: 37.2 (07 Mar 2024 22:00)  T(F): 97.6 (08 Mar 2024 10:05), Max: 98.9 (07 Mar 2024 22:00)  HR: 76 (08 Mar 2024 11:10) (76 - 104)  BP: 139/98 (08 Mar 2024 11:10) (134/81 - 139/98)  BP(mean): 115 (08 Mar 2024 11:10) (104 - 115)  RR: 18 (08 Mar 2024 07:24) (18 - 18)  SpO2: 100% (08 Mar 2024 07:24) (100% - 100%)    Parameters below as of 08 Mar 2024 11:10  Patient On (Oxygen Delivery Method): room air        03-07 @ 07:01  -  03-08 @ 07:00  --------------------------------------------------------  IN: 675 mL / OUT: 500 mL / NET: 175 mL    03-08 @ 07:01  -  03-08 @ 16:57  --------------------------------------------------------  IN: 375 mL / OUT: 0 mL / NET: 375 mL            PHYSICAL EXAM:  Constitutional: well-appearing, in no apparent distress  Head: NC/AT  EENT: PERRL, anicteric sclera; oropharynx clear with moist mucous membranes  Neck: supple, ROM intact, no appreciable JVD or LAD  Respiratory: Lungs clear to auscultation bilaterally, no wheezes, rhonchi or rales  Cardiovascular: +S1/S2 intact, regular rhythm and rate. No murmurs appreciated  Gastrointestinal: soft, non-tender, non distended, bowel sounds normoactive   Extremities: no edema, clubbing or cyanosis  Vascular: 2+ radial pulses B/L  Neurological: awake and alert; oriented with no appreciable focal neuro defecits    LABS:      CBC Full  -  ( 08 Mar 2024 10:00 )  WBC Count : 11.10 K/uL  RBC Count : 4.59 M/uL  Hemoglobin : 12.2 g/dL  Hematocrit : 37.2 %  Platelet Count - Automated : 227 K/uL  Mean Cell Volume : 81.0 fl  Mean Cell Hemoglobin : 26.6 pg  Mean Cell Hemoglobin Concentration : 32.8 gm/dL  Auto Neutrophil # : x  Auto Lymphocyte # : x  Auto Monocyte # : x  Auto Eosinophil # : x  Auto Basophil # : x  Auto Neutrophil % : x  Auto Lymphocyte % : x  Auto Monocyte % : x  Auto Eosinophil % : x  Auto Basophil % : x    03-08    135  |  106  |  13  ----------------------------<  95  4.1   |  26  |  0.86    Ca    8.4      08 Mar 2024 10:00  Phos  3.4     03-08  Mg     2.4     03-08          RADIOLOGY & ADDITIONAL STUDIES:    reviewed PET scan  IMPRESSION:  1. Increased activity in a right upper lobe poorly defined groundglass lesion in the anterior segment. While this activity might be due to inflammatory disease, a malignancy cannot be excluded and might explain the patient's paraneoplastic syndrome.  2. Increased activity in the bone marrow. This is a nonspecific finding usually seen in patients with anemia, but other causes of bone marrow stimulation should be considered.  3. Asymmetric sacralization of L5 with sclerotic changes on the right side. No FDG uptake is seen in this area. PULMONARY SERVICE INITIAL CONSULT NOTE    HPI:  39y Male with PMHx of meningitis 1.5 years ago (related to enterovirus), HTN, intractable headaches for which he follows up with Dr. Weston outpatient who was sent in for admission after outpatient PET scan brain and LP concerning for possible encephalitis. outpatient brain PET scan on 03/01 which showed "abnormal hypometabolism particularly pronounced in the parieto-occipital and temporal regions (right greater than left), and also involving the anteromedial temporal lobes with extension into the orbitofrontal and paramedian frontal regions bilaterally. Findings suggestive of autoimmune encephalitis (AIE) sequelae. presented to [facility] and underwent an IR guided LP and was found to have an elevated opening pressure of 30 mmHg, remainder of CSF negative. 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 PET scan was done to evaluate for underlying malignancy in case his presentation was related to a paraneoplastic syndrome. His PET scan showed a RUL, anterior segment lesion that was PET avid for which Pulmonary is consulted.     Patient denies any personal pulmonary history or respiratory complaints now or leading up to his admission. Never smoker, denies vaping. Denies fever, chills, URTI symptoms, or cough. NO family hx of lung cancer.     Denies inhalational exposures. He has no recent travel or sick contacts. He has dogs and went deep sea fishing in February but otherwise no unique hobbies or occupational hazards.     REVIEW OF SYSTEMS:  10 point ROS negative aside from what is mentioned in HPI    PAST MEDICAL & SURGICAL HISTORY:  Hypertension  Migraine  Meningitis      FAMILY HISTORY:      SOCIAL HISTORY:  Smoking Status: [ ] Current, [ ] Former, [x ] Never  Pack Years:    MEDICATIONS:  Pulmonary:    Antimicrobials:    Anticoagulants:  enoxaparin Injectable 40 milliGRAM(s) SubCutaneous every 24 hours    Onc:    GI/:  pantoprazole    Tablet 40 milliGRAM(s) Oral every 12 hours    Endocrine:  dexAMETHasone  Injectable 4 milliGRAM(s) IV Push two times a day    Cardiac:  verapamil  milliGRAM(s) Oral daily    Other Medications:  acetaminophen     Tablet .. 650 milliGRAM(s) Oral every 6 hours PRN  acetaminophen     Tablet .. 650 milliGRAM(s) Oral once  baclofen 5 milliGRAM(s) Oral every 8 hours  baclofen 5 milliGRAM(s) Oral every 8 hours PRN  diphenhydrAMINE 25 milliGRAM(s) Oral once  gabapentin 100 milliGRAM(s) Oral every 8 hours  ketorolac   Injectable 15 milliGRAM(s) IV Push every 12 hours PRN  OLANZapine 2.5 milliGRAM(s) Oral every 24 hours  OLANZapine 2.5 milliGRAM(s) Oral once PRN  OLANZapine 2.5 milliGRAM(s) Oral every 24 hours  OLANZapine 5 milliGRAM(s) Oral every 24 hours  sodium chloride 0.9%. 1000 milliLiter(s) IV Continuous <Continuous>  SUMAtriptan 100 milliGRAM(s) Oral two times a day PRN  traMADol 25 milliGRAM(s) Oral two times a day PRN      Allergies    methylPREDNISolone (Rash; Urticaria; Flushing; Hives)    Intolerances        Vital Signs Last 24 Hrs  T(C): 36.4 (08 Mar 2024 10:05), Max: 37.2 (07 Mar 2024 22:00)  T(F): 97.6 (08 Mar 2024 10:05), Max: 98.9 (07 Mar 2024 22:00)  HR: 76 (08 Mar 2024 11:10) (76 - 104)  BP: 139/98 (08 Mar 2024 11:10) (134/81 - 139/98)  BP(mean): 115 (08 Mar 2024 11:10) (104 - 115)  RR: 18 (08 Mar 2024 07:24) (18 - 18)  SpO2: 100% (08 Mar 2024 07:24) (100% - 100%)    Parameters below as of 08 Mar 2024 11:10  Patient On (Oxygen Delivery Method): room air        03-07 @ 07:01 - 03-08 @ 07:00  --------------------------------------------------------  IN: 675 mL / OUT: 500 mL / NET: 175 mL    03-08 @ 07:01 - 03-08 @ 16:57  --------------------------------------------------------  IN: 375 mL / OUT: 0 mL / NET: 375 mL            PHYSICAL EXAM:  Constitutional: well-appearing, in no apparent distress  Head: NC/AT  EENT: PERRL, anicteric sclera; oropharynx clear with moist mucous membranes  Neck: supple, ROM intact, no appreciable JVD or LAD  Respiratory: Lungs clear to auscultation bilaterally, no wheezes, rhonchi or rales  Cardiovascular: +S1/S2 intact, regular rhythm and rate. No murmurs appreciated  Gastrointestinal: soft, non-tender, non distended, bowel sounds normoactive   Extremities: no edema, clubbing or cyanosis  Vascular: 2+ radial pulses B/L  Neurological: awake and alert; oriented with no appreciable focal neuro defecits    LABS:      CBC Full  -  ( 08 Mar 2024 10:00 )  WBC Count : 11.10 K/uL  RBC Count : 4.59 M/uL  Hemoglobin : 12.2 g/dL  Hematocrit : 37.2 %  Platelet Count - Automated : 227 K/uL  Mean Cell Volume : 81.0 fl  Mean Cell Hemoglobin : 26.6 pg  Mean Cell Hemoglobin Concentration : 32.8 gm/dL  Auto Neutrophil # : x  Auto Lymphocyte # : x  Auto Monocyte # : x  Auto Eosinophil # : x  Auto Basophil # : x  Auto Neutrophil % : x  Auto Lymphocyte % : x  Auto Monocyte % : x  Auto Eosinophil % : x  Auto Basophil % : x    03-08    135  |  106  |  13  ----------------------------<  95  4.1   |  26  |  0.86    Ca    8.4      08 Mar 2024 10:00  Phos  3.4     03-08  Mg     2.4     03-08          RADIOLOGY & ADDITIONAL STUDIES:    reviewed PET scan  IMPRESSION:  1. Increased activity in a right upper lobe poorly defined groundglass lesion in the anterior segment. While this activity might be due to inflammatory disease, a malignancy cannot be excluded and might explain the patient's paraneoplastic syndrome.  2. Increased activity in the bone marrow. This is a nonspecific finding usually seen in patients with anemia, but other causes of bone marrow stimulation should be considered.  3. Asymmetric sacralization of L5 with sclerotic changes on the right side. No FDG uptake is seen in this area.

## 2024-03-08 NOTE — BH CONSULTATION LIAISON PROGRESS NOTE - NSBHATTESTBILLING_PSY_A_CORE
84268-Uomrfazhuy OBS or IP - moderate complexity OR 35-49 mins
97010-Xlpaxlsiyx OBS or IP - high complexity OR 50-79 mins

## 2024-03-08 NOTE — PROGRESS NOTE ADULT - SUBJECTIVE AND OBJECTIVE BOX
Neurology Stroke Progress Note    INTERVAL HPI/OVERNIGHT EVENTS:  Patient seen and examined. Pt states he slept all night. Still has a headache, more of a frontal headache right now. Thinks it's just his waking up headache    MEDICATIONS  (STANDING):  acetaminophen     Tablet .. 650 milliGRAM(s) Oral once  acetaminophen     Tablet .. 650 milliGRAM(s) Oral once  diphenhydrAMINE 25 milliGRAM(s) Oral once  diphenhydrAMINE 25 milliGRAM(s) Oral once  enoxaparin Injectable 40 milliGRAM(s) SubCutaneous every 24 hours  gabapentin 100 milliGRAM(s) Oral every 8 hours  immune   globulin 10% (GAMMAGARD) IVPB 40 Gram(s) IV Intermittent once  OLANZapine 2.5 milliGRAM(s) Oral every 24 hours  OLANZapine 2.5 milliGRAM(s) Oral every 24 hours  OLANZapine 5 milliGRAM(s) Oral every 24 hours  pantoprazole    Tablet 40 milliGRAM(s) Oral every 12 hours  verapamil  milliGRAM(s) Oral daily    MEDICATIONS  (PRN):  acetaminophen     Tablet .. 650 milliGRAM(s) Oral every 6 hours PRN Mild Pain (1 - 3)  baclofen 10 milliGRAM(s) Oral every 8 hours PRN Muscle Spasm  ketorolac   Injectable 15 milliGRAM(s) IV Push every 12 hours PRN Severe Pain (7 - 10)  SUMAtriptan 100 milliGRAM(s) Oral two times a day PRN Headache  traMADol 25 milliGRAM(s) Oral two times a day PRN Moderate Pain (4 - 6)      Allergies    methylPREDNISolone (Rash; Urticaria; Flushing; Hives)    Intolerances        Vital Signs Last 24 Hrs  T(C): 37.2 (07 Mar 2024 22:00), Max: 37.2 (07 Mar 2024 22:00)  T(F): 98.9 (07 Mar 2024 22:00), Max: 98.9 (07 Mar 2024 22:00)  HR: 104 (08 Mar 2024 07:24) (86 - 110)  BP: 134/81 (08 Mar 2024 07:24) (134/81 - 141/93)  BP(mean): 104 (08 Mar 2024 07:24) (104 - 112)  RR: 18 (08 Mar 2024 07:24) (18 - 18)  SpO2: 100% (08 Mar 2024 07:24) (100% - 100%)    Parameters below as of 08 Mar 2024 07:24  Patient On (Oxygen Delivery Method): room air        Physical exam:  General: No acute distress, awake and alert  Eyes: Anicteric sclerae, moist conjunctivae, see below for CNs  Neck: trachea midline,  Cardiovascular: Regular rate and rhythm    Neurologic:  -Mental status: Awake, alert, oriented to person, place, and time. Speech is fluent with intact naming, repetition, and comprehension, no dysarthria. Recent and remote memory intact. Follows commands. Attention/concentration intact. Fund of knowledge appropriate.  -Cranial nerves:   II: Visual fields are full to confrontation.  III, IV, VI: Extraocular movements are intact without nystagmus.   VII: Face is symmetric   Motor: Normal bulk and tone. No pronator drift. Strength bilateral upper extremity 5/5, bilateral lower extremities 5/5.      LABS:                        13.6   16.10 )-----------( 287      ( 06 Mar 2024 11:02 )             41.9     03-06    138  |  105  |  14  ----------------------------<  137<H>  3.9   |  22  |  0.94    Ca    9.2      06 Mar 2024 11:02  Phos  3.6     03-06  Mg     2.0     03-06        Urinalysis Basic - ( 06 Mar 2024 11:02 )    Color: x / Appearance: x / SG: x / pH: x  Gluc: 137 mg/dL / Ketone: x  / Bili: x / Urobili: x   Blood: x / Protein: x / Nitrite: x   Leuk Esterase: x / RBC: x / WBC x   Sq Epi: x / Non Sq Epi: x / Bacteria: x        RADIOLOGY & ADDITIONAL TESTS:

## 2024-03-08 NOTE — BH CONSULTATION LIAISON PROGRESS NOTE - NSBHCONSULTSUBSTANCEALCOHOL_PSY_A_CORE FT
[facility] Hospitalist Progress Note     [facility]      SUBJECTIVE:  Feeling ok  Low grade temps overnight    OBJECTIVE:  Temp:  [98 °F (36.7 °C)-100.4 °F (38 °C)] 98.1 °F (36.7 °C)  Pulse:  [] 91  Resp:  [16-28] 16  BP: ()/(61-91) 108/62    I
Dose reduction techniques were used. Dose information is transmitted to the [facility]) NRDR ([address] [place] [address] Rd) which includes the Dose Index Registry.   PATIENT STATED HISTORY:(As transcribed by Technologist)  Shanelle
x 7 mm seen on image 204 of series 5. There are multiple peripheral nodules then or pleural based also in the left upper lobe. There is a 9 x 9 mm left lower lobe lung nodule seen on image 234 of series 5.  There is a 9 x 6 mm nodule also in the left lowe
malignancy. No axillary adenopathy. LIMITED ABDOMEN:  Limited images of the upper abdomen are unremarkable. BONES:  Lytic lesions are seen in the right 2nd, 10th and 11th ribs consistent with bony metastatic disease. OTHER:  Negative.              Catherine Byers
upper extremity Doppler ultrasound examination. There is no evidence of DVT.     Dictated by (CST): Dexter De La Rosa MD on 10/11/2021 at 6:33 PM     Finalized by (CST): Dexter De La Rosa MD on 10/11/2021 at 6:34 PM       XR CHEST AP PORTABLE  (CPT=71045)    Resul
MCG/ACT inhaler 2 puff, 2 puff, Inhalation, Q4H PRN  atorvastatin (LIPITOR) tab 40 mg, 40 mg, Oral, Nightly  Dorzolamide HCl (TRUSOPT) 2 % ophthalmic solution 1 drop, 1 drop, Both Eyes, BID  fluticasone furoate-vilanterol (BREO ELLIPTA) 100-25 MCG/INH inha
improved, stop IVF     # Swelling in LUE  - US negative for DVT     # Mesothelioma  - follows with oncology at [facility]  - onc c/s -- appreciate recs  - has been on immunotherapy     # Chronic pain  - takes PO morphine 30 mg TID  - norco prn  - continue     #
[facility] monitoring
[facility] monitoring

## 2024-03-08 NOTE — BH CONSULTATION LIAISON PROGRESS NOTE - NSBHCHARTREVIEWINVESTIGATE_PSY_A_CORE FT
ACC: [id number] EXAM:  MR BRAIN SEIZURE EP WAW IC 3D#   ORDERED BY: GWYN ESCOBAR     ACC: [id number] EXAM:  MR SPINE CERVICAL   ORDERED BY: GWYN ESCOBAR     PROCEDURE DATE:  03/06/2024          INTERPRETATION:  Clinical indication: Encephalitis    MRI of the brain was performed sagittal T1 axial T1 and T2 T2 FLAIR   diffusion and gradient echo sequence. The patient was injected with   approximately 7.5 cc of gadavist IV with no IV contrast discarded.   Sagittal coronal and axial T1-weighted sequence was performed.    This exam is compared prior contrast enhanced brain MRI performed on   September 28, 2022.    The lateral ventricles have a normal configuration    There is no acute hemorrhage mass mass effect or abnormal enhancement   seen.    Evaluation of the diffusion weighted sequence demonstrates no abnormal   areas of restricted diffusion to suggest acute infarct.    No abnormal intra or extra-axial collections are seen.    The large vessels demonstrate normal flow voids.    The visualized paranasal sinuses mastoid and middle ear regions appear   clear.    IMPRESSION: No acute hemorrhage mass mass effect or abnormal enhancement.    MRI of the cervical spine was performed using sagittal T1 and T2 and STIR   sequences. Axial T1 and T2 and gradient echo sequence was performed well   as well.    Loss of the normal cervical lordosis is seen seen    Mild scoliosis is seen    The vertebral body height alignment and marrow signal appears normal.    Mild disc desiccation is seen involving the C2-C3 4 C4-5 C5-6 and C6-7   levels.    C2-3: Bilateral hypertrophic facet joint changes. Moderate narrowing of   the left neural foramen    C3-4: Bilateral hypertrophic facet joint changes. Mild to moderate   narrowing of the right neural foramen    C4-5: Disc bulge is seen. Hypertrophic change involving both facet and   left uncovertebral joint. Moderate narrowing of left neural foramen    C5-6: Disc bulge and bilateral hypertrophic facet joint changes are seen.   Mild narrowing of the left neural foramen. Moderate narrowing of the   right neural foramen    C6-7: Normal    C7-T1: Normal    The spinal cord appears normal signal and caliber.    Evaluation of the paraspinal soft tissues appear normal.    IMPRESSION: Degenerative changesas describe above.    --- End of Report ---            ALEJANDRA LLAMAS MD; Attending Radiologist  This document has been electronically signed. Mar  6 2024  3:18PM

## 2024-03-08 NOTE — DISCHARGE NOTE PROVIDER - NSDCFUADDINST_GEN_ALL_CORE_FT
Prednisone taper:  60mg x 7 days (3/9-3/15)  40 mg x 7 days (3/16-3/22)  20mg x 7 days (3/23-3/29)  10mg x 7 days (3/30-4/5)

## 2024-03-08 NOTE — PROGRESS NOTE ADULT - ASSESSMENT
39y Male with PMHx of viral meningitis with sequela of HTN and intractable headaches (followed by Dr. Weston) presents for possible autoimmune encephalitis management/treatment seen on outpatient PET 3/4. LP done on the same day with opening pressures of 30mmHg; CSF unremarkable. C/C/b by allergic reaction to solumedrol which was switched to decadron. MRI epilepsy protocol done with no abnormalities or enhancements. Started on IVIG 3/5 - 3/8    Neuro  #autoimmune encephalitis  - Outpatient PET scan 3/1: Abnormal hypometabolism particularly pronounced in the parieto-occipital and temporal regions (right greater than left), and also involving the anteromedial temporal lobes with extension into the orbitofrontal and   paramedian frontal regions bilaterally. Findings suggestive of autoimmune encephalitis (AIE) sequelae, likely with contributing neurotropic medication effects, in the proper clinical   setting.  - s/p LP 3/4 with elevated opening pressure at 30 mmHg, remainder of CSF negative  - lower Decadron to 4g q12 since MRI shows no enhancement - likely will taper as outpatient  -MRI brain epilepsy protocol done - no abnormalities or enhancement  - continue home pantoprazole  - PRN Tylenol, Toradol, and Relgan, sumatriptan ordered for headache management  - q8hr general neuro checks and vitals  Zyprexa standing (2.5 am, 2.5 pm, 5 at bedtime) and allow for additional 2.5mg PRN as needed for agitation  -QTc on 3/8 was 432  -STOP ativan    #hiccups  -continue baclofen, added Gabapentin TID  -Only thorazine and reglan after approval from Dr. Weston    Cards  #HTN  - goal normotension  - will hold losartan for now due to hx of autonomic dysfunction  -continue home verapamil    Pulm  - call provider if SPO2 < 94%    GI  #Nutrition/Fluids/Electrolytes   - replete K<4 and Mg <2  - Diet: Regular    Infectious Disease  - afebrile on admission    Endocrine  #DM  - A1C results: 5.2  - continue FS monitoring while on steroids    - TSH results: 0.594    Psych  #anxiety  - psych consulted, f/u recs  - c/w klonopin 0,5mg BID prn, stopped ativan  - Zyprexa started as above    DVT Prophylaxis  - SCDs for DVT prophylaxis     Dispo: home when cleared     Discussed daily hospital plans and goals with patient bedside    Discussed with Neurology Attending, Dr. Weston

## 2024-03-08 NOTE — BH CONSULTATION LIAISON PROGRESS NOTE - NSBHASSESSMENTFT_PSY_ALL_CORE
Patient is a 40yo M, PMH migraines, HTN, viral meningitis, GERD, PPH reported anxiety/depression, denies inpatient psychiatric hospitalizations, denies suicide attempts, BIBS for outpatient PET scan findings suspicious of autoimmune encephalitis and admitted for further workup. Psychiatry originally consulted for anxiety and later for delirium.    Upon assessment today, patient is AOx4, calm, linear, cooperative. Patient reporting good sleep and appears to be tolerating zyprexa well. Discussed use of zyprexa with primary team and patient and agree with plan from primary team of zyprexa 2.5mg morning and afternoon + 5mg qhs (total 10mg daily). Patient does not meet criteria for inpatient psychiatric hospitalization at this time, and does not require constant observation. No psychiatric contraindications to discharge. [facility] psychiatry will sign off on this patient, please feel free to call back with any questions.  Patient is a 40yo M, PMH migraines, HTN, viral meningitis, GERD, PPH reported anxiety/depression, denies inpatient psychiatric hospitalizations, denies suicide attempts, BIBS for outpatient PET scan findings suspicious of autoimmune encephalitis and admitted for further workup. Psychiatry originally consulted for anxiety and later for delirium with intermittent agitation, improved today.    Upon assessment today, patient is AOx4, calm, linear, cooperative. Patient reporting good sleep and appears to be tolerating zyprexa well. Discussed use of zyprexa with primary team and patient and agree with plan from primary team of zyprexa 2.5mg morning and afternoon + 5mg qhs (total 10mg daily). Patient does not meet criteria for inpatient psychiatric hospitalization at this time, and does not require constant observation. No psychiatric contraindications to discharge. [facility] psychiatry will sign off on this patient, please feel free to call back with any questions.

## 2024-03-08 NOTE — DISCHARGE NOTE PROVIDER - NSDCMRMEDTOKEN_GEN_ALL_CORE_FT
baclofen 10 mg oral tablet: 1 tab(s) orally every 8 hours as needed for Muscle Spasm  Botox 100 units injection: 100 unit(s) injectable every 3 months 100 units into each axilla  gabapentin 100 mg oral capsule: 1 cap(s) orally every 8 hours  methocarbamol 500 mg oral tablet: 2 tab(s) orally every 8 hours  OLANZapine 2.5 mg oral tablet: 1 tab(s) orally 2 times a day  OLANZapine 5 mg oral tablet: 1 tab(s) orally once a day (at bedtime)  pantoprazole 40 mg oral delayed release tablet: 1 tab(s) orally every 12 hours  predniSONE 10 mg oral tablet: 1 tab(s) orally once a day  predniSONE 20 mg oral tablet: 3 tab(s) orally once a day  predniSONE 20 mg oral tablet: 1 tab(s) orally once Take 3 tabs (60 mg) for 7 days, then 2 tabs (40 mg) for 7 days, then 1 tab (20 mg) for 7 days  verapamil 120 mg/24 hours oral capsule, extended release: 1 cap(s) orally once a day  ZOLMitriptan 5 mg oral tablet, disintegratin tab(s) orally once a day as needed for  headache   baclofen 10 mg oral tablet: 1 tab(s) orally every 6 hours as needed for Muscle Spasm  Botox 100 units injection: 100 unit(s) injectable every 3 months 100 units into each axilla  gabapentin 100 mg oral capsule: 1 cap(s) orally every 6 hours  methocarbamol 500 mg oral tablet: 2 tab(s) orally every 8 hours  OLANZapine 2.5 mg oral tablet: 1 tab(s) orally 2 times a day  OLANZapine 5 mg oral tablet: 1 tab(s) orally once a day (at bedtime)  pantoprazole 40 mg oral delayed release tablet: 1 tab(s) orally every 12 hours  predniSONE 10 mg oral tablet: 1 tab(s) orally once a day  predniSONE 20 mg oral tablet: 3 tab(s) orally once a day  predniSONE 20 mg oral tablet: 1 tab(s) orally once Take 3 tabs (60 mg) for 7 days, then 2 tabs (40 mg) for 7 days, then 1 tab (20 mg) for 7 days  verapamil 120 mg/24 hours oral capsule, extended release: 1 cap(s) orally once a day  ZOLMitriptan 5 mg oral tablet, disintegratin tab(s) orally once a day as needed for  headache   amLODIPine 10 mg oral tablet: 1 tab(s) orally once a day  baclofen 10 mg oral tablet: 1 tab(s) orally every 6 hours  Botox 100 units injection: 100 unit(s) injectable every 3 months 100 units into each axilla  gabapentin 100 mg oral capsule: 1 cap(s) orally every 6 hours  Imitrex Statdose 6 mg/0.5 mL subcutaneous solution: 6 milligram(s) subcutaneously once a day as needed for  headache  methocarbamol 500 mg oral tablet: 2 tab(s) orally every 8 hours  OLANZapine 2.5 mg oral tablet: 1 tab(s) orally once a day  OLANZapine 5 mg oral tablet: 1 tab(s) orally once a day (at bedtime)  pantoprazole 40 mg oral delayed release tablet: 1 tab(s) orally 2 times a day  predniSONE 10 mg oral tablet: 1 tab(s) orally once a day  predniSONE 20 mg oral tablet: 1 tab(s) orally once Take 3 tabs (60 mg) for 7 days, then 2 tabs (40 mg) for 7 days, then 1 tab (20 mg) for 7 days  SUMAtriptan 100 mg oral tablet: 1 tab(s) orally 2 times a day as needed for Headache  ZOLMitriptan 5 mg oral tablet, disintegratin tab(s) orally once a day as needed for  headache

## 2024-03-08 NOTE — DISCHARGE NOTE PROVIDER - NSDCCPCAREPLAN_GEN_ALL_CORE_FT
PRINCIPAL DISCHARGE DIAGNOSIS  Diagnosis: Autoimmune encephalitis  Assessment and Plan of Treatment: Autoimmune encephalitis is a collection of related conditions in which the body’s immune system attacks the brain, causing inflammation. The immune system produces substances called antibodies that mistakenly attack brain cells.  Like multiple sclerosis, the disease can be progressive (worsening over time) or relapsing-remitting (with alternating flare-ups and periods of recovery). Autoimmune encephalitis has many subtypes that depends on the antibodies present.  You were treated with 4 days of IVIG and 5 days of IV steroids in the hospital. You will get monthly IVIG for the next 6 months (arranged with Dr. Weston). You will also get a steroid taper over the next month - please take 1 week of Prednisone 60mg, 1 week of Prednisone 40mg, 1 week of Prednisone 20mg, and 1 week of Prednisone 10 mg     PRINCIPAL DISCHARGE DIAGNOSIS  Diagnosis: Autoimmune encephalitis  Assessment and Plan of Treatment: Autoimmune encephalitis is a collection of related conditions in which the body’s immune system attacks the brain, causing inflammation. The immune system produces substances called antibodies that mistakenly attack brain cells.  The disease can be progressive (worsening over time) or relapsing-remitting (with alternating flare-ups and periods of recovery). Autoimmune encephalitis has many subtypes that depends on the antibodies present.  You were treated with 4 days of IVIG and 5 days of IV steroids in the hospital. You will get monthly IVIG for the next 6 months (arranged with Dr. Weston). You will also get a steroid taper over the next month - please take 1 week of Prednisone 60mg, 1 week of Prednisone 40mg, 1 week of Prednisone 20mg, and 1 week of Prednisone 10 mg.

## 2024-03-08 NOTE — BH CONSULTATION LIAISON PROGRESS NOTE - NSBHATTESTCOMMENTATTENDFT_PSY_A_CORE
40yo man, domiciled, employed as PA at [employer], with PPH of anxiety and ADHD, social alcohol use (reports >16 drinks/week), PMH of headaches (recently worsening with intractable neck pain), viral meningitis (9/2022), HTN, admitted for workup of possible autoimmune encephalitis after concerning findings on outpatient PET and LP, now receiving high dose steroids and IVIG. Psychiatry consulted for further management of anxiety at pt's request and following for additional management of acute delirium with intermittent agitation in setting of multiple risk factors include encephalitis, steroid use. Confusion and agitation improved in the last 24 hours per d/w neurology team; pt tolerating initiation of standing olanzapine and discontinuation of benzodiazepines.     On interview today, pt presents calm, fatigued, fully oriented, with organized TP and constricted affect, no SI/HI/AVH, no personal recall of episodes of agitation and confusion but aware of events, believes he experienced some perceptual disturbances on prior nights. Endorses some ongoing anxiety that manifests as physical sensation in chest. Agreeable to plan to continue olanzapine for anxiety and agitation as outpatient as per d/w neurology as steroids are slowly tapered. Interested in resuming psychiatric care at [facility] as well. No acute withdrawal sx. No other acute concerns voiced.    Impression: Resolving acute delirium, adjustment d/o r/o HOLLIE, ADHD by history, r/o alcohol misuse    Recommendations: Agree with continuing olanzapine for anxiety, agitation, mood stabilization - can be reassessed as outpatient. Agree with discontinuation of benzodiazepines. Recommend additional counseling about alcohol use as, similar to benzodiazepines, may exacerbate confusional state as outpatient. Will refer to [facility] to re-establish outpatient psychiatric care. D/w primary team. Please contact with questions.

## 2024-03-08 NOTE — DISCHARGE NOTE PROVIDER - PROVIDER TOKENS
PROVIDER:[TOKEN:[[id number]],FOLLOWUP:[1 month]] PROVIDER:[TOKEN:[[id number]],SCHEDULEDAPPT:[03/13/2024]]

## 2024-03-08 NOTE — BH CONSULTATION LIAISON PROGRESS NOTE - NSBHCONSULTFOLLOWAFTERCARE_PSY_A_CORE FT
Team will attempt to make referral for outpatient psychiatric treatment. Team will attempt to make referral for outpatient psychiatric treatment at [facility]  o	[address] [place] [address] [place] [address]  o	[phone]

## 2024-03-08 NOTE — BH CONSULTATION LIAISON PROGRESS NOTE - CURRENT MEDICATION
MEDICATIONS  (STANDING):  acetaminophen     Tablet .. 650 milliGRAM(s) Oral once  clonazePAM  Tablet 0.5 milliGRAM(s) Oral two times a day  dexAMETHasone  Injectable 4 milliGRAM(s) IV Push every 6 hours  diphenhydrAMINE 25 milliGRAM(s) Oral once  immune   globulin 10% (GAMMAGARD) IVPB 40 Gram(s) IV Intermittent every 24 hours  pantoprazole    Tablet 40 milliGRAM(s) Oral every 12 hours  verapamil  milliGRAM(s) Oral daily    MEDICATIONS  (PRN):  acetaminophen     Tablet .. 650 milliGRAM(s) Oral every 6 hours PRN Mild Pain (1 - 3)  baclofen 10 milliGRAM(s) Oral every 8 hours PRN Muscle Spasm  ketorolac   Injectable 15 milliGRAM(s) IV Push every 12 hours PRN Severe Pain (7 - 10)  LORazepam   Injectable 1 milliGRAM(s) IV Push every 6 hours PRN Anxiety  metoclopramide Injectable 10 milliGRAM(s) IV Push every 12 hours PRN severe headache  SUMAtriptan 100 milliGRAM(s) Oral two times a day PRN Headache  traMADol 25 milliGRAM(s) Oral two times a day PRN Moderate Pain (4 - 6)  
MEDICATIONS  (STANDING):  acetaminophen     Tablet .. 650 milliGRAM(s) Oral once  acetaminophen     Tablet .. 650 milliGRAM(s) Oral once  dexAMETHasone  Injectable 4 milliGRAM(s) IV Push two times a day  diphenhydrAMINE 25 milliGRAM(s) Oral once  diphenhydrAMINE 25 milliGRAM(s) Oral once  enoxaparin Injectable 40 milliGRAM(s) SubCutaneous every 24 hours  gabapentin 100 milliGRAM(s) Oral every 8 hours  immune   globulin 10% (GAMMAGARD) IVPB 40 Gram(s) IV Intermittent once  OLANZapine 2.5 milliGRAM(s) Oral every 24 hours  OLANZapine 2.5 milliGRAM(s) Oral every 24 hours  OLANZapine 5 milliGRAM(s) Oral every 24 hours  pantoprazole    Tablet 40 milliGRAM(s) Oral every 12 hours  sodium chloride 0.9%. 1000 milliLiter(s) (75 mL/Hr) IV Continuous <Continuous>  verapamil  milliGRAM(s) Oral daily    MEDICATIONS  (PRN):  acetaminophen     Tablet .. 650 milliGRAM(s) Oral every 6 hours PRN Mild Pain (1 - 3)  baclofen 10 milliGRAM(s) Oral every 8 hours PRN Muscle Spasm  ketorolac   Injectable 15 milliGRAM(s) IV Push every 12 hours PRN Severe Pain (7 - 10)  SUMAtriptan 100 milliGRAM(s) Oral two times a day PRN Headache  traMADol 25 milliGRAM(s) Oral two times a day PRN Moderate Pain (4 - 6)

## 2024-03-08 NOTE — DISCHARGE NOTE PROVIDER - HOSPITAL COURSE
Hospital course:  39y Male with PMH     Patient had the following workup done in house:  CT Head:   MR Head Non Contrast:  CT Angio Head:  CT Angio Neck:  []echo  []labs  []other    Physical exam at discharge:  Physical exam:  General: No acute distress    Neurologic:  -Mental status: Awake, alert, oriented to person, place, and time. Speech is fluent with intact naming, repetition, and comprehension, no dysarthria. Recent and remote memory intact. Follows commands. Attention/concentration intact. Fund of knowledge appropriate.  -Cranial nerves:   II: Visual fields are full to confrontation.  III, IV, VI: Extraocular movements are intact without nystagmus.  VII: Face is symmetric   Motor: Normal bulk and tone. No pronator drift. Strength bilateral upper extremity 5/5, bilateral lower extremities 5/5.  Sensation: Intact to light touch bilaterally. No neglect or extinction on double simultaneous testing.  Coordination: No dysmetria on finger-to-nose  Gait: Narrow gait and steady    NIHSS 0    New medications on discharge: Prednisone taper, Olanzapine  Labs to be followed up:  Imaging to be done as outpatient:  Further outpatient workup:   Hospital course:  39y Male with PMHx of viral meningitis with sequela of HTN and intractable headaches (followed by Dr. Weston) presents for possible autoimmune encephalitis management/treatment seen on outpatient PET 3/4. LP done on the same day with opening pressures of 30mmHg; CSF unremarkable. Course complicated by allergic reaction to solumedrol which was switched to decadron. Pt will be on a prednisone taper on discharge. MRI epilepsy protocol done with no abnormalities or enhancements. Pt received IVIG 3/5-3/8. PET scan showed ______    Patient had the following workup done in house:  < from: MR Brain-Seizure, Epilepsy w/wo IV Cont (03.06.24 @ 14:56) >  IMPRESSION: No acute hemorrhage mass mass effect or abnormal enhancement.    < from: MR Cervical Spine No Cont (03.06.24 @ 14:54) >  The vertebral body height alignment and marrow signal appears normal.  Mild disc desiccation is seen involving the C2-C3 4 C4-5 C5-6 and C6-7   levels.  C2-3: Bilateral hypertrophic facet joint changes. Moderate narrowing of   the left neural foramen  C3-4: Bilateral hypertrophic facet joint changes. Mild to moderate   narrowing of the right neural foramen  C4-5: Disc bulge is seen. Hypertrophic change involving both facet and   left uncovertebral joint. Moderate narrowing of left neural foramen  C5-6: Disc bulge and bilateral hypertrophic facet joint changes are seen.   Mild narrowing of the left neural foramen. Moderate narrowing of the   right neural foramen  C6-7: Normal  C7-T1: Normal  The spinal cord appears normal signal and caliber.  Evaluation of the paraspinal soft tissues appear normal.    < from: NM PET Brain FDG Encephalitis (03.01.24 @ 14:03) >  IMPRESSION:  Abnormal hypometabolism particularly pronounced in the parieto-occipital  and temporal regions (right greater than left), and also involving the   anteromedial temporal lobes with extension into the orbitofrontal and   paramedian frontal regions bilaterally.    Findings suggestive of autoimmune encephalitis (AIE) sequelae, likely   with contributing neurotropic medication effects, in the proper clinical   setting.          []echo  []labs  []other    Physical exam at discharge:  Physical exam:  General: No acute distress    Neurologic:  -Mental status: Awake, alert, oriented to person, place, and time. Speech is fluent with intact naming, repetition, and comprehension, no dysarthria. Recent and remote memory intact. Follows commands. Attention/concentration intact. Fund of knowledge appropriate.  -Cranial nerves:   II: Visual fields are full to confrontation.  III, IV, VI: Extraocular movements are intact without nystagmus.  VII: Face is symmetric   Motor: Normal bulk and tone. No pronator drift. Strength bilateral upper extremity 5/5, bilateral lower extremities 5/5.  Sensation: Intact to light touch bilaterally. No neglect or extinction on double simultaneous testing.  Coordination: No dysmetria on finger-to-nose  Gait: Narrow gait and steady    NIHSS 0    New medications on discharge: Prednisone taper, Olanzapine  Labs to be followed up:  Imaging to be done as outpatient:  Further outpatient workup:   39y Male with PMHx of viral meningitis with sequela of HTN and intractable headaches (followed by Dr. Weston) presents for possible autoimmune encephalitis management/treatment seen on outpatient PET 3/4. LP done on the same day with opening pressures of 30mmHg; CSF unremarkable. Course complicated by allergic reaction to solumedrol which was treated with benadryl. Steroid switched to decadron. Patient with some decrease in cognition (serial 7s, memory difficulty, attention/focus worsening) with improvement back to baseline with no cognitive/neuro deficit s/p 4 day course of IVIG. Autoimmune encephalitis labs sent, so far negative (refer to chart note 3/5). EEG negative. Intractable hiccups treated with gabapentin/baclofen. MRI epilepsy protocol done with no abnormalities or enhancements. MRV obtained for sudden severe HAs; no CVST. In addition, PET also showed increased activity with poorly define groundglass lesion, unable to r/o malignancy. CT C/A/P with contrast obtained; no evidence of malignancy/adenopathy, more consistent with infectious/inflammatory etiology. Pulmonary consulted; in agreement and can consider rpt CT chest in 6-12 weeks to document resolution. Cardiology consulted for PACs vs sinus arrhythmia, home verapamil switched to amlodipine. Plan for outpatient prednisone taper.     Patient had the following workup done in house:  < from: MR Brain-Seizure, Epilepsy w/wo IV Cont (03.06.24 @ 14:56) >  IMPRESSION: No acute hemorrhage mass mass effect or abnormal enhancement.    < from: MR Cervical Spine No Cont (03.06.24 @ 14:54) >  The vertebral body height alignment and marrow signal appears normal.  Mild disc desiccation is seen involving the C2-C3 4 C4-5 C5-6 and C6-7   levels.  C2-3: Bilateral hypertrophic facet joint changes. Moderate narrowing of   the left neural foramen  C3-4: Bilateral hypertrophic facet joint changes. Mild to moderate   narrowing of the right neural foramen  C4-5: Disc bulge is seen. Hypertrophic change involving both facet and   left uncovertebral joint. Moderate narrowing of left neural foramen  C5-6: Disc bulge and bilateral hypertrophic facet joint changes are seen.   Mild narrowing of the left neural foramen. Moderate narrowing of the   right neural foramen  C6-7: Normal  C7-T1: Normal  The spinal cord appears normal signal and caliber.  Evaluation of the paraspinal soft tissues appear normal.    < from: NM PET Brain FDG Encephalitis (03.01.24 @ 14:03) >  IMPRESSION:  Abnormal hypometabolism particularly pronounced in the parieto-occipital  and temporal regions (right greater than left), and also involving the   anteromedial temporal lobes with extension into the orbitofrontal and   paramedian frontal regions bilaterally.    Findings suggestive of autoimmune encephalitis (AIE) sequelae, likely   with contributing neurotropic medication effects, in the proper clinical   setting.    PET 3/8:  IMPRESSION:  1.  Increased activity in a right upper lobe poorly defined groundglass   lesion in the anterior segment.  While this activity might be due to   inflammatory disease, a malignancy cannot be excluded and might explain   the patient's paraneoplastic syndrome.  2.  Increased activity in the bone marrow.  This is a nonspecific finding   usually seen in patients with anemia, but other causes of bone marrow   stimulation should be considered.  3.  Asymmetric sacralization of L5 with sclerotic changes on the right   side.  No FDG uptake is seen in this area.    CT c/a/p 3/8:  No evidenceof suspicious mass or adenopathy in the chest, abdomen, or   pelvis.    Clustered nodular and groundglass opacities in bilateral upper lobes,   more prominent on the right. Findings are most consistent with   infectious/inflammatory etiology.    []echo  []labs  []other    Physical exam at discharge:  Physical exam:  General: No acute distress  Neurologic:  -Mental status: Awake, alert, oriented to person, place, and time. Speech is fluent with intact naming, repetition, and comprehension, no dysarthria. Recent and remote memory intact. Follows commands. Attention/concentration intact. Fund of knowledge appropriate.  -Cranial nerves:   II: Visual fields are full to confrontation.  III, IV, VI: Extraocular movements are intact without nystagmus.  VII: Face is symmetric   Motor: Normal bulk and tone. No pronator drift. Strength bilateral upper extremity 5/5, bilateral lower extremities 5/5.  Sensation: Intact to light touch bilaterally. No neglect or extinction on double simultaneous testing.  Coordination: No dysmetria on finger-to-nose  Gait: Narrow gait and steady    NIHSS 0    New medications on discharge: Prednisone taper, Olanzapine, baclofen, gabapentin, amlodipine, pantoprazole, imitrex, tramadol  Labs to be followed up: autoimmune encephalitis labs  Imaging to be done as outpatient:  Further outpatient workup: rpt CT chest 6-12 weeks   39y Male with PMHx of viral meningitis with sequela of HTN and intractable headaches (followed by Dr. Weston) presents for possible autoimmune encephalitis management/treatment seen on outpatient PET 3/4. LP done on the same day with opening pressures of 30mmHg; CSF unremarkable. Course complicated by allergic reaction to solumedrol which was treated with benadryl. Steroid switched to decadron. Patient with some decrease in cognition (serial 7s, memory difficulty, attention/focus worsening) with improvement back to baseline with no cognitive/neuro deficit s/p 4 day course of IVIG. Autoimmune encephalitis labs sent, so far negative (refer to chart note 3/5). EEG negative. Intractable hiccups treated with gabapentin/baclofen. MRI epilepsy protocol done with no abnormalities or enhancements. MRV obtained for sudden severe HAs; no CVST. In addition, PET also showed increased activity with poorly define groundglass lesion, unable to r/o malignancy. CT C/A/P with contrast obtained; no evidence of malignancy/adenopathy, more consistent with infectious/inflammatory etiology. Pulmonary consulted; in agreement and can consider rpt CT chest in 6-12 weeks to document resolution. Cardiology consulted for PACs vs sinus arrhythmia, home verapamil switched to amlodipine. Plan for outpatient prednisone taper, home infusion, outpatient rehab, outpatient neuro-cognitive assessment. Will also consider repeat CT chest 6-12 weeks pending on lab results.     Patient had the following workup done in house:  < from: MR Brain-Seizure, Epilepsy w/wo IV Cont (03.06.24 @ 14:56) >  IMPRESSION: No acute hemorrhage mass mass effect or abnormal enhancement.    < from: MR Cervical Spine No Cont (03.06.24 @ 14:54) >  The vertebral body height alignment and marrow signal appears normal.  Mild disc desiccation is seen involving the C2-C3 4 C4-5 C5-6 and C6-7   levels.  C2-3: Bilateral hypertrophic facet joint changes. Moderate narrowing of   the left neural foramen  C3-4: Bilateral hypertrophic facet joint changes. Mild to moderate   narrowing of the right neural foramen  C4-5: Disc bulge is seen. Hypertrophic change involving both facet and   left uncovertebral joint. Moderate narrowing of left neural foramen  C5-6: Disc bulge and bilateral hypertrophic facet joint changes are seen.   Mild narrowing of the left neural foramen. Moderate narrowing of the   right neural foramen  C6-7: Normal  C7-T1: Normal  The spinal cord appears normal signal and caliber.  Evaluation of the paraspinal soft tissues appear normal.    < from: NM PET Brain FDG Encephalitis (03.01.24 @ 14:03) >  IMPRESSION:  Abnormal hypometabolism particularly pronounced in the parieto-occipital  and temporal regions (right greater than left), and also involving the   anteromedial temporal lobes with extension into the orbitofrontal and   paramedian frontal regions bilaterally.    Findings suggestive of autoimmune encephalitis (AIE) sequelae, likely   with contributing neurotropic medication effects, in the proper clinical   setting.    PET 3/8:  IMPRESSION:  1.  Increased activity in a right upper lobe poorly defined groundglass   lesion in the anterior segment.  While this activity might be due to   inflammatory disease, a malignancy cannot be excluded and might explain   the patient's paraneoplastic syndrome.  2.  Increased activity in the bone marrow.  This is a nonspecific finding   usually seen in patients with anemia, but other causes of bone marrow   stimulation should be considered.  3.  Asymmetric sacralization of L5 with sclerotic changes on the right   side.  No FDG uptake is seen in this area.    CT c/a/p 3/8:  No evidenceof suspicious mass or adenopathy in the chest, abdomen, or   pelvis.    Clustered nodular and groundglass opacities in bilateral upper lobes,   more prominent on the right. Findings are most consistent with   infectious/inflammatory etiology.    []echo  []labs  []other    Physical exam at discharge:  Physical exam:  General: No acute distress  Neurologic:  -Mental status: Awake, alert, oriented to person, place, and time. Speech is fluent with intact naming, repetition, and comprehension, no dysarthria. Recent and remote memory intact. Follows commands. Attention/concentration intact. Fund of knowledge appropriate.  -Cranial nerves:   II: Visual fields are full to confrontation.  III, IV, VI: Extraocular movements are intact without nystagmus.  VII: Face is symmetric   Motor: Normal bulk and tone. No pronator drift. Strength bilateral upper extremity 5/5, bilateral lower extremities 5/5.  Sensation: Intact to light touch bilaterally. No neglect or extinction on double simultaneous testing.  Coordination: No dysmetria on finger-to-nose  Gait: Narrow gait and steady    NIHSS 0    New medications on discharge: Prednisone taper, Olanzapine, baclofen, gabapentin, amlodipine, pantoprazole, imitrex, tramadol  Labs to be followed up: autoimmune encephalitis labs  Imaging to be done as outpatient:  Further outpatient workup: neurocog assessment 1-2 weeks, outpatient rehab

## 2024-03-09 LAB
% ALBUMIN: 58.7 % — SIGNIFICANT CHANGE UP
% ALPHA 1: 4.2 % — SIGNIFICANT CHANGE UP
% ALPHA 2: 8.9 % — SIGNIFICANT CHANGE UP
% BETA: 15.2 % — SIGNIFICANT CHANGE UP
% GAMMA: 13 % — SIGNIFICANT CHANGE UP
ALBUMIN SERPL ELPH-MCNC: 3.9 G/DL — SIGNIFICANT CHANGE UP (ref 3.6–5.5)
ALBUMIN/GLOB SERPL ELPH: 1.4 RATIO — SIGNIFICANT CHANGE UP
ALPHA1 GLOB SERPL ELPH-MCNC: 0.3 G/DL — SIGNIFICANT CHANGE UP (ref 0.1–0.4)
ALPHA2 GLOB SERPL ELPH-MCNC: 0.6 G/DL — SIGNIFICANT CHANGE UP (ref 0.5–1)
ANION GAP SERPL CALC-SCNC: 9 MMOL/L — SIGNIFICANT CHANGE UP (ref 5–17)
B BURGDOR DNA SPEC QL NAA+PROBE: NEGATIVE — SIGNIFICANT CHANGE UP
B-GLOBULIN SERPL ELPH-MCNC: 1 G/DL — SIGNIFICANT CHANGE UP (ref 0.5–1)
BUN SERPL-MCNC: 13 MG/DL — SIGNIFICANT CHANGE UP (ref 7–23)
CALCIUM SERPL-MCNC: 8.3 MG/DL — LOW (ref 8.4–10.5)
CHLORIDE SERPL-SCNC: 106 MMOL/L — SIGNIFICANT CHANGE UP (ref 96–108)
CO2 SERPL-SCNC: 21 MMOL/L — LOW (ref 22–31)
CREAT SERPL-MCNC: 0.78 MG/DL — SIGNIFICANT CHANGE UP (ref 0.5–1.3)
EGFR: 118 ML/MIN/1.73M2 — SIGNIFICANT CHANGE UP
GAMMA GLOBULIN: 0.9 G/DL — SIGNIFICANT CHANGE UP (ref 0.6–1.6)
GLUCOSE SERPL-MCNC: 259 MG/DL — HIGH (ref 70–99)
HCT VFR BLD CALC: 39.6 % — SIGNIFICANT CHANGE UP (ref 39–50)
HGB BLD-MCNC: 12.5 G/DL — LOW (ref 13–17)
INTERPRETATION SERPL IFE-IMP: SIGNIFICANT CHANGE UP
MCHC RBC-ENTMCNC: 26.4 PG — LOW (ref 27–34)
MCHC RBC-ENTMCNC: 31.6 GM/DL — LOW (ref 32–36)
MCV RBC AUTO: 83.7 FL — SIGNIFICANT CHANGE UP (ref 80–100)
NRBC # BLD: 0 /100 WBCS — SIGNIFICANT CHANGE UP (ref 0–0)
PLATELET # BLD AUTO: 225 K/UL — SIGNIFICANT CHANGE UP (ref 150–400)
POTASSIUM SERPL-MCNC: 4 MMOL/L — SIGNIFICANT CHANGE UP (ref 3.5–5.3)
POTASSIUM SERPL-SCNC: 4 MMOL/L — SIGNIFICANT CHANGE UP (ref 3.5–5.3)
PROT PATTERN SERPL ELPH-IMP: SIGNIFICANT CHANGE UP
PROT SERPL-MCNC: 6.7 G/DL — SIGNIFICANT CHANGE UP (ref 6–8.3)
RBC # BLD: 4.73 M/UL — SIGNIFICANT CHANGE UP (ref 4.2–5.8)
RBC # FLD: 18.8 % — HIGH (ref 10.3–14.5)
SODIUM SERPL-SCNC: 136 MMOL/L — SIGNIFICANT CHANGE UP (ref 135–145)
VOLTAGE-GATED K CHANNEL AB RESULT: 0 PMOL/L — SIGNIFICANT CHANGE UP (ref 0–31)
WBC # BLD: 8.69 K/UL — SIGNIFICANT CHANGE UP (ref 3.8–10.5)
WBC # FLD AUTO: 8.69 K/UL — SIGNIFICANT CHANGE UP (ref 3.8–10.5)
WNV RNA SPEC QL NAA+PROBE: SIGNIFICANT CHANGE UP
WNV RNA SPEC QL NAA+PROBE: SIGNIFICANT CHANGE UP

## 2024-03-09 PROCEDURE — 99233 SBSQ HOSP IP/OBS HIGH 50: CPT | Mod: GC

## 2024-03-09 PROCEDURE — 99232 SBSQ HOSP IP/OBS MODERATE 35: CPT

## 2024-03-09 PROCEDURE — 70546 MR ANGIOGRAPH HEAD W/O&W/DYE: CPT | Mod: 26

## 2024-03-09 RX ORDER — AMLODIPINE BESYLATE 2.5 MG/1
10 TABLET ORAL DAILY
Refills: 0 | Status: DISCONTINUED | OUTPATIENT
Start: 2024-03-09 | End: 2024-03-10

## 2024-03-09 RX ORDER — BACLOFEN 100 %
5 POWDER (GRAM) MISCELLANEOUS EVERY 6 HOURS
Refills: 0 | Status: DISCONTINUED | OUTPATIENT
Start: 2024-03-09 | End: 2024-03-09

## 2024-03-09 RX ORDER — BACLOFEN 100 %
10 POWDER (GRAM) MISCELLANEOUS EVERY 6 HOURS
Refills: 0 | Status: DISCONTINUED | OUTPATIENT
Start: 2024-03-09 | End: 2024-03-10

## 2024-03-09 RX ORDER — BACLOFEN 100 %
10 POWDER (GRAM) MISCELLANEOUS EVERY 6 HOURS
Refills: 0 | Status: DISCONTINUED | OUTPATIENT
Start: 2024-03-09 | End: 2024-03-09

## 2024-03-09 RX ORDER — CHLORPROMAZINE HCL 10 MG
50 TABLET ORAL ONCE
Refills: 0 | Status: COMPLETED | OUTPATIENT
Start: 2024-03-09 | End: 2024-03-09

## 2024-03-09 RX ORDER — GABAPENTIN 400 MG/1
1 CAPSULE ORAL
Qty: 90 | Refills: 1
Start: 2024-03-09

## 2024-03-09 RX ORDER — GABAPENTIN 400 MG/1
100 CAPSULE ORAL EVERY 6 HOURS
Refills: 0 | Status: DISCONTINUED | OUTPATIENT
Start: 2024-03-09 | End: 2024-03-10

## 2024-03-09 RX ORDER — BACLOFEN 100 %
10 POWDER (GRAM) MISCELLANEOUS ONCE
Refills: 0 | Status: COMPLETED | OUTPATIENT
Start: 2024-03-09 | End: 2024-03-09

## 2024-03-09 RX ORDER — SUMATRIPTAN SUCCINATE 4 MG/.5ML
6 INJECTION, SOLUTION SUBCUTANEOUS ONCE
Refills: 0 | Status: COMPLETED | OUTPATIENT
Start: 2024-03-09 | End: 2024-03-09

## 2024-03-09 RX ORDER — METOCLOPRAMIDE HCL 10 MG
10 TABLET ORAL ONCE
Refills: 0 | Status: COMPLETED | OUTPATIENT
Start: 2024-03-09 | End: 2024-03-09

## 2024-03-09 RX ORDER — KETOROLAC TROMETHAMINE 30 MG/ML
15 SYRINGE (ML) INJECTION ONCE
Refills: 0 | Status: DISCONTINUED | OUTPATIENT
Start: 2024-03-09 | End: 2024-03-09

## 2024-03-09 RX ORDER — GABAPENTIN 400 MG/1
100 CAPSULE ORAL ONCE
Refills: 0 | Status: COMPLETED | OUTPATIENT
Start: 2024-03-09 | End: 2024-03-09

## 2024-03-09 RX ORDER — BACLOFEN 100 %
1 POWDER (GRAM) MISCELLANEOUS
Qty: 90 | Refills: 1
Start: 2024-03-09

## 2024-03-09 RX ADMIN — OLANZAPINE 2.5 MILLIGRAM(S): 15 TABLET, FILM COATED ORAL at 10:24

## 2024-03-09 RX ADMIN — SUMATRIPTAN SUCCINATE 6 MILLIGRAM(S): 4 INJECTION, SOLUTION SUBCUTANEOUS at 10:15

## 2024-03-09 RX ADMIN — Medication 4 MILLIGRAM(S): at 05:58

## 2024-03-09 RX ADMIN — GABAPENTIN 100 MILLIGRAM(S): 400 CAPSULE ORAL at 19:07

## 2024-03-09 RX ADMIN — Medication 5 MILLIGRAM(S): at 05:57

## 2024-03-09 RX ADMIN — GABAPENTIN 100 MILLIGRAM(S): 400 CAPSULE ORAL at 23:03

## 2024-03-09 RX ADMIN — ENOXAPARIN SODIUM 40 MILLIGRAM(S): 100 INJECTION SUBCUTANEOUS at 21:31

## 2024-03-09 RX ADMIN — Medication 10 MILLIGRAM(S): at 19:07

## 2024-03-09 RX ADMIN — Medication 15 MILLIGRAM(S): at 10:15

## 2024-03-09 RX ADMIN — PANTOPRAZOLE SODIUM 40 MILLIGRAM(S): 20 TABLET, DELAYED RELEASE ORAL at 21:31

## 2024-03-09 RX ADMIN — Medication 120 MILLIGRAM(S): at 09:28

## 2024-03-09 RX ADMIN — TRAMADOL HYDROCHLORIDE 25 MILLIGRAM(S): 50 TABLET ORAL at 23:18

## 2024-03-09 RX ADMIN — Medication 50 MILLIGRAM(S): at 02:17

## 2024-03-09 RX ADMIN — SUMATRIPTAN SUCCINATE 6 MILLIGRAM(S): 4 INJECTION, SOLUTION SUBCUTANEOUS at 16:44

## 2024-03-09 RX ADMIN — Medication 10 MILLIGRAM(S): at 09:29

## 2024-03-09 RX ADMIN — Medication 10 MILLIGRAM(S): at 23:03

## 2024-03-09 RX ADMIN — Medication 15 MILLIGRAM(S): at 21:41

## 2024-03-09 RX ADMIN — Medication 10 MILLIGRAM(S): at 03:16

## 2024-03-09 RX ADMIN — SUMATRIPTAN SUCCINATE 6 MILLIGRAM(S): 4 INJECTION, SOLUTION SUBCUTANEOUS at 17:31

## 2024-03-09 RX ADMIN — Medication 15 MILLIGRAM(S): at 17:31

## 2024-03-09 RX ADMIN — OLANZAPINE 2.5 MILLIGRAM(S): 15 TABLET, FILM COATED ORAL at 16:07

## 2024-03-09 RX ADMIN — OLANZAPINE 5 MILLIGRAM(S): 15 TABLET, FILM COATED ORAL at 21:31

## 2024-03-09 RX ADMIN — Medication 60 MILLIGRAM(S): at 10:25

## 2024-03-09 RX ADMIN — GABAPENTIN 100 MILLIGRAM(S): 400 CAPSULE ORAL at 05:58

## 2024-03-09 RX ADMIN — Medication 10 MILLIGRAM(S): at 13:35

## 2024-03-09 RX ADMIN — GABAPENTIN 100 MILLIGRAM(S): 400 CAPSULE ORAL at 03:16

## 2024-03-09 RX ADMIN — Medication 15 MILLIGRAM(S): at 09:34

## 2024-03-09 RX ADMIN — SUMATRIPTAN SUCCINATE 6 MILLIGRAM(S): 4 INJECTION, SOLUTION SUBCUTANEOUS at 09:37

## 2024-03-09 RX ADMIN — PANTOPRAZOLE SODIUM 40 MILLIGRAM(S): 20 TABLET, DELAYED RELEASE ORAL at 09:28

## 2024-03-09 RX ADMIN — TRAMADOL HYDROCHLORIDE 25 MILLIGRAM(S): 50 TABLET ORAL at 00:05

## 2024-03-09 RX ADMIN — Medication 15 MILLIGRAM(S): at 22:00

## 2024-03-09 RX ADMIN — GABAPENTIN 100 MILLIGRAM(S): 400 CAPSULE ORAL at 12:49

## 2024-03-09 RX ADMIN — Medication 15 MILLIGRAM(S): at 16:40

## 2024-03-09 RX ADMIN — POLYETHYLENE GLYCOL 3350 17 GRAM(S): 17 POWDER, FOR SOLUTION ORAL at 19:07

## 2024-03-09 NOTE — PROGRESS NOTE ADULT - ASSESSMENT
39y Male with PMHx of meningitis 1.5 years ago (related to enterovirus), HTN, intractable headache who presented w/ neurocognitive decline and headaches. He is on the neurology service being treated for autoimmune encephalitis (AIE) with IVIG and Decadron, and his headache is now resolved.  He had PET scan was done to evaluate for underlying malignancy in case his presentation was related to a paraneoplastic syndrome. His PET scan showed a RUL, anterior segment lesion that was PET avid for which Pulmonary is consulted. Dedicated CT chest showed bilateral UL GGO, R>L. No consolidations, solid nodules, or septal thickening.    # RUL anterior segment PET Avid area  - Incidental finding that represents inflammatory vs. infectious process. No respiratory symptoms.  - No indication for treatment. Patient may have repeat CT in 2-4 weeks to ensure resolution    Pulmonary will sign off. Please call/page with any new questions.    S/D/E with Dr. Monsalve

## 2024-03-09 NOTE — PROGRESS NOTE ADULT - ASSESSMENT
39y Male with PMHx of viral meningitis with sequela of HTN and intractable headaches (followed by Dr. Weston) presents for possible autoimmune encephalitis management/treatment seen on outpatient PET 3/4. LP done on the same day with opening pressures of 30mmHg; CSF unremarkable. C/C/b by allergic reaction to solumedrol which was switched to decadron. MRI epilepsy protocol done with no abnormalities or enhancements. Started on IVIG 3/5 - 3/8    Neuro  #autoimmune encephalitis  - Outpatient PET scan 3/1: Abnormal hypometabolism particularly pronounced in the parieto-occipital and temporal regions (right greater than left), and also involving the anteromedial temporal lobes with extension into the orbitofrontal and   paramedian frontal regions bilaterally. Findings suggestive of autoimmune encephalitis (AIE) sequelae, likely with contributing neurotropic medication effects, in the proper clinical   setting.  - s/p LP 3/4 with elevated opening pressure at 30 mmHg, remainder of CSF negative  - lower Decadron to 4g q12 since MRI shows no enhancement - likely will taper as outpatient  -MRI brain epilepsy protocol done - no abnormalities or enhancement  - continue home pantoprazole  - PRN Tylenol, Toradol, and Relgan, sumatriptan ordered for headache management  - q8hr general neuro checks and vitals  Zyprexa standing (2.5 am, 2.5 pm, 5 at bedtime) and allow for additional 2.5mg PRN as needed for agitation  -QTc on 3/8 was 432  -STOP ativan    #hiccups  -continue baclofen, added Gabapentin QID  -Only thorazine and reglan after approval from Dr. Weston    Cards  #HTN  - goal normotension  - will hold losartan for now due to hx of autonomic dysfunction  -continue home verapamil    Pulm  - call provider if SPO2 < 94%  - uptake noted in right upper lobe of right lung on PET; CT C/A/P demonstrates clustered nodular and ground-glass opacities in bilateral upper lobes, more prominent over right; findings most consistent with infectious/inflammatory etiology  - pulmonology on board, appreciate recs    GI  #Nutrition/Fluids/Electrolytes   - replete K<4 and Mg <2  - Diet: Regular    Infectious Disease  - afebrile on admission    Endocrine  #DM  - A1C results: 5.2  - continue FS monitoring while on steroids    - TSH results: 0.594    Psych  #anxiety  - psych consulted, f/u recs  - c/w klonopin 0,5mg BID prn, stopped ativan  - Zyprexa started as above    DVT Prophylaxis  - SCDs for DVT prophylaxis     Dispo: home when cleared     Discussed daily hospital plans and goals with patient bedside    Discussed with Neurology Attending, Dr. Weston     39y Male with PMHx of viral meningitis with sequela of HTN and intractable headaches (followed by Dr. Weston) presents for possible autoimmune encephalitis management/treatment seen on outpatient PET 3/4. LP done on the same day with opening pressures of 30mmHg; CSF unremarkable. C/C/b by allergic reaction to solumedrol which was switched to decadron. MRI epilepsy protocol done with no abnormalities or enhancements. Started on IVIG 3/5 - 3/8    Neuro  #autoimmune encephalitis  - Outpatient PET scan 3/1: Abnormal hypometabolism particularly pronounced in the parieto-occipital and temporal regions (right greater than left), and also involving the anteromedial temporal lobes with extension into the orbitofrontal and   paramedian frontal regions bilaterally. Findings suggestive of autoimmune encephalitis (AIE) sequelae, likely with contributing neurotropic medication effects, in the proper clinical   setting.  - s/p LP 3/4 with elevated opening pressure at 30 mmHg, remainder of CSF negative  - lower Decadron to 4g q12 since MRI shows no enhancement - likely will taper as outpatient  -MRI brain epilepsy protocol done - no abnormalities or enhancement  - continue home pantoprazole  - PRN Tylenol, Toradol, and Relgan, sumatriptan ordered for headache management  - q8hr general neuro checks and vitals  Zyprexa standing (2.5 am, 2.5 pm, 5 at bedtime) and allow for additional 2.5mg PRN as needed for agitation  -QTc on 3/8 was 432  -STOP ativan    #hiccups  - increased gabapentin to four times a day  - increased baclofen to 10mg q6h PRN  -Only thorazine and reglan after approval from Dr. Weston    Cards  #HTN  - goal normotension  - will hold losartan for now due to hx of autonomic dysfunction  -continue home verapamil    Pulm  - call provider if SPO2 < 94%  - uptake noted in right upper lobe of right lung on PET; CT C/A/P demonstrates clustered nodular and ground-glass opacities in bilateral upper lobes, more prominent over right; findings most consistent with infectious/inflammatory etiology  - pulmonology on board, appreciate recs    GI  #Nutrition/Fluids/Electrolytes   - replete K<4 and Mg <2  - Diet: Regular    Infectious Disease  - afebrile on admission    Endocrine  #DM  - A1C results: 5.2  - continue FS monitoring while on steroids    - TSH results: 0.594    Psych  #anxiety  - psych consulted, f/u recs  - c/w klonopin 0,5mg BID prn, stopped ativan  - Zyprexa started as above    DVT Prophylaxis  - SCDs for DVT prophylaxis     Dispo: home when cleared     Discussed daily hospital plans and goals with patient bedside    Discussed with Neurology Attending, Dr. Weston

## 2024-03-09 NOTE — PROGRESS NOTE ADULT - SUBJECTIVE AND OBJECTIVE BOX
PULMONARY CONSULT SERVICE FOLLOW-UP NOTE    INTERVAL HPI:  Reviewed chart and overnight events; patient seen and examined at bedside. Patient feels well. Primary complaint was headache, which is resolved. No shortness of breath, cough, chest pain/discomfort. No new symptoms.     MEDICATIONS:  Pulmonary:    Antimicrobials:    Anticoagulants:  enoxaparin Injectable 40 milliGRAM(s) SubCutaneous every 24 hours    Cardiac:  amLODIPine   Tablet 10 milliGRAM(s) Oral daily      Allergies    methylPREDNISolone (Rash; Urticaria; Flushing; Hives)    Intolerances        Vital Signs Last 24 Hrs  T(C): 36.4 (09 Mar 2024 22:01), Max: 36.9 (09 Mar 2024 14:00)  T(F): 97.5 (09 Mar 2024 22:01), Max: 98.4 (09 Mar 2024 14:00)  HR: 94 (09 Mar 2024 20:18) (64 - 110)  BP: 139/95 (09 Mar 2024 20:18) (139/95 - 159/87)  BP(mean): 113 (09 Mar 2024 20:18) (113 - 124)  RR: 16 (09 Mar 2024 20:18) (16 - 16)  SpO2: --    Parameters below as of 09 Mar 2024 20:18  Patient On (Oxygen Delivery Method): room air        03-08 @ 07:01  -  03-09 @ 07:00  --------------------------------------------------------  IN: 1275 mL / OUT: 0 mL / NET: 1275 mL    03-09 @ 06:01  -  03-09 @ 23:06  --------------------------------------------------------  IN: 900 mL / OUT: 900 mL / NET: 0 mL          PHYSICAL EXAM:  Constitutional: NAD  HEENT: NC/AT; PERRL, anicteric sclera; MMM  Neck: supple  Cardiovascular: +S1/S2, RRR  Respiratory: CTA B/L; no W/R/R  Gastrointestinal: soft, NT/ND  Extremities: WWP; no edema, clubbing or cyanosis  Vascular: 2+ radial pulses B/L  Neurological: awake and alert; CHEW    LABS:      CBC Full  -  ( 09 Mar 2024 10:00 )  WBC Count : 8.69 K/uL  RBC Count : 4.73 M/uL  Hemoglobin : 12.5 g/dL  Hematocrit : 39.6 %  Platelet Count - Automated : 225 K/uL  Mean Cell Volume : 83.7 fl  Mean Cell Hemoglobin : 26.4 pg  Mean Cell Hemoglobin Concentration : 31.6 gm/dL  Auto Neutrophil # : x  Auto Lymphocyte # : x  Auto Monocyte # : x  Auto Eosinophil # : x  Auto Basophil # : x  Auto Neutrophil % : x  Auto Lymphocyte % : x  Auto Monocyte % : x  Auto Eosinophil % : x  Auto Basophil % : x    03-09    136  |  106  |  13  ----------------------------<  259<H>  4.0   |  21<L>  |  0.78    Ca    8.3<L>      09 Mar 2024 10:00  Phos  3.4     03-08  Mg     2.4     03-08            Urinalysis Basic - ( 09 Mar 2024 10:00 )    Color: x / Appearance: x / SG: x / pH: x  Gluc: 259 mg/dL / Ketone: x  / Bili: x / Urobili: x   Blood: x / Protein: x / Nitrite: x   Leuk Esterase: x / RBC: x / WBC x   Sq Epi: x / Non Sq Epi: x / Bacteria: x                RADIOLOGY & ADDITIONAL STUDIES:

## 2024-03-09 NOTE — PROGRESS NOTE ADULT - SUBJECTIVE AND OBJECTIVE BOX
Neurology Stroke Progress Note    INTERVAL HPI/OVERNIGHT EVENTS:  Patient seen and examined. Continued to have hiccups throughout the night; increased baclofen and gabapentin to four times a day, starting this AM.     MEDICATIONS  (STANDING):  acetaminophen     Tablet .. 650 milliGRAM(s) Oral once  baclofen 5 milliGRAM(s) Oral every 6 hours  diphenhydrAMINE 25 milliGRAM(s) Oral once  enoxaparin Injectable 40 milliGRAM(s) SubCutaneous every 24 hours  gabapentin 100 milliGRAM(s) Oral every 6 hours  OLANZapine 2.5 milliGRAM(s) Oral every 24 hours  OLANZapine 2.5 milliGRAM(s) Oral every 24 hours  OLANZapine 5 milliGRAM(s) Oral every 24 hours  pantoprazole    Tablet 40 milliGRAM(s) Oral every 12 hours  polyethylene glycol 3350 17 Gram(s) Oral two times a day  predniSONE   Tablet 20 milliGRAM(s) Oral once  sodium chloride 0.9%. 1000 milliLiter(s) (75 mL/Hr) IV Continuous <Continuous>  verapamil  milliGRAM(s) Oral daily    MEDICATIONS  (PRN):  acetaminophen     Tablet .. 650 milliGRAM(s) Oral every 6 hours PRN Mild Pain (1 - 3)  ketorolac   Injectable 15 milliGRAM(s) IV Push every 12 hours PRN Severe Pain (7 - 10)  OLANZapine 2.5 milliGRAM(s) Oral once PRN agitation or anxiety  SUMAtriptan 100 milliGRAM(s) Oral two times a day PRN Headache  traMADol 25 milliGRAM(s) Oral two times a day PRN Moderate Pain (4 - 6)      Allergies    methylPREDNISolone (Rash; Urticaria; Flushing; Hives)    Intolerances        Vital Signs Last 24 Hrs  T(C): 36.4 (08 Mar 2024 10:05), Max: 36.4 (08 Mar 2024 10:05)  T(F): 97.6 (08 Mar 2024 10:05), Max: 97.6 (08 Mar 2024 10:05)  HR: 64 (09 Mar 2024 06:00) (62 - 98)  BP: 159/87 (09 Mar 2024 06:00) (137/95 - 159/87)  BP(mean): 117 (09 Mar 2024 06:00) (112 - 117)  RR: --  SpO2: --    Parameters below as of 09 Mar 2024 06:00  Patient On (Oxygen Delivery Method): room air        Physical exam:  General: No acute distress, awake and alert  Eyes: Anicteric sclerae, moist conjunctivae, see below for CNs  Neck: trachea midline,  Cardiovascular: Regular rate and rhythm    Neurologic:  -Mental status: Awake, alert, oriented to person, place, and time. Speech is fluent with intact naming, repetition, and comprehension, no dysarthria. Recent and remote memory intact. Follows commands. Attention/concentration intact. Fund of knowledge appropriate.  -Cranial nerves:   II: Visual fields are full to confrontation.  III, IV, VI: Extraocular movements are intact without nystagmus.   VII: Face is symmetric   Motor: Normal bulk and tone. No pronator drift. Strength bilateral upper extremity 5/5, bilateral lower extremities 5/5.        LABS:                        12.2   11.10 )-----------( 227      ( 08 Mar 2024 10:00 )             37.2     03-08    135  |  106  |  13  ----------------------------<  95  4.1   |  26  |  0.86    Ca    8.4      08 Mar 2024 10:00  Phos  3.4     03-08  Mg     2.4     03-08        Urinalysis Basic - ( 08 Mar 2024 10:00 )    Color: x / Appearance: x / SG: x / pH: x  Gluc: 95 mg/dL / Ketone: x  / Bili: x / Urobili: x   Blood: x / Protein: x / Nitrite: x   Leuk Esterase: x / RBC: x / WBC x   Sq Epi: x / Non Sq Epi: x / Bacteria: x        RADIOLOGY & ADDITIONAL TESTS:  < from: CT Abdomen and Pelvis w/ Oral Cont and w/ IV Cont (03.08.24 @ 19:26) >    IMPRESSION:  No evidenceof suspicious mass or adenopathy in the chest, abdomen, or   pelvis.    Clustered nodular and groundglass opacities in bilateral upper lobes,   more prominent on the right. Findings are most consistent with   infectious/inflammatory etiology.    < end of copied text >     Neurology Stroke Progress Note    INTERVAL HPI/OVERNIGHT EVENTS:  Patient seen and examined. Continued to have hiccups throughout the night; increased baclofen to 10mg q6h PRN and gabapentin to four times a day, starting this AM.     MEDICATIONS  (STANDING):  acetaminophen     Tablet .. 650 milliGRAM(s) Oral once  baclofen 5 milliGRAM(s) Oral every 6 hours  diphenhydrAMINE 25 milliGRAM(s) Oral once  enoxaparin Injectable 40 milliGRAM(s) SubCutaneous every 24 hours  gabapentin 100 milliGRAM(s) Oral every 6 hours  OLANZapine 2.5 milliGRAM(s) Oral every 24 hours  OLANZapine 2.5 milliGRAM(s) Oral every 24 hours  OLANZapine 5 milliGRAM(s) Oral every 24 hours  pantoprazole    Tablet 40 milliGRAM(s) Oral every 12 hours  polyethylene glycol 3350 17 Gram(s) Oral two times a day  predniSONE   Tablet 20 milliGRAM(s) Oral once  sodium chloride 0.9%. 1000 milliLiter(s) (75 mL/Hr) IV Continuous <Continuous>  verapamil  milliGRAM(s) Oral daily    MEDICATIONS  (PRN):  acetaminophen     Tablet .. 650 milliGRAM(s) Oral every 6 hours PRN Mild Pain (1 - 3)  ketorolac   Injectable 15 milliGRAM(s) IV Push every 12 hours PRN Severe Pain (7 - 10)  OLANZapine 2.5 milliGRAM(s) Oral once PRN agitation or anxiety  SUMAtriptan 100 milliGRAM(s) Oral two times a day PRN Headache  traMADol 25 milliGRAM(s) Oral two times a day PRN Moderate Pain (4 - 6)      Allergies    methylPREDNISolone (Rash; Urticaria; Flushing; Hives)    Intolerances        Vital Signs Last 24 Hrs  T(C): 36.4 (08 Mar 2024 10:05), Max: 36.4 (08 Mar 2024 10:05)  T(F): 97.6 (08 Mar 2024 10:05), Max: 97.6 (08 Mar 2024 10:05)  HR: 64 (09 Mar 2024 06:00) (62 - 98)  BP: 159/87 (09 Mar 2024 06:00) (137/95 - 159/87)  BP(mean): 117 (09 Mar 2024 06:00) (112 - 117)  RR: --  SpO2: --    Parameters below as of 09 Mar 2024 06:00  Patient On (Oxygen Delivery Method): room air        Physical exam:  General: No acute distress, awake and alert  Eyes: Anicteric sclerae, moist conjunctivae, see below for CNs  Neck: trachea midline,  Cardiovascular: Regular rate and rhythm    Neurologic:  -Mental status: Awake, alert, oriented to person, place, and time. Speech is fluent with intact naming, repetition, and comprehension, no dysarthria. Recent and remote memory intact. Follows commands. Attention/concentration intact. Fund of knowledge appropriate.  -Cranial nerves:   II: Visual fields are full to confrontation.  III, IV, VI: Extraocular movements are intact without nystagmus.   VII: Face is symmetric   Motor: Normal bulk and tone. No pronator drift. Strength bilateral upper extremity 5/5, bilateral lower extremities 5/5.        LABS:                        12.2   11.10 )-----------( 227      ( 08 Mar 2024 10:00 )             37.2     03-08    135  |  106  |  13  ----------------------------<  95  4.1   |  26  |  0.86    Ca    8.4      08 Mar 2024 10:00  Phos  3.4     03-08  Mg     2.4     03-08        Urinalysis Basic - ( 08 Mar 2024 10:00 )    Color: x / Appearance: x / SG: x / pH: x  Gluc: 95 mg/dL / Ketone: x  / Bili: x / Urobili: x   Blood: x / Protein: x / Nitrite: x   Leuk Esterase: x / RBC: x / WBC x   Sq Epi: x / Non Sq Epi: x / Bacteria: x        RADIOLOGY & ADDITIONAL TESTS:  < from: CT Abdomen and Pelvis w/ Oral Cont and w/ IV Cont (03.08.24 @ 19:26) >    IMPRESSION:  No evidenceof suspicious mass or adenopathy in the chest, abdomen, or   pelvis.    Clustered nodular and groundglass opacities in bilateral upper lobes,   more prominent on the right. Findings are most consistent with   infectious/inflammatory etiology.    < end of copied text >

## 2024-03-09 NOTE — PROGRESS NOTE ADULT - NS ATTEND AMEND GEN_ALL_CORE FT
Agree with above plan. CT C/A/P not concerning for malignancy. Hiccups persist, continue symptomatic management as laid out above.

## 2024-03-09 NOTE — CONSULT NOTE ADULT - ATTENDING COMMENTS
-Bradycardia - Pt. w/ sinus bradycardia on tele this AM, also on Verapamil for chronic headaches. After discussing this time period w/ patient - he was performing vagal maneuvers attempting to get rid of hiccups. Subsequently w/ sinus tach ~110-120 around noon - pt. was ambulating to the bathroom, also asymptomatic; Tele reviewed: NSR w/ sinus arrhythmia vs NSR w/ occasional APC - would suggest discontinuation of verapamil, but given need for BP control and migraine ppx - would start Norvasc 10mg daily  -Will continue to follow    Angela Gutiérrez MD  Cardiology Attending
Patient seen, examined, and discussed with Dr. Winkler. Agree with above. 39M with a h/o GERD with severe esophagitis and gastric erosions (last EGD 2023 with Dr. Tuttle on Omeprazole 40mg daily), viral meningitis with sequela of HTN and intractable headaches (followed by Dr. Weston) presents for autoimmune encephalitis management/treatment seen on outpatient PET 3/4 currently on decadron. Continue PPI BID, limited reglan PRN for nausea, and baclofen for hiccups. Would argue that his GI symptoms should improve with improvement of his Neuro symptoms.     Maximo Ozuna MD  Gastroenterology

## 2024-03-09 NOTE — CONSULT NOTE ADULT - SUBJECTIVE AND OBJECTIVE BOX
HPI:  39y Male with PMHx of meningitis 1.5 years ago, HTN, intractable headaches for which he follows up with Dr. Trista maguire who was sent in for admission after outpatient PET scan and LP concerning for possible encephalitis. Patient has undergone multiple treatments for his headaches in the past, including botox injections. Noticed increasing frequency and severity of his headaches over the last couple of days/weeks with associated neck stiffness. Has also noticed increased lethargy and cognitive changes over the same period of time. Describes it as a similar feeling to dyslexia, noticed it when he was responding to emails. Was sent for an outpatient PET scan on 03/01 which showed "abnormal hypometabolism particularly pronounced in the parieto-occipital and temporal regions (right greater than left), and also involving the anteromedial temporal lobes with extension into the orbitofrontal and paramedian frontal regions bilaterally. Findings suggestive of autoimmune encephalitis (AIE) sequelae, likely with contributing neurotropic medication effects, in the proper clinical setting." Underwent an IR guided LP today and was found to have an elevated opening pressure of 30 mmHg, remainder of CSF negative. Admitted for autoimmune encephalitis workup and management.    Today on 3/9 pt noted to be more bradycardic with HR to 50s on tele and HR appeared irregular so cardiology consulted. Per pt himself, he was doing vagal maneuvers at the time of bradycardia, trying to hold his breath so his hiccups would resolve. Also reports he became tachy with movement when he walked to the bathroom due to deconditioning.      ROS: Per HPI    PAST MEDICAL & SURGICAL HISTORY:  Hypertension      Migraine      Meningitis        SOCIAL HISTORY:  FAMILY HISTORY:    ALLERGIES: 	  methylPREDNISolone (Rash; Urticaria; Flushing; Hives)          MEDICATIONS:  acetaminophen     Tablet .. 650 milliGRAM(s) Oral every 6 hours PRN  acetaminophen     Tablet .. 650 milliGRAM(s) Oral once  amLODIPine   Tablet 10 milliGRAM(s) Oral daily  baclofen 10 milliGRAM(s) Oral every 6 hours  diphenhydrAMINE 25 milliGRAM(s) Oral once  enoxaparin Injectable 40 milliGRAM(s) SubCutaneous every 24 hours  gabapentin 100 milliGRAM(s) Oral every 6 hours  OLANZapine 2.5 milliGRAM(s) Oral once PRN  OLANZapine 2.5 milliGRAM(s) Oral every 24 hours  OLANZapine 5 milliGRAM(s) Oral every 24 hours  OLANZapine 2.5 milliGRAM(s) Oral every 24 hours  pantoprazole    Tablet 40 milliGRAM(s) Oral every 12 hours  polyethylene glycol 3350 17 Gram(s) Oral two times a day  sodium chloride 0.9%. 1000 milliLiter(s) IV Continuous <Continuous>  SUMAtriptan 100 milliGRAM(s) Oral two times a day PRN  traMADol 25 milliGRAM(s) Oral two times a day PRN      T(C): 36.4 (03-09-24 @ 22:01), Max: 36.9 (03-09-24 @ 14:00)  HR: 94 (03-09-24 @ 20:18) (64 - 110)  BP: 139/95 (03-09-24 @ 20:18) (139/95 - 159/87)  RR: 16 (03-09-24 @ 20:18) (16 - 16)  SpO2: --    03-08-24 @ 07:01  -  03-09-24 @ 07:00  --------------------------------------------------------  IN: 1275 mL / OUT: 0 mL / NET: 1275 mL    03-09-24 @ 06:01  -  03-09-24 @ 23:49  --------------------------------------------------------  IN: 900 mL / OUT: 900 mL / NET: 0 mL        PHYSICAL EXAM:    Constitutional: resting comfortably in bed; NAD  HEENT: NC/AT,  MMM, no JVD  Respiratory: CTA B/L; no W/R/R  Cardiac: +S1/S2; RRR; no M/R/G  Gastrointestinal: soft, NT/ND  Extremities: no peripheral edema  Dermatologic: skin warm, dry and intact  Neurologic: AAOx3; CNII-XII grossly intact; no focal deficits        I&O's Summary    08 Mar 2024 07:01  -  09 Mar 2024 07:00  --------------------------------------------------------  IN: 1275 mL / OUT: 0 mL / NET: 1275 mL    09 Mar 2024 06:01  -  09 Mar 2024 23:49  --------------------------------------------------------  IN: 900 mL / OUT: 900 mL / NET: 0 mL        LABS:	 	                        12.5   8.69  )-----------( 225      ( 09 Mar 2024 10:00 )             39.6     03-09    136  |  106  |  13  ----------------------------<  259<H>  4.0   |  21<L>  |  0.78    Ca    8.3<L>      09 Mar 2024 10:00  Phos  3.4     03-08  Mg     2.4     03-08

## 2024-03-09 NOTE — CONSULT NOTE ADULT - ASSESSMENT
Neurology    39 y o Male with PMHx of meningitis 1.5 years ago, HTN, intractable headaches for which he follows up with Dr. Weston outpatient who was sent in for admission after outpatient PET scan and LP concerning for possible encephalitis.     Neuro  #concern for encephalitis  - Outpatient PET scan 3/1: Abnormal hypometabolism particularly pronounced in the parieto-occipital and temporal regions (right greater than left), and also involving the anteromedial temporal lobes with extension into the orbitofrontal and   paramedian frontal regions bilaterally. Findings suggestive of autoimmune encephalitis (AIE) sequelae, likely with contributing neurotropic medication effects, in the proper clinical   setting.  - s/p LP 3/4 with elevated opening pressure at 30 mmHg, remainder of CSF negative  - start 1 g IV Solumedrol, monitor for any adverse rxn  - continue home PPI   - PRN Tylenol, Toradol, and Relgan ordered for headache management  - q8hr general neuro checks and vitals    Cards  #HTN  - permissive hypertension, Goal -180  - restart home Verapamil and Cozaar PRN    Pulm  - call provider if SPO2 < 94%    GI  #Nutrition/Fluids/Electrolytes   - replete K<4 and Mg <2  - Diet: Regular    Renal  - daily BMPs    Infectious Disease  - afebrile on admission    Endocrine  #DM  - A1C results: pending  - continue FS monitoring while on steroids    - TSH results: pending    DVT Prophylaxis  - lovenox sq for DVT prophylaxis   - SCDs for DVT prophylaxis     Dispo: pending PM&R consult,  PT/OT      Discussed daily hospital plans and goals with patient bedside    Discussed with Neurology Attending, Dr. Weston
39y Male with PMHx of GERD with severe esophagitis and gastric erosions (last EGD 2023 with Dr. Tuttle on Omeprazole 40mg daily), viral meningitis with sequela of HTN and intractable headaches (followed by Dr. Weston) presents for autoimmune encephalitis management/treatment seen on outpatient PET 3/4 currently on decadron.     GI consulted for worsening GERD symptoms on steroids. Agree with neurology management of reflux with optimizing PPI and behavioral modifications.     Recommendations:  - Pantoprazole 40mg PO BID while on steroids  - Baclofen hiccups  - Reglan PRN nausea  - Avoid NSAIDS if possible  - Remain upright 3-4h after meals  - Sleep with head of bed lifted  - Appreciate neurology management    Jacey Winkler MD  Gastroenterology Fellow, PGY -6  Weekday Pager [phone]
39y Male with PMHx of meningitis 1.5 years ago, HTN, intractable headaches admitted for autoimmune encephalitis    Autoimmune encephalitis  Management per primary team.   Patient reports allergic reaction with Methylprednisolone overnight, was started on Dexamethasone with good tolerance  Monitor FS while on steroids     HTN  Resume home Losartan, DASH diet. Patient reports was started on Verapamil to assist with BP and migraine, BP currently acceptable, will introduce as needed.   Will continue to monitor    Anxiety  Patient reports worsening anxiety  Agree with psychiatry evaluation to assist with symptoms management    Lymphopenia  Monitor, will manage based on trend    DVT ppx: per primary team   
39y Male with PMHx of meningitis 1.5 years ago (related to enterovirus), HTN, intractable headache who presents w/ neurocognitive decline and headaches. He is on the neurology service being treated for autoimmune encephalitis (AIE) with IVIG and Decadron. He had PET scan was done to evaluate for underlying malignancy in case his presentation was related to a paraneoplastic syndrome. His PET scan showed a RUL, anterior segment lesion that was PET avid for which Pulmonary is consulted.     # RUL anterior segment PET Avid area  - PET scan positive for uptake in an area that corresponds to a poorly defined area of ground glass  - denies any respiratory symptoms currently, young, never smoker, no family hx of lung adenocarcinoma  - unlikely that the patient's area on PET scan represents an underlying malignancy. Will follow up the dedicated CT noncontrast Chest study to evaluate this lesion w/ better clarity and thinner slices  - Likely represents inflammatory vs. infectious process. Will follow up CT chest and provide further recommendations      S/D/E with Dr. Wilson
39y Male with PMHx of meningitis 1.5 years ago, HTN, intractable headaches for which he follows up with Dr. Weston outpatient who was sent in for admission after outpatient PET scan and LP concerning for autoimmune encephalitis. Cardiology consulted for bradycardia.     Review of Studies:  Telemetry: NSR 80-90s with PACs vs sinus arrhythmia    Recommendations:    #Bradycardia  #Tachycardia  - pt reports doing vagal maneuvers at the time of bradycardia and becomes tachycardic with exertion  - irregularity due to APCs vs sinus arrhythmia, both benign findings with no intervention  - would hold Verapamil    #HTN  - -160s/80s  - would start Amlodipine 10mg daily for both BP and migraine ppx      Plan was discussed with primary team  We'll continue to follow, thank you for the consultation      Jenelle Kwon   Cardiovascular Disease Fellow  Consult Pager: [phone]

## 2024-03-09 NOTE — PROGRESS NOTE ADULT - ATTENDING COMMENTS
Admitted w autoimmune encephalitis, PET done in this context demonstrated FDG avid pulmonary opacities which were followed up w CT chest. The pt has no hx lung disease and no present chest symptoms. His chest is clear on exam. We reviewed the imaging which shows mild R>L upper lobe ground glass opacities. This appearance is not c/w malignancy and is likely infectious/inflammatory. Recommend no further eval now, and could consider a repeat CT chest in 6-12 weeks to document resolution. D/w pt at bedside.

## 2024-03-10 ENCOUNTER — TRANSCRIPTION ENCOUNTER (OUTPATIENT)
Age: 39
End: 2024-03-10

## 2024-03-10 VITALS — DIASTOLIC BLOOD PRESSURE: 96 MMHG | RESPIRATION RATE: 17 BRPM | SYSTOLIC BLOOD PRESSURE: 149 MMHG

## 2024-03-10 PROCEDURE — 86147 CARDIOLIPIN ANTIBODY EA IG: CPT

## 2024-03-10 PROCEDURE — 70546 MR ANGIOGRAPH HEAD W/O&W/DYE: CPT | Mod: MC

## 2024-03-10 PROCEDURE — 82164 ANGIOTENSIN I ENZYME TEST: CPT

## 2024-03-10 PROCEDURE — 86431 RHEUMATOID FACTOR QUANT: CPT

## 2024-03-10 PROCEDURE — 83789 MASS SPECTROMETRY QUAL/QUAN: CPT

## 2024-03-10 PROCEDURE — 84165 PROTEIN E-PHORESIS SERUM: CPT

## 2024-03-10 PROCEDURE — 86800 THYROGLOBULIN ANTIBODY: CPT

## 2024-03-10 PROCEDURE — 86255 FLUORESCENT ANTIBODY SCREEN: CPT

## 2024-03-10 PROCEDURE — 86038 ANTINUCLEAR ANTIBODIES: CPT

## 2024-03-10 PROCEDURE — 84443 ASSAY THYROID STIM HORMONE: CPT

## 2024-03-10 PROCEDURE — A9552: CPT

## 2024-03-10 PROCEDURE — 36415 COLL VENOUS BLD VENIPUNCTURE: CPT

## 2024-03-10 PROCEDURE — 86334 IMMUNOFIX E-PHORESIS SERUM: CPT

## 2024-03-10 PROCEDURE — 85730 THROMBOPLASTIN TIME PARTIAL: CPT

## 2024-03-10 PROCEDURE — 97165 OT EVAL LOW COMPLEX 30 MIN: CPT

## 2024-03-10 PROCEDURE — 83036 HEMOGLOBIN GLYCOSYLATED A1C: CPT

## 2024-03-10 PROCEDURE — 86160 COMPLEMENT ANTIGEN: CPT

## 2024-03-10 PROCEDURE — 86231 EMA EACH IG CLASS: CPT

## 2024-03-10 PROCEDURE — 86235 NUCLEAR ANTIGEN ANTIBODY: CPT

## 2024-03-10 PROCEDURE — 95700 EEG CONT REC W/VID EEG TECH: CPT

## 2024-03-10 PROCEDURE — 86162 COMPLEMENT TOTAL (CH50): CPT

## 2024-03-10 PROCEDURE — 83735 ASSAY OF MAGNESIUM: CPT

## 2024-03-10 PROCEDURE — A9585: CPT

## 2024-03-10 PROCEDURE — 80061 LIPID PANEL: CPT

## 2024-03-10 PROCEDURE — 85027 COMPLETE CBC AUTOMATED: CPT

## 2024-03-10 PROCEDURE — 80048 BASIC METABOLIC PNL TOTAL CA: CPT

## 2024-03-10 PROCEDURE — 82962 GLUCOSE BLOOD TEST: CPT

## 2024-03-10 PROCEDURE — 86256 FLUORESCENT ANTIBODY TITER: CPT

## 2024-03-10 PROCEDURE — 95713 VEEG 2-12 HR CONT MNTR: CPT

## 2024-03-10 PROCEDURE — 76377 3D RENDER W/INTRP POSTPROCES: CPT

## 2024-03-10 PROCEDURE — 82306 VITAMIN D 25 HYDROXY: CPT

## 2024-03-10 PROCEDURE — 97161 PT EVAL LOW COMPLEX 20 MIN: CPT

## 2024-03-10 PROCEDURE — 83519 RIA NONANTIBODY: CPT

## 2024-03-10 PROCEDURE — 93005 ELECTROCARDIOGRAM TRACING: CPT

## 2024-03-10 PROCEDURE — 85732 THROMBOPLASTIN TIME PARTIAL: CPT

## 2024-03-10 PROCEDURE — 86376 MICROSOMAL ANTIBODY EACH: CPT

## 2024-03-10 PROCEDURE — 85025 COMPLETE CBC W/AUTO DIFF WBC: CPT

## 2024-03-10 PROCEDURE — 71260 CT THORAX DX C+: CPT | Mod: MC

## 2024-03-10 PROCEDURE — 86225 DNA ANTIBODY NATIVE: CPT

## 2024-03-10 PROCEDURE — 84100 ASSAY OF PHOSPHORUS: CPT

## 2024-03-10 PROCEDURE — 80053 COMPREHEN METABOLIC PANEL: CPT

## 2024-03-10 PROCEDURE — 85598 HEXAGNAL PHOSPH PLTLT NEUTRL: CPT

## 2024-03-10 PROCEDURE — 85613 RUSSELL VIPER VENOM DILUTED: CPT

## 2024-03-10 PROCEDURE — 78815 PET IMAGE W/CT SKULL-THIGH: CPT | Mod: MC

## 2024-03-10 PROCEDURE — 86146 BETA-2 GLYCOPROTEIN ANTIBODY: CPT

## 2024-03-10 PROCEDURE — 86140 C-REACTIVE PROTEIN: CPT

## 2024-03-10 PROCEDURE — 95705 EEG W/O VID 2-12 HR UNMNTR: CPT

## 2024-03-10 PROCEDURE — 74018 RADEX ABDOMEN 1 VIEW: CPT

## 2024-03-10 PROCEDURE — 72141 MRI NECK SPINE W/O DYE: CPT | Mod: MC

## 2024-03-10 PROCEDURE — 82595 ASSAY OF CRYOGLOBULIN: CPT

## 2024-03-10 PROCEDURE — 84155 ASSAY OF PROTEIN SERUM: CPT

## 2024-03-10 PROCEDURE — 70553 MRI BRAIN STEM W/O & W/DYE: CPT | Mod: MC

## 2024-03-10 PROCEDURE — 74177 CT ABD & PELVIS W/CONTRAST: CPT | Mod: MC

## 2024-03-10 PROCEDURE — 86596 VOLTAGE-GTD CA CHNL ANTB EA: CPT

## 2024-03-10 PROCEDURE — 86258 DGP ANTIBODY EACH IG CLASS: CPT

## 2024-03-10 PROCEDURE — 99239 HOSP IP/OBS DSCHRG MGMT >30: CPT

## 2024-03-10 PROCEDURE — 83520 IMMUNOASSAY QUANT NOS NONAB: CPT

## 2024-03-10 PROCEDURE — 84432 ASSAY OF THYROGLOBULIN: CPT

## 2024-03-10 PROCEDURE — 86341 ISLET CELL ANTIBODY: CPT

## 2024-03-10 PROCEDURE — 85652 RBC SED RATE AUTOMATED: CPT

## 2024-03-10 RX ORDER — OLANZAPINE 15 MG/1
1 TABLET, FILM COATED ORAL
Qty: 30 | Refills: 0
Start: 2024-03-10 | End: 2024-04-08

## 2024-03-10 RX ORDER — SUMATRIPTAN SUCCINATE 4 MG/.5ML
6 INJECTION, SOLUTION SUBCUTANEOUS
Qty: 6 | Refills: 0
Start: 2024-03-10 | End: 2024-03-15

## 2024-03-10 RX ORDER — SUMATRIPTAN SUCCINATE 4 MG/.5ML
1 INJECTION, SOLUTION SUBCUTANEOUS
Qty: 30 | Refills: 1
Start: 2024-03-10

## 2024-03-10 RX ORDER — AMLODIPINE BESYLATE 2.5 MG/1
1 TABLET ORAL
Qty: 30 | Refills: 3
Start: 2024-03-10

## 2024-03-10 RX ORDER — PANTOPRAZOLE SODIUM 20 MG/1
1 TABLET, DELAYED RELEASE ORAL
Qty: 60 | Refills: 1
Start: 2024-03-10

## 2024-03-10 RX ORDER — PANTOPRAZOLE SODIUM 20 MG/1
1 TABLET, DELAYED RELEASE ORAL
Qty: 60 | Refills: 1
Start: 2024-03-10 | End: 2024-05-08

## 2024-03-10 RX ORDER — BACLOFEN 100 %
1 POWDER (GRAM) MISCELLANEOUS
Qty: 56 | Refills: 0
Start: 2024-03-10 | End: 2024-03-23

## 2024-03-10 RX ORDER — GABAPENTIN 400 MG/1
1 CAPSULE ORAL
Qty: 56 | Refills: 1
Start: 2024-03-10 | End: 2024-04-06

## 2024-03-10 RX ADMIN — OLANZAPINE 2.5 MILLIGRAM(S): 15 TABLET, FILM COATED ORAL at 10:24

## 2024-03-10 RX ADMIN — GABAPENTIN 100 MILLIGRAM(S): 400 CAPSULE ORAL at 06:46

## 2024-03-10 RX ADMIN — POLYETHYLENE GLYCOL 3350 17 GRAM(S): 17 POWDER, FOR SOLUTION ORAL at 10:25

## 2024-03-10 RX ADMIN — Medication 10 MILLIGRAM(S): at 12:21

## 2024-03-10 RX ADMIN — Medication 10 MILLIGRAM(S): at 06:46

## 2024-03-10 RX ADMIN — SODIUM CHLORIDE 75 MILLILITER(S): 9 INJECTION INTRAMUSCULAR; INTRAVENOUS; SUBCUTANEOUS at 00:10

## 2024-03-10 RX ADMIN — AMLODIPINE BESYLATE 10 MILLIGRAM(S): 2.5 TABLET ORAL at 06:46

## 2024-03-10 RX ADMIN — GABAPENTIN 100 MILLIGRAM(S): 400 CAPSULE ORAL at 12:21

## 2024-03-10 RX ADMIN — Medication 60 MILLIGRAM(S): at 10:24

## 2024-03-10 RX ADMIN — PANTOPRAZOLE SODIUM 40 MILLIGRAM(S): 20 TABLET, DELAYED RELEASE ORAL at 10:24

## 2024-03-10 NOTE — PROVIDER CONTACT NOTE (OTHER) - REASON
Pt having hiccups
Pt removed continuous VEEG and continuous cardiac monitor
Pt expressed intent to take home medications with belongings if he does not receive the medications
Refusing the use of call bell and the spo2 sensor
Pt refusing tele monitoring
pt refusing tele and continuous pulse oximetry monitoring

## 2024-03-10 NOTE — PROVIDER CONTACT NOTE (OTHER) - DATE AND TIME:
07-Mar-2024 05:30
07-Mar-2024 10:30
10-Mar-2024 08:30
05-Mar-2024 07:58
06-Mar-2024 00:05
05-Mar-2024 22:43

## 2024-03-10 NOTE — PROVIDER CONTACT NOTE (OTHER) - NAME OF MD/NP/PA/DO NOTIFIED:
ALMA ROSA Sharp
Stroke PA Carlota Jones
Stroke ACP Nupur
ALMA ROSA Sharp
ALMA ROSA Rivera
ALMA ROSA Rivera

## 2024-03-11 ENCOUNTER — APPOINTMENT (OUTPATIENT)
Dept: DERMATOLOGY | Facility: CLINIC | Age: 39
End: 2024-03-11

## 2024-03-11 ENCOUNTER — TRANSCRIPTION ENCOUNTER (OUTPATIENT)
Age: 39
End: 2024-03-11

## 2024-03-11 ENCOUNTER — NON-APPOINTMENT (OUTPATIENT)
Age: 39
End: 2024-03-11

## 2024-03-11 PROBLEM — G03.9 MENINGITIS, UNSPECIFIED: Chronic | Status: ACTIVE | Noted: 2024-03-04

## 2024-03-12 ENCOUNTER — APPOINTMENT (OUTPATIENT)
Dept: NEUROLOGY | Facility: CLINIC | Age: 39
End: 2024-03-12

## 2024-03-12 ENCOUNTER — APPOINTMENT (OUTPATIENT)
Dept: HEART AND VASCULAR | Facility: CLINIC | Age: 39
End: 2024-03-12
Payer: COMMERCIAL

## 2024-03-12 ENCOUNTER — OUTPATIENT (OUTPATIENT)
Dept: OUTPATIENT SERVICES | Facility: HOSPITAL | Age: 39
LOS: 1 days | End: 2024-03-12
Payer: COMMERCIAL

## 2024-03-12 ENCOUNTER — INPATIENT (INPATIENT)
Facility: HOSPITAL | Age: 39
LOS: 13 days | Discharge: HOME | DRG: 98 | End: 2024-03-26
Attending: PSYCHIATRY & NEUROLOGY | Admitting: PSYCHIATRY & NEUROLOGY
Payer: COMMERCIAL

## 2024-03-12 ENCOUNTER — APPOINTMENT (OUTPATIENT)
Dept: MRI IMAGING | Facility: HOSPITAL | Age: 39
End: 2024-03-12

## 2024-03-12 ENCOUNTER — NON-APPOINTMENT (OUTPATIENT)
Age: 39
End: 2024-03-12

## 2024-03-12 VITALS
OXYGEN SATURATION: 96 % | BODY MASS INDEX: 27.9 KG/M2 | SYSTOLIC BLOOD PRESSURE: 150 MMHG | DIASTOLIC BLOOD PRESSURE: 113 MMHG | HEIGHT: 72 IN | HEART RATE: 140 BPM | WEIGHT: 206 LBS

## 2024-03-12 VITALS
RESPIRATION RATE: 16 BRPM | DIASTOLIC BLOOD PRESSURE: 98 MMHG | HEART RATE: 113 BPM | SYSTOLIC BLOOD PRESSURE: 142 MMHG | OXYGEN SATURATION: 95 %

## 2024-03-12 DIAGNOSIS — R29.898 OTHER SYMPTOMS AND SIGNS INVOLVING THE MUSCULOSKELETAL SYSTEM: ICD-10-CM

## 2024-03-12 LAB
AMPA-R AB CBA, CSF: NEGATIVE — SIGNIFICANT CHANGE UP
AMPA-RECEPTOR ANTIBODY BY CBA, SERUM: NEGATIVE — SIGNIFICANT CHANGE UP
AMPHIPHYSIN AB TITR CSF: NEGATIVE — SIGNIFICANT CHANGE UP
AMPHIPHYSIN AB TITR SER: NEGATIVE — SIGNIFICANT CHANGE UP
AMPHIPHYSIN AB TITR SER: NEGATIVE — SIGNIFICANT CHANGE UP
ANION GAP SERPL CALC-SCNC: 12 MMOL/L — SIGNIFICANT CHANGE UP (ref 5–17)
BUN SERPL-MCNC: 22 MG/DL — SIGNIFICANT CHANGE UP (ref 7–23)
CALCIUM SERPL-MCNC: 8.6 MG/DL — SIGNIFICANT CHANGE UP (ref 8.4–10.5)
CASPR2-IGG CBA, CSF: NEGATIVE — SIGNIFICANT CHANGE UP
CASPR2-IGG CBA, SERUM: NEGATIVE — SIGNIFICANT CHANGE UP
CHLORIDE SERPL-SCNC: 102 MMOL/L — SIGNIFICANT CHANGE UP (ref 96–108)
CO2 SERPL-SCNC: 19 MMOL/L — LOW (ref 22–31)
CREAT SERPL-MCNC: 0.95 MG/DL — SIGNIFICANT CHANGE UP (ref 0.5–1.3)
CRMP-5-IGG WESTERN BLOT: NEGATIVE — SIGNIFICANT CHANGE UP
CRMP-5-IGG WESTERN BLOT: NEGATIVE — SIGNIFICANT CHANGE UP
CRP SERPL-MCNC: <3 MG/L — SIGNIFICANT CHANGE UP (ref 0–4)
CV2 IGG TITR CSF: NEGATIVE — SIGNIFICANT CHANGE UP
D DIMER BLD IA.RAPID-MCNC: 502 NG/ML DDU — HIGH
DPPX ANTIBODY IFA, CSF: NEGATIVE — SIGNIFICANT CHANGE UP
DPPX IGG SERPL QL IF: NEGATIVE — SIGNIFICANT CHANGE UP
EGFR: 106 ML/MIN/1.73M2 — SIGNIFICANT CHANGE UP
ERYTHROCYTE [SEDIMENTATION RATE] IN BLOOD: 15 MM/HR — SIGNIFICANT CHANGE UP
GABA-B-R AB CBA, CSF: NEGATIVE — SIGNIFICANT CHANGE UP
GABA-B-RECEPTOR ANTIBODY BY CBA, SERUM: NEGATIVE — SIGNIFICANT CHANGE UP
GAD65 AB CSF-SCNC: 0 NMOL/L — SIGNIFICANT CHANGE UP
GAD65 AB SER-MCNC: 0 NMOL/L — SIGNIFICANT CHANGE UP
GFAP ALPHA IGG SER QL IF: NEGATIVE — SIGNIFICANT CHANGE UP
GFAP IFA, CSF: NEGATIVE — SIGNIFICANT CHANGE UP
GLIAL NUC TYPE 1 AB TITR CSF: NEGATIVE — SIGNIFICANT CHANGE UP
GLIAL NUC TYPE 1 AB TITR SER: NEGATIVE — SIGNIFICANT CHANGE UP
GLIAL NUC TYPE 1 AB TITR SER: NEGATIVE — SIGNIFICANT CHANGE UP
GLUCOSE SERPL-MCNC: 213 MG/DL — HIGH (ref 70–99)
HAV IGM SER-ACNC: SIGNIFICANT CHANGE UP
HBV CORE IGM SER-ACNC: SIGNIFICANT CHANGE UP
HBV SURFACE AG SER-ACNC: SIGNIFICANT CHANGE UP
HCT VFR BLD CALC: 42.7 % — SIGNIFICANT CHANGE UP (ref 39–50)
HCV AB S/CO SERPL IA: 0.08 S/CO — SIGNIFICANT CHANGE UP
HCV AB SERPL-IMP: SIGNIFICANT CHANGE UP
HGB BLD-MCNC: 13.8 G/DL — SIGNIFICANT CHANGE UP (ref 13–17)
HU1 AB TITR CSF IF: NEGATIVE — SIGNIFICANT CHANGE UP
HU1 AB TITR SER: NEGATIVE — SIGNIFICANT CHANGE UP
HU1 AB TITR SER: NEGATIVE — SIGNIFICANT CHANGE UP
HU2 AB TITR CSF IF: NEGATIVE — SIGNIFICANT CHANGE UP
HU2 AB TITR SER IF: NEGATIVE — SIGNIFICANT CHANGE UP
HU2 AB TITR SER IF: NEGATIVE — SIGNIFICANT CHANGE UP
HU3 AB TITR CSF: NEGATIVE — SIGNIFICANT CHANGE UP
HU3 AB TITR SER: NEGATIVE — SIGNIFICANT CHANGE UP
HU3 AB TITR SER: NEGATIVE — SIGNIFICANT CHANGE UP
IFA NOTES: SIGNIFICANT CHANGE UP
IGLON5 IFA, CSF: NEGATIVE — SIGNIFICANT CHANGE UP
IMMUNOLOGIST REVIEW: SIGNIFICANT CHANGE UP
IMMUNOLOGIST REVIEW: SIGNIFICANT CHANGE UP
LGI1-IGG CBA, CSF: NEGATIVE — SIGNIFICANT CHANGE UP
LGI1-IGG CBA, SERUM: NEGATIVE — SIGNIFICANT CHANGE UP
MAGNESIUM SERPL-MCNC: 2.2 MG/DL — SIGNIFICANT CHANGE UP (ref 1.6–2.6)
MBP CSF-MCNC: 3.5 NG/ML — SIGNIFICANT CHANGE UP (ref 0–3.8)
MCHC RBC-ENTMCNC: 26.6 PG — LOW (ref 27–34)
MCHC RBC-ENTMCNC: 32.3 GM/DL — SIGNIFICANT CHANGE UP (ref 32–36)
MCV RBC AUTO: 82.4 FL — SIGNIFICANT CHANGE UP (ref 80–100)
MGLUR1 AB IFA, CSF: NEGATIVE — SIGNIFICANT CHANGE UP
MGLUR1 IGG SER QL IF: NEGATIVE — SIGNIFICANT CHANGE UP
NEOPTERIN SERPL-MCNC: 0.9 NG/ML — SIGNIFICANT CHANGE UP
NEUROCHONDRIN AB SERPL QL IF: NEGATIVE — SIGNIFICANT CHANGE UP
NMDAR1 IGG SER QL CBA IFA: NEGATIVE — SIGNIFICANT CHANGE UP
NRBC # BLD: 0 /100 WBCS — SIGNIFICANT CHANGE UP (ref 0–0)
OLIGOCLONAL BANDS CSF ELPH-IMP: SIGNIFICANT CHANGE UP
P/Q TYPE ANTIBODY: 0 NMOL/L — SIGNIFICANT CHANGE UP
PARANEOPLASTIC AB SER-IMP: SIGNIFICANT CHANGE UP
PCA-1 AB TITR SER: NEGATIVE — SIGNIFICANT CHANGE UP
PCA-1 AB TITR SER: NEGATIVE — SIGNIFICANT CHANGE UP
PCA-2 AB TITR SER: NEGATIVE — SIGNIFICANT CHANGE UP
PCA-2 AB TITR SER: NEGATIVE — SIGNIFICANT CHANGE UP
PCA-TR AB TITR CSF: NEGATIVE — SIGNIFICANT CHANGE UP
PCA-TR AB TITR SER: NEGATIVE — SIGNIFICANT CHANGE UP
PCA-TR AB TITR SER: NEGATIVE — SIGNIFICANT CHANGE UP
PHOSPHATE SERPL-MCNC: 3.8 MG/DL — SIGNIFICANT CHANGE UP (ref 2.5–4.5)
PLATELET # BLD AUTO: 307 K/UL — SIGNIFICANT CHANGE UP (ref 150–400)
POTASSIUM SERPL-MCNC: 3.9 MMOL/L — SIGNIFICANT CHANGE UP (ref 3.5–5.3)
POTASSIUM SERPL-SCNC: 3.9 MMOL/L — SIGNIFICANT CHANGE UP (ref 3.5–5.3)
RBC # BLD: 5.18 M/UL — SIGNIFICANT CHANGE UP (ref 4.2–5.8)
RBC # FLD: 18.9 % — HIGH (ref 10.3–14.5)
SODIUM SERPL-SCNC: 133 MMOL/L — LOW (ref 135–145)
VGKC AB SER-SCNC: 0.02 NMOL/L — SIGNIFICANT CHANGE UP
WBC # BLD: 15.36 K/UL — HIGH (ref 3.8–10.5)
WBC # FLD AUTO: 15.36 K/UL — HIGH (ref 3.8–10.5)

## 2024-03-12 PROCEDURE — 93000 ELECTROCARDIOGRAM COMPLETE: CPT

## 2024-03-12 PROCEDURE — A9585: CPT

## 2024-03-12 PROCEDURE — 72156 MRI NECK SPINE W/O & W/DYE: CPT

## 2024-03-12 PROCEDURE — 70553 MRI BRAIN STEM W/O & W/DYE: CPT

## 2024-03-12 PROCEDURE — 70553 MRI BRAIN STEM W/O & W/DYE: CPT | Mod: 26

## 2024-03-12 PROCEDURE — 99214 OFFICE O/P EST MOD 30 MIN: CPT

## 2024-03-12 PROCEDURE — 72156 MRI NECK SPINE W/O & W/DYE: CPT | Mod: 26

## 2024-03-12 PROCEDURE — 99215 OFFICE O/P EST HI 40 MIN: CPT

## 2024-03-12 RX ORDER — OLANZAPINE 15 MG/1
5 TABLET, FILM COATED ORAL AT BEDTIME
Refills: 0 | Status: DISCONTINUED | OUTPATIENT
Start: 2024-03-12 | End: 2024-03-26

## 2024-03-12 RX ORDER — POLYETHYLENE GLYCOL 3350 17 G/17G
17 POWDER, FOR SOLUTION ORAL ONCE
Refills: 0 | Status: COMPLETED | OUTPATIENT
Start: 2024-03-12 | End: 2024-03-12

## 2024-03-12 RX ORDER — DEXAMETHASONE 0.5 MG/5ML
4 ELIXIR ORAL ONCE
Refills: 0 | Status: DISCONTINUED | OUTPATIENT
Start: 2024-03-12 | End: 2024-03-12

## 2024-03-12 RX ORDER — DEXAMETHASONE 0.5 MG/5ML
2 ELIXIR ORAL EVERY 6 HOURS
Refills: 0 | Status: DISCONTINUED | OUTPATIENT
Start: 2024-03-13 | End: 2024-03-13

## 2024-03-12 RX ORDER — PANTOPRAZOLE SODIUM 20 MG/1
40 TABLET, DELAYED RELEASE ORAL
Refills: 0 | Status: DISCONTINUED | OUTPATIENT
Start: 2024-03-12 | End: 2024-03-13

## 2024-03-12 RX ORDER — BACLOFEN 100 %
10 POWDER (GRAM) MISCELLANEOUS EVERY 8 HOURS
Refills: 0 | Status: DISCONTINUED | OUTPATIENT
Start: 2024-03-12 | End: 2024-03-14

## 2024-03-12 RX ORDER — OLANZAPINE 15 MG/1
5 TABLET, FILM COATED ORAL DAILY
Refills: 0 | Status: DISCONTINUED | OUTPATIENT
Start: 2024-03-13 | End: 2024-03-13

## 2024-03-12 RX ORDER — OLANZAPINE 15 MG/1
2.5 TABLET, FILM COATED ORAL DAILY
Refills: 0 | Status: DISCONTINUED | OUTPATIENT
Start: 2024-03-13 | End: 2024-03-13

## 2024-03-12 RX ORDER — DEXAMETHASONE 0.5 MG/5ML
4 ELIXIR ORAL ONCE
Refills: 0 | Status: COMPLETED | OUTPATIENT
Start: 2024-03-12 | End: 2024-03-12

## 2024-03-12 RX ORDER — GABAPENTIN 400 MG/1
100 CAPSULE ORAL EVERY 8 HOURS
Refills: 0 | Status: DISCONTINUED | OUTPATIENT
Start: 2024-03-12 | End: 2024-03-14

## 2024-03-12 RX ORDER — VERAPAMIL HCL 240 MG
120 CAPSULE, EXTENDED RELEASE PELLETS 24 HR ORAL DAILY
Refills: 0 | Status: DISCONTINUED | OUTPATIENT
Start: 2024-03-12 | End: 2024-03-13

## 2024-03-12 RX ORDER — DEXAMETHASONE 0.5 MG/5ML
4 ELIXIR ORAL EVERY 6 HOURS
Refills: 0 | Status: DISCONTINUED | OUTPATIENT
Start: 2024-03-12 | End: 2024-03-12

## 2024-03-12 RX ORDER — SUMATRIPTAN SUCCINATE 4 MG/.5ML
100 INJECTION, SOLUTION SUBCUTANEOUS ONCE
Refills: 0 | Status: COMPLETED | OUTPATIENT
Start: 2024-03-12 | End: 2024-03-12

## 2024-03-12 RX ORDER — SODIUM CHLORIDE 9 MG/ML
500 INJECTION INTRAMUSCULAR; INTRAVENOUS; SUBCUTANEOUS
Refills: 0 | Status: DISCONTINUED | OUTPATIENT
Start: 2024-03-12 | End: 2024-03-14

## 2024-03-12 RX ADMIN — SODIUM CHLORIDE 125 MILLILITER(S): 9 INJECTION INTRAMUSCULAR; INTRAVENOUS; SUBCUTANEOUS at 19:25

## 2024-03-12 RX ADMIN — Medication 120 MILLIGRAM(S): at 21:54

## 2024-03-12 RX ADMIN — Medication 10 MILLIGRAM(S): at 21:15

## 2024-03-12 RX ADMIN — Medication 4 MILLIGRAM(S): at 20:59

## 2024-03-12 RX ADMIN — OLANZAPINE 5 MILLIGRAM(S): 15 TABLET, FILM COATED ORAL at 21:05

## 2024-03-12 RX ADMIN — SUMATRIPTAN SUCCINATE 100 MILLIGRAM(S): 4 INJECTION, SOLUTION SUBCUTANEOUS at 21:00

## 2024-03-12 RX ADMIN — POLYETHYLENE GLYCOL 3350 17 GRAM(S): 17 POWDER, FOR SOLUTION ORAL at 21:51

## 2024-03-12 RX ADMIN — SUMATRIPTAN SUCCINATE 100 MILLIGRAM(S): 4 INJECTION, SOLUTION SUBCUTANEOUS at 22:43

## 2024-03-12 RX ADMIN — GABAPENTIN 100 MILLIGRAM(S): 400 CAPSULE ORAL at 21:05

## 2024-03-12 NOTE — H&P ADULT - NSHPPHYSICALEXAM_GEN_ALL_CORE
Neurologic:  -Mental status: Awake, alert, oriented to person, place, and time. Speech is fluent with intact naming, repetition, and comprehension, no dysarthria. Recent and remote memory intact. Follows commands. Attention/concentration intact. Fund of knowledge appropriate.  -Cranial nerves:   II: Visual fields are full to confrontation.  III, IV, VI: Extraocular movements are intact without nystagmus. Pupils equally round and reactive to light  V:  Facial sensation V1-V3 equal and intact   VII: Face is symmetric with normal eye closure and smile  VIII: Hearing is bilaterally intact to finger rub  IX, X: Uvula is midline and soft palate rises symmetrically  XI: Head turning and shoulder shrug are intact.  XII: Tongue protrudes midline  Motor: Normal bulk and tone. No pronator drift. Strength bilateral upper extremity 5/5, bilateral lower extremities 5/5.  Rapid alternating movements intact and symmetric  Sensation: Intact to light touch bilaterally. No neglect or extinction on double simultaneous testing.  Coordination: No dysmetria on finger-to-nose and heel-to-shin bilaterally  Reflexes: Downgoing toes bilaterally   Gait: Narrow gait and steady    NIHSS Score: **** ASPECT Score: *** ICH Score: *** (GCS) Neurologic:  -Mental status: Awake, alert, oriented to person, place, and time. Speech is fluent with intact naming, repetition, and comprehension, no dysarthria. Recent and remote memory intact. Follows commands. Attention/concentration intact.    -Cranial nerves:   II: Visual fields are full to confrontation.  III, IV, VI: Extraocular movements are intact without nystagmus.    V:  Facial sensation V1-V3 equal and intact   VII: Face is symmetric with normal eye closure and smile +glabellar and snout sign  VIII: Hearing is bilaterally intact to finger rub  IX, X: Uvula is midline and soft palate rises symmetrically  XI: Head turning and shoulder shrug are intact.  XII: Tongue protrudes midline  Motor: Normal bulk and tone. Bilateral UE tremor and cogwheeling. Weakness in bilateral distal UE. Weakness in bilateral wrist and finger extensors. Right leg hip flexor weakness.  Sensation: Patchy sensory loss bilateral forearms. No neglect or extinction on double simultaneous testing.  Coordination: Bilateral UE dysmetria and past pointing with dysdiadochokinesia  Reflexes: Downgoing toes bilaterally, hyper-reflexia in bilateral LE, normal in bilateral UE  Gait: wide based gait

## 2024-03-12 NOTE — DISCUSSION/SUMMARY
[FreeTextEntry1] : 1.Tachy- sinus tach- secondary to encpehalopthy/ predinose induce delerium  MRI Dirct admission to [facility]  2.htn- dc norvasc and retasrt verapamil 120 [EKG obtained to assist in diagnosis and management of assessed problem(s)] : EKG obtained to assist in diagnosis and management of assessed problem(s)

## 2024-03-12 NOTE — H&P ADULT - ASSESSMENT
Neuro  #autoimmune encephalitis  - Outpatient PET scan 3/1: Abnormal hypometabolism particularly pronounced in the parieto-occipital and temporal regions (right greater than left), and also involving the anteromedial temporal lobes with extension into the orbitofrontal and   paramedian frontal regions bilaterally. Findings suggestive of autoimmune encephalitis (AIE) sequelae, likely with contributing neurotropic medication effects, in the proper clinical   setting.  - s/p LP 3/4 with elevated opening pressure at 30 mmHg, remainder of CSF negative  - lower Decadron to 4g q12 since MRI shows no enhancement - likely will taper as outpatient  -MRI brain epilepsy protocol done - no abnormalities or enhancement  - continue home pantoprazole  - PRN Tylenol, Toradol, and Relgan, sumatriptan ordered for headache management  - q8hr general neuro checks and vitals  Zyprexa standing (2.5 am, 2.5 pm, 5 at bedtime) and allow for additional 2.5mg PRN as needed for agitation  -QTc on 3/8 was 432  -STOP ativan    #hiccups  - increased gabapentin to four times a day  - increased baclofen to 10mg q6h PRN  -Only thorazine and reglan after approval from Dr. Weston    Cards  #HTN  - goal normotension  - will hold losartan for now due to hx of autonomic dysfunction  -continue home verapamil    Pulm  - call provider if SPO2 < 94%  - uptake noted in right upper lobe of right lung on PET; CT C/A/P demonstrates clustered nodular and ground-glass opacities in bilateral upper lobes, more prominent over right; findings most consistent with infectious/inflammatory etiology  - pulmonology on board, appreciate recs    GI  #Nutrition/Fluids/Electrolytes   - replete K<4 and Mg <2  - Diet: Regular    Infectious Disease  - afebrile on admission    Endocrine  #DM  - A1C results: 5.2  - continue FS monitoring while on steroids    - TSH results: 0.594    Psych  #anxiety  - psych consulted, f/u recs  - c/w klonopin 0,5mg BID prn, stopped ativan  - Zyprexa started as above    DVT Prophylaxis  - SCDs for DVT prophylaxis     Dispo: home when cleared     Discussed daily hospital plans and goals with patient bedside    Discussed with Neurology Attending, Dr. Weston   39y Male with PMHx of viral meningitis with sequela of HTN and intractable headaches (followed by Dr. Weston), recent admission 3/4/24 for autoimmune encephalitis management/treatment presents today for bilateral hand weakness as well as wide based gait, UE tremors, UE cogwheeling, dysmetria, and UE weakness. Went for outpatient MRI brain and cervical spine today. Admitted for further neurologic workup.     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  - s/p LP 3/4 with elevated opening pressure at 30 mmHg, remainder of CSF negative  - one dose of decadron 4mg tonight  - will start decadron 2mg q6h on 3/13  - LP to be done 3/13, NPO after midnight, need full infectious, autoimmune, and fungal studies, all herpes panels (HSV 1, HSV 2, HSV 6), CSF neopterin (write in), TB, RPR, VDRL, AFB, bacterial antigens, full viral PCR panel, oligoclonal bands, Igg index, myelin basic protein  - will need LP with opening pressure  - plasma exchange to be done 3/13, heme made aware and Dr. Yancey from blood bank made aware  - HD catheter for the plasma exchange will be placed by IR during LP, will need IR cather orders  - start verapamil 120mg, if HR is not controlled, can give labetalol 5mg pushes  - f/u serum hepatitis panel, quantiferon, NMO antibody, d-dimer, ACE, sed rate, CRP, serum protein and immuno electrophoresis, ANCA, LAKSHMI, double stranded DNA, cryoglobulin, copper, ceruloplasmin  - urine heavy metal screen, urine porphyria  - c/w gabapentin 100mg q6h  - c/w baclofen 10mg q8h  - continue home pantoprazole  - q8hr general neuro checks and vitals    #migraine  - sumatriptan 100mg PO PRN once (max 2 tabs)  - if needed, can given sumatriptan 6mg SQ once on top of PO    Cards  #HTN  - goal normotension  - hold amlodipine  - start verapamil 120mg    Pulm  - call provider if SPO2 < 94%  - uptake noted in right upper lobe of right lung on PET; CT C/A/P demonstrates clustered nodular and ground-glass opacities in bilateral upper lobes, more prominent over right; findings most consistent with infectious/inflammatory etiology  - repeat CT in 6-12 weeks to ensure resolution    GI  #Nutrition/Fluids/Electrolytes   - replete K<4 and Mg <2  - Diet: Regular  - IVF: NS 500cc/hr then continue 100cc/hr    Infectious Disease  - afebrile on admission    Endocrine  - A1C results: 5.2  - continue FS monitoring while on steroids    - TSH results: 0.594    Psych  #anxiety  - Zyprexa standing (2.5 am, 2.5 pm, 5 at bedtime)    DVT Prophylaxis  - SCDs for DVT prophylaxis     Dispo: home when cleared     Discussed daily hospital plans and goals with patient and mother at bedside    Discussed with Neurology Attending, Dr. Weston   39y Male with PMHx of viral meningitis with sequela of HTN and intractable headaches (followed by Dr. Weston), recent admission 3/4/24 for autoimmune encephalitis management/treatment presents today for bilateral hand weakness as well as wide based gait, UE tremors, UE cogwheeling, dysmetria, and UE weakness. Went for outpatient MRI brain and cervical spine today. Admitted for further neurologic workup.     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  - s/p LP 3/4 with elevated opening pressure at 30 mmHg, remainder of CSF negative  - one dose of decadron 4mg tonight  - will start decadron 2mg q6h on 3/13  - LP to be done 3/13, NPO after midnight, need full infectious, autoimmune, and fungal studies, all herpes panels (HSV 1, HSV 2, HSV 6), CSF neopterin (write in), TB, RPR, VDRL, AFB, bacterial antigens, full viral PCR panel, oligoclonal bands, Igg index, myelin basic protein  - will need LP with opening pressure  - plasma exchange to be done 3/13, heme made aware and Dr. Yancey from blood bank made aware  - HD catheter for the plasma exchange will be placed by IR during LP, will need IR catheter orders  - start verapamil 120mg, if HR is not controlled, can give labetalol 5mg pushes  - f/u serum hepatitis panel, quantiferon, NMO antibody, d-dimer, ACE, sed rate, CRP, serum protein and immuno electrophoresis, ANCA, LAKSHMI, double stranded DNA, cryoglobulin, copper, ceruloplasmin  - urine heavy metal screen, urine porphyrins, urine porphobilinogen  - c/w gabapentin 100mg q8h  - c/w baclofen 10mg q8h  - continue home pantoprazole  - q8hr general neuro checks and vitals    #migraine  - sumatriptan 100mg PO PRN once (max 2 tabs)  - if needed, can given sumatriptan 6mg subcutaneously once on top of PO    Cards  #HTN  - goal normotension  - hold amlodipine  - start verapamil 120mg    Pulm  - call provider if SPO2 < 94%  - uptake noted in right upper lobe of right lung on PET; CT C/A/P demonstrates clustered nodular and ground-glass opacities in bilateral upper lobes, more prominent over right; findings most consistent with infectious/inflammatory etiology  - pulm consulted during recent admission  - repeat CT in 6-12 weeks to ensure resolution    GI  #Nutrition/Fluids/Electrolytes   - replete K<4 and Mg <2  - Diet: Regular  - IVF: NS 500cc/hr then continue 100cc/hr    Infectious Disease  - afebrile on admission    Endocrine  - A1C results: 5.2  - continue FS monitoring while on steroids    - TSH results: 0.594    Psych  #anxiety  - Zyprexa standing (2.5 am, 2.5 pm, 5 at bedtime)    DVT Prophylaxis  - SCDs for DVT prophylaxis     Dispo: home when cleared     Discussed daily hospital plans and goals with patient and mother at bedside.    Discussed with Neurology Attending, Dr. Weston

## 2024-03-12 NOTE — CHART NOTE - NSCHARTNOTEFT_GEN_A_CORE
Transfusion Medicine Chart Note    Briefly, patient is a 39 year old male with PMH of viral meningitis, possible autoimmune encephalitis (dx made by PET scan 3/4/24 showing parieto-occipital and temporal region hypometabolism, though antibody testing reportedly normal), who presents for bilateral hand weakness after being sent in by OP neurologist. Transfusion medicine is consulted for plasma exchange.     On previous admission, patient had PET scan performed 3/4 showing parietoocciptial and temporal region hypometabolism, and per neurology concern was for autoimmune encephalitis. LP on previous admission was notable of opening pressure of 30 mmHgb but otherwise unremarkable.  Patient was given IVIG and steroids (allergic reaction to solumedrol and so decahedron chosen) which per report, patient had been compliant with.     Transfusion medicine is consulted, as primary team wishes to pursue plasma exchange     Plan   - Please obtain CBC and patient height/weight for calculation of appropriate plasma volume   - Please place Shiley catheter for plasma exchange   - Will consent and examine patient in AM for final recommendations  - Plasma Exchange team notified about possible treatment tomorrow   - Defer further management of autoimmune encephalitis to Neurology/primary team    Discussed with Dr. Yancey Transfusion Medicine Chart Note    Briefly, patient is a 39 year old male with PMH of viral meningitis, possible autoimmune encephalitis (dx made by PET scan 3/4/24 showing parieto-occipital and temporal region hypometabolism, though antibody testing reportedly normal), who presents for bilateral hand weakness after being sent in by OP neurologist. Transfusion medicine is consulted for plasma exchange.     On previous admission, patient had PET scan performed 3/4 showing parietoocciptial and temporal region hypometabolism, and per neurology concern was for autoimmune encephalitis. LP on previous admission was notable of opening pressure of 30 mmHg but otherwise unremarkable. MRI brain notable only for degenerative changes, and no evidence of venous sinus thrombosis. Patient was given IVIG and steroids (allergic reaction to solumedrol and so decadron chosen) which per report, patient had been compliant with.     Transfusion medicine is consulted, as primary team wishes to pursue plasma exchange     Plan   - Please obtain CBC and patient height/weight for calculation of appropriate plasma volume   - Please place Shiley catheter for plasma exchange   - Will consent and examine patient in AM for final recommendations  - Plasma Exchange team notified about possible treatment tomorrow   - Defer further management of autoimmune encephalitis to Neurology/primary team    Discussed with Dr. Yancey

## 2024-03-12 NOTE — PHYSICAL EXAM
[Well Developed] : well developed [No Acute Distress] : no acute distress [Well Nourished] : well nourished [Normal Conjunctiva] : normal conjunctiva [Normal Venous Pressure] : normal venous pressure [No Carotid Bruit] : no carotid bruit [Normal S1, S2] : normal S1, S2 [No Murmur] : no murmur [No Rub] : no rub [No Gallop] : no gallop [Clear Lung Fields] : clear lung fields [Good Air Entry] : good air entry [No Respiratory Distress] : no respiratory distress  [Soft] : abdomen soft [Non Tender] : non-tender [Normal Gait] : normal gait [Normal Bowel Sounds] : normal bowel sounds [No Masses/organomegaly] : no masses/organomegaly [No Edema] : no edema [No Cyanosis] : no cyanosis [No Clubbing] : no clubbing [No Varicosities] : no varicosities [No Rash] : no rash [Moves all extremities] : moves all extremities [No Skin Lesions] : no skin lesions [No Focal Deficits] : no focal deficits [Normal Speech] : normal speech [Alert and Oriented] : alert and oriented [Normal memory] : normal memory

## 2024-03-12 NOTE — HISTORY OF PRESENT ILLNESS
[FreeTextEntry1] : 40yo male with pmh sig for htn, new admission for auto immune encephalitis, dc over weekend  now tachycardiac, nervous and SOB  attributes to anxiety   ekg sinus tach

## 2024-03-12 NOTE — H&P ADULT - HISTORY OF PRESENT ILLNESS
**STROKE HPI***    HPI: 39y Male with PMHx of viral meningitis with sequela of HTN and intractable headaches (followed by Dr. Weston), recent admission 3/4/24 for autoimmune encephalitis management/treatment presents today for bilateral hand weakness.     3/4 admission: autoimmune encephalitis findings seen on outpatient PET 3/4. LP done on the same day with opening pressures of 30mmHg; CSF unremarkable. Course complicated by allergic reaction to solumedrol which was treated with benadryl. Steroid switched to decadron. Patient with some decrease in cognition (serial 7s, memory difficulty, attention/focus worsening) with improvement back to baseline with no cognitive/neuro deficit s/p 4 day course of IVIG. Autoimmune encephalitis labs sent, so far negative (refer to chart note 3/5). EEG negative. Intractable hiccups treated with gabapentin/baclofen. MRI epilepsy protocol done with no abnormalities or enhancements. MRV obtained for sudden severe HAs; no CVST. In addition, PET also showed increased activity with poorly define groundglass lesion, unable to r/o malignancy. CT C/A/P with contrast obtained; no evidence of malignancy/adenopathy, more consistent with infectious/inflammatory etiology. Pulmonary consulted; in agreement and can consider rpt CT chest in 6-12 weeks to document resolution. Cardiology consulted for PACs vs sinus arrhythmia, home verapamil switched to amlodipine. Planned for outpatient prednisone taper, home infusion, outpatient rehab, outpatient neuro-cognitive assessment. Will also consider repeat CT chest 6-12 weeks pending on lab results.       PAST MEDICAL & SURGICAL HISTORY:  Hypertension      Migraine      Meningitis          FAMILY HISTORY:      T(C): --  HR: --  BP: --  RR: --  SpO2: --    MEDICATION RECONCILIATION   MEDICATIONS  (STANDING):    MEDICATIONS  (PRN):    Allergies    methylPREDNISolone (Rash; Urticaria; Flushing; Hives)    Intolerances      Vital Signs Last 24 Hrs  T(C): --  T(F): --  HR: --  BP: --  BP(mean): --  RR: --  SpO2: --    **STROKE HPI***    HPI: 39y Male with PMHx of viral meningitis with sequela of HTN and intractable headaches (followed by Dr. Weston), recent admission 3/4/24 for autoimmune encephalitis management/treatment presents today for bilateral hand weakness as well as wide based gait, UE tremors, UE cogwheeling, dysmetria, and UE weakness. Went for outpatient MRI brain and cervical spine today.    3/4 admission: autoimmune encephalitis findings seen on outpatient PET 3/4. LP done on the same day with opening pressures of 30mmHg; CSF unremarkable. Course complicated by allergic reaction to solumedrol which was treated with benadryl. Steroid switched to decadron. Patient with some decrease in cognition (serial 7s, memory difficulty, attention/focus worsening) with improvement back to baseline with no cognitive/neuro deficit s/p 4 day course of IVIG. Autoimmune encephalitis labs sent, so far negative (refer to chart note 3/5). EEG negative. Intractable hiccups treated with gabapentin/baclofen. MRI epilepsy protocol done with no abnormalities or enhancements. MRV obtained for sudden severe HAs; no CVST. In addition, PET also showed increased activity with poorly define groundglass lesion, unable to r/o malignancy. CT C/A/P with contrast obtained; no evidence of malignancy/adenopathy, more consistent with infectious/inflammatory etiology. Pulmonary consulted; in agreement and can consider rpt CT chest in 6-12 weeks to document resolution. Cardiology consulted for PACs vs sinus arrhythmia, home verapamil switched to amlodipine. Planned for outpatient prednisone taper, home infusion, outpatient rehab, outpatient neuro-cognitive assessment. Will also consider repeat CT chest 6-12 weeks pending on lab results.       PAST MEDICAL & SURGICAL HISTORY:  Hypertension      Migraine      Meningitis          FAMILY HISTORY:      T(C): --  HR: --  BP: --  RR: --  SpO2: --    MEDICATION RECONCILIATION   MEDICATIONS  (STANDING):    MEDICATIONS  (PRN):    Allergies    methylPREDNISolone (Rash; Urticaria; Flushing; Hives)    Intolerances      Vital Signs Last 24 Hrs  T(C): --  T(F): --  HR: --  BP: --  BP(mean): --  RR: --  SpO2: --

## 2024-03-12 NOTE — PATIENT PROFILE ADULT - FALL HARM RISK - UNIVERSAL INTERVENTIONS
Bed in lowest position, wheels locked, appropriate side rails in place/Call bell, personal items and telephone in reach/Instruct patient to call for assistance before getting out of bed or chair/Non-slip footwear when patient is out of bed/[place] to call system/Physically safe environment - no spills, clutter or unnecessary equipment/Purposeful Proactive Rounding/Room/bathroom lighting operational, light cord in reach

## 2024-03-12 NOTE — H&P ADULT - NSHPLABSRESULTS_GEN_ALL_CORE
LABS:                    RADIOLOGY & ADDITIONAL STUDIES:    outpatient MRI brain and cervical spine results pending

## 2024-03-12 NOTE — CONSULT NOTE ADULT - SUBJECTIVE AND OBJECTIVE BOX
HPI:  40 yo male with history of viral meningitis (1.5 years ago) with sequela of HTN and intractable headaches (followed by Dr. Weston), recent admission 3/4/24 recent admission 3/4/24 for autoimmune encephalitis       PAST MEDICAL & SURGICAL HISTORY:  Hypertension      Migraine      Meningitis    MEDICATION RECONCILIATION   MEDICATIONS  (STANDING):    MEDICATIONS  (PRN):    Allergies    methylPREDNISolone (Rash; Urticaria; Flushing; Hives)    Intolerances    ROS: Per HPI    PAST MEDICAL & SURGICAL HISTORY:  Hypertension  Migraine  Meningitis      ALLERGIES: 	  methylPREDNISolone (Rash; Urticaria; Flushing; Hives)    MEDICATIONS:  baclofen 10 milliGRAM(s) Oral every 8 hours  gabapentin 100 milliGRAM(s) Oral every 8 hours  OLANZapine 5 milliGRAM(s) Oral at bedtime  pantoprazole    Tablet 40 milliGRAM(s) Oral before breakfast  sodium chloride 0.9%. 500 milliLiter(s) IV Continuous <Continuous>  verapamil 120 milliGRAM(s) Oral daily      T(C): --  HR: 123 (03-12-24 @ 21:49) (113 - 123)  BP: 153/107 (03-12-24 @ 21:49) (142/98 - 153/107)  RR: 20 (03-12-24 @ 21:49) (16 - 20)  SpO2: 98% (03-12-24 @ 21:49) (95% - 98%)      PHYSICAL EXAM:  pleasant, NAD  rrr no mrg   ctab   abd nondistended   no le edema, wwp  conversing appropriately       I&O's Summary    Height (cm): 182.9 (03-12 @ 21:49)  Weight (kg): 93.44 (03-12 @ 21:49)  BMI (kg/m2): 27.9 (03-12 @ 21:49)  BSA (m2): 2.16 (03-12 @ 21:49)  LABS:	 	                        13.8   15.36 )-----------( 307      ( 12 Mar 2024 21:08 )             42.7     03-12    133<L>  |  102  |  22  ----------------------------<  213<H>  3.9   |  19<L>  |  0.95    Ca    8.6      12 Mar 2024 21:08  Phos  3.8     03-12  Mg     2.2     03-12          proBNP:   Lipid Profile:   HgA1c:   TSH:     TELEMETRY: 	    ECG:  	  RADIOLOGY:   ECHO:  STRESS:  CATH:   HPI:  40 yo male with history of viral meningitis (1.5 years ago) with sequela of HTN and intractable headaches (followed by Dr. Weston), recent admission 3/4/24 recent admission 3/4/24 for autoimmune encephalitis treated with IVIG and steroids who presented today to outpatient neurologist for b/l hand weakness, noted to have wide based gait, UE tremors, UE cogwheeling, dysmetria, and UE weakness and was sent to [facility] for admission to neurology service. Planned for decadron repeat LP, and plasma exchange.   Cardiology consulted for persistent tachycardia.  Telemetry reviewed - sinus tachycardia mostly 100-108, but with intermittent elevation to 140s.     Patient seen and examined at the bedside. No complaints at this time. Denies chest pain, sob, and palpitations. Has been feeling anxious regarding everything going on and pending procedures. Notes his baseline HR is in 60s but has recently been in low 100s. He was moving the chair around in his room when his heart rate was higher and believes it is from deconditioning.  He was taking amlodipine 5mg po qd at home, but it was switched back to verapamil.   Other home medications include baclofen, gabapentin, and zyprexa, and prednisone 60mg. Reports episodes of delirium iso of steroid use.   Reports he has history of HTN. He was on Cozaar but it was switched to verapamil for bp control and migraine ppx.       PAST MEDICAL & SURGICAL HISTORY:  Hypertension  Migraine  Meningitis  MEDICATION RECONCILIATION   MEDICATIONS  (STANDING):    MEDICATIONS  (PRN):    Allergies    methylPREDNISolone (Rash; Urticaria; Flushing; Hives)    Intolerances    ROS: Per HPI    PAST MEDICAL & SURGICAL HISTORY:  Hypertension  Migraine  Meningitis      ALLERGIES: 	  methylPREDNISolone (Rash; Urticaria; Flushing; Hives)    MEDICATIONS:  baclofen 10 milliGRAM(s) Oral every 8 hours  gabapentin 100 milliGRAM(s) Oral every 8 hours  OLANZapine 5 milliGRAM(s) Oral at bedtime  pantoprazole    Tablet 40 milliGRAM(s) Oral before breakfast  sodium chloride 0.9%. 500 milliLiter(s) IV Continuous <Continuous>  verapamil 120 milliGRAM(s) Oral daily      T(C): --  HR: 123 (03-12-24 @ 21:49) (113 - 123)  BP: 153/107 (03-12-24 @ 21:49) (142/98 - 153/107)  RR: 20 (03-12-24 @ 21:49) (16 - 20)  SpO2: 98% (03-12-24 @ 21:49) (95% - 98%)      PHYSICAL EXAM:  pleasant, NAD  rrr no mrg   ctab   abd nondistended   no le edema, wwp  conversing appropriately       I&O's Summary    Height (cm): 182.9 (03-12 @ 21:49)  Weight (kg): 93.44 (03-12 @ 21:49)  BMI (kg/m2): 27.9 (03-12 @ 21:49)  BSA (m2): 2.16 (03-12 @ 21:49)  LABS:	 	                        13.8   15.36 )-----------( 307      ( 12 Mar 2024 21:08 )             42.7     03-12    133<L>  |  102  |  22  ----------------------------<  213<H>  3.9   |  19<L>  |  0.95    Ca    8.6      12 Mar 2024 21:08  Phos  3.8     03-12  Mg     2.2     03-12          proBNP:   Lipid Profile:   HgA1c:   TSH:     TELEMETRY: 	    ECG:  	  RADIOLOGY:   ECHO:  STRESS:  CATH:   HPI:  40 yo male with history of viral meningitis (1.5 years ago) with sequela of HTN and intractable headaches (followed by Dr. Weston), recent admission 3/4/24 recent admission 3/4/24 for autoimmune encephalitis treated with IVIG and steroids who presented today to outpatient neurologist for b/l hand weakness, noted to have wide based gait, UE tremors, UE cogwheeling, dysmetria, and UE weakness and was sent to [facility] for admission to neurology service. Planned for decadron repeat LP, and plasma exchange.   Cardiology consulted for persistent tachycardia.  Telemetry reviewed - sinus tachycardia mostly 100-108, but with intermittent elevation to 140s.     Patient seen and examined at the bedside. No complaints at this time. Denies chest pain, sob, and palpitations. Has been feeling anxious regarding everything going on and pending procedures. Notes his baseline HR is in 60s but has recently been in low 100s. He was moving the chair around in his room when his heart rate was higher and believes it is from deconditioning.  He was taking amlodipine 5mg po qd at home, but it was switched back to verapamil.   Other home medications include baclofen, gabapentin, and zyprexa, and prednisone 60mg. Reports episodes of delirium iso of steroid use.   Reports he has history of HTN. He was on Cozaar but it was switched to verapamil for bp control and migraine ppx.       PAST MEDICAL & SURGICAL HISTORY:  Hypertension  Migraine  Meningitis  MEDICATION RECONCILIATION   MEDICATIONS  (STANDING):    MEDICATIONS  (PRN):    Allergies    methylPREDNISolone (Rash; Urticaria; Flushing; Hives)    Intolerances    ROS: Per HPI    PAST MEDICAL & SURGICAL HISTORY:  Hypertension  Migraine  Meningitis      ALLERGIES: 	  methylPREDNISolone (Rash; Urticaria; Flushing; Hives)    MEDICATIONS:  baclofen 10 milliGRAM(s) Oral every 8 hours  gabapentin 100 milliGRAM(s) Oral every 8 hours  OLANZapine 5 milliGRAM(s) Oral at bedtime  pantoprazole    Tablet 40 milliGRAM(s) Oral before breakfast  sodium chloride 0.9%. 500 milliLiter(s) IV Continuous <Continuous>  verapamil 120 milliGRAM(s) Oral daily      T(C): --  HR: 123 (03-12-24 @ 21:49) (113 - 123)  BP: 153/107 (03-12-24 @ 21:49) (142/98 - 153/107)  RR: 20 (03-12-24 @ 21:49) (16 - 20)  SpO2: 98% (03-12-24 @ 21:49) (95% - 98%)      PHYSICAL EXAM:  pleasant, NAD  rrr no mrg   ctab   abd nondistended   no le edema, wwp  conversing appropriately       I&O's Summary    Height (cm): 182.9 (03-12 @ 21:49)  Weight (kg): 93.44 (03-12 @ 21:49)  BMI (kg/m2): 27.9 (03-12 @ 21:49)  BSA (m2): 2.16 (03-12 @ 21:49)  LABS:	 	                        13.8   15.36 )-----------( 307      ( 12 Mar 2024 21:08 )             42.7     03-12    133<L>  |  102  |  22  ----------------------------<  213<H>  3.9   |  19<L>  |  0.95    Ca    8.6      12 Mar 2024 21:08  Phos  3.8     03-12  Mg     2.2     03-12

## 2024-03-12 NOTE — CONSULT NOTE ADULT - ASSESSMENT
Incomplete note 40 yo male with history of viral meningitis (1.5 years ago) with sequela of HTN and intractable headaches (followed by Dr. Weston), recent admission 3/4/24 recent admission 3/4/24 for autoimmune encephalitis treated with IVIG and steroids who t presented today to outpatient neurologist for b/l hand weakness, noted to have wide based gait, UE tremors, UE cogwheeling, dysmetria, and UE weakness and was sent to [facility] for admission to neurology service. Planned for decardron, repeat LP, and plasma exchange.     Review of Studies:   EKG 3/12/24:  Tele 3/12/24: sinus tachycardia rates 100- 110, intermittently ST 140s  CT chest 3/8/24- heart size is normal, no pericardial effusion.  Clustered nodular and GGO RUL c/w infections/inflammatory etiology    #Sinus Tachycardia   Likely iso anxiety and steroids. Currently asymptomatic and denies chest pain, sob, or palpitations.   - Given he has had persistent tachycardia, can obtain baseline TTE for structural assessment.   - treat underlying causes (anxiety)  - agree with gentle hydration while NPO    #HTN  Verapamil recently switched to amlodipine on prior admission.   amlodipine discontinued and verapamil restarted 3/12 by Dr. Vasquez  - continue home verapamil for both BP and migraine ppx    Case to be discussed with attending. Please see attending recommendations for final recommendations.   Linnea Broussard  Cardiology fellow 40 yo male with history of viral meningitis (1.5 years ago) with sequela of HTN and intractable headaches (followed by Dr. Weston), recent admission 3/4/24 recent admission 3/4/24 for autoimmune encephalitis treated with IVIG and steroids who t presented today to outpatient neurologist for b/l hand weakness, noted to have wide based gait, UE tremors, UE cogwheeling, dysmetria, and UE weakness and was sent to [facility] for admission to neurology service. Planned for decardron, repeat LP, and plasma exchange.     Review of Studies:   EKG 3/12/24:  Tele 3/12/24: sinus tachycardia rates 100- 110, intermittently ST 140s  CT chest 3/8/24- heart size is normal, no pericardial effusion.  Clustered nodular and GGO RUL c/w infections/inflammatory etiology    #Sinus Tachycardia   Likely iso anxiety and steroids. Currently asymptomatic and denies chest pain, sob, or palpitations.   - Given he has had persistent tachycardia, can obtain baseline TTE for structural assessment.   - treat underlying causes (anxiety)  - agree with gentle hydration while NPO  - check TSH    #HTN  Verapamil recently switched to amlodipine on prior admission.   amlodipine discontinued and verapamil restarted 3/12 by Dr. Vasquez  - continue home verapamil for both BP and migraine ppx    Case to be discussed with attending. Please see attending recommendations for final recommendations.   Linnea Broussard  Cardiology fellow 38 yo male with history of viral meningitis (1.5 years ago) with sequela of HTN and intractable headaches (followed by Dr. Weston), recent admission 3/4/24 recent admission 3/4/24 for autoimmune encephalitis treated with IVIG and steroids who t presented today to outpatient neurologist for b/l hand weakness, noted to have wide based gait, UE tremors, UE cogwheeling, dysmetria, and UE weakness and was sent to [facility] for admission to neurology service. Planned for decardron, repeat LP, and plasma exchange.     Review of Studies:   Tele 3/12/24: sinus tachycardia rates 100- 110, intermittently ST 140s  CT chest 3/8/24- heart size is normal, no pericardial effusion.  Clustered nodular and GGO RUL c/w infections/inflammatory etiology    #Sinus Tachycardia   Likely iso anxiety and steroids. Currently asymptomatic and denies chest pain, sob, or palpitations.   - Given he has had persistent tachycardia, can obtain baseline TTE for structural assessment.   - treat underlying causes (anxiety)  - agree with gentle hydration while NPO  - check TSH  - obtain EKG    #HTN  Verapamil recently switched to amlodipine on prior admission.   amlodipine discontinued and verapamil restarted 3/12 by Dr. Vasquez  - continue home verapamil for both BP and migraine ppx    Case to be discussed with attending. Please see attending recommendations for final recommendations.   Linnea Broussard  Cardiology fellow 38 yo male with history of viral meningitis (1.5 years ago) with sequela of HTN and intractable headaches (followed by Dr. Weston), recent admission 3/4/24 recent admission 3/4/24 for autoimmune encephalitis treated with IVIG and steroids who t presented today to outpatient neurologist for b/l hand weakness, noted to have wide based gait, UE tremors, UE cogwheeling, dysmetria, and UE weakness and was sent to [facility] for admission to neurology service. Planned for decadron repeat LP, and plasma exchange. Cardiology consulted for tachycardia.    Review of Studies:   Tele 3/12/24: sinus tachycardia rates 100- 110, intermittently ST 140s  CT chest 3/8/24- heart size is normal, no pericardial effusion.  Clustered nodular and GGO RUL c/w infections/inflammatory etiology    #Sinus Tachycardia   Likely iso anxiety and steroids. Currently asymptomatic and denies chest pain, sob, or palpitations.   - Given he has had persistent tachycardia, can obtain baseline TTE for structural assessment.   - treat underlying causes (anxiety)  - agree with gentle hydration while NPO  - check TSH  - obtain EKG    #HTN  Verapamil recently switched to amlodipine on prior admission.   amlodipine discontinued and verapamil restarted 3/12 by Dr. Vasquez  - continue home verapamil for both BP and migraine ppx    Case to be discussed with attending. Please see attending recommendations for final recommendations.   Linnea Broussard  Cardiology fellow

## 2024-03-13 ENCOUNTER — RESULT REVIEW (OUTPATIENT)
Age: 39
End: 2024-03-13

## 2024-03-13 LAB
ANION GAP SERPL CALC-SCNC: 8 MMOL/L — SIGNIFICANT CHANGE UP (ref 5–17)
APPEARANCE CSF: CLEAR — SIGNIFICANT CHANGE UP
APPEARANCE SPUN FLD: SIGNIFICANT CHANGE UP
BUN SERPL-MCNC: 16 MG/DL — SIGNIFICANT CHANGE UP (ref 7–23)
CALCIUM SERPL-MCNC: 8.5 MG/DL — SIGNIFICANT CHANGE UP (ref 8.4–10.5)
CERULOPLASMIN SERPL-MCNC: 16 MG/DL — SIGNIFICANT CHANGE UP (ref 15–30)
CHLORIDE SERPL-SCNC: 102 MMOL/L — SIGNIFICANT CHANGE UP (ref 96–108)
CO2 SERPL-SCNC: 22 MMOL/L — SIGNIFICANT CHANGE UP (ref 22–31)
COLOR CSF: SIGNIFICANT CHANGE UP
CREAT SERPL-MCNC: 0.77 MG/DL — SIGNIFICANT CHANGE UP (ref 0.5–1.3)
CSF COMMENTS: SIGNIFICANT CHANGE UP
CSF PCR RESULT: SIGNIFICANT CHANGE UP
EGFR: 118 ML/MIN/1.73M2 — SIGNIFICANT CHANGE UP
FIBRINOGEN PPP-MCNC: 244 MG/DL — SIGNIFICANT CHANGE UP (ref 200–445)
GLUCOSE BLDC GLUCOMTR-MCNC: 123 MG/DL — HIGH (ref 70–99)
GLUCOSE CSF-MCNC: 116 MG/DL — HIGH (ref 40–70)
GLUCOSE SERPL-MCNC: 302 MG/DL — HIGH (ref 70–99)
GRAM STN FLD: SIGNIFICANT CHANGE UP
HCT VFR BLD CALC: 42.7 % — SIGNIFICANT CHANGE UP (ref 39–50)
HGB BLD-MCNC: 13.2 G/DL — SIGNIFICANT CHANGE UP (ref 13–17)
HIV 1+2 AB+HIV1 P24 AG SERPL QL IA: SIGNIFICANT CHANGE UP
IGA FLD-MCNC: 305 MG/DL — SIGNIFICANT CHANGE UP (ref 84–499)
IGG FLD-MCNC: 2704 MG/DL — HIGH (ref 610–1660)
IGM SERPL-MCNC: 69 MG/DL — SIGNIFICANT CHANGE UP (ref 35–242)
KAPPA LC SER QL IFE: 1.1 MG/DL — SIGNIFICANT CHANGE UP (ref 0.33–1.94)
KAPPA/LAMBDA FREE LIGHT CHAIN RATIO, SERUM: 1.25 RATIO — SIGNIFICANT CHANGE UP (ref 0.26–1.65)
LAMBDA LC SER QL IFE: 0.88 MG/DL — SIGNIFICANT CHANGE UP (ref 0.57–2.63)
LYMPHOCYTES # CSF: 2 % — LOW (ref 40–80)
MAGNESIUM SERPL-MCNC: 2.3 MG/DL — SIGNIFICANT CHANGE UP (ref 1.6–2.6)
MCHC RBC-ENTMCNC: 25.8 PG — LOW (ref 27–34)
MCHC RBC-ENTMCNC: 30.9 GM/DL — LOW (ref 32–36)
MCV RBC AUTO: 83.4 FL — SIGNIFICANT CHANGE UP (ref 80–100)
NEUTROPHILS # CSF: 0 % — SIGNIFICANT CHANGE UP (ref 0–6)
NRBC # BLD: 0 /100 WBCS — SIGNIFICANT CHANGE UP (ref 0–0)
NRBC NFR CSF: 2 /UL — SIGNIFICANT CHANGE UP (ref 0–5)
PHOSPHATE SERPL-MCNC: 2.7 MG/DL — SIGNIFICANT CHANGE UP (ref 2.5–4.5)
PLATELET # BLD AUTO: 276 K/UL — SIGNIFICANT CHANGE UP (ref 150–400)
POTASSIUM SERPL-MCNC: 4.3 MMOL/L — SIGNIFICANT CHANGE UP (ref 3.5–5.3)
POTASSIUM SERPL-SCNC: 4.3 MMOL/L — SIGNIFICANT CHANGE UP (ref 3.5–5.3)
PROT CSF-MCNC: 46 MG/DL — HIGH (ref 15–45)
RBC # BLD: 5.12 M/UL — SIGNIFICANT CHANGE UP (ref 4.2–5.8)
RBC # CSF: 155 /UL — HIGH (ref 0–0)
RBC # FLD: 19.1 % — HIGH (ref 10.3–14.5)
SODIUM SERPL-SCNC: 132 MMOL/L — LOW (ref 135–145)
SPECIMEN SOURCE: SIGNIFICANT CHANGE UP
T PALLIDUM AB TITR SER: NEGATIVE — SIGNIFICANT CHANGE UP
TUBE TYPE: SIGNIFICANT CHANGE UP
WBC # BLD: 12.23 K/UL — HIGH (ref 3.8–10.5)
WBC # FLD AUTO: 12.23 K/UL — HIGH (ref 3.8–10.5)

## 2024-03-13 PROCEDURE — 99253 IP/OBS CNSLTJ NEW/EST LOW 45: CPT

## 2024-03-13 PROCEDURE — 76937 US GUIDE VASCULAR ACCESS: CPT | Mod: 26

## 2024-03-13 PROCEDURE — 88305 TISSUE EXAM BY PATHOLOGIST: CPT | Mod: 26

## 2024-03-13 PROCEDURE — 36556 INSERT NON-TUNNEL CV CATH: CPT

## 2024-03-13 PROCEDURE — 77001 FLUOROGUIDE FOR VEIN DEVICE: CPT | Mod: 26,59

## 2024-03-13 PROCEDURE — 88108 CYTOPATH CONCENTRATE TECH: CPT | Mod: 26

## 2024-03-13 PROCEDURE — 93010 ELECTROCARDIOGRAM REPORT: CPT

## 2024-03-13 PROCEDURE — 99447 NTRPROF PH1/NTRNET/EHR 11-20: CPT

## 2024-03-13 PROCEDURE — 62328 DX LMBR SPI PNXR W/FLUOR/CT: CPT

## 2024-03-13 PROCEDURE — 93306 TTE W/DOPPLER COMPLETE: CPT | Mod: 26

## 2024-03-13 RX ORDER — OLANZAPINE 15 MG/1
2.5 TABLET, FILM COATED ORAL ONCE
Refills: 0 | Status: COMPLETED | OUTPATIENT
Start: 2024-03-13 | End: 2024-03-13

## 2024-03-13 RX ORDER — OLANZAPINE 15 MG/1
2.5 TABLET, FILM COATED ORAL EVERY 24 HOURS
Refills: 0 | Status: DISCONTINUED | OUTPATIENT
Start: 2024-03-13 | End: 2024-03-15

## 2024-03-13 RX ORDER — DEXAMETHASONE 0.5 MG/5ML
2 ELIXIR ORAL EVERY 6 HOURS
Refills: 0 | Status: DISCONTINUED | OUTPATIENT
Start: 2024-03-14 | End: 2024-03-14

## 2024-03-13 RX ORDER — ALBUMIN HUMAN 25 %
3500 VIAL (ML) INTRAVENOUS ONCE
Refills: 0 | Status: DISCONTINUED | OUTPATIENT
Start: 2024-03-13 | End: 2024-03-14

## 2024-03-13 RX ORDER — DIPHENHYDRAMINE HCL 50 MG
25 CAPSULE ORAL ONCE
Refills: 0 | Status: DISCONTINUED | OUTPATIENT
Start: 2024-03-13 | End: 2024-03-26

## 2024-03-13 RX ORDER — POLYETHYLENE GLYCOL 3350 17 G/17G
17 POWDER, FOR SOLUTION ORAL
Refills: 0 | Status: DISCONTINUED | OUTPATIENT
Start: 2024-03-13 | End: 2024-03-14

## 2024-03-13 RX ORDER — SUMATRIPTAN SUCCINATE 4 MG/.5ML
100 INJECTION, SOLUTION SUBCUTANEOUS
Refills: 0 | Status: DISCONTINUED | OUTPATIENT
Start: 2024-03-13 | End: 2024-03-26

## 2024-03-13 RX ORDER — VERAPAMIL HCL 240 MG
120 CAPSULE, EXTENDED RELEASE PELLETS 24 HR ORAL DAILY
Refills: 0 | Status: DISCONTINUED | OUTPATIENT
Start: 2024-03-13 | End: 2024-03-13

## 2024-03-13 RX ORDER — MIRTAZAPINE 45 MG/1
7.5 TABLET, ORALLY DISINTEGRATING ORAL EVERY 24 HOURS
Refills: 0 | Status: DISCONTINUED | OUTPATIENT
Start: 2024-03-13 | End: 2024-03-14

## 2024-03-13 RX ORDER — VERAPAMIL HCL 240 MG
120 CAPSULE, EXTENDED RELEASE PELLETS 24 HR ORAL DAILY
Refills: 0 | Status: DISCONTINUED | OUTPATIENT
Start: 2024-03-13 | End: 2024-03-14

## 2024-03-13 RX ORDER — PANTOPRAZOLE SODIUM 20 MG/1
40 TABLET, DELAYED RELEASE ORAL
Refills: 0 | Status: ACTIVE | OUTPATIENT
Start: 2024-03-13 | End: 2025-02-09

## 2024-03-13 RX ORDER — OLANZAPINE 15 MG/1
5 TABLET, FILM COATED ORAL DAILY
Refills: 0 | Status: DISCONTINUED | OUTPATIENT
Start: 2024-03-14 | End: 2024-03-14

## 2024-03-13 RX ORDER — CHLORHEXIDINE GLUCONATE 213 G/1000ML
1 SOLUTION TOPICAL DAILY
Refills: 0 | Status: DISCONTINUED | OUTPATIENT
Start: 2024-03-13 | End: 2024-03-26

## 2024-03-13 RX ORDER — ONDANSETRON 8 MG/1
4 TABLET, FILM COATED ORAL ONCE
Refills: 0 | Status: DISCONTINUED | OUTPATIENT
Start: 2024-03-13 | End: 2024-03-13

## 2024-03-13 RX ORDER — CALCIUM GLUCONATE 100 MG/ML
2 VIAL (ML) INTRAVENOUS ONCE
Refills: 0 | Status: DISCONTINUED | OUTPATIENT
Start: 2024-03-13 | End: 2024-03-14

## 2024-03-13 RX ADMIN — SODIUM CHLORIDE 125 MILLILITER(S): 9 INJECTION INTRAMUSCULAR; INTRAVENOUS; SUBCUTANEOUS at 23:10

## 2024-03-13 RX ADMIN — POLYETHYLENE GLYCOL 3350 17 GRAM(S): 17 POWDER, FOR SOLUTION ORAL at 22:23

## 2024-03-13 RX ADMIN — SODIUM CHLORIDE 125 MILLILITER(S): 9 INJECTION INTRAMUSCULAR; INTRAVENOUS; SUBCUTANEOUS at 03:30

## 2024-03-13 RX ADMIN — Medication 10 MILLIGRAM(S): at 06:21

## 2024-03-13 RX ADMIN — Medication 2 MILLIGRAM(S): at 06:22

## 2024-03-13 RX ADMIN — Medication 2 MILLIGRAM(S): at 00:48

## 2024-03-13 RX ADMIN — OLANZAPINE 2.5 MILLIGRAM(S): 15 TABLET, FILM COATED ORAL at 06:44

## 2024-03-13 RX ADMIN — Medication 10 MILLIGRAM(S): at 22:23

## 2024-03-13 RX ADMIN — GABAPENTIN 100 MILLIGRAM(S): 400 CAPSULE ORAL at 06:21

## 2024-03-13 RX ADMIN — PANTOPRAZOLE SODIUM 40 MILLIGRAM(S): 20 TABLET, DELAYED RELEASE ORAL at 16:36

## 2024-03-13 RX ADMIN — POLYETHYLENE GLYCOL 3350 17 GRAM(S): 17 POWDER, FOR SOLUTION ORAL at 16:35

## 2024-03-13 RX ADMIN — OLANZAPINE 5 MILLIGRAM(S): 15 TABLET, FILM COATED ORAL at 22:23

## 2024-03-13 RX ADMIN — MIRTAZAPINE 7.5 MILLIGRAM(S): 45 TABLET, ORALLY DISINTEGRATING ORAL at 22:23

## 2024-03-13 RX ADMIN — Medication 2 MILLIGRAM(S): at 18:34

## 2024-03-13 RX ADMIN — Medication 2 MILLIGRAM(S): at 12:07

## 2024-03-13 RX ADMIN — Medication 120 MILLIGRAM(S): at 07:36

## 2024-03-13 RX ADMIN — OLANZAPINE 2.5 MILLIGRAM(S): 15 TABLET, FILM COATED ORAL at 11:04

## 2024-03-13 RX ADMIN — GABAPENTIN 100 MILLIGRAM(S): 400 CAPSULE ORAL at 22:23

## 2024-03-13 RX ADMIN — PANTOPRAZOLE SODIUM 40 MILLIGRAM(S): 20 TABLET, DELAYED RELEASE ORAL at 06:22

## 2024-03-13 RX ADMIN — OLANZAPINE 2.5 MILLIGRAM(S): 15 TABLET, FILM COATED ORAL at 16:39

## 2024-03-13 RX ADMIN — GABAPENTIN 100 MILLIGRAM(S): 400 CAPSULE ORAL at 16:36

## 2024-03-13 RX ADMIN — Medication 10 MILLIGRAM(S): at 16:36

## 2024-03-13 RX ADMIN — SUMATRIPTAN SUCCINATE 100 MILLIGRAM(S): 4 INJECTION, SOLUTION SUBCUTANEOUS at 23:10

## 2024-03-13 NOTE — CONSULT NOTE ADULT - ASSESSMENT
Assessment/Plan: 39y Male with PMHx of viral meningitis with sequela of HTN and intractable headaches (followed by Dr. Weston), recent admission 3/4/24 for autoimmune encephalitis management/treatment presents today for bilateral hand weakness as well as wide based gait, UE tremors, UE cogwheeling, dysmetria, and UE weakness. IR consulted for repeat lumbar puncture and temporary dialysis catheter placement for plasmapheresis. Case reviewed with Dr. Arroyo, plan for LP and catheter placement with anesthesia.     Recommendations: Maintain NPO x 8 hours prior to procedure. Optimize blood glucose prior to procedure.     Communicated with: Mignon stroke PA

## 2024-03-13 NOTE — CONSULT NOTE ADULT - SUBJECTIVE AND OBJECTIVE BOX
39y Male with PMHx of viral meningitis with sequela of HTN and intractable headaches (followed by Dr. Weston), recent admission 3/4/24 for autoimmune encephalitis management/treatment presents today for bilateral hand weakness as well as wide based gait, UE tremors, UE cogwheeling, dysmetria, and UE weakness. 3/4 admission: autoimmune encephalitis findings seen on outpatient PET 3/4. LP done on the same day with opening pressures of 30mmHg; CSF unremarkable. IR consulted for repeat lumbar puncture and temporary dialysis catheter placement for plasmapheresis.     Clinical History: ENCEPHALITIS    Acne vulgaris    Adjustment disorder with mixed anxiety and depressed mood    Attention-deficit hyperactivity disorder, unspecified type    Chronic migraine without aura, intractable, without status migrainosus    Chronic migraine without aura, not intractable, without status migrainosus    Encounter for preprocedural laboratory examination    Encounter for screening for malignant neoplasm of skin    Epidermal cyst    Esophagitis, unspecified without bleeding    Gastric ulcer, unspecified as acute or chronic, without hemorrhage or perforation    Generalized anxiety disorder    Generalized hyperhidrosis    Headache, unspecified    Insomnia, unspecified    Iron deficiency    Major depressive disorder, single episode, in full remission    Melanocytic nevi, unspecified    Neoplasm of uncertain behavior of other specified sites    Occipital neuralgia    Other benign neoplasm of skin, unspecified    Other encephalitis and encephalomyelitis    Other internal derangements of left knee    Pain in left ankle and joints of left foot    Patellofemoral disorders, left knee    Pityriasis versicolor    Sarcoidosis, unspecified    Tinea pedis    Vertigo of central origin    Essential (primary) hypertension    Other symptoms and signs involving the musculoskeletal system    Handoff    MEWS Score    Hypertension    Migraine    Hypertension    Migraine    No pertinent past medical history    Meningitis    No pertinent past medical history    No significant past surgical history    No significant past surgical history    [email or web address]        Meds:baclofen 10 milliGRAM(s) Oral every 8 hours  dexAMETHasone  Injectable 2 milliGRAM(s) IV Push every 6 hours  gabapentin 100 milliGRAM(s) Oral every 8 hours  OLANZapine 2.5 milliGRAM(s) Oral once  OLANZapine 5 milliGRAM(s) Oral at bedtime  OLANZapine 2.5 milliGRAM(s) Oral daily  pantoprazole    Tablet 40 milliGRAM(s) Oral before breakfast  sodium chloride 0.9%. 500 milliLiter(s) IV Continuous <Continuous>  verapamil  milliGRAM(s) Oral daily      Allergies:methylPREDNISolone (Rash; Urticaria; Flushing; Hives)        Labs:                           13.2   12.23 )-----------( 276      ( 13 Mar 2024 05:30 )             42.7       03-13    132<L>  |  102  |  16  ----------------------------<  302<H>  4.3   |  22  |  0.77    Ca    8.5      13 Mar 2024 05:30  Phos  2.7     03-13  Mg     2.3     03-13

## 2024-03-13 NOTE — CONSULT NOTE ADULT - ASSESSMENT
39YM w/PMHx of viral meningitis, possible autoimmune encephalitis (dx made by PET scan 3/4/24 showing parieto-occipital and temporal region hypometabolism), who presents for bilateral hand weakness after being sent in by OP neurologist. Transfusion medicine is consulted for plasma exchange.     On previous admission, patient had PET scan performed 3/4 showing parietoocciptial and temporal region hypometabolism, and per neurology concern was for autoimmune encephalitis. LP on previous admission was notable of opening pressure of 30 mmHg but otherwise unremarkable. MRI brain notable only for degenerative changes, and no evidence of venous sinus thrombosis. Patient was given IVIG and steroids (allergic reaction to solumedrol and so decadron chosen) which per report, patient had been compliant with.     Transfusion medicine is consulted, as primary team wishes to pursue plasma exchange     Plan   - Autoimmune (evelyn-NMDAR) encephalitis is a category I indication for therapeutic plasma exchange. We will plan for 5 sessions every other day  - Risks and benefits of plasma exchange explained to patient. All of his questions were answered and he verbalized understanding and consented to therapy.   - please monitor daily calcium and fibrinogen clauss levels. With plasmapharesis, both of these are expected to decrease.   - If <200, will consider cryoprecipitate administration  - Defer further management of autoimmune encephalitis to Neurology/primary team    D/w transfusion medicine attending, Dr Nehemias Yancey      39YM w/PMHx of viral meningitis, possible autoimmune encephalitis (dx made by PET scan 3/4/24 showing parieto-occipital and temporal region hypometabolism), who presents for bilateral hand weakness after being sent in by OP neurologist. Transfusion medicine is consulted for plasma exchange.     On previous admission, patient had PET scan performed 3/4 showing parietoocciptial and temporal region hypometabolism, and per neurology concern was for autoimmune encephalitis. LP on previous admission was notable of opening pressure of 30 mmHg but otherwise unremarkable. MRI brain notable only for degenerative changes, and no evidence of venous sinus thrombosis. Patient was given IVIG and steroids (allergic reaction to solumedrol and so decadron chosen) which per report, patient had been compliant with.     Transfusion medicine is consulted, as primary team wishes to pursue plasma exchange     Plan   - Autoimmune (evelyn-NMDAR) encephalitis is a category I indication for therapeutic plasma exchange. We will plan for 5 sessions every other day  - Risks and benefits of plasma exchange explained to patient. All of his questions were answered and he verbalized understanding and consented to therapy.   - please monitor daily calcium and fibrinogen clauss levels. With plasmapharesis, both of these are expected to decrease.   - please maintain active T&S   - If <200, will advise on cryoprecipitate administration  - further management of autoimmune encephalitis per Neurology/primary team    D/w transfusion medicine attending, Dr Nehemias Yancey

## 2024-03-13 NOTE — CONSULT NOTE ADULT - SUBJECTIVE AND OBJECTIVE BOX
Transfusion Medicine Consult Note    HPI: 39y Male with PMHx of viral meningitis with sequela of HTN and intractable headaches (followed by Dr. Weston), recent admission 3/4/24 for autoimmune encephalitis management/treatment presents today for bilateral hand weakness as well as wide based gait, UE tremors, UE cogwheeling, dysmetria, and UE weakness. Went for outpatient MRI brain and cervical spine today.    3/4 admission: autoimmune encephalitis findings seen on outpatient PET 3/4. LP done on the same day with opening pressures of 30mmHg; CSF unremarkable. Course complicated by allergic reaction to solumedrol which was treated with benadryl. Steroid switched to decadron. Patient with some decrease in cognition (serial 7s, memory difficulty, attention/focus worsening) with improvement back to baseline with no cognitive/neuro deficit s/p 4 day course of IVIG. Autoimmune encephalitis labs sent, so far negative (refer to chart note 3/5). EEG negative. Intractable hiccups treated with gabapentin/baclofen. MRI epilepsy protocol done with no abnormalities or enhancements. MRV obtained for sudden severe HAs; no CVST. In addition, PET also showed increased activity with poorly define groundglass lesion, unable to r/o malignancy. CT C/A/P with contrast obtained; no evidence of malignancy/adenopathy, more consistent with infectious/inflammatory etiology. Pulmonary consulted; in agreement and can consider rpt CT chest in 6-12 weeks to document resolution. Cardiology consulted for PACs vs sinus arrhythmia, home verapamil switched to amlodipine. Planned for outpatient prednisone taper, home infusion, outpatient rehab, outpatient neuro-cognitive assessment. Will also consider repeat CT chest 6-12 weeks pending on lab results.     Transfusion medicine consulted for plasma exchange evaluation       PAST MEDICAL & SURGICAL HISTORY:  Hypertension  Migraine  Meningitis  MEDICATIONS  (PRN):    Allergies    methylPREDNISolone (Rash; Urticaria; Flushing; Hives)    Intolerances    Allergies    methylPREDNISolone (Rash; Urticaria; Flushing; Hives)    Intolerances        MEDICATIONS  (STANDING):  albumin human  5% IVPB 3500 milliLiter(s) IV Intermittent once  baclofen 10 milliGRAM(s) Oral every 8 hours  calcium gluconate IVPB 2 Gram(s) IV Intermittent once  chlorhexidine 2% Cloths 1 Application(s) Topical daily  dexAMETHasone  Injectable 2 milliGRAM(s) IV Push every 6 hours  gabapentin 100 milliGRAM(s) Oral every 8 hours  heparin  Lock Flush 100 Units/mL Injectable 600 Unit(s) IV Push once  mirtazapine 7.5 milliGRAM(s) Oral every 24 hours  OLANZapine 2.5 milliGRAM(s) Oral every 24 hours  OLANZapine 5 milliGRAM(s) Oral at bedtime  pantoprazole    Tablet 40 milliGRAM(s) Oral two times a day  polyethylene glycol 3350 17 Gram(s) Oral two times a day  sodium chloride 0.9%. 500 milliLiter(s) (125 mL/Hr) IV Continuous <Continuous>  verapamil  milliGRAM(s) Oral daily    MEDICATIONS  (PRN):  diphenhydrAMINE Injectable 25 milliGRAM(s) IV Push once PRN Allergic Reaction      PAST MEDICAL & SURGICAL HISTORY:  Hypertension      Migraine      Meningitis          FAMILY HISTORY:      SOCIAL HISTORY: No EtOH, no tobacco      Height (cm): 182.9 (03-13 @ 12:59)  Weight (kg): 93.4 (03-13 @ 12:59)  BMI (kg/m2): 27.9 (03-13 @ 12:59)  BSA (m2): 2.16 (03-13 @ 12:59)    T(F): 98.2 (03-13-24 @ 10:40), Max: 98.2 (03-13-24 @ 10:40)  HR: 73 (03-13-24 @ 12:59)  BP: 140/92 (03-13-24 @ 12:59)  RR: 17 (03-13-24 @ 12:59)  SpO2: 99% (03-13-24 @ 12:59)  Wt(kg): --    GENERAL: NAD, well-developed  HEAD:  Atraumatic, Normocephalic  EYES: EOMI, PERRLA, conjunctiva and sclera clear  NECK: Supple, No JVD  CHEST/LUNG: Clear to auscultation bilaterally; No wheeze  HEART: Regular rate and rhythm; No murmurs, rubs, or gallops  ABDOMEN: Soft, Nontender, Nondistended; Bowel sounds present  EXTREMITIES:  2+ Peripheral Pulses, No clubbing, cyanosis, or edema  NEUROLOGY:  weakness. AAOx3, CN grossly intact   SKIN: No rashes or lesions                          13.2   12.23 )-----------( 276      ( 13 Mar 2024 05:30 )             42.7       03-13    132<L>  |  102  |  16  ----------------------------<  302<H>  4.3   |  22  |  0.77    Ca    8.5      13 Mar 2024 05:30  Phos  2.7     03-13  Mg     2.3     03-13        Magnesium: 2.3 mg/dL (03-13 @ 05:30)  Phosphorus: 2.7 mg/dL (03-13 @ 05:30)  Magnesium: 2.2 mg/dL (03-12 @ 21:08)  Phosphorus: 3.8 mg/dL (03-12 @ 21:08)

## 2024-03-13 NOTE — PROGRESS NOTE ADULT - SUBJECTIVE AND OBJECTIVE BOX
Neurology Progress Note    INTERVAL HPI/OVERNIGHT EVENTS:  Patient seen and examined. Pt feeling much better this morning, says he feels less anxious. Discussed the treatments and procedures for today. He said he hardly remembers anything from last week.    MEDICATIONS  (STANDING):  baclofen 10 milliGRAM(s) Oral every 8 hours  chlorhexidine 2% Cloths 1 Application(s) Topical daily  dexAMETHasone  Injectable 2 milliGRAM(s) IV Push every 6 hours  gabapentin 100 milliGRAM(s) Oral every 8 hours  mirtazapine 7.5 milliGRAM(s) Oral every 24 hours  OLANZapine 2.5 milliGRAM(s) Oral every 24 hours  OLANZapine 5 milliGRAM(s) Oral at bedtime  pantoprazole    Tablet 40 milliGRAM(s) Oral two times a day  polyethylene glycol 3350 17 Gram(s) Oral two times a day  sodium chloride 0.9%. 500 milliLiter(s) (125 mL/Hr) IV Continuous <Continuous>  verapamil  milliGRAM(s) Oral daily    MEDICATIONS  (PRN):      Allergies    methylPREDNISolone (Rash; Urticaria; Flushing; Hives)    Intolerances        Vital Signs Last 24 Hrs  T(C): 36.8 (13 Mar 2024 10:40), Max: 36.8 (13 Mar 2024 10:40)  T(F): 98.2 (13 Mar 2024 10:40), Max: 98.2 (13 Mar 2024 10:40)  HR: 73 (13 Mar 2024 11:30) (72 - 137)  BP: 140/92 (13 Mar 2024 11:30) (140/92 - 153/107)  BP(mean): 112 (13 Mar 2024 11:30) (112 - 124)  RR: 17 (13 Mar 2024 11:30) (16 - 20)  SpO2: 99% (13 Mar 2024 11:30) (95% - 99%)    Parameters below as of 13 Mar 2024 11:30  Patient On (Oxygen Delivery Method): room air        Physical exam:  General: No acute distress, awake and alert  Eyes: Anicteric sclerae, moist conjunctivae, see below for CNs  Neck: trachea midline,  Cardiovascular: Regular rate and rhythm    Neurologic:  -Mental status: Awake, alert, oriented to person, place, and time. Speech is fluent with intact naming, repetition, and comprehension, no dysarthria. Recent and remote memory intact. Follows commands. Attention/concentration intact. Fund of knowledge appropriate.  -Cranial nerves:   II: Visual fields are full to confrontation.  III, IV, VI: Extraocular movements are intact without nystagmus. P  VII: Face is symmetric   Motor: Normal bulk and tone. No pronator drift. Strength bilateral upper extremity 5/5, bilateral lower extremities 5/5. Minimal cogwheeling today  Sensation: Intact to light touch bilaterally. No neglect or extinction on double simultaneous testing.  Coordination: No dysmetria on finger-to-nose  Gait: Slightly broad based gait, worse when pt walking on his toes    LABS:                        13.2   12.23 )-----------( 276      ( 13 Mar 2024 05:30 )             42.7     03-13    132<L>  |  102  |  16  ----------------------------<  302<H>  4.3   |  22  |  0.77    Ca    8.5      13 Mar 2024 05:30  Phos  2.7     03-13  Mg     2.3     03-13        Urinalysis Basic - ( 13 Mar 2024 05:30 )    Color: x / Appearance: x / SG: x / pH: x  Gluc: 302 mg/dL / Ketone: x  / Bili: x / Urobili: x   Blood: x / Protein: x / Nitrite: x   Leuk Esterase: x / RBC: x / WBC x   Sq Epi: x / Non Sq Epi: x / Bacteria: x        RADIOLOGY & ADDITIONAL TESTS:

## 2024-03-13 NOTE — PROGRESS NOTE ADULT - SUBJECTIVE AND OBJECTIVE BOX
Cardiology Consult    O/N:  Interval History:  Telemetry:    OBJECTIVE  Vitals:  T(C): 36.8 (03-13-24 @ 12:59), Max: 36.8 (03-13-24 @ 10:40)  HR: 73 (03-13-24 @ 12:59) (72 - 137)  BP: 140/92 (03-13-24 @ 12:59) (140/92 - 153/107)  RR: 17 (03-13-24 @ 12:59) (16 - 20)  SpO2: 99% (03-13-24 @ 12:59) (95% - 99%)  Wt(kg): --    I/O:  I&O's Summary    12 Mar 2024 07:01  -  13 Mar 2024 07:00  --------------------------------------------------------  IN: 1500 mL / OUT: 2600 mL / NET: -1100 mL    13 Mar 2024 07:01  -  13 Mar 2024 15:52  --------------------------------------------------------  IN: 0 mL / OUT: 875 mL / NET: -875 mL        PHYSICAL EXAM:  Appearance: NAD. Speaking in full sentences. Pleasant gentleman   HEENT: No pallor noted.  Conjunctiva clear b/l. Moist oral mucosa.  Cardiovascular: RRR with no murmurs.  Respiratory: Lungs CTAB.   Extremities: No edema b/l.   Neurologic:  Alert and awake. Moving all extremities.   	  LABS:                        13.2   12.23 )-----------( 276      ( 13 Mar 2024 05:30 )             42.7     03-13    132<L>  |  102  |  16  ----------------------------<  302<H>  4.3   |  22  |  0.77    Ca    8.5      13 Mar 2024 05:30  Phos  2.7     03-13  Mg     2.3     03-13        Urinalysis Basic - ( 13 Mar 2024 05:30 )    Color: x / Appearance: x / SG: x / pH: x  Gluc: 302 mg/dL / Ketone: x  / Bili: x / Urobili: x   Blood: x / Protein: x / Nitrite: x   Leuk Esterase: x / RBC: x / WBC x   Sq Epi: x / Non Sq Epi: x / Bacteria: x        RADIOLOGY & ADDITIONAL TESTS:  Reviewed .    MEDICATIONS  (STANDING):  albumin human  5% IVPB 3500 milliLiter(s) IV Intermittent once  baclofen 10 milliGRAM(s) Oral every 8 hours  calcium gluconate IVPB 2 Gram(s) IV Intermittent once  chlorhexidine 2% Cloths 1 Application(s) Topical daily  dexAMETHasone  Injectable 2 milliGRAM(s) IV Push every 6 hours  gabapentin 100 milliGRAM(s) Oral every 8 hours  heparin  Lock Flush 100 Units/mL Injectable 600 Unit(s) IV Push once  mirtazapine 7.5 milliGRAM(s) Oral every 24 hours  OLANZapine 2.5 milliGRAM(s) Oral every 24 hours  OLANZapine 5 milliGRAM(s) Oral at bedtime  pantoprazole    Tablet 40 milliGRAM(s) Oral two times a day  polyethylene glycol 3350 17 Gram(s) Oral two times a day  sodium chloride 0.9%. 500 milliLiter(s) (125 mL/Hr) IV Continuous <Continuous>  verapamil  milliGRAM(s) Oral daily    MEDICATIONS  (PRN):  diphenhydrAMINE Injectable 25 milliGRAM(s) IV Push once PRN Allergic Reaction     Cardiology Consult    O/N:  Interval History: patient was seen and examined in am. Reports feeling a little better today. Still feels a little anxious       OBJECTIVE  Vitals:  T(C): 36.8 (03-13-24 @ 12:59), Max: 36.8 (03-13-24 @ 10:40)  HR: 73 (03-13-24 @ 12:59) (72 - 137)  BP: 140/92 (03-13-24 @ 12:59) (140/92 - 153/107)  RR: 17 (03-13-24 @ 12:59) (16 - 20)  SpO2: 99% (03-13-24 @ 12:59) (95% - 99%)  Wt(kg): --    I/O:  I&O's Summary    12 Mar 2024 07:01  -  13 Mar 2024 07:00  --------------------------------------------------------  IN: 1500 mL / OUT: 2600 mL / NET: -1100 mL    13 Mar 2024 07:01  -  13 Mar 2024 15:52  --------------------------------------------------------  IN: 0 mL / OUT: 875 mL / NET: -875 mL        PHYSICAL EXAM:  Appearance: NAD. Speaking in full sentences. Pleasant gentleman   HEENT: No pallor noted.  Conjunctiva clear b/l. Moist oral mucosa.  Cardiovascular: RRR with no murmurs.  Respiratory: Lungs CTAB.   Extremities: No edema b/l.   Neurologic:  Alert and awake. Moving all extremities.   	  LABS:                        13.2   12.23 )-----------( 276      ( 13 Mar 2024 05:30 )             42.7     03-13    132<L>  |  102  |  16  ----------------------------<  302<H>  4.3   |  22  |  0.77    Ca    8.5      13 Mar 2024 05:30  Phos  2.7     03-13  Mg     2.3     03-13        Urinalysis Basic - ( 13 Mar 2024 05:30 )    Color: x / Appearance: x / SG: x / pH: x  Gluc: 302 mg/dL / Ketone: x  / Bili: x / Urobili: x   Blood: x / Protein: x / Nitrite: x   Leuk Esterase: x / RBC: x / WBC x   Sq Epi: x / Non Sq Epi: x / Bacteria: x        RADIOLOGY & ADDITIONAL TESTS:  Reviewed .    MEDICATIONS  (STANDING):  albumin human  5% IVPB 3500 milliLiter(s) IV Intermittent once  baclofen 10 milliGRAM(s) Oral every 8 hours  calcium gluconate IVPB 2 Gram(s) IV Intermittent once  chlorhexidine 2% Cloths 1 Application(s) Topical daily  dexAMETHasone  Injectable 2 milliGRAM(s) IV Push every 6 hours  gabapentin 100 milliGRAM(s) Oral every 8 hours  heparin  Lock Flush 100 Units/mL Injectable 600 Unit(s) IV Push once  mirtazapine 7.5 milliGRAM(s) Oral every 24 hours  OLANZapine 2.5 milliGRAM(s) Oral every 24 hours  OLANZapine 5 milliGRAM(s) Oral at bedtime  pantoprazole    Tablet 40 milliGRAM(s) Oral two times a day  polyethylene glycol 3350 17 Gram(s) Oral two times a day  sodium chloride 0.9%. 500 milliLiter(s) (125 mL/Hr) IV Continuous <Continuous>  verapamil  milliGRAM(s) Oral daily    MEDICATIONS  (PRN):  diphenhydrAMINE Injectable 25 milliGRAM(s) IV Push once PRN Allergic Reaction

## 2024-03-13 NOTE — PROGRESS NOTE ADULT - ASSESSMENT
38 yo male with history of viral meningitis (1.5 years ago) with sequela of HTN and intractable headaches (followed by Dr. Weston), recent admission 3/4/24 recent admission 3/4/24 for autoimmune encephalitis treated with IVIG and steroids who t presented today to outpatient neurologist for b/l hand weakness, noted to have wide based gait, UE tremors, UE cogwheeling, dysmetria, and UE weakness and was sent to [facility] for admission to neurology service. Planned for decadron repeat LP, and plasma exchange. Cardiology consulted for tachycardia.    Review of Studies:  EKG: NSR   Tele 3/13/24: NSR @80-90 bpm    Tele 3/12/24: sinus tachycardia rates 100- 110, intermittently ST 140s  CT chest 3/8/24- heart size is normal, no pericardial effusion.  Clustered nodular and GGO RUL c/w infections/inflammatory etiology  ECHO 3/13/24: normal echo, mild LVH    #Sinus Tachycardia   Likely iso anxiety and steroids. Currently asymptomatic and denies chest pain, sob, or palpitations.   ECHO normal with mild LVH  Likely 2/2 steroids     #HTN  Verapamil recently switched to amlodipine on prior admission.   Amlodipine discontinued and verapamil restarted 3/12 by Dr. Vasquez  continue home verapamil for both BP and migraine ppx  Alternatively, can also consider Propranolol, will leave upto the primary team

## 2024-03-13 NOTE — PROGRESS NOTE ADULT - ASSESSMENT
39y Male with PMHx of viral meningitis with sequela of HTN and intractable headaches (followed by Dr. Weston), recent admission 3/4/24 for autoimmune encephalitis management/treatment presents today for bilateral hand weakness as well as wide based gait, UE tremors, UE cogwheeling, dysmetria, and UE weakness. Went for outpatient MRI brain and cervical spine. Admitted for further neurologic workup. Restarted on IV steroids, and plan for plasma exchange this admission.     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  - s/p LP 3/4 with elevated opening pressure at 30 mmHg, remainder of CSF negative  - s/p one dose of decadron 4mg, continue decadron 2mg q6h on 3/13. Plan to wean steroids as tolerated after each plasma exchange session  - LP to be done 3/13, NPO after midnight, need full infectious, autoimmune, and fungal studies, all herpes panels (HSV 1, HSV 2, HSV 6), CSF neopterin (write in), TB, RPR, VDRL, AFB, bacterial antigens, full viral PCR panel, oligoclonal bands, Igg index, myelin basic protein  - will need LP with opening pressure  - plasma exchange to be done 3/13, heme made aware and Dr. Yancey from blood bank made aware  - HD catheter for the plasma exchange will be placed by IR during LP, will need IR catheter orders  - start verapamil 120mg, if HR is not controlled, can give labetalol 5mg pushes  - f/u serum hepatitis panel, quantiferon, NMO antibody, d-dimer, ACE, sed rate, CRP, serum protein and immuno electrophoresis, ANCA, LAKSHMI, double stranded DNA, cryoglobulin, copper, ceruloplasmin  - urine heavy metal screen, urine porphyrins, urine porphobilinogen  - c/w gabapentin 100mg q8h  - c/w baclofen 10mg q8h  - continue home pantoprazole  - q8hr general neuro checks and vitals    #migraine  - sumatriptan 100mg PO PRN once (max 2 tabs)  - if needed, can given sumatriptan 6mg subcutaneously once on top of PO    Cards  #HTN  - goal normotension  - hold amlodipine  - start verapamil 120mg    Pulm  - call provider if SPO2 < 94%  - uptake noted in right upper lobe of right lung on PET; CT C/A/P demonstrates clustered nodular and ground-glass opacities in bilateral upper lobes, more prominent over right; findings most consistent with infectious/inflammatory etiology  - pulm consulted during recent admission  - repeat CT in 6-12 weeks to ensure resolution    GI  #Nutrition/Fluids/Electrolytes   - replete K<4 and Mg <2  - Diet: Regular  - IVF: NS 500cc/hr then continue 100cc/hr    Infectious Disease  - afebrile on admission    Endocrine  - A1C results: 5.2  - continue FS monitoring while on steroids    - TSH results: 0.594    Psych  #anxiety  - Zyprexa standing (2.5 am, 2.5 pm, 5 at bedtime)    DVT Prophylaxis  - SCDs for DVT prophylaxis     Dispo: home when cleared     Discussed daily hospital plans and goals with patient and mother at bedside.    Discussed with Neurology Attending, Dr. Weston 39y Male with PMHx of viral meningitis with sequela of HTN and intractable headaches (followed by Dr. Weston), recent admission 3/4/24 for autoimmune encephalitis management/treatment presents today for bilateral hand weakness as well as wide based gait, UE tremors, UE cogwheeling, dysmetria, and UE weakness. Went for outpatient MRI brain and cervical spine. Admitted for further neurologic workup. Restarted on IV steroids, and plan for plasma exchange this admission.     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  - s/p LP 3/4 with elevated opening pressure at 30 mmHg, remainder of CSF negative  - s/p one dose of decadron 4mg, continue decadron 2mg q6h on 3/13. Plan to wean steroids as tolerated after each plasma exchange session  - LP planned for today 3/13, NPO after midnight, need full infectious, autoimmune, and fungal studies, all herpes panels (HSV 1, HSV 2, HSV 6), CSF neopterin (write in), TB, RPR, VDRL, AFB, bacterial antigens, full viral PCR panel, oligoclonal bands, Igg index, myelin basic protein  - will need LP with opening pressure  - plasma exchange to be done 3/13 evening, heme made aware and Dr. Yancey from blood bank made aware  - HD catheter for the plasma exchange will be placed by IR during LP - will need to send daily fibrinogen clauss levels   - continue verapamil 120mg  - f/u serum hepatitis panel, quantiferon, NMO antibody, d-dimer, ACE, sed rate, CRP, serum protein and immuno electrophoresis, ANCA, LAKSHMI, double stranded DNA, cryoglobulin, copper, ceruloplasmin  -f/u HIV  - urine heavy metal screen, urine porphyrins, urine porphobilinogen  - c/w gabapentin 100mg q8h  - c/w baclofen 10mg q8h  - continue home pantoprazole BID  - q8hr general neuro checks and vitals    #migraine  - sumatriptan 100mg PO PRN once (max 2 tabs)  - if needed, can given sumatriptan 6mg subcutaneously once on top of PO    Cards  #HTN  - goal normotension  - hold amlodipine  - start verapamil 120mg    Pulm  - call provider if SPO2 < 94%  - uptake noted in right upper lobe of right lung on PET; CT C/A/P demonstrates clustered nodular and ground-glass opacities in bilateral upper lobes, more prominent over right; findings most consistent with infectious/inflammatory etiology  - pulm consulted during recent admission  - repeat CT in 6-12 weeks to ensure resolution    GI  #Nutrition/Fluids/Electrolytes   - replete K<4 and Mg <2  - Diet: Regular  - IVF: NS 500cc/hr then continue 100cc/hr    Infectious Disease  - afebrile on admission    Endocrine  - A1C results: 5.2  - continue FS monitoring while on steroids    - TSH results: 0.594    Psych  #anxiety  - Zyprexa standing (2.5 am, 2.5 pm, 5 at bedtime)  -start Remeron 7.5 mg for sleep, can add an extra 7.5 mg PRN if needed    DVT Prophylaxis  - SCDs for DVT prophylaxis     Dispo: home when cleared     Discussed daily hospital plans and goals with patient and mother at bedside.    Discussed with Neurology Attending, Dr. Weston 39y Male with PMHx of viral meningitis with sequela of HTN and intractable headaches (followed by Dr. Weston), recent admission 3/4/24 for autoimmune encephalitis management/treatment presents today for bilateral hand weakness as well as wide based gait, UE tremors, UE cogwheeling, dysmetria, and UE weakness. Went for outpatient MRI brain and cervical spine. Admitted for further neurologic workup. Restarted on IV steroids, and plan for plasma exchange this admission.     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  - s/p LP 3/4 with elevated opening pressure at 30 mmHg, remainder of CSF negative  - s/p one dose of decadron 4mg, continue decadron 2mg q6h on 3/13. Plan to wean steroids as tolerated after each plasma exchange session  - LP planned for today 3/13, NPO after midnight, need full infectious, autoimmune, and fungal studies, all herpes panels (HSV 1, HSV 2, HSV 6), CSF neopterin (write in), TB, RPR, VDRL, AFB, bacterial antigens, full viral PCR panel, oligoclonal bands, Igg index, myelin basic protein  - will need LP with opening pressure  - plasma exchange to be done 3/13 evening, heme made aware and Dr. Yancey from blood bank made aware  - HD catheter for the plasma exchange will be placed by IR during LP - will need to send daily fibrinogen clauss levels   -ID consult for rule out possible infectious causes, and prior to initiation of Rituximab  -repeat Neuropsych consult on 3/14  - continue verapamil 120mg  - f/u serum hepatitis panel, quantiferon, NMO antibody, d-dimer, ACE, sed rate, CRP, serum protein and immuno electrophoresis, ANCA, LAKSHMI, double stranded DNA, cryoglobulin, copper, ceruloplasmin  -f/u HIV  - urine heavy metal screen, urine porphyrins, urine porphobilinogen  - c/w gabapentin 100mg q8h  - c/w baclofen 10mg q8h  - continue home pantoprazole BID  - q8hr general neuro checks and vitals    #migraine  - sumatriptan 100mg PO PRN once (max 2 tabs)  - if needed, can given sumatriptan 6mg subcutaneously once on top of PO    Cards  #HTN  - goal normotension  - hold amlodipine  - start verapamil 120mg    Pulm  - call provider if SPO2 < 94%  - uptake noted in right upper lobe of right lung on PET; CT C/A/P demonstrates clustered nodular and ground-glass opacities in bilateral upper lobes, more prominent over right; findings most consistent with infectious/inflammatory etiology  - pulm consulted during recent admission  - repeat CT in 6-12 weeks to ensure resolution    GI  #Nutrition/Fluids/Electrolytes   - replete K<4 and Mg <2  - Diet: Regular  - IVF: NS 500cc/hr then continue 100cc/hr    Infectious Disease  - afebrile on admission    Endocrine  - A1C results: 5.2  - continue FS monitoring while on steroids    - TSH results: 0.594    Psych  #anxiety  - Zyprexa standing (2.5 am, 2.5 pm, 5 at bedtime)  -start Remeron 7.5 mg for sleep, can add an extra 7.5 mg PRN if needed    DVT Prophylaxis  - SCDs for DVT prophylaxis     Dispo: home when cleared     Discussed daily hospital plans and goals with patient and mother at bedside.    Discussed with Neurology Attending, Dr. Weston

## 2024-03-14 DIAGNOSIS — G04.81 OTHER ENCEPHALITIS AND ENCEPHALOMYELITIS: ICD-10-CM

## 2024-03-14 DIAGNOSIS — Z86.61 PERSONAL HISTORY OF INFECTIONS OF THE CENTRAL NERVOUS SYSTEM: ICD-10-CM

## 2024-03-14 DIAGNOSIS — I10 ESSENTIAL (PRIMARY) HYPERTENSION: ICD-10-CM

## 2024-03-14 DIAGNOSIS — F41.9 ANXIETY DISORDER, UNSPECIFIED: ICD-10-CM

## 2024-03-14 DIAGNOSIS — L29.8 OTHER PRURITUS: ICD-10-CM

## 2024-03-14 DIAGNOSIS — D72.829 ELEVATED WHITE BLOOD CELL COUNT, UNSPECIFIED: ICD-10-CM

## 2024-03-14 DIAGNOSIS — R73.9 HYPERGLYCEMIA, UNSPECIFIED: ICD-10-CM

## 2024-03-14 DIAGNOSIS — E11.9 TYPE 2 DIABETES MELLITUS WITHOUT COMPLICATIONS: ICD-10-CM

## 2024-03-14 DIAGNOSIS — R11.10 VOMITING, UNSPECIFIED: ICD-10-CM

## 2024-03-14 DIAGNOSIS — R06.6 HICCOUGH: ICD-10-CM

## 2024-03-14 DIAGNOSIS — K21.9 GASTRO-ESOPHAGEAL REFLUX DISEASE WITHOUT ESOPHAGITIS: ICD-10-CM

## 2024-03-14 DIAGNOSIS — T38.0X5A ADVERSE EFFECT OF GLUCOCORTICOIDS AND SYNTHETIC ANALOGUES, INITIAL ENCOUNTER: ICD-10-CM

## 2024-03-14 DIAGNOSIS — R00.1 BRADYCARDIA, UNSPECIFIED: ICD-10-CM

## 2024-03-14 DIAGNOSIS — F43.20 ADJUSTMENT DISORDER, UNSPECIFIED: ICD-10-CM

## 2024-03-14 DIAGNOSIS — L50.0 ALLERGIC URTICARIA: ICD-10-CM

## 2024-03-14 DIAGNOSIS — M50.30 OTHER CERVICAL DISC DEGENERATION, UNSPECIFIED CERVICAL REGION: ICD-10-CM

## 2024-03-14 DIAGNOSIS — R41.0 DISORIENTATION, UNSPECIFIED: ICD-10-CM

## 2024-03-14 DIAGNOSIS — E87.1 HYPO-OSMOLALITY AND HYPONATREMIA: ICD-10-CM

## 2024-03-14 DIAGNOSIS — I95.9 HYPOTENSION, UNSPECIFIED: ICD-10-CM

## 2024-03-14 DIAGNOSIS — G43.909 MIGRAINE, UNSPECIFIED, NOT INTRACTABLE, WITHOUT STATUS MIGRAINOSUS: ICD-10-CM

## 2024-03-14 LAB
ACE SERPL-CCNC: 42 U/L — SIGNIFICANT CHANGE UP (ref 14–82)
ADD ON TEST-SPECIMEN IN LAB: SIGNIFICANT CHANGE UP
ADD ON TEST-SPECIMEN IN LAB: SIGNIFICANT CHANGE UP
ANA PAT FLD IF-IMP: ABNORMAL
ANA TITR SER: ABNORMAL
ANION GAP SERPL CALC-SCNC: 11 MMOL/L — SIGNIFICANT CHANGE UP (ref 5–17)
ANION GAP SERPL CALC-SCNC: 4 MMOL/L — LOW (ref 5–17)
AUTO DIFF PNL BLD: ABNORMAL
BUN SERPL-MCNC: 14 MG/DL — SIGNIFICANT CHANGE UP (ref 7–23)
BUN SERPL-MCNC: 16 MG/DL — SIGNIFICANT CHANGE UP (ref 7–23)
C-ANCA SER-ACNC: NEGATIVE — SIGNIFICANT CHANGE UP
CALCIUM SERPL-MCNC: 8.6 MG/DL — SIGNIFICANT CHANGE UP (ref 8.4–10.5)
CALCIUM SERPL-MCNC: 8.9 MG/DL — SIGNIFICANT CHANGE UP (ref 8.4–10.5)
CHLORIDE SERPL-SCNC: 101 MMOL/L — SIGNIFICANT CHANGE UP (ref 96–108)
CHLORIDE SERPL-SCNC: 105 MMOL/L — SIGNIFICANT CHANGE UP (ref 96–108)
CO2 SERPL-SCNC: 24 MMOL/L — SIGNIFICANT CHANGE UP (ref 22–31)
CO2 SERPL-SCNC: 27 MMOL/L — SIGNIFICANT CHANGE UP (ref 22–31)
CREAT SERPL-MCNC: 0.88 MG/DL — SIGNIFICANT CHANGE UP (ref 0.5–1.3)
CREAT SERPL-MCNC: 0.95 MG/DL — SIGNIFICANT CHANGE UP (ref 0.5–1.3)
DSDNA AB SER-ACNC: 14 IU/ML — SIGNIFICANT CHANGE UP
EGFR: 106 ML/MIN/1.73M2 — SIGNIFICANT CHANGE UP
EGFR: 114 ML/MIN/1.73M2 — SIGNIFICANT CHANGE UP
FERRITIN SERPL-MCNC: 36 NG/ML — SIGNIFICANT CHANGE UP (ref 30–400)
FIBRINOGEN PPP-MCNC: 140 MG/DL — LOW (ref 200–445)
GLUCOSE BLDC GLUCOMTR-MCNC: 231 MG/DL — HIGH (ref 70–99)
GLUCOSE BLDC GLUCOMTR-MCNC: 243 MG/DL — HIGH (ref 70–99)
GLUCOSE BLDC GLUCOMTR-MCNC: 283 MG/DL — HIGH (ref 70–99)
GLUCOSE SERPL-MCNC: 260 MG/DL — HIGH (ref 70–99)
GLUCOSE SERPL-MCNC: 392 MG/DL — HIGH (ref 70–99)
HCT VFR BLD CALC: 43.3 % — SIGNIFICANT CHANGE UP (ref 39–50)
HGB BLD-MCNC: 13.8 G/DL — SIGNIFICANT CHANGE UP (ref 13–17)
HIV 1+2 AB+HIV1 P24 AG SERPL QL IA: SIGNIFICANT CHANGE UP
IRON SATN MFR SERPL: 22 % — SIGNIFICANT CHANGE UP (ref 16–55)
IRON SATN MFR SERPL: 46 UG/DL — SIGNIFICANT CHANGE UP (ref 45–165)
MAGNESIUM SERPL-MCNC: 2 MG/DL — SIGNIFICANT CHANGE UP (ref 1.6–2.6)
MCHC RBC-ENTMCNC: 26.1 PG — LOW (ref 27–34)
MCHC RBC-ENTMCNC: 31.9 GM/DL — LOW (ref 32–36)
MCV RBC AUTO: 82 FL — SIGNIFICANT CHANGE UP (ref 80–100)
NIGHT BLUE STAIN TISS: SIGNIFICANT CHANGE UP
NRBC # BLD: 0 /100 WBCS — SIGNIFICANT CHANGE UP (ref 0–0)
P-ANCA SER-ACNC: NEGATIVE — SIGNIFICANT CHANGE UP
PHOSPHATE SERPL-MCNC: 2.6 MG/DL — SIGNIFICANT CHANGE UP (ref 2.5–4.5)
PLATELET # BLD AUTO: 263 K/UL — SIGNIFICANT CHANGE UP (ref 150–400)
POTASSIUM SERPL-MCNC: 4.4 MMOL/L — SIGNIFICANT CHANGE UP (ref 3.5–5.3)
POTASSIUM SERPL-MCNC: 4.7 MMOL/L — SIGNIFICANT CHANGE UP (ref 3.5–5.3)
POTASSIUM SERPL-SCNC: 4.4 MMOL/L — SIGNIFICANT CHANGE UP (ref 3.5–5.3)
POTASSIUM SERPL-SCNC: 4.7 MMOL/L — SIGNIFICANT CHANGE UP (ref 3.5–5.3)
RBC # BLD: 5.28 M/UL — SIGNIFICANT CHANGE UP (ref 4.2–5.8)
RBC # FLD: 19.6 % — HIGH (ref 10.3–14.5)
SARS-COV-2 RNA SPEC QL NAA+PROBE: SIGNIFICANT CHANGE UP
SODIUM SERPL-SCNC: 132 MMOL/L — LOW (ref 135–145)
SODIUM SERPL-SCNC: 140 MMOL/L — SIGNIFICANT CHANGE UP (ref 135–145)
SPECIMEN SOURCE: SIGNIFICANT CHANGE UP
TIBC SERPL-MCNC: 211 UG/DL — LOW (ref 220–430)
TSH SERPL-MCNC: 3.8 UIU/ML — SIGNIFICANT CHANGE UP (ref 0.27–4.2)
UIBC SERPL-MCNC: 165 UG/DL — SIGNIFICANT CHANGE UP (ref 110–370)
WBC # BLD: 23 K/UL — HIGH (ref 3.8–10.5)
WBC # FLD AUTO: 23 K/UL — HIGH (ref 3.8–10.5)

## 2024-03-14 PROCEDURE — 96136 PSYCL/NRPSYC TST PHY/QHP 1ST: CPT

## 2024-03-14 PROCEDURE — 93970 EXTREMITY STUDY: CPT | Mod: 26

## 2024-03-14 PROCEDURE — 76870 US EXAM SCROTUM: CPT | Mod: 26

## 2024-03-14 PROCEDURE — 96133 NRPSYC TST EVAL PHYS/QHP EA: CPT

## 2024-03-14 PROCEDURE — 99221 1ST HOSP IP/OBS SF/LOW 40: CPT

## 2024-03-14 PROCEDURE — 96137 PSYCL/NRPSYC TST PHY/QHP EA: CPT

## 2024-03-14 PROCEDURE — 70544 MR ANGIOGRAPHY HEAD W/O DYE: CPT | Mod: 26

## 2024-03-14 PROCEDURE — 99255 IP/OBS CONSLTJ NEW/EST HI 80: CPT

## 2024-03-14 PROCEDURE — 96132 NRPSYC TST EVAL PHYS/QHP 1ST: CPT

## 2024-03-14 PROCEDURE — 99252 IP/OBS CONSLTJ NEW/EST SF 35: CPT | Mod: GC

## 2024-03-14 PROCEDURE — 70547 MR ANGIOGRAPHY NECK W/O DYE: CPT | Mod: 26

## 2024-03-14 PROCEDURE — 99253 IP/OBS CNSLTJ NEW/EST LOW 45: CPT

## 2024-03-14 PROCEDURE — 99232 SBSQ HOSP IP/OBS MODERATE 35: CPT

## 2024-03-14 RX ORDER — VERAPAMIL HCL 240 MG
120 CAPSULE, EXTENDED RELEASE PELLETS 24 HR ORAL EVERY 24 HOURS
Refills: 0 | Status: DISCONTINUED | OUTPATIENT
Start: 2024-03-15 | End: 2024-03-18

## 2024-03-14 RX ORDER — SODIUM CHLORIDE 9 MG/ML
1000 INJECTION, SOLUTION INTRAVENOUS
Refills: 0 | Status: DISCONTINUED | OUTPATIENT
Start: 2024-03-14 | End: 2024-03-26

## 2024-03-14 RX ORDER — BACLOFEN 100 %
10 POWDER (GRAM) MISCELLANEOUS
Refills: 0 | Status: DISCONTINUED | OUTPATIENT
Start: 2024-03-14 | End: 2024-03-24

## 2024-03-14 RX ORDER — GABAPENTIN 400 MG/1
100 CAPSULE ORAL
Refills: 0 | Status: DISCONTINUED | OUTPATIENT
Start: 2024-03-14 | End: 2024-03-15

## 2024-03-14 RX ORDER — DEXTROSE 50 % IN WATER 50 %
25 SYRINGE (ML) INTRAVENOUS ONCE
Refills: 0 | Status: DISCONTINUED | OUTPATIENT
Start: 2024-03-14 | End: 2024-03-26

## 2024-03-14 RX ORDER — DEXTROSE 50 % IN WATER 50 %
12.5 SYRINGE (ML) INTRAVENOUS ONCE
Refills: 0 | Status: DISCONTINUED | OUTPATIENT
Start: 2024-03-14 | End: 2024-03-26

## 2024-03-14 RX ORDER — MIRTAZAPINE 45 MG/1
15 TABLET, ORALLY DISINTEGRATING ORAL AT BEDTIME
Refills: 0 | Status: DISCONTINUED | OUTPATIENT
Start: 2024-03-14 | End: 2024-03-15

## 2024-03-14 RX ORDER — TRAMADOL HYDROCHLORIDE 50 MG/1
25 TABLET ORAL EVERY 8 HOURS
Refills: 0 | Status: DISCONTINUED | OUTPATIENT
Start: 2024-03-14 | End: 2024-03-15

## 2024-03-14 RX ORDER — ENOXAPARIN SODIUM 100 MG/ML
40 INJECTION SUBCUTANEOUS EVERY 24 HOURS
Refills: 0 | Status: DISCONTINUED | OUTPATIENT
Start: 2024-03-14 | End: 2024-03-26

## 2024-03-14 RX ORDER — TRAMADOL HYDROCHLORIDE 50 MG/1
25 TABLET ORAL ONCE
Refills: 0 | Status: DISCONTINUED | OUTPATIENT
Start: 2024-03-14 | End: 2024-03-14

## 2024-03-14 RX ORDER — POLYETHYLENE GLYCOL 3350 17 G/17G
17 POWDER, FOR SOLUTION ORAL EVERY 12 HOURS
Refills: 0 | Status: DISCONTINUED | OUTPATIENT
Start: 2024-03-14 | End: 2024-03-26

## 2024-03-14 RX ORDER — DEXAMETHASONE 0.5 MG/5ML
2 ELIXIR ORAL EVERY 12 HOURS
Refills: 0 | Status: DISCONTINUED | OUTPATIENT
Start: 2024-03-14 | End: 2024-03-14

## 2024-03-14 RX ORDER — PANTOPRAZOLE SODIUM 20 MG/1
40 TABLET, DELAYED RELEASE ORAL EVERY 12 HOURS
Refills: 0 | Status: DISCONTINUED | OUTPATIENT
Start: 2024-03-14 | End: 2024-03-26

## 2024-03-14 RX ORDER — ENOXAPARIN SODIUM 100 MG/ML
40 INJECTION SUBCUTANEOUS EVERY 24 HOURS
Refills: 0 | Status: DISCONTINUED | OUTPATIENT
Start: 2024-03-14 | End: 2024-03-14

## 2024-03-14 RX ORDER — DEXAMETHASONE 0.5 MG/5ML
2 ELIXIR ORAL EVERY 24 HOURS
Refills: 0 | Status: DISCONTINUED | OUTPATIENT
Start: 2024-03-14 | End: 2024-03-14

## 2024-03-14 RX ORDER — IRON SUCROSE 20 MG/ML
300 INJECTION, SOLUTION INTRAVENOUS ONCE
Refills: 0 | Status: COMPLETED | OUTPATIENT
Start: 2024-03-14 | End: 2024-03-14

## 2024-03-14 RX ORDER — INSULIN LISPRO 100/ML
VIAL (ML) SUBCUTANEOUS
Refills: 0 | Status: DISCONTINUED | OUTPATIENT
Start: 2024-03-14 | End: 2024-03-20

## 2024-03-14 RX ORDER — GLUCAGON INJECTION, SOLUTION 0.5 MG/.1ML
1 INJECTION, SOLUTION SUBCUTANEOUS ONCE
Refills: 0 | Status: DISCONTINUED | OUTPATIENT
Start: 2024-03-14 | End: 2024-03-26

## 2024-03-14 RX ORDER — CALCIUM GLUCONATE 100 MG/ML
2 VIAL (ML) INTRAVENOUS ONCE
Refills: 0 | Status: DISCONTINUED | OUTPATIENT
Start: 2024-03-15 | End: 2024-03-18

## 2024-03-14 RX ORDER — DEXAMETHASONE 0.5 MG/5ML
2 ELIXIR ORAL
Refills: 0 | Status: DISCONTINUED | OUTPATIENT
Start: 2024-03-14 | End: 2024-03-15

## 2024-03-14 RX ORDER — DEXAMETHASONE 0.5 MG/5ML
2 ELIXIR ORAL ONCE
Refills: 0 | Status: COMPLETED | OUTPATIENT
Start: 2024-03-14 | End: 2024-03-14

## 2024-03-14 RX ORDER — OLANZAPINE 15 MG/1
5 TABLET, FILM COATED ORAL EVERY 24 HOURS
Refills: 0 | Status: DISCONTINUED | OUTPATIENT
Start: 2024-03-15 | End: 2024-03-15

## 2024-03-14 RX ORDER — DEXTROSE 50 % IN WATER 50 %
15 SYRINGE (ML) INTRAVENOUS ONCE
Refills: 0 | Status: DISCONTINUED | OUTPATIENT
Start: 2024-03-14 | End: 2024-03-26

## 2024-03-14 RX ORDER — ALBUMIN HUMAN 25 %
3500 VIAL (ML) INTRAVENOUS ONCE
Refills: 0 | Status: DISCONTINUED | OUTPATIENT
Start: 2024-03-15 | End: 2024-03-18

## 2024-03-14 RX ADMIN — TRAMADOL HYDROCHLORIDE 25 MILLIGRAM(S): 50 TABLET ORAL at 07:56

## 2024-03-14 RX ADMIN — OLANZAPINE 5 MILLIGRAM(S): 15 TABLET, FILM COATED ORAL at 21:54

## 2024-03-14 RX ADMIN — PANTOPRAZOLE SODIUM 40 MILLIGRAM(S): 20 TABLET, DELAYED RELEASE ORAL at 21:55

## 2024-03-14 RX ADMIN — MIRTAZAPINE 15 MILLIGRAM(S): 45 TABLET, ORALLY DISINTEGRATING ORAL at 21:57

## 2024-03-14 RX ADMIN — SUMATRIPTAN SUCCINATE 100 MILLIGRAM(S): 4 INJECTION, SOLUTION SUBCUTANEOUS at 07:09

## 2024-03-14 RX ADMIN — GABAPENTIN 100 MILLIGRAM(S): 400 CAPSULE ORAL at 21:55

## 2024-03-14 RX ADMIN — Medication 2: at 17:18

## 2024-03-14 RX ADMIN — ENOXAPARIN SODIUM 40 MILLIGRAM(S): 100 INJECTION SUBCUTANEOUS at 21:57

## 2024-03-14 RX ADMIN — TRAMADOL HYDROCHLORIDE 25 MILLIGRAM(S): 50 TABLET ORAL at 21:53

## 2024-03-14 RX ADMIN — POLYETHYLENE GLYCOL 3350 17 GRAM(S): 17 POWDER, FOR SOLUTION ORAL at 07:09

## 2024-03-14 RX ADMIN — SUMATRIPTAN SUCCINATE 100 MILLIGRAM(S): 4 INJECTION, SOLUTION SUBCUTANEOUS at 07:22

## 2024-03-14 RX ADMIN — OLANZAPINE 2.5 MILLIGRAM(S): 15 TABLET, FILM COATED ORAL at 17:17

## 2024-03-14 RX ADMIN — Medication 10 MILLIGRAM(S): at 07:09

## 2024-03-14 RX ADMIN — Medication 10 MILLIGRAM(S): at 21:54

## 2024-03-14 RX ADMIN — GABAPENTIN 100 MILLIGRAM(S): 400 CAPSULE ORAL at 14:06

## 2024-03-14 RX ADMIN — GABAPENTIN 100 MILLIGRAM(S): 400 CAPSULE ORAL at 07:12

## 2024-03-14 RX ADMIN — SUMATRIPTAN SUCCINATE 100 MILLIGRAM(S): 4 INJECTION, SOLUTION SUBCUTANEOUS at 00:31

## 2024-03-14 RX ADMIN — Medication 2 MILLIGRAM(S): at 21:54

## 2024-03-14 RX ADMIN — TRAMADOL HYDROCHLORIDE 25 MILLIGRAM(S): 50 TABLET ORAL at 22:27

## 2024-03-14 RX ADMIN — Medication 2 MILLIGRAM(S): at 15:51

## 2024-03-14 RX ADMIN — SUMATRIPTAN SUCCINATE 100 MILLIGRAM(S): 4 INJECTION, SOLUTION SUBCUTANEOUS at 22:27

## 2024-03-14 RX ADMIN — SUMATRIPTAN SUCCINATE 100 MILLIGRAM(S): 4 INJECTION, SOLUTION SUBCUTANEOUS at 21:54

## 2024-03-14 RX ADMIN — Medication 10 MILLIGRAM(S): at 14:06

## 2024-03-14 RX ADMIN — SODIUM CHLORIDE 125 MILLILITER(S): 9 INJECTION INTRAMUSCULAR; INTRAVENOUS; SUBCUTANEOUS at 07:08

## 2024-03-14 RX ADMIN — IRON SUCROSE 176.67 MILLIGRAM(S): 20 INJECTION, SOLUTION INTRAVENOUS at 22:06

## 2024-03-14 RX ADMIN — Medication 10 MILLIGRAM(S): at 17:16

## 2024-03-14 RX ADMIN — POLYETHYLENE GLYCOL 3350 17 GRAM(S): 17 POWDER, FOR SOLUTION ORAL at 21:54

## 2024-03-14 RX ADMIN — PANTOPRAZOLE SODIUM 40 MILLIGRAM(S): 20 TABLET, DELAYED RELEASE ORAL at 07:10

## 2024-03-14 RX ADMIN — GABAPENTIN 100 MILLIGRAM(S): 400 CAPSULE ORAL at 17:15

## 2024-03-14 RX ADMIN — Medication 2 MILLIGRAM(S): at 07:12

## 2024-03-14 NOTE — CONSULT NOTE ADULT - TIME BILLING
preparing to see patient, obtaining history, performing physical exam, educating the patient, communicating with other health care providers, documenting in the EMR, independently interpreting results.
As above
direct patient encounter  reviewed labs and images  documentation  d/w Neurology ACP

## 2024-03-14 NOTE — OCCUPATIONAL THERAPY INITIAL EVALUATION ADULT - GROSSLY INTACT, SENSORY
Pt reports decreased bUE L/T to distal forearms ; intact at proximal levels/Left LE/Right LE/Grossly Intact

## 2024-03-14 NOTE — CONSULT NOTE ADULT - SUBJECTIVE AND OBJECTIVE BOX
Patient is a 39y old  Male who presents with a chief complaint of     HPI:   **STROKE HPI***    HPI: 39y Male with PMHx of viral meningitis with sequela of HTN and intractable headaches (followed by Dr. Weston), recent admission 3/4/24 for autoimmune encephalitis management/treatment presents today for bilateral hand weakness as well as wide based gait, UE tremors, UE cogwheeling, dysmetria, and UE weakness. Went for outpatient MRI brain and cervical spine today.    3/4 admission: autoimmune encephalitis findings seen on outpatient PET 3/4. LP done on the same day with opening pressures of 30mmHg; CSF unremarkable. Course complicated by allergic reaction to solumedrol which was treated with benadryl. Steroid switched to decadron. Patient with some decrease in cognition (serial 7s, memory difficulty, attention/focus worsening) with improvement back to baseline with no cognitive/neuro deficit s/p 4 day course of IVIG. Autoimmune encephalitis labs sent, so far negative (refer to chart note 3/5). EEG negative. Intractable hiccups treated with gabapentin/baclofen. MRI epilepsy protocol done with no abnormalities or enhancements. MRV obtained for sudden severe HAs; no CVST. In addition, PET also showed increased activity with poorly define groundglass lesion, unable to r/o malignancy. CT C/A/P with contrast obtained; no evidence of malignancy/adenopathy, more consistent with infectious/inflammatory etiology. Pulmonary consulted; in agreement and can consider rpt CT chest in 6-12 weeks to document resolution. Cardiology consulted for PACs vs sinus arrhythmia, home verapamil switched to amlodipine. Planned for outpatient prednisone taper, home infusion, outpatient rehab, outpatient neuro-cognitive assessment. Will also consider repeat CT chest 6-12 weeks pending on lab results.       PAST MEDICAL & SURGICAL HISTORY:  Hypertension      Migraine      Meningitis          FAMILY HISTORY:      T(C): --  HR: --  BP: --  RR: --  SpO2: --    MEDICATION RECONCILIATION   MEDICATIONS  (STANDING):    MEDICATIONS  (PRN):    Allergies    methylPREDNISolone (Rash; Urticaria; Flushing; Hives)    Intolerances      Vital Signs Last 24 Hrs  T(C): --  T(F): --  HR: --  BP: --  BP(mean): --  RR: --  SpO2: --   (12 Mar 2024 17:57)    PAST MEDICAL & SURGICAL HISTORY:  Hypertension      Migraine      Meningitis        MEDICATIONS  (STANDING):  albumin human  5% IVPB 3500 milliLiter(s) IV Intermittent once  baclofen 10 milliGRAM(s) Oral every 8 hours  calcium gluconate IVPB 2 Gram(s) IV Intermittent once  chlorhexidine 2% Cloths 1 Application(s) Topical daily  dexAMETHasone  Injectable 2 milliGRAM(s) IV Push every 6 hours  enoxaparin Injectable 40 milliGRAM(s) SubCutaneous every 24 hours  gabapentin 100 milliGRAM(s) Oral every 8 hours  heparin  Lock Flush 100 Units/mL Injectable 600 Unit(s) IV Push once  mirtazapine 7.5 milliGRAM(s) Oral every 24 hours  OLANZapine 5 milliGRAM(s) Oral at bedtime  OLANZapine 5 milliGRAM(s) Oral daily  OLANZapine 2.5 milliGRAM(s) Oral every 24 hours  pantoprazole    Tablet 40 milliGRAM(s) Oral two times a day  polyethylene glycol 3350 17 Gram(s) Oral two times a day  sodium chloride 0.9%. 500 milliLiter(s) (125 mL/Hr) IV Continuous <Continuous>  verapamil  milliGRAM(s) Oral daily    MEDICATIONS  (PRN):  diphenhydrAMINE Injectable 25 milliGRAM(s) IV Push once PRN Allergic Reaction  SUMAtriptan 100 milliGRAM(s) Oral two times a day PRN Migraine          Home Living Status :  lives alone in an elevator accessible apartment building          -  Home Services :  none             -  Occupation : [employer] employee    Baseline Functional Level Prior to Admission :             - ADL's/ IADL's :  independent           - Ambulatory status prior to admission :   walked with no assistive devices          FAMILY HISTORY:      CBC Full  -  ( 13 Mar 2024 05:30 )  WBC Count : 12.23 K/uL  RBC Count : 5.12 M/uL  Hemoglobin : 13.2 g/dL  Hematocrit : 42.7 %  Platelet Count - Automated : 276 K/uL  Mean Cell Volume : 83.4 fl  Mean Cell Hemoglobin : 25.8 pg  Mean Cell Hemoglobin Concentration : 30.9 gm/dL  Auto Neutrophil # : x  Auto Lymphocyte # : x  Auto Monocyte # : x  Auto Eosinophil # : x  Auto Basophil # : x  Auto Neutrophil % : x  Auto Lymphocyte % : x  Auto Monocyte % : x  Auto Eosinophil % : x  Auto Basophil % : x      03-13    140  |  105  |  14  ----------------------------<  392<H>  4.7   |  24  |  0.88    Ca    8.6      13 Mar 2024 23:31  Phos  2.6     03-13  Mg     2.0     03-13        Urinalysis Basic - ( 13 Mar 2024 23:31 )    Color: x / Appearance: x / SG: x / pH: x  Gluc: 392 mg/dL / Ketone: x  / Bili: x / Urobili: x   Blood: x / Protein: x / Nitrite: x   Leuk Esterase: x / RBC: x / WBC x   Sq Epi: x / Non Sq Epi: x / Bacteria: x        Radiology :             Review of Systems : per HPI         Vital Signs Last 24 Hrs  T(C): 36.8 (14 Mar 2024 06:39), Max: 36.8 (13 Mar 2024 10:40)  T(F): 98.2 (14 Mar 2024 06:39), Max: 98.2 (13 Mar 2024 10:40)  HR: 113 (14 Mar 2024 06:39) (73 - 113)  BP: 146/93 (14 Mar 2024 06:39) (125/76 - 162/102)  BP(mean): 114 (14 Mar 2024 06:39) (94 - 125)  RR: 18 (14 Mar 2024 06:39) (17 - 18)  SpO2: 97% (14 Mar 2024 06:39) (97% - 99%)    Parameters below as of 14 Mar 2024 06:39  Patient On (Oxygen Delivery Method): room air            Physical Exam:  39 y o man  lying comfortably in semi Tillman's position , awake , alert , no new complaints , feels tired     Head: normocephalic , atraumatic    Eyes: PERRLA , EOMI , no nystagmus , sclera anicteric    ENT / FACE: neg nasal discharge , uvula midline , no oropharyngeal erythema / exudate    Neck: supple , negative JVD , negative carotid bruits , no thyromegaly    Chest: CTA bilaterally , neg wheeze / rhonchi / rales / crackles / egophany    Cardiovascular: regular rate and rhythm , neg murmurs / rubs / gallops    Abdomen: soft , non distended , non tender to palpation in all 4 quadrants ,  normal bowel sounds     Extremities: WWP , neg cyanosis /clubbing / edema     Neurologic Exam:     Alert and oriented to person , place , date/year , speech fluent w/o dysarthria     Cranial Nerves:           II:                         pupils equal , round and reactive to light , visual fields intact         III/ IV/VI:             extraocular movements intact , neg nystagmus , neg ptosis        V:                        facial sensation intact , V1-3 normal        VII:                      face symmetric , no droop , normal eye closure and smile        VIII:                     hearing intact to finger rub bilaterally        IX and X:             no hoarseness , gag intact , palate/ uvula rise symmetrically        XI:                       SCM / trapezius strength intact bilateral        XII:                      no tongue deviation       Motor Exam:        > 3+/5 x 4 extremities        Sensation:         intact to light touch x 4 extremities                            no neglect or extinction on double simultaneous testing    DTR:           biceps/brachioradialis: equal                            patella/ankle: equal           neg Babinski     Coordination:            Finger to Nose:  neg dysmetria bilaterally           Heel to Shin:    neg dysmetria bilaterally          Gait:  not tested          PM&R Impression: admitted for c/o bilateral hand weakness as well as wide based gait, UE tremors, UE cogwheeling, dysmetria, and UE weakness    - h/o autoimmune encephalitis          Recommendations / Plan:       1) Physical / Occupational therapy focusing on therapeutic exercises , equipment evaluation , bed mobility/transfer out of bed evaluation , progressive ambulation with assistive devices prn .    2) Current disposition plan recommendation:    pending functional progress  , probable d/c home

## 2024-03-14 NOTE — CONSULT NOTE ADULT - ASSESSMENT
Neurology    39 y o Male with PMHx of viral meningitis with sequela of HTN and intractable headaches (followed by Dr. Weston), recent admission 3/4/24 for autoimmune encephalitis management/treatment presents today for bilateral hand weakness as well as wide based gait, UE tremors, UE cogwheeling, dysmetria, and UE weakness. Went for outpatient MRI brain and cervical spine today. Admitted for further neurologic workup.     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  - s/p LP 3/4 with elevated opening pressure at 30 mmHg, remainder of CSF negative  - one dose of decadron 4mg tonight  - will start decadron 2mg q6h on 3/13  - LP to be done 3/13, NPO after midnight, need full infectious, autoimmune, and fungal studies, all herpes panels (HSV 1, HSV 2, HSV 6), CSF neopterin (write in), TB, RPR, VDRL, AFB, bacterial antigens, full viral PCR panel, oligoclonal bands, Igg index, myelin basic protein  - will need LP with opening pressure  - plasma exchange to be done 3/13, heme made aware and Dr. Yancey from blood bank made aware  - HD catheter for the plasma exchange will be placed by IR during LP, will need IR catheter orders  - start verapamil 120mg, if HR is not controlled, can give labetalol 5mg pushes  - f/u serum hepatitis panel, quantiferon, NMO antibody, d-dimer, ACE, sed rate, CRP, serum protein and immuno electrophoresis, ANCA, LAKSHMI, double stranded DNA, cryoglobulin, copper, ceruloplasmin  - urine heavy metal screen, urine porphyrins, urine porphobilinogen  - c/w gabapentin 100mg q8h  - c/w baclofen 10mg q8h  - continue home pantoprazole  - q8hr general neuro checks and vitals    #migraine  - sumatriptan 100mg PO PRN once (max 2 tabs)  - if needed, can given sumatriptan 6mg subcutaneously once on top of PO    Cards  #HTN  - goal normotension  - hold amlodipine  - start verapamil 120mg    Pulm  - call provider if SPO2 < 94%  - uptake noted in right upper lobe of right lung on PET; CT C/A/P demonstrates clustered nodular and ground-glass opacities in bilateral upper lobes, more prominent over right; findings most consistent with infectious/inflammatory etiology  - pulm consulted during recent admission  - repeat CT in 6-12 weeks to ensure resolution    GI  #Nutrition/Fluids/Electrolytes   - replete K<4 and Mg <2  - Diet: Regular  - IVF: NS 500cc/hr then continue 100cc/hr    Infectious Disease  - afebrile on admission    Endocrine  - A1C results: 5.2  - continue FS monitoring while on steroids    - TSH results: 0.594    Psych  #anxiety  - Zyprexa standing (2.5 am, 2.5 pm, 5 at bedtime)    DVT Prophylaxis  - SCDs for DVT prophylaxis     Dispo: home when cleared     Discussed daily hospital plans and goals with patient and mother at bedside.    Discussed with Neurology Attending, Dr. Weston

## 2024-03-14 NOTE — CONSULT NOTE ADULT - ATTENDING COMMENTS
As above, 40 yo w/ concern for autoimmune encephalitis who previously received IVIG and discharged home now presents w/ recurrent symptoms (wide based gait, weakness), now with plans for PLEX followed by rituxan. Pulmonary consulted last admission for CT chest with abnormal findings - patchy GGOs at bronchovascular bundle that were PET avid. Pt had no symptoms at the time of CT scan last week, was felt to be inflammatory in nature or possible infectious but no intervention necessary given lack of symptoms. Now re-called for safety of immunosuppressive agents i/s/o abnormal CT last week. Reviewed CT again - nonspecific findings. Pt also notes he had previously excessive vomiting, may represent aspiration pneumonitis. Again remains asymptomatic from respiratory standpoint. Would be too soon to repeat imaging as can take 4-6 weeks to resolve. However, given lack of SIRS or symptoms of infection, there is no overt contraindication to proceeding with immunosuppressive agent. Will still plan to repeat CT chest in 4 weeks to ensure resolution. Continue w/ autoimmune serologies. Low suspicion for pulmonary vasculitis, alveolar hemorrhage, or active bacterial infection. Case also discussed w/ Dr. Santos who reviewed images with us and agrees with plan.
HEMATOLOGY ATTENDING NOTE    In brief, patient is a 39year old man w/ prior hx of iron deficiency anemia (with prior luminal eval with GI revealing gastric erosions and esophagitis) admitted for autoimmune encephalitis and currently undergoing plasma exchange for that under the care of Dr Yancey. Hematology consulted for iron deficiency anemia.    Hemoglobin 14-13, 13.8now  Iron 66 2021, 37 02/23, 35 02/24, 46 now  Ferritin 18 2021, 14 2022, 13 02/24, 36 now  % saturation 14 2021, 9 2022, 7 2023, 7 02/24, 22 now    Given ferritin of 36, which is below ferritin of 50 it still indicated component of iron def.   Agree with fellow's recommendation to treat with one infusion of Venofer 300mg.   Patient can follow up with GI and PMD as outpatient.
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 Mild symmetric left ventricular hypertrophy. Normal right ventricular size and systolic function. Normal atria. No significant valvular disease. No pericardial effusion.  - Tele 3/12-13/24: sinus tachycardia rates 100- 110, intermittently ST 140s; Currently in the 70's     #Sinus Tachycardia   - Patient sinus tachycardia is likely multifactorial, mostly driven by steroids. Patient reports feeling of excitement since initiation of Steroid therapy  - He was previously started on Verapamil by Neurology for Migraine treatment. Verapamil was discontinued during his last hospitalization due to  bradycardic episode that was suspected to be vagal mediated  - Tele reviewed this hospitalization showing ST episode with HR ranging in the 100-110, now currently in the 70's  - Echo performed this hospitalization revealed normal BIV function without valvular heart disease  - Clinically patient is warm, well perfused and well compensated  - At this time recommend Continuing Verapamil 120 mG daily more for Migraine than for the Tachycardia as suspect Tachycardia will resolve once patient weaned off steroids  - Alternatively, can also consider Propranolol for Migraine/Anxiety benefits in lieu of Verapamil. If propranolol preferred would start at 20 mg po BID (Lowest dose) and uptitrate as tolerated    # HTN  - Patient has history of essential HTN previously on Coozar 50 mg po daily  - SBP this hospitalization have ranged from 140-150's/90's-100  - At this time suspect iatrogenic component to worsening BP  - Would observe patient post Plasma Exchange. Should SBP remain above goal and persistently >140/90, would than initiate Losartan at 25 mg po daily dosing with goal to uptitrate as tolerated    Cardiology will continue to follow along with you, please call with any questions  Case discussed with Dr Weston

## 2024-03-14 NOTE — CONSULT NOTE ADULT - SUBJECTIVE AND OBJECTIVE BOX
PULMONARY SERVICE INITIAL CONSULT NOTE    HPI: 39y Male with PMHx of viral meningitis with sequela of HTN and intractable headaches (followed by Dr. Weston), recent admission 3/4/24 for autoimmune encephalitis management/treatment presents today for bilateral hand weakness as well as wide based gait, UE tremors, UE cogwheeling, dysmetria, and UE weakness. Went for outpatient MRI brain and cervical spine today.    3/4 admission: autoimmune encephalitis findings seen on outpatient PET 3/4. LP done on the same day with opening pressures of 30mmHg; CSF unremarkable. Course complicated by allergic reaction to solumedrol which was treated with benadryl. Steroid switched to decadron. Patient with some decrease in cognition (serial 7s, memory difficulty, attention/focus worsening) with improvement back to baseline with no cognitive/neuro deficit s/p 4 day course of IVIG. Autoimmune encephalitis labs sent, so far negative (refer to chart note 3/5). EEG negative. Intractable hiccups treated with gabapentin/baclofen. MRI epilepsy protocol done with no abnormalities or enhancements. MRV obtained for sudden severe HAs; no CVST. In addition, PET also showed increased activity with poorly define groundglass lesion, unable to r/o malignancy. CT C/A/P with contrast obtained; no evidence of malignancy/adenopathy, more consistent with infectious/inflammatory etiology. Pulmonary consulted; in agreement and can consider rpt CT chest in 6-12 weeks to document resolution. Cardiology consulted for PACs vs sinus arrhythmia, home verapamil switched to amlodipine. Planned for outpatient prednisone taper, home infusion, outpatient rehab, outpatient neuro-cognitive assessment. Will also consider repeat CT chest 6-12 weeks pending on lab results.     Pulmonary is consulted for abnormal CT scan during last admission and recommendations regarding if further work up needed prior to starting rituximab.     Patient has no primary pulmonary history and does not follow with a pulmonologist at home. he denies any respiratory symptoms, denies SOB, cough, fevers, and has normal functional capacity prior to his neurological symptoms.    REVIEW OF SYSTEMS:  10 point ROS negative aside from what is mentioned in HPI    PAST MEDICAL & SURGICAL HISTORY:  Hypertension  Migraine  Meningitis      FAMILY HISTORY:      SOCIAL HISTORY:  Smoking Status: [ ] Current, [ ] Former, [x ] Never  Pack Years:    MEDICATIONS:  Pulmonary:  diphenhydrAMINE Injectable 25 milliGRAM(s) IV Push once PRN    Antimicrobials:    Anticoagulants:  enoxaparin Injectable 40 milliGRAM(s) SubCutaneous every 24 hours    Onc:    GI/:  pantoprazole    Tablet 40 milliGRAM(s) Oral every 12 hours  polyethylene glycol 3350 17 Gram(s) Oral every 12 hours    Endocrine:  dexAMETHasone  Injectable 2 milliGRAM(s) IV Push <User Schedule>  dextrose 50% Injectable 25 Gram(s) IV Push once  dextrose 50% Injectable 12.5 Gram(s) IV Push once  dextrose 50% Injectable 25 Gram(s) IV Push once  dextrose Oral Gel 15 Gram(s) Oral once PRN  glucagon  Injectable 1 milliGRAM(s) IntraMuscular once  insulin lispro (ADMELOG) corrective regimen sliding scale   SubCutaneous three times a day before meals    Cardiac:    Other Medications:  baclofen 10 milliGRAM(s) Oral <User Schedule>  chlorhexidine 2% Cloths 1 Application(s) Topical daily  dextrose 5%. 1000 milliLiter(s) IV Continuous <Continuous>  dextrose 5%. 1000 milliLiter(s) IV Continuous <Continuous>  gabapentin 100 milliGRAM(s) Oral <User Schedule>  iron sucrose IVPB 300 milliGRAM(s) IV Intermittent once  mirtazapine 15 milliGRAM(s) Oral at bedtime  OLANZapine 2.5 milliGRAM(s) Oral every 24 hours  OLANZapine 5 milliGRAM(s) Oral at bedtime  SUMAtriptan 100 milliGRAM(s) Oral two times a day PRN      Allergies    methylPREDNISolone (Rash; Urticaria; Flushing; Hives)    Intolerances        Vital Signs Last 24 Hrs  T(C): 36.8 (14 Mar 2024 10:00), Max: 36.8 (14 Mar 2024 06:39)  T(F): 98.2 (14 Mar 2024 10:00), Max: 98.2 (14 Mar 2024 06:39)  HR: 83 (14 Mar 2024 12:20) (83 - 113)  BP: 163/95 (14 Mar 2024 12:20) (125/76 - 163/95)  BP(mean): 124 (14 Mar 2024 12:20) (94 - 125)  RR: 18 (14 Mar 2024 12:20) (18 - 18)  SpO2: 95% (14 Mar 2024 12:20) (95% - 98%)    Parameters below as of 14 Mar 2024 12:20  Patient On (Oxygen Delivery Method): room air        03-13 @ 07:01  -  03-14 @ 07:00  --------------------------------------------------------  IN: 2750 mL / OUT: 0 mL / NET: 2750 mL            PHYSICAL EXAM:  Constitutional: well-appearing, in no apparent distress  Head: NC/AT  EENT: PERRL, anicteric sclera; oropharynx clear with moist mucous membranes  Neck: supple, ROM intact, no appreciable JVD or LAD  Respiratory: Lungs clear to auscultation bilaterally, no wheezes, rhonchi or rales  Cardiovascular: +S1/S2 intact, regular rhythm and rate. No murmurs appreciated  Gastrointestinal: soft, non-tender, non distended, bowel sounds normoactive   Extremities: no edema, clubbing or cyanosis  Vascular: 2+ radial pulses B/L  Neurological: awake and alert; oriented x3    LABS:      CBC Full  -  ( 14 Mar 2024 10:42 )  WBC Count : 23.00 K/uL  RBC Count : 5.28 M/uL  Hemoglobin : 13.8 g/dL  Hematocrit : 43.3 %  Platelet Count - Automated : 263 K/uL  Mean Cell Volume : 82.0 fl  Mean Cell Hemoglobin : 26.1 pg  Mean Cell Hemoglobin Concentration : 31.9 gm/dL  Auto Neutrophil # : x  Auto Lymphocyte # : x  Auto Monocyte # : x  Auto Eosinophil # : x  Auto Basophil # : x  Auto Neutrophil % : x  Auto Lymphocyte % : x  Auto Monocyte % : x  Auto Eosinophil % : x  Auto Basophil % : x    03-14    132<L>  |  101  |  16  ----------------------------<  260<H>  4.4   |  27  |  0.95    Ca    8.9      14 Mar 2024 10:42  Phos  2.6     03-13  Mg     2.0     03-13                        RADIOLOGY & ADDITIONAL STUDIES:    CT Thorax   IMPRESSION:  Clustered nodular and groundglass opacities in bilateral upper lobes, more prominent on the right. Findings are most consistent with infectious/inflammatory etiology.  LUNGS AND LARGE AIRWAYS: Patent central airways. Clustered nodular and groundglass opacities in the right upper lobe most consistent with infectious/inflammatory etiology (3:31). Less prominent groundglass opacity in the left upper lobe.

## 2024-03-14 NOTE — CONSULT NOTE ADULT - SUBJECTIVE AND OBJECTIVE BOX
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  ID consult to evaluate for occult infection as etiology, presence of latent Enterovirus infection and b/o plan for rituximab treatment. Following his episode of meningitis, he had somewhat increased HAs. In Nov-Dec 2023, he developed HAs that were different in location (more temporal than frontal), as well as neck stiffness that was treated with Kenalog injection, initially with some relief.  Neck stiffness recurred and did not improve with Kenalog injection.  He also developed progressive cognitive decline.  He had an outpatient PET on 3/1 that showed abnormal hypometabolism particularly pronounced in parieto-occipital and temporal regions and also involving anteromedial temporal lobes with extension into orbitofrontal and paramedian frontal regions bilaterally suggestive of AIE.  LP on 3/4 was remarkable for elevated OP (30), CSF with nl protein and glc, RBC 0, WBC 3 with 1 PMN, 1L, CSF PCR ND, gram stain no cells or orgs, NGTD.  He was admitted for further management.  He was given methylprednisolone, had hives/erythema, was changed to decadron.  He received a 4 or 5 d course of IVIG.  MRI (3/6) brain (w/wo) was unremarkable, MRI C spine showed degenerative changes.  PET 3/8 to eval for paraneoplastic etiology showed increased activity in RUL poorly defined GG lesion in the anterior segment – may be due to inflammatory disease, malignancy cannot be excluded with increased activity in the BM.  CT C/A/P on 3/8 showed clustered nodular and GGOs in bilateral upper lobes, mor prominent on the R, most c/c infectious/inflammatory etiology, no evidence for suspicious mass.  He was seen by Pulmonary – recommended no treatment, repeat CT in 6-12 weeks.  MR venogram on 3/9 showed no evidence of venous sinus thrombosis.  He was d/darshan on 3/10 on a prednisone taper (starting with 60 mg po qd X 7 d, reduce by 10 q week) with plan for monthly IVIG X 6 months.  He developed bilateral hand weakness, numbness in fingers and palms, wide-based gait, UE tremors, UE cogwheeling, dysmetria and UE weakness and was readmitted on 3/12.  He was started on decadron.  Repeat LP 3/13 showed OP 13.5 cm, CSF prot 46, glc 116, , WBC 2 (lymphs), PCR ND, gram stain neg.  Included in w/u were HSV1/HSV2/HSV6, TB, RPR, VDRL, AFB.  HD catheter was inserted for plasma exchange.  He had first plasmapheresis last night, with plan for qod X 5 total.  He is to receive rituximab after the course if he remains stable.  If he has further decline, plasmapheresis will be held and he will be given rituximab.  Per Mr. Glynn, his gait and strength are somewhat improved after the first plasmapheresis.      PAST MEDICAL & SURGICAL HISTORY:  Hypertension      Migraine      Meningitis      L knee repair of patellar tendon 20 years ago  Facial surgery following trauma 2005        MEDICATIONS  (STANDING):  baclofen 10 milliGRAM(s) Oral <User Schedule>  chlorhexidine 2% Cloths 1 Application(s) Topical daily  dexAMETHasone  Injectable 2 milliGRAM(s) IV Push <User Schedule>  dextrose 5%. 1000 milliLiter(s) (100 mL/Hr) IV Continuous <Continuous>  dextrose 5%. 1000 milliLiter(s) (50 mL/Hr) IV Continuous <Continuous>  dextrose 50% Injectable 25 Gram(s) IV Push once  dextrose 50% Injectable 12.5 Gram(s) IV Push once  dextrose 50% Injectable 25 Gram(s) IV Push once  enoxaparin Injectable 40 milliGRAM(s) SubCutaneous every 24 hours  gabapentin 100 milliGRAM(s) Oral <User Schedule>  glucagon  Injectable 1 milliGRAM(s) IntraMuscular once  insulin lispro (ADMELOG) corrective regimen sliding scale   SubCutaneous three times a day before meals  iron sucrose IVPB 300 milliGRAM(s) IV Intermittent once  mirtazapine 15 milliGRAM(s) Oral at bedtime  OLANZapine 2.5 milliGRAM(s) Oral every 24 hours  OLANZapine 5 milliGRAM(s) Oral at bedtime  pantoprazole    Tablet 40 milliGRAM(s) Oral every 12 hours  polyethylene glycol 3350 17 Gram(s) Oral every 12 hours    MEDICATIONS  (PRN):  dextrose Oral Gel 15 Gram(s) Oral once PRN Blood Glucose LESS THAN 70 milliGRAM(s)/deciliter  diphenhydrAMINE Injectable 25 milliGRAM(s) IV Push once PRN Allergic Reaction  SUMAtriptan 100 milliGRAM(s) Oral two times a day PRN Migraine      Allergies    methylPREDNISolone (Rash; Urticaria; Flushing; Hives)    Intolerances        SOCIAL HISTORY:  [place]  Single, lives alone in [facility], has 2 dogs, no other animal contact.  No h/o Lyme disease, tick bites or TBDs.  No tobacco, occ alcohol, occ edibles.    FAMILY HISTORY:  No pertinent family history in first degree relatives.       ROS:  No rhinorrhea, sore throat, cough, CP, SOB, N, V, diarrhea, abd pain, dysuria, hematuria, frequency, muscle/joint symptoms, bruising/bleeding     Vital Signs Last 24 Hrs  T(C): 36.8 (14 Mar 2024 10:00), Max: 36.8 (14 Mar 2024 06:39)  T(F): 98.2 (14 Mar 2024 10:00), Max: 98.2 (14 Mar 2024 06:39)  HR: 83 (14 Mar 2024 12:20) (83 - 113)  BP: 163/95 (14 Mar 2024 12:20) (125/76 - 163/95)  BP(mean): 124 (14 Mar 2024 12:20) (94 - 125)  RR: 18 (14 Mar 2024 12:20) (18 - 18)  SpO2: 95% (14 Mar 2024 12:20) (95% - 98%)    Parameters below as of 14 Mar 2024 12:20  Patient On (Oxygen Delivery Method): room air        PE:  Alert, in no distress  HEENT:  NC, PERRL, sclerae anicteric, conjunctivae clear.  Sinuses nontender, no nasal exudate.  No buccal or pharyngeal lesions, erythema or exudate  Neck:  Supple, no adenopathy.  RIJ HD catheter  Lungs:  Clear to auscultation  Cor:  RRR, S1, S2, no murmur appreciated  Abd:  Symmetric, normoactive BS.  Soft, nontender, no masses, guarding or rebound.  Liver and spleen not enlarged  Extrem:  No cyanosis or edema  Skin:  No rashes.  Neuro:  Decreased  strength bilaterally    LABS:                        13.8   23.00 )-----------( 263      ( 14 Mar 2024 10:42 )             43.3     03-14    132<L>  |  101  |  16  ----------------------------<  260<H>  4.4   |  27  |  0.95    Ca    8.9      14 Mar 2024 10:42  Phos  2.6     03-13  Mg     2.0     03-13      Urinalysis Basic - ( 14 Mar 2024 10:42 )    Color: x / Appearance: x / SG: x / pH: x  Gluc: 260 mg/dL / Ketone: x  / Bili: x / Urobili: x   Blood: x / Protein: x / Nitrite: x   Leuk Esterase: x / RBC: x / WBC x   Sq Epi: x / Non Sq Epi: x / Bacteria: x        MICROBIOLOGY:    CSF bacterial, fungal & AFB cultures 3/13 - NGTD    3/13:  HIV 1/2 Ag/Ab NR    Acute Hepatitis Panel (03.12.24 @ 21:08)    Hepatitis C Virus S/CO Ratio: 0.08 S/CO   Hepatitis C Virus Interpretation: Nonreact: Hepatitis C AB  Non-Reactive: A person with a non-reactive hepatitis C virus (HCV)  antibody result is considered uninfected. No further action is needed  unless recent infection is expected.  Reactive: HCV antibody is abnormal, HCV RNA Qualitative test will follow.  Note: HCV antibody testing is performed on the Roche [id number] analyzer.   Hepatitis B Core IgM Antibody: Nonreact   Hepatitis B Surface Antigen: Nonreact   Hepatitis A IgM Antibody: Nonreact    2015:  QF neg, HBsAb reactive, HBcAb neg, EBV serology c/c prior infection  9/2019:  QF neg  2/18/2021:  Lyme Ab and syphilis screen neg      RADIOLOGY & ADDITIONAL STUDIES:  Reviewed

## 2024-03-14 NOTE — CONSULT NOTE ADULT - PROBLEM SELECTOR RECOMMENDATION 4
likely due to steroids; no e/o acute bacterial infection  -- monitor CBC off abx  -- check differential

## 2024-03-14 NOTE — PHYSICAL THERAPY INITIAL EVALUATION ADULT - PERTINENT HX OF CURRENT PROBLEM, REHAB EVAL
39y Male with PMHx of viral meningitis with sequela of HTN and intractable headaches (followed by Dr. Weston), recent admission 3/4/24 for autoimmune encephalitis management/treatment presents today for bilateral hand weakness as well as wide based gait, UE tremors, UE cogwheeling, dysmetria, and UE weakness. Went for outpatient MRI brain and cervical spine today.

## 2024-03-14 NOTE — CONSULT NOTE ADULT - SUBJECTIVE AND OBJECTIVE BOX
Patient is a 39y old  Male who presents with a chief complaint of     HPI:   **STROKE HPI***    HPI: 39y Male with PMHx of viral meningitis with sequela of HTN and intractable headaches (followed by Dr. Weston), recent admission 3/4/24 for autoimmune encephalitis management/treatment presents today for bilateral hand weakness as well as wide based gait, UE tremors, UE cogwheeling, dysmetria, and UE weakness. Went for outpatient MRI brain and cervical spine today.    3/4 admission: autoimmune encephalitis findings seen on outpatient PET 3/4. LP done on the same day with opening pressures of 30mmHg; CSF unremarkable. Course complicated by allergic reaction to solumedrol which was treated with benadryl. Steroid switched to decadron. Patient with some decrease in cognition (serial 7s, memory difficulty, attention/focus worsening) with improvement back to baseline with no cognitive/neuro deficit s/p 4 day course of IVIG. Autoimmune encephalitis labs sent, so far negative (refer to chart note 3/5). EEG negative. Intractable hiccups treated with gabapentin/baclofen. MRI epilepsy protocol done with no abnormalities or enhancements. MRV obtained for sudden severe HAs; no CVST. In addition, PET also showed increased activity with poorly define groundglass lesion, unable to r/o malignancy. CT C/A/P with contrast obtained; no evidence of malignancy/adenopathy, more consistent with infectious/inflammatory etiology. Pulmonary consulted; in agreement and can consider rpt CT chest in 6-12 weeks to document resolution. Cardiology consulted for PACs vs sinus arrhythmia, home verapamil switched to amlodipine. Planned for outpatient prednisone taper, home infusion, outpatient rehab, outpatient neuro-cognitive assessment. Will also consider repeat CT chest 6-12 weeks pending on lab results.   (12 Mar 2024 17:57)    Review of Systems: 12 point review of systems otherwise negative    PAST MEDICAL & SURGICAL HISTORY:  Hypertension      Migraine      Meningitis          MEDICATIONS  (STANDING):  baclofen 10 milliGRAM(s) Oral <User Schedule>  chlorhexidine 2% Cloths 1 Application(s) Topical daily  dexAMETHasone  Injectable 2 milliGRAM(s) IV Push <User Schedule>  dextrose 5%. 1000 milliLiter(s) (50 mL/Hr) IV Continuous <Continuous>  dextrose 5%. 1000 milliLiter(s) (100 mL/Hr) IV Continuous <Continuous>  dextrose 50% Injectable 25 Gram(s) IV Push once  dextrose 50% Injectable 12.5 Gram(s) IV Push once  dextrose 50% Injectable 25 Gram(s) IV Push once  enoxaparin Injectable 40 milliGRAM(s) SubCutaneous every 24 hours  gabapentin 100 milliGRAM(s) Oral <User Schedule>  glucagon  Injectable 1 milliGRAM(s) IntraMuscular once  insulin lispro (ADMELOG) corrective regimen sliding scale   SubCutaneous three times a day before meals  mirtazapine 15 milliGRAM(s) Oral at bedtime  OLANZapine 5 milliGRAM(s) Oral at bedtime  OLANZapine 2.5 milliGRAM(s) Oral every 24 hours  pantoprazole    Tablet 40 milliGRAM(s) Oral every 12 hours  polyethylene glycol 3350 17 Gram(s) Oral two times a day  sodium chloride 0.9%. 500 milliLiter(s) (125 mL/Hr) IV Continuous <Continuous>    MEDICATIONS  (PRN):  dextrose Oral Gel 15 Gram(s) Oral once PRN Blood Glucose LESS THAN 70 milliGRAM(s)/deciliter  diphenhydrAMINE Injectable 25 milliGRAM(s) IV Push once PRN Allergic Reaction  SUMAtriptan 100 milliGRAM(s) Oral two times a day PRN Migraine      Allergies    methylPREDNISolone (Rash; Urticaria; Flushing; Hives)    Intolerances          Vital Signs Last 24 Hrs  T(C): 36.8 (14 Mar 2024 10:00), Max: 36.8 (14 Mar 2024 06:39)  T(F): 98.2 (14 Mar 2024 10:00), Max: 98.2 (14 Mar 2024 06:39)  HR: 83 (14 Mar 2024 08:36) (83 - 113)  BP: 144/93 (14 Mar 2024 08:36) (125/76 - 162/102)  BP(mean): 115 (14 Mar 2024 08:36) (94 - 125)  RR: 18 (14 Mar 2024 08:36) (18 - 18)  SpO2: 96% (14 Mar 2024 08:36) (96% - 98%)    Parameters below as of 14 Mar 2024 08:36  Patient On (Oxygen Delivery Method): room air      CAPILLARY BLOOD GLUCOSE      POCT Blood Glucose.: 243 mg/dL (14 Mar 2024 11:36)      03-13 @ 07:01  -  03-14 @ 07:00  --------------------------------------------------------  IN: 2750 mL / OUT: 0 mL / NET: 2750 mL        Physical Exam:  (earlier today)  Daily     Daily   General:  well-appearing in NAD  HEENT:  MMM  Skin:  WWP  Neuro:  AAOx3    LABS:                        13.8   23.00 )-----------( 263      ( 14 Mar 2024 10:42 )             43.3     03-14    132<L>  |  101  |  16  ----------------------------<  260<H>  4.4   |  27  |  0.95    Ca    8.9      14 Mar 2024 10:42  Phos  2.6     03-13  Mg     2.0     03-13        Urinalysis Basic - ( 14 Mar 2024 10:42 )    Color: x / Appearance: x / SG: x / pH: x  Gluc: 260 mg/dL / Ketone: x  / Bili: x / Urobili: x   Blood: x / Protein: x / Nitrite: x   Leuk Esterase: x / RBC: x / WBC x   Sq Epi: x / Non Sq Epi: x / Bacteria: x

## 2024-03-14 NOTE — OCCUPATIONAL THERAPY INITIAL EVALUATION ADULT - GENERAL OBSERVATIONS, REHAB EVAL
Pt's RN Natasha aware of intent to eval/tx; cleared Pt. Pt received in recliner - +telemetry, heplock. Pt agreeable to OT. PT Woody present.

## 2024-03-14 NOTE — PROGRESS NOTE ADULT - SUBJECTIVE AND OBJECTIVE BOX
Cardiology Consult    O/N:  Interval History: patient was seen and examined in am. Reports feeling ok.       OBJECTIVE  Vitals:  T(C): 36.8 (03-14-24 @ 10:00), Max: 36.8 (03-14-24 @ 06:39)  HR: 83 (03-14-24 @ 08:36) (83 - 113)  BP: 144/93 (03-14-24 @ 08:36) (125/76 - 162/102)  RR: 18 (03-14-24 @ 08:36) (18 - 18)  SpO2: 96% (03-14-24 @ 08:36) (96% - 98%)  Wt(kg): --    I/O:  I&O's Summary    13 Mar 2024 07:01  -  14 Mar 2024 07:00  --------------------------------------------------------  IN: 2750 mL / OUT: 0 mL / NET: 2750 mL        PHYSICAL EXAM:  Appearance: NAD. Speaking in full sentences. Pleasant gentleman   HEENT: No pallor noted.  Conjunctiva clear b/l. Moist oral mucosa.  Cardiovascular: RRR with no murmurs.  Respiratory: Lungs CTAB.   Extremities: No edema b/l.   Neurologic:  Alert and awake. Moving all extremities.   	  LABS:                        13.8   23.00 )-----------( 263      ( 14 Mar 2024 10:42 )             43.3     03-14    132<L>  |  101  |  16  ----------------------------<  260<H>  4.4   |  27  |  0.95    Ca    8.9      14 Mar 2024 10:42  Phos  2.6     03-13  Mg     2.0     03-13        Urinalysis Basic - ( 14 Mar 2024 10:42 )    Color: x / Appearance: x / SG: x / pH: x  Gluc: 260 mg/dL / Ketone: x  / Bili: x / Urobili: x   Blood: x / Protein: x / Nitrite: x   Leuk Esterase: x / RBC: x / WBC x   Sq Epi: x / Non Sq Epi: x / Bacteria: x        RADIOLOGY & ADDITIONAL TESTS:  Reviewed .    MEDICATIONS  (STANDING):  baclofen 10 milliGRAM(s) Oral <User Schedule>  chlorhexidine 2% Cloths 1 Application(s) Topical daily  dexAMETHasone  Injectable 2 milliGRAM(s) IV Push <User Schedule>  dexAMETHasone  Injectable 2 milliGRAM(s) IV Push once  dextrose 5%. 1000 milliLiter(s) (50 mL/Hr) IV Continuous <Continuous>  dextrose 5%. 1000 milliLiter(s) (100 mL/Hr) IV Continuous <Continuous>  dextrose 50% Injectable 25 Gram(s) IV Push once  dextrose 50% Injectable 12.5 Gram(s) IV Push once  dextrose 50% Injectable 25 Gram(s) IV Push once  enoxaparin Injectable 40 milliGRAM(s) SubCutaneous every 24 hours  gabapentin 100 milliGRAM(s) Oral <User Schedule>  glucagon  Injectable 1 milliGRAM(s) IntraMuscular once  insulin lispro (ADMELOG) corrective regimen sliding scale   SubCutaneous three times a day before meals  mirtazapine 15 milliGRAM(s) Oral at bedtime  OLANZapine 5 milliGRAM(s) Oral at bedtime  OLANZapine 2.5 milliGRAM(s) Oral every 24 hours  pantoprazole    Tablet 40 milliGRAM(s) Oral every 12 hours  polyethylene glycol 3350 17 Gram(s) Oral every 12 hours  sodium chloride 0.9%. 500 milliLiter(s) (125 mL/Hr) IV Continuous <Continuous>    MEDICATIONS  (PRN):  dextrose Oral Gel 15 Gram(s) Oral once PRN Blood Glucose LESS THAN 70 milliGRAM(s)/deciliter  diphenhydrAMINE Injectable 25 milliGRAM(s) IV Push once PRN Allergic Reaction  SUMAtriptan 100 milliGRAM(s) Oral two times a day PRN Migraine

## 2024-03-14 NOTE — CONSULT NOTE ADULT - PROBLEM SELECTOR RECOMMENDATION 3
likely due to steroids; HbA1c 5.2%, Pt. does not have diabetes  -- monitor blood glucose  -- start low-dose SSI; may need long-acting insulin, based on 24-hour coverage

## 2024-03-14 NOTE — OCCUPATIONAL THERAPY INITIAL EVALUATION ADULT - MD ORDER
b/l hand weakness, wide based gait, UE tremors, UE cogwheeling, dysmetria, and UE weakness   Autoimmune encephalitis w/u  Sinus Tachycardia  Pending further neurologic w/u

## 2024-03-14 NOTE — CONSULT NOTE ADULT - ASSESSMENT
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  No evidence for current infection based upon CSF studies, CSF PCR panels X 2 neg for Enterovirus or other community acquired pathogens.  Regarding potential for latent Enterovirus infection - the Enteroviruses detected in the CSF PCR panel are the numbered EV species.  Other members of the genus (eg Coxsackie, poliovirus) are not detected.  The only data I see regarding latent/persistent infection is detection of EV RNA in myocardium of patients with cardiomyopathies. 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  No evidence for current infection based upon CSF studies, CSF PCR panels X 2 neg for community acquired pathogens, prior Lyme/syphilis studies neg, MRI unremarkable.  Though Enteroviruses can cause encephalitis, PCR likely would be positive and findings would be present on MRI. Chronic/persistent meningoencephalitis from EVs occur in patients with B cell defects but he has no known immunocompromise at this time.  Regarding potential for latent Enterovirus infection - the Enterovirus primer in the CSF PCR panel detect members of the Enterovirus genus, including the numbered EV species, Coxsackie and Echoviruses.  The only data I see regarding latent/persistent infection of Enteroviruses is detection of the numbered EV RNA in myocardium of patients with cardiomyopathies.  I see no evidence for latent/dormant infection in the CNS or elsewhere.  Regarding rituximab and risk for subsequent infection - the pathogens for which he would be susceptible to reactivation include HBV, HCV and HEV.  He was HBV core Ab/sAg and HCV Ab neg, was vaccinated for HBV.  HEV not tested previously - serology likely will be altered by plasmapheresis but would test. Regarding infections to which he would be more susceptible - Enteroviruses (would be at risk for chronic encephalitis), PJP (increased with rituximab/steroids), PML are included.  He also would be at increased risk for respiratory infections.  Abnormalities seen on CT were non-specific and he is asymptomatic but would monitor off antibiotics for development of symptoms.  Suggest:  - Please send Hepatitis E IgG  - F/U Quantiferon - pending from 3/12  - F/U CSF cultures (bacterial, fungal and AFB) from 3/4 and 3/13  - F/U CSF IgG index and VDRL  - F/U CSF EBV PCR, HHV-6 PCR, Lyme Abs, West Nile Virus IgM/IgG  - Agree with Pulmonary recommendation to repeat CT chest in 3-5 weeks (4-6 following prior on 3/8).  Will follow with you, team 1.

## 2024-03-14 NOTE — PHYSICAL THERAPY INITIAL EVALUATION ADULT - MODALITIES TREATMENT COMMENTS
Cranial Nerves II - XII: II: PERRLA; visual fields are full to confrontation III, IV, VI: EOMI, no nystagmus appreciated V: facial sensation intact to light touch V1-V3 b/l VII: no ptosis, no facial droop, symmetric eyebrow raise and closure VIII: hearing intact to finger rub b/l  XI: head turning and shoulder shrug intact b/l XII: tongue protrusion midline  Vision: Intact

## 2024-03-14 NOTE — CONSULT NOTE ADULT - SUBJECTIVE AND OBJECTIVE BOX
HPI: 39y Male with PMHx of viral meningitis with sequela of HTN and intractable headaches (followed by Dr. Weston), recent admission 3/4/24 for autoimmune encephalitis management/treatment presents today for bilateral hand weakness as well as wide based gait, UE tremors, UE cogwheeling, dysmetria, and UE weakness. Went for outpatient MRI brain and cervical spine today.    3/4 admission: autoimmune encephalitis findings seen on outpatient PET 3/4. LP done on the same day with opening pressures of 30mmHg; CSF unremarkable. Course complicated by allergic reaction to solumedrol which was treated with benadryl. Steroid switched to decadron. Patient with some decrease in cognition (serial 7s, memory difficulty, attention/focus worsening) with improvement back to baseline with no cognitive/neuro deficit s/p 4 day course of IVIG. Autoimmune encephalitis labs sent, so far negative (refer to chart note 3/5). EEG negative. Intractable hiccups treated with gabapentin/baclofen. MRI epilepsy protocol done with no abnormalities or enhancements. MRV obtained for sudden severe HAs; no CVST. In addition, PET also showed increased activity with poorly define groundglass lesion, unable to r/o malignancy. CT C/A/P with contrast obtained; no evidence of malignancy/adenopathy, more consistent with infectious/inflammatory etiology. Pulmonary consulted; in agreement and can consider rpt CT chest in 6-12 weeks to document resolution. Cardiology consulted for PACs vs sinus arrhythmia, home verapamil switched to amlodipine. Planned for outpatient prednisone taper, home infusion, outpatient rehab, outpatient neuro-cognitive assessment. Will also consider repeat CT chest 6-12 weeks pending on lab results.     hematology consulte    MEDICATIONS  (STANDING):  albumin human  5% IVPB 3500 milliLiter(s) IV Intermittent once  baclofen 10 milliGRAM(s) Oral every 8 hours  calcium gluconate IVPB 2 Gram(s) IV Intermittent once  chlorhexidine 2% Cloths 1 Application(s) Topical daily  dexAMETHasone  Injectable 2 milliGRAM(s) IV Push every 6 hours  enoxaparin Injectable 40 milliGRAM(s) SubCutaneous every 24 hours  gabapentin 100 milliGRAM(s) Oral every 8 hours  heparin  Lock Flush 100 Units/mL Injectable 600 Unit(s) IV Push once  mirtazapine 7.5 milliGRAM(s) Oral every 24 hours  OLANZapine 5 milliGRAM(s) Oral at bedtime  OLANZapine 2.5 milliGRAM(s) Oral every 24 hours  OLANZapine 5 milliGRAM(s) Oral daily  pantoprazole    Tablet 40 milliGRAM(s) Oral two times a day  polyethylene glycol 3350 17 Gram(s) Oral two times a day  sodium chloride 0.9%. 500 milliLiter(s) (125 mL/Hr) IV Continuous <Continuous>  verapamil  milliGRAM(s) Oral daily    MEDICATIONS  (PRN):  diphenhydrAMINE Injectable 25 milliGRAM(s) IV Push once PRN Allergic Reaction  SUMAtriptan 100 milliGRAM(s) Oral two times a day PRN Migraine      Allergies    methylPREDNISolone (Rash; Urticaria; Flushing; Hives)    Intolerances    GENERAL: NAD, well-developed  HEAD:  Atraumatic, Normocephalic  EYES: EOMI, PERRLA, conjunctiva and sclera clear  NECK: Supple, No JVD  CHEST/LUNG: Clear to auscultation bilaterally; No wheeze  HEART: Regular rate and rhythm; No murmurs, rubs, or gallops  ABDOMEN: Soft, Nontender, Nondistended; Bowel sounds present  EXTREMITIES:  2+ Peripheral Pulses, No clubbing, cyanosis, or edema  NEUROLOGY:  weakness. AAOx3, CN grossly intact   SKIN: No rashes or lesions    Vital Signs Last 24 Hrs  T(C): 36.8 (14 Mar 2024 06:39), Max: 36.8 (13 Mar 2024 10:40)  T(F): 98.2 (14 Mar 2024 06:39), Max: 98.2 (13 Mar 2024 10:40)  HR: 113 (14 Mar 2024 06:39) (72 - 113)  BP: 146/93 (14 Mar 2024 06:39) (125/76 - 162/102)  BP(mean): 114 (14 Mar 2024 06:39) (94 - 125)  RR: 18 (14 Mar 2024 06:39) (17 - 18)  SpO2: 97% (14 Mar 2024 06:39) (97% - 99%)    Parameters below as of 14 Mar 2024 06:39  Patient On (Oxygen Delivery Method): room air  d for history of Iron Deficiency Anemia -

## 2024-03-14 NOTE — PROGRESS NOTE ADULT - SUBJECTIVE AND OBJECTIVE BOX
MORGAN. Tolerated first plex session well, with some numbness/tingling in extremities that have since resolved. Fibrinogen 140 today.       MEDICATIONS  (STANDING):  albumin human  5% IVPB 3500 milliLiter(s) IV Intermittent once  baclofen 10 milliGRAM(s) Oral every 8 hours  calcium gluconate IVPB 2 Gram(s) IV Intermittent once  chlorhexidine 2% Cloths 1 Application(s) Topical daily  dexAMETHasone  Injectable 2 milliGRAM(s) IV Push every 6 hours  enoxaparin Injectable 40 milliGRAM(s) SubCutaneous every 24 hours  gabapentin 100 milliGRAM(s) Oral every 8 hours  heparin  Lock Flush 100 Units/mL Injectable 600 Unit(s) IV Push once  mirtazapine 7.5 milliGRAM(s) Oral every 24 hours  OLANZapine 5 milliGRAM(s) Oral at bedtime  OLANZapine 2.5 milliGRAM(s) Oral every 24 hours  OLANZapine 5 milliGRAM(s) Oral daily  pantoprazole    Tablet 40 milliGRAM(s) Oral two times a day  polyethylene glycol 3350 17 Gram(s) Oral two times a day  sodium chloride 0.9%. 500 milliLiter(s) (125 mL/Hr) IV Continuous <Continuous>  verapamil  milliGRAM(s) Oral daily    MEDICATIONS  (PRN):  diphenhydrAMINE Injectable 25 milliGRAM(s) IV Push once PRN Allergic Reaction  SUMAtriptan 100 milliGRAM(s) Oral two times a day PRN Migraine      Allergies    methylPREDNISolone (Rash; Urticaria; Flushing; Hives)    Intolerances    GENERAL: NAD, well-developed  HEAD:  Atraumatic, Normocephalic  EYES: EOMI, PERRLA, conjunctiva and sclera clear  NECK: Supple, No JVD  CHEST/LUNG: Clear to auscultation bilaterally; No wheeze  HEART: Regular rate and rhythm; No murmurs, rubs, or gallops  ABDOMEN: Soft, Nontender, Nondistended; Bowel sounds present  EXTREMITIES:  2+ Peripheral Pulses, No clubbing, cyanosis, or edema  NEUROLOGY:  weakness. AAOx3, CN grossly intact   SKIN: No rashes or lesions    Vital Signs Last 24 Hrs  T(C): 36.8 (14 Mar 2024 06:39), Max: 36.8 (13 Mar 2024 10:40)  T(F): 98.2 (14 Mar 2024 06:39), Max: 98.2 (13 Mar 2024 10:40)  HR: 113 (14 Mar 2024 06:39) (72 - 113)  BP: 146/93 (14 Mar 2024 06:39) (125/76 - 162/102)  BP(mean): 114 (14 Mar 2024 06:39) (94 - 125)  RR: 18 (14 Mar 2024 06:39) (17 - 18)  SpO2: 97% (14 Mar 2024 06:39) (97% - 99%)    Parameters below as of 14 Mar 2024 06:39  Patient On (Oxygen Delivery Method): room air

## 2024-03-14 NOTE — PHYSICAL THERAPY INITIAL EVALUATION ADULT - MANUAL MUSCLE TESTING RESULTS, REHAB EVAL
Left LE: hip flex 4-/5, knee flex 4-/5, knee ext 4-/5.  Strength: R 3+/5 L 4-/5. R Shoulder flex 4-/5

## 2024-03-14 NOTE — CONSULT NOTE ADULT - ASSESSMENT
39y Male with PMHx of meningitis 1.5 years ago (related to enterovirus), HTN, intractable headache who presented w/ neurocognitive decline and headaches. He is was on the neurology service being treated for autoimmune encephalitis (AIE) with IVIG and Decadron, and his symptoms resolved so he was discharged. Unforuntately he had return of neurolgoical symptoms and is re-admitted, being evaluated for plasmapharesis and Rituximab. Given that he had CT scan with bilateral small areas of RUL and ALBERT ground glass opacities with small nodules overlying the areas, Pulmonary was consulted again to evaluate if any further work up was needed prior to starting rituximab regarding his lung findings on CT scan.     # RUL and ALBERT areas of GGO with clustered nodules superimposed, PET Avid area  - Incidental finding that represents inflammatory vs. infectious process. patient continues to deny respiratory symptoms.  - no reservations regarding starting rituximab from a pulmonary standpoint and no further work up needed for these CT findings  - recommend repeating CT scan in 4-6 weeks from 3/8    S/D/E with Dr. Licea and discussed with Dr. Santos as well     39y Male with PMHx of meningitis 1.5 years ago (related to enterovirus), HTN, intractable headache who presented w/ neurocognitive decline and headaches. He is was on the neurology service being treated for autoimmune encephalitis (AIE) with IVIG and Decadron, and his symptoms resolved so he was discharged. Unfortunately he had return of neurological symptoms and is re-admitted, being evaluated for plasmapharesis and Rituximab. Given that he had CT scan with bilateral small areas of RUL and ALBERT ground glass opacities with small nodules overlying the areas, Pulmonary was consulted again to evaluate if any further work up was needed prior to starting rituximab regarding his lung findings on CT scan.     # RUL and ALBERT areas of GGO with clustered nodules superimposed, PET Avid area  - Incidental finding that represents inflammatory vs. infectious process. patient continues to deny respiratory symptoms.  - no reservations regarding starting rituximab from a pulmonary standpoint and no further work up needed for these CT findings  - recommend repeating CT scan in 4-6 weeks from 3/8    S/D/E with Dr. Licea and discussed with Dr. Santos as well

## 2024-03-14 NOTE — PROGRESS NOTE ADULT - ASSESSMENT
39YM w/PMHx of viral meningitis, possible autoimmune encephalitis (dx made by PET scan 3/4/24 showing parieto-occipital and temporal region hypometabolism), who presents for bilateral hand weakness after being sent in by OP neurologist. Transfusion medicine is consulted for plasma exchange.     On previous admission, patient had PET scan performed 3/4 showing parietoocciptial and temporal region hypometabolism, and per neurology concern was for autoimmune encephalitis. LP on previous admission was notable of opening pressure of 30 mmHg but otherwise unremarkable. MRI brain notable only for degenerative changes, and no evidence of venous sinus thrombosis. Patient was given IVIG and steroids (allergic reaction to solumedrol and so decadron chosen) which per report, patient had been compliant with.     Transfusion medicine is consulted, as primary team wishes to pursue plasma exchange     Plan   - Autoimmune (evelyn-NMDAR) encephalitis is a category I indication for therapeutic plasma exchange. We will plan for 5 sessions every other day  - He tolerated session one 03/13 well, plan for 03/15, 03/17, 03/19, 03/21   - Risks and benefits of plasma exchange explained to patient. All of his questions were answered and he verbalized understanding and consented to therapy.   - please monitor daily calcium and fibrinogen clauss levels. With plasmapharesis, both of these are expected to decrease. 2g of calcium are included with plasmapharesis orders.   - please maintain active T&S   - If fibrinogen <200, will advise on cryoprecipitate administration  - Baseline fibrinogen 240, 140 today. No need for cryoprecipitate at this time, will follow carefully.   - further management of autoimmune encephalitis per Neurology/primary team    D/w transfusion medicine attending, Dr Nehemias Yancey

## 2024-03-14 NOTE — OCCUPATIONAL THERAPY INITIAL EVALUATION ADULT - ADDITIONAL COMMENTS
Patient resides alone in an elevator building in [facility], also has secondary home ([address] [place] [address]). Plans to recuperate w/ family upon d/c. Pt states that he was independent prior to admission, w/o prior use of AEs/DMEs.

## 2024-03-14 NOTE — OCCUPATIONAL THERAPY INITIAL EVALUATION ADULT - PERTINENT HX OF CURRENT PROBLEM, REHAB EVAL
39y Male with PMHx of viral meningitis with sequela of HTN and intractable headaches (followed by Dr. Weston), recent admission 3/4/24 for autoimmune encephalitis management/treatment presents today for bilateral hand weakness as well as wide based gait, UE tremors, UE cogwheeling, dysmetria, and UE weakness. Went for outpatient MRI brain and cervical spine today. Admitted for further neurologic workup.

## 2024-03-14 NOTE — PROGRESS NOTE ADULT - SUBJECTIVE AND OBJECTIVE BOX
Neurology Stroke Progress Note    INTERVAL HPI/OVERNIGHT EVENTS:  Patient seen and examined.  number ______ used.    MEDICATIONS  (STANDING):  albumin human  5% IVPB 3500 milliLiter(s) IV Intermittent once  baclofen 10 milliGRAM(s) Oral every 8 hours  calcium gluconate IVPB 2 Gram(s) IV Intermittent once  chlorhexidine 2% Cloths 1 Application(s) Topical daily  dexAMETHasone  Injectable 2 milliGRAM(s) IV Push every 6 hours  enoxaparin Injectable 40 milliGRAM(s) SubCutaneous every 24 hours  gabapentin 100 milliGRAM(s) Oral every 8 hours  heparin  Lock Flush 100 Units/mL Injectable 600 Unit(s) IV Push once  mirtazapine 7.5 milliGRAM(s) Oral every 24 hours  OLANZapine 5 milliGRAM(s) Oral at bedtime  OLANZapine 2.5 milliGRAM(s) Oral every 24 hours  OLANZapine 5 milliGRAM(s) Oral daily  pantoprazole    Tablet 40 milliGRAM(s) Oral two times a day  polyethylene glycol 3350 17 Gram(s) Oral two times a day  sodium chloride 0.9%. 500 milliLiter(s) (125 mL/Hr) IV Continuous <Continuous>  verapamil  milliGRAM(s) Oral daily    MEDICATIONS  (PRN):  diphenhydrAMINE Injectable 25 milliGRAM(s) IV Push once PRN Allergic Reaction  SUMAtriptan 100 milliGRAM(s) Oral two times a day PRN Migraine      Allergies    methylPREDNISolone (Rash; Urticaria; Flushing; Hives)    Intolerances        Vital Signs Last 24 Hrs  T(C): 36.8 (14 Mar 2024 06:39), Max: 36.8 (13 Mar 2024 10:40)  T(F): 98.2 (14 Mar 2024 06:39), Max: 98.2 (13 Mar 2024 10:40)  HR: 113 (14 Mar 2024 06:39) (72 - 113)  BP: 146/93 (14 Mar 2024 06:39) (125/76 - 162/102)  BP(mean): 114 (14 Mar 2024 06:39) (94 - 125)  RR: 18 (14 Mar 2024 06:39) (17 - 18)  SpO2: 97% (14 Mar 2024 06:39) (97% - 99%)    Parameters below as of 14 Mar 2024 06:39  Patient On (Oxygen Delivery Method): room air        Physical exam:  General: No acute distress, awake and alert  Eyes: Anicteric sclerae, moist conjunctivae, see below for CNs  Neck: trachea midline,  Cardiovascular: Regular rate and rhythm    Neurologic:  -Mental status: Awake, alert, oriented to person, place, and time. Speech is fluent with intact naming, repetition, and comprehension, no dysarthria. Recent and remote memory intact. Follows commands. Attention/concentration intact. Fund of knowledge appropriate.  -Cranial nerves:   II: Visual fields are full to confrontation.  III, IV, VI: Extraocular movements are intact without nystagmus. Pupils equally round and reactive to light  V:  Facial sensation V1-V3 equal and intact   VII: Face is symmetric   Motor: Normal bulk and tone. No pronator drift. Strength bilateral upper extremity 5/5, bilateral lower extremities 5/5.  Sensation: Intact to light touch bilaterally. No neglect or extinction on double simultaneous testing.  Coordination: No dysmetria on finger-to-nose  Gait: Narrow gait and steady    LABS:                        13.2   12.23 )-----------( 276      ( 13 Mar 2024 05:30 )             42.7     03-13    140  |  105  |  14  ----------------------------<  392<H>  4.7   |  24  |  0.88    Ca    8.6      13 Mar 2024 23:31  Phos  2.6     03-13  Mg     2.0     03-13        Urinalysis Basic - ( 13 Mar 2024 23:31 )    Color: x / Appearance: x / SG: x / pH: x  Gluc: 392 mg/dL / Ketone: x  / Bili: x / Urobili: x   Blood: x / Protein: x / Nitrite: x   Leuk Esterase: x / RBC: x / WBC x   Sq Epi: x / Non Sq Epi: x / Bacteria: x        RADIOLOGY & ADDITIONAL TESTS:     Neurology Stroke Progress Note    INTERVAL HPI/OVERNIGHT EVENTS:  Overnight pt had plasmapheresis done. Post plasmapheresis, pt had bilateral hand weakness. About an hour later and after a short nap, pt returned back to baseline.  Patient seen and examined. Pt states he feels well this morning, was a little nervous about his hands being weak last night. Wondering what the plan is for today.    MEDICATIONS  (STANDING):  albumin human  5% IVPB 3500 milliLiter(s) IV Intermittent once  baclofen 10 milliGRAM(s) Oral every 8 hours  calcium gluconate IVPB 2 Gram(s) IV Intermittent once  chlorhexidine 2% Cloths 1 Application(s) Topical daily  dexAMETHasone  Injectable 2 milliGRAM(s) IV Push every 6 hours  enoxaparin Injectable 40 milliGRAM(s) SubCutaneous every 24 hours  gabapentin 100 milliGRAM(s) Oral every 8 hours  heparin  Lock Flush 100 Units/mL Injectable 600 Unit(s) IV Push once  mirtazapine 7.5 milliGRAM(s) Oral every 24 hours  OLANZapine 5 milliGRAM(s) Oral at bedtime  OLANZapine 2.5 milliGRAM(s) Oral every 24 hours  OLANZapine 5 milliGRAM(s) Oral daily  pantoprazole    Tablet 40 milliGRAM(s) Oral two times a day  polyethylene glycol 3350 17 Gram(s) Oral two times a day  sodium chloride 0.9%. 500 milliLiter(s) (125 mL/Hr) IV Continuous <Continuous>  verapamil  milliGRAM(s) Oral daily    MEDICATIONS  (PRN):  diphenhydrAMINE Injectable 25 milliGRAM(s) IV Push once PRN Allergic Reaction  SUMAtriptan 100 milliGRAM(s) Oral two times a day PRN Migraine      Allergies    methylPREDNISolone (Rash; Urticaria; Flushing; Hives)    Intolerances        Vital Signs Last 24 Hrs  T(C): 36.8 (14 Mar 2024 06:39), Max: 36.8 (13 Mar 2024 10:40)  T(F): 98.2 (14 Mar 2024 06:39), Max: 98.2 (13 Mar 2024 10:40)  HR: 113 (14 Mar 2024 06:39) (72 - 113)  BP: 146/93 (14 Mar 2024 06:39) (125/76 - 162/102)  BP(mean): 114 (14 Mar 2024 06:39) (94 - 125)  RR: 18 (14 Mar 2024 06:39) (17 - 18)  SpO2: 97% (14 Mar 2024 06:39) (97% - 99%)    Parameters below as of 14 Mar 2024 06:39  Patient On (Oxygen Delivery Method): room air        Physical exam:  General: No acute distress, awake and alert  Eyes: Anicteric sclerae, moist conjunctivae, see below for CNs  Neck: trachea midline,  Cardiovascular: Regular rate and rhythm    Neurologic:  -Mental status: Awake, alert, oriented to person, place, and time. Speech is fluent with intact naming, repetition, and comprehension, no dysarthria. Recent and remote memory intact. Follows commands. Attention/concentration intact. Fund of knowledge appropriate.  -Cranial nerves:   II: Visual fields are full to confrontation.  III, IV, VI: Extraocular movements are intact without nystagmus.   VII: Face is symmetric   Motor: Normal bulk and tone. No pronator drift. Strength bilateral upper extremity 5/5, bilateral lower extremities 5/5.  Sensation: Intact to light touch bilaterally. No neglect or extinction on double simultaneous testing.  Coordination: No dysmetria on finger-to-nose  Gait: slightly narrower gait but still wider, does not swing right arm as much when walking    LABS:                        13.2   12.23 )-----------( 276      ( 13 Mar 2024 05:30 )             42.7     03-13    140  |  105  |  14  ----------------------------<  392<H>  4.7   |  24  |  0.88    Ca    8.6      13 Mar 2024 23:31  Phos  2.6     03-13  Mg     2.0     03-13        Urinalysis Basic - ( 13 Mar 2024 23:31 )    Color: x / Appearance: x / SG: x / pH: x  Gluc: 392 mg/dL / Ketone: x  / Bili: x / Urobili: x   Blood: x / Protein: x / Nitrite: x   Leuk Esterase: x / RBC: x / WBC x   Sq Epi: x / Non Sq Epi: x / Bacteria: x        RADIOLOGY & ADDITIONAL TESTS:

## 2024-03-14 NOTE — PROGRESS NOTE ADULT - ASSESSMENT
40 yo male with history of viral meningitis (1.5 years ago) with sequela of HTN and intractable headaches (followed by Dr. Weston), recent admission 3/4/24 recent admission 3/4/24 for autoimmune encephalitis treated with IVIG and steroids who t presented today to outpatient neurologist for b/l hand weakness, noted to have wide based gait, UE tremors, UE cogwheeling, dysmetria, and UE weakness and was sent to [facility] for admission to neurology service. Planned for decadron repeat LP, and plasma exchange. Cardiology consulted for tachycardia.    Review of Studies:  EKG: NSR   Tele 3/13/24: NSR @80-90 bpm    Tele 3/12/24: sinus tachycardia rates 100- 110, intermittently ST 140s  CT chest 3/8/24- heart size is normal, no pericardial effusion.  Clustered nodular and GGO RUL c/w infections/inflammatory etiology  ECHO 3/13/24: normal echo, mild LVH    #Sinus Tachycardia   Likely iso anxiety and steroids. Currently asymptomatic and denies chest pain, sob, or palpitations.   ECHO normal with mild LVH  Likely 2/2 steroids     #HTN  Verapamil recently switched to amlodipine on prior admission.   Amlodipine discontinued and verapamil restarted 3/12 by Dr. Vasquez  continue home verapamil for both BP and migraine ppx  recommend Losartan 25 mg PO daily for better BP control

## 2024-03-14 NOTE — PROGRESS NOTE ADULT - ASSESSMENT
39y Male with PMHx of viral meningitis with sequela of HTN and intractable headaches (followed by Dr. Weston), recent admission 3/4/24 for autoimmune encephalitis management/treatment presents today for bilateral hand weakness as well as wide based gait, UE tremors, UE cogwheeling, dysmetria, and UE weakness. Went for outpatient MRI brain and cervical spine. Admitted for further neurologic workup. Restarted on IV steroids, and plasmapheresis..     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  - s/p LP 3/4 with elevated opening pressure at 30 mmHg, remainder of CSF negative  - s/p one dose of decadron 4mg, continue decadron 2mg q6h (skipped overnight on 3/13). Plan to wean steroids as tolerated after each plasma exchange session  - LP planned for today 3/13, NPO after midnight, need full infectious, autoimmune, and fungal studies, all herpes panels (HSV 1, HSV 2, HSV 6), CSF neopterin (write in), TB, RPR, VDRL, AFB, bacterial antigens, full viral PCR panel, oligoclonal bands, Igg index, myelin basic protein  - will need LP with opening pressure  - plasma exchange started on 3/13, plan for 5 sessions every other day. will need to send daily fibrinogen clauss levels   - HD catheter for the plasma exchange will be placed by IR on 3/13  -ID consult for rule out possible infectious causes, and prior to initiation of Rituximab  -Pulm consult to follow up nodules seen on last admission's PET scan  -repeat Neuropsych consult on 3/14  - continue verapamil 120mg  - f/u serum hepatitis panel, quantiferon, NMO antibody, d-dimer, ACE, sed rate, CRP, serum protein and immuno electrophoresis, ANCA, LAKSHMI, double stranded DNA, cryoglobulin, copper, ceruloplasmin  -f/u HIV  - urine heavy metal screen, urine porphyrins, urine porphobilinogen  - c/w gabapentin 100mg q8h  - c/w baclofen 10mg q8h  - continue home pantoprazole BID  - q8hr general neuro checks and vitals    #migraine  - sumatriptan 100mg PO PRN once (max 2 tabs)  - if needed, can given sumatriptan 6mg subcutaneously once on top of PO    Cards  #HTN  - goal normotension  - hold amlodipine  - continue verapamil 120mg    Pulm  - call provider if SPO2 < 94%  - uptake noted in right upper lobe of right lung on PET; CT C/A/P demonstrates clustered nodular and ground-glass opacities in bilateral upper lobes, more prominent over right; findings most consistent with infectious/inflammatory etiology  - pulm consulted during recent admission  - repeat CT in 6-12 weeks to ensure resolution    GI  #Nutrition/Fluids/Electrolytes   - replete K<4 and Mg <2  - Diet: Regular  - IVF: NS 500cc/hr then continue 100cc/hr    Infectious Disease  - afebrile on admission    Endocrine  - A1C results: 5.2  - continue FS monitoring while on steroids    - TSH results: 0.594    Psych  #anxiety  - Zyprexa standing (2.5 am, 2.5 pm, 5 at bedtime)  -start Remeron 7.5 mg for sleep, can add an extra 7.5 mg PRN if needed    DVT Prophylaxis  - SCDs for DVT prophylaxis     Dispo: home when cleared     Discussed daily hospital plans and goals with patient and mother at bedside.    Discussed with Neurology Attending, Dr. Weston

## 2024-03-15 ENCOUNTER — TRANSCRIPTION ENCOUNTER (OUTPATIENT)
Age: 39
End: 2024-03-15

## 2024-03-15 LAB
ADD ON TEST-SPECIMEN IN LAB: SIGNIFICANT CHANGE UP
ALBUMIN CSF-MCNC: SIGNIFICANT CHANGE UP MG/DL (ref 14–25)
ALBUMIN SERPL ELPH-MCNC: 2883 MG/DL — LOW (ref 3500–5200)
ANION GAP SERPL CALC-SCNC: 11 MMOL/L — SIGNIFICANT CHANGE UP (ref 5–17)
ANION GAP SERPL CALC-SCNC: 9 MMOL/L — SIGNIFICANT CHANGE UP (ref 5–17)
APPEARANCE UR: CLEAR — SIGNIFICANT CHANGE UP
BILIRUB UR-MCNC: NEGATIVE — SIGNIFICANT CHANGE UP
BLD GP AB SCN SERPL QL: NEGATIVE — SIGNIFICANT CHANGE UP
BUN SERPL-MCNC: 16 MG/DL — SIGNIFICANT CHANGE UP (ref 7–23)
BUN SERPL-MCNC: 20 MG/DL — SIGNIFICANT CHANGE UP (ref 7–23)
CALCIUM SERPL-MCNC: 9 MG/DL — SIGNIFICANT CHANGE UP (ref 8.4–10.5)
CALCIUM SERPL-MCNC: 9.2 MG/DL — SIGNIFICANT CHANGE UP (ref 8.4–10.5)
CHLORIDE SERPL-SCNC: 101 MMOL/L — SIGNIFICANT CHANGE UP (ref 96–108)
CHLORIDE SERPL-SCNC: 96 MMOL/L — SIGNIFICANT CHANGE UP (ref 96–108)
CO2 SERPL-SCNC: 22 MMOL/L — SIGNIFICANT CHANGE UP (ref 22–31)
CO2 SERPL-SCNC: 23 MMOL/L — SIGNIFICANT CHANGE UP (ref 22–31)
COLOR SPEC: YELLOW — SIGNIFICANT CHANGE UP
CREAT ?TM UR-MCNC: 42 MG/DL — SIGNIFICANT CHANGE UP
CREAT SERPL-MCNC: 0.82 MG/DL — SIGNIFICANT CHANGE UP (ref 0.5–1.3)
CREAT SERPL-MCNC: 0.85 MG/DL — SIGNIFICANT CHANGE UP (ref 0.5–1.3)
DIFF PNL FLD: NEGATIVE — SIGNIFICANT CHANGE UP
EBV PCR: SIGNIFICANT CHANGE UP IU/ML
EGFR: 115 ML/MIN/1.73M2 — SIGNIFICANT CHANGE UP
EGFR: 116 ML/MIN/1.73M2 — SIGNIFICANT CHANGE UP
FIBRINOGEN AG PPP IA-MCNC: 332 MG/DL — SIGNIFICANT CHANGE UP (ref 206–478)
FIBRINOGEN PPP-MCNC: 162 MG/DL — LOW (ref 200–445)
GLUCOSE BLDC GLUCOMTR-MCNC: 131 MG/DL — HIGH (ref 70–99)
GLUCOSE BLDC GLUCOMTR-MCNC: 228 MG/DL — HIGH (ref 70–99)
GLUCOSE BLDC GLUCOMTR-MCNC: 316 MG/DL — HIGH (ref 70–99)
GLUCOSE BLDC GLUCOMTR-MCNC: 338 MG/DL — HIGH (ref 70–99)
GLUCOSE BLDC GLUCOMTR-MCNC: 413 MG/DL — HIGH (ref 70–99)
GLUCOSE SERPL-MCNC: 273 MG/DL — HIGH (ref 70–99)
GLUCOSE SERPL-MCNC: 469 MG/DL — CRITICAL HIGH (ref 70–99)
GLUCOSE UR QL: 500 MG/DL
HCT VFR BLD CALC: 39.3 % — SIGNIFICANT CHANGE UP (ref 39–50)
HGB BLD-MCNC: 12.7 G/DL — LOW (ref 13–17)
HHV6 AB SER-ACNC: SIGNIFICANT CHANGE UP COPIES/ML
IGG CSF-MCNC: 7.1 MG/DL — HIGH
IGG FLD-MCNC: 2228 MG/DL — HIGH (ref 610–1660)
IGG SYNTH RATE SER+CSF CALC-MRATE: SIGNIFICANT CHANGE UP MG/DAY
IGG/ALB CLEAR SER+CSF-RTO: SIGNIFICANT CHANGE UP
IGG/ALB CSF: SIGNIFICANT CHANGE UP RATIO
IGG/ALB SER: 0.77 RATIO — SIGNIFICANT CHANGE UP
KETONES UR-MCNC: NEGATIVE MG/DL — SIGNIFICANT CHANGE UP
LEUKOCYTE ESTERASE UR-ACNC: NEGATIVE — SIGNIFICANT CHANGE UP
MAGNESIUM SERPL-MCNC: 2.3 MG/DL — SIGNIFICANT CHANGE UP (ref 1.6–2.6)
MCHC RBC-ENTMCNC: 26.1 PG — LOW (ref 27–34)
MCHC RBC-ENTMCNC: 32.3 GM/DL — SIGNIFICANT CHANGE UP (ref 32–36)
MCV RBC AUTO: 80.9 FL — SIGNIFICANT CHANGE UP (ref 80–100)
NITRITE UR-MCNC: NEGATIVE — SIGNIFICANT CHANGE UP
NON-GYNECOLOGICAL CYTOLOGY STUDY: SIGNIFICANT CHANGE UP
NRBC # BLD: 0 /100 WBCS — SIGNIFICANT CHANGE UP (ref 0–0)
OSMOLALITY UR: 406 MOSM/KG — SIGNIFICANT CHANGE UP (ref 300–900)
PH UR: 7 — SIGNIFICANT CHANGE UP (ref 5–8)
PLATELET # BLD AUTO: 203 K/UL — SIGNIFICANT CHANGE UP (ref 150–400)
POTASSIUM SERPL-MCNC: 4.4 MMOL/L — SIGNIFICANT CHANGE UP (ref 3.5–5.3)
POTASSIUM SERPL-MCNC: 4.7 MMOL/L — SIGNIFICANT CHANGE UP (ref 3.5–5.3)
POTASSIUM SERPL-SCNC: 4.4 MMOL/L — SIGNIFICANT CHANGE UP (ref 3.5–5.3)
POTASSIUM SERPL-SCNC: 4.7 MMOL/L — SIGNIFICANT CHANGE UP (ref 3.5–5.3)
PROT SERPL-MCNC: 8.1 G/DL — SIGNIFICANT CHANGE UP (ref 6–8.3)
PROT UR-MCNC: NEGATIVE MG/DL — SIGNIFICANT CHANGE UP
RBC # BLD: 4.86 M/UL — SIGNIFICANT CHANGE UP (ref 4.2–5.8)
RBC # FLD: 19.3 % — HIGH (ref 10.3–14.5)
RH IG SCN BLD-IMP: POSITIVE — SIGNIFICANT CHANGE UP
SODIUM SERPL-SCNC: 129 MMOL/L — LOW (ref 135–145)
SODIUM SERPL-SCNC: 133 MMOL/L — LOW (ref 135–145)
SODIUM UR-SCNC: 62 MMOL/L — SIGNIFICANT CHANGE UP
SP GR SPEC: 1.01 — SIGNIFICANT CHANGE UP (ref 1–1.03)
UROBILINOGEN FLD QL: 1 MG/DL — SIGNIFICANT CHANGE UP (ref 0.2–1)
WBC # BLD: 19.63 K/UL — HIGH (ref 3.8–10.5)
WBC # FLD AUTO: 19.63 K/UL — HIGH (ref 3.8–10.5)

## 2024-03-15 PROCEDURE — 99232 SBSQ HOSP IP/OBS MODERATE 35: CPT

## 2024-03-15 PROCEDURE — 93010 ELECTROCARDIOGRAM REPORT: CPT

## 2024-03-15 PROCEDURE — 99233 SBSQ HOSP IP/OBS HIGH 50: CPT

## 2024-03-15 PROCEDURE — 99223 1ST HOSP IP/OBS HIGH 75: CPT

## 2024-03-15 RX ORDER — GABAPENTIN 400 MG/1
100 CAPSULE ORAL
Refills: 0 | Status: DISCONTINUED | OUTPATIENT
Start: 2024-03-15 | End: 2024-03-24

## 2024-03-15 RX ORDER — GABAPENTIN 400 MG/1
300 CAPSULE ORAL AT BEDTIME
Refills: 0 | Status: DISCONTINUED | OUTPATIENT
Start: 2024-03-15 | End: 2024-03-16

## 2024-03-15 RX ORDER — SODIUM CHLORIDE 9 MG/ML
500 INJECTION INTRAMUSCULAR; INTRAVENOUS; SUBCUTANEOUS ONCE
Refills: 0 | Status: COMPLETED | OUTPATIENT
Start: 2024-03-15 | End: 2024-03-15

## 2024-03-15 RX ORDER — OLANZAPINE 15 MG/1
2.5 TABLET, FILM COATED ORAL
Refills: 0 | Status: DISCONTINUED | OUTPATIENT
Start: 2024-03-15 | End: 2024-03-26

## 2024-03-15 RX ORDER — OLANZAPINE 15 MG/1
2.5 TABLET, FILM COATED ORAL ONCE
Refills: 0 | Status: COMPLETED | OUTPATIENT
Start: 2024-03-15 | End: 2024-03-15

## 2024-03-15 RX ORDER — MIRTAZAPINE 45 MG/1
15 TABLET, ORALLY DISINTEGRATING ORAL AT BEDTIME
Refills: 0 | Status: DISCONTINUED | OUTPATIENT
Start: 2024-03-15 | End: 2024-03-15

## 2024-03-15 RX ORDER — OLANZAPINE 15 MG/1
2.5 TABLET, FILM COATED ORAL
Refills: 0 | Status: DISCONTINUED | OUTPATIENT
Start: 2024-03-15 | End: 2024-03-15

## 2024-03-15 RX ORDER — INSULIN LISPRO 100/ML
8 VIAL (ML) SUBCUTANEOUS ONCE
Refills: 0 | Status: COMPLETED | OUTPATIENT
Start: 2024-03-15 | End: 2024-03-15

## 2024-03-15 RX ORDER — ACETAMINOPHEN 500 MG
650 TABLET ORAL EVERY 6 HOURS
Refills: 0 | Status: DISCONTINUED | OUTPATIENT
Start: 2024-03-15 | End: 2024-03-26

## 2024-03-15 RX ORDER — SENNA PLUS 8.6 MG/1
2 TABLET ORAL AT BEDTIME
Refills: 0 | Status: DISCONTINUED | OUTPATIENT
Start: 2024-03-15 | End: 2024-03-26

## 2024-03-15 RX ORDER — DEXAMETHASONE 0.5 MG/5ML
2 ELIXIR ORAL
Refills: 0 | Status: DISCONTINUED | OUTPATIENT
Start: 2024-03-15 | End: 2024-03-22

## 2024-03-15 RX ADMIN — PANTOPRAZOLE SODIUM 40 MILLIGRAM(S): 20 TABLET, DELAYED RELEASE ORAL at 07:02

## 2024-03-15 RX ADMIN — OLANZAPINE 2.5 MILLIGRAM(S): 15 TABLET, FILM COATED ORAL at 10:32

## 2024-03-15 RX ADMIN — Medication 4: at 22:20

## 2024-03-15 RX ADMIN — SODIUM CHLORIDE 1000 MILLILITER(S): 9 INJECTION INTRAMUSCULAR; INTRAVENOUS; SUBCUTANEOUS at 13:11

## 2024-03-15 RX ADMIN — Medication 120 MILLIGRAM(S): at 07:02

## 2024-03-15 RX ADMIN — SENNA PLUS 2 TABLET(S): 8.6 TABLET ORAL at 22:10

## 2024-03-15 RX ADMIN — OLANZAPINE 5 MILLIGRAM(S): 15 TABLET, FILM COATED ORAL at 22:10

## 2024-03-15 RX ADMIN — Medication 10 MILLIGRAM(S): at 07:01

## 2024-03-15 RX ADMIN — Medication 2 MILLIGRAM(S): at 19:01

## 2024-03-15 RX ADMIN — Medication 8 UNIT(S): at 20:20

## 2024-03-15 RX ADMIN — CHLORHEXIDINE GLUCONATE 1 APPLICATION(S): 213 SOLUTION TOPICAL at 12:27

## 2024-03-15 RX ADMIN — Medication 10 MILLIGRAM(S): at 16:39

## 2024-03-15 RX ADMIN — Medication 2 MILLIGRAM(S): at 07:01

## 2024-03-15 RX ADMIN — Medication 10 MILLIGRAM(S): at 22:10

## 2024-03-15 RX ADMIN — PANTOPRAZOLE SODIUM 40 MILLIGRAM(S): 20 TABLET, DELAYED RELEASE ORAL at 19:01

## 2024-03-15 RX ADMIN — TRAMADOL HYDROCHLORIDE 25 MILLIGRAM(S): 50 TABLET ORAL at 06:02

## 2024-03-15 RX ADMIN — GABAPENTIN 300 MILLIGRAM(S): 400 CAPSULE ORAL at 22:10

## 2024-03-15 RX ADMIN — POLYETHYLENE GLYCOL 3350 17 GRAM(S): 17 POWDER, FOR SOLUTION ORAL at 19:02

## 2024-03-15 RX ADMIN — Medication 10 MILLIGRAM(S): at 20:22

## 2024-03-15 RX ADMIN — TRAMADOL HYDROCHLORIDE 25 MILLIGRAM(S): 50 TABLET ORAL at 05:27

## 2024-03-15 RX ADMIN — ENOXAPARIN SODIUM 40 MILLIGRAM(S): 100 INJECTION SUBCUTANEOUS at 22:10

## 2024-03-15 RX ADMIN — Medication 2 MILLIGRAM(S): at 13:32

## 2024-03-15 RX ADMIN — OLANZAPINE 2.5 MILLIGRAM(S): 15 TABLET, FILM COATED ORAL at 08:57

## 2024-03-15 RX ADMIN — GABAPENTIN 100 MILLIGRAM(S): 400 CAPSULE ORAL at 07:02

## 2024-03-15 RX ADMIN — Medication 2: at 11:42

## 2024-03-15 RX ADMIN — SUMATRIPTAN SUCCINATE 100 MILLIGRAM(S): 4 INJECTION, SOLUTION SUBCUTANEOUS at 05:28

## 2024-03-15 RX ADMIN — SUMATRIPTAN SUCCINATE 100 MILLIGRAM(S): 4 INJECTION, SOLUTION SUBCUTANEOUS at 06:02

## 2024-03-15 RX ADMIN — POLYETHYLENE GLYCOL 3350 17 GRAM(S): 17 POWDER, FOR SOLUTION ORAL at 07:01

## 2024-03-15 RX ADMIN — OLANZAPINE 2.5 MILLIGRAM(S): 15 TABLET, FILM COATED ORAL at 05:27

## 2024-03-15 RX ADMIN — Medication 4: at 16:39

## 2024-03-15 RX ADMIN — GABAPENTIN 100 MILLIGRAM(S): 400 CAPSULE ORAL at 16:39

## 2024-03-15 NOTE — PROGRESS NOTE ADULT - SUBJECTIVE AND OBJECTIVE BOX
INTERVAL HPI/OVERNIGHT EVENTS:  Afebrile with improved gait and strength.  "Panic attack" overnight.    CONSTITUTIONAL:  No fever, chills, night sweats  EYES:  No photophobia or visual changes  CARDIOVASCULAR:  No chest pain  RESPIRATORY:  No cough, wheezing, or SOB   GASTROINTESTINAL:  No nausea, vomiting, diarrhea, constipation, or abdominal pain  GENITOURINARY:  No frequency, urgency, dysuria or hematuria  NEUROLOGIC:  No headache, lightheadedness      ANTIBIOTICS/RELEVANT:          Vital Signs Last 24 Hrs  T(C): 36.5 (15 Mar 2024 06:56), Max: 36.7 (14 Mar 2024 19:51)  T(F): 97.7 (15 Mar 2024 06:56), Max: 98 (14 Mar 2024 19:51)  HR: 103 (15 Mar 2024 16:34) (64 - 104)  BP: 134/75 (15 Mar 2024 16:34) (134/75 - 179/116)  BP(mean): 97 (15 Mar 2024 16:34) (97 - 141)  RR: 17 (15 Mar 2024 14:51) (17 - 18)  SpO2: 95% (15 Mar 2024 14:51) (95% - 98%)    Parameters below as of 15 Mar 2024 14:51  Patient On (Oxygen Delivery Method): room air        PHYSICAL EXAM:  Constitutional:  Alert  HEENT:  NC, Sclerae anicteric, conjunctivae clear, PERRL.  No nasal exudate or sinus tenderness;  No buccal mucosal lesions, no pharyngeal erythema or exudate	  Neck:  Supple, no adenopathy.  RIJ HD catheter  Respiratory:  Clear bilaterally  Cardiovascular:  RRR, S1S2, no murmur appreciated  Gastrointestinal:  Symmetric, normoactive BS, soft, NT, no masses, guarding or rebound.  No HSM  Extremities:  No edema      LABS:                        12.7   19.63 )-----------( 203      ( 15 Mar 2024 10:32 )             39.3         03-15    x   |  101  |  20  ----------------------------<  x   4.4   |  23  |  0.85    Ca    9.2      15 Mar 2024 18:56  Phos  2.6     03-13  Mg     2.3     03-15        Urinalysis Basic - ( 15 Mar 2024 12:15 )    Color: Yellow / Appearance: Clear / S.013 / pH: x  Gluc: x / Ketone: Negative mg/dL  / Bili: Negative / Urobili: 1.0 mg/dL   Blood: x / Protein: Negative mg/dL / Nitrite: Negative   Leuk Esterase: Negative / RBC: x / WBC x   Sq Epi: x / Non Sq Epi: x / Bacteria: x        MICROBIOLOGY:        RADIOLOGY & ADDITIONAL STUDIES:

## 2024-03-15 NOTE — PROGRESS NOTE ADULT - SUBJECTIVE AND OBJECTIVE BOX
Physical Medicine and Rehabilitation Progress Note :       Patient is a 39y old  Male who presents with a chief complaint of Autoimmune Encephalitis (15 Mar 2024 10:15)      HPI:   **STROKE HPI***    HPI: 39y Male with PMHx of viral meningitis with sequela of HTN and intractable headaches (followed by Dr. Weston), recent admission 3/4/24 for autoimmune encephalitis management/treatment presents today for bilateral hand weakness as well as wide based gait, UE tremors, UE cogwheeling, dysmetria, and UE weakness. Went for outpatient MRI brain and cervical spine today.    3/4 admission: autoimmune encephalitis findings seen on outpatient PET 3/4. LP done on the same day with opening pressures of 30mmHg; CSF unremarkable. Course complicated by allergic reaction to solumedrol which was treated with benadryl. Steroid switched to decadron. Patient with some decrease in cognition (serial 7s, memory difficulty, attention/focus worsening) with improvement back to baseline with no cognitive/neuro deficit s/p 4 day course of IVIG. Autoimmune encephalitis labs sent, so far negative (refer to chart note 3/5). EEG negative. Intractable hiccups treated with gabapentin/baclofen. MRI epilepsy protocol done with no abnormalities or enhancements. MRV obtained for sudden severe HAs; no CVST. In addition, PET also showed increased activity with poorly define groundglass lesion, unable to r/o malignancy. CT C/A/P with contrast obtained; no evidence of malignancy/adenopathy, more consistent with infectious/inflammatory etiology. Pulmonary consulted; in agreement and can consider rpt CT chest in 6-12 weeks to document resolution. Cardiology consulted for PACs vs sinus arrhythmia, home verapamil switched to amlodipine. Planned for outpatient prednisone taper, home infusion, outpatient rehab, outpatient neuro-cognitive assessment. Will also consider repeat CT chest 6-12 weeks pending on lab results.       PAST MEDICAL & SURGICAL HISTORY:  Hypertension      Migraine      Meningitis          FAMILY HISTORY:      T(C): --  HR: --  BP: --  RR: --  SpO2: --    MEDICATION RECONCILIATION   MEDICATIONS  (STANDING):    MEDICATIONS  (PRN):    Allergies    methylPREDNISolone (Rash; Urticaria; Flushing; Hives)    Intolerances      Vital Signs Last 24 Hrs  T(C): --  T(F): --  HR: --  BP: --  BP(mean): --  RR: --  SpO2: --   (12 Mar 2024 17:57)                            12.7   19.63 )-----------( 203      ( 15 Mar 2024 10:32 )             39.3       03-15    129<L>  |  96  |  16  ----------------------------<  273<H>  4.7   |  22  |  0.82    Ca    9.0      15 Mar 2024 10:32  Phos  2.6     03-13  Mg     2.3     03-15      Vital Signs Last 24 Hrs  T(C): 36.5 (15 Mar 2024 06:56), Max: 36.7 (14 Mar 2024 19:51)  T(F): 97.7 (15 Mar 2024 06:56), Max: 98 (14 Mar 2024 19:51)  HR: 82 (15 Mar 2024 08:50) (64 - 106)  BP: 168/96 (15 Mar 2024 08:50) (128/80 - 179/116)  BP(mean): 124 (15 Mar 2024 08:50) (101 - 141)  RR: 17 (15 Mar 2024 08:12) (17 - 18)  SpO2: 97% (15 Mar 2024 08:12) (95% - 98%)    Parameters below as of 15 Mar 2024 08:12  Patient On (Oxygen Delivery Method): room air        MEDICATIONS  (STANDING):  albumin human  5% IVPB 3500 milliLiter(s) IV Intermittent once  baclofen 10 milliGRAM(s) Oral <User Schedule>  calcium gluconate IVPB 2 Gram(s) IV Intermittent once  chlorhexidine 2% Cloths 1 Application(s) Topical daily  dexAMETHasone  Injectable 2 milliGRAM(s) IV Push <User Schedule>  dextrose 5%. 1000 milliLiter(s) (50 mL/Hr) IV Continuous <Continuous>  dextrose 5%. 1000 milliLiter(s) (100 mL/Hr) IV Continuous <Continuous>  dextrose 50% Injectable 25 Gram(s) IV Push once  dextrose 50% Injectable 12.5 Gram(s) IV Push once  dextrose 50% Injectable 25 Gram(s) IV Push once  enoxaparin Injectable 40 milliGRAM(s) SubCutaneous every 24 hours  gabapentin 100 milliGRAM(s) Oral <User Schedule>  gabapentin 300 milliGRAM(s) Oral at bedtime  glucagon  Injectable 1 milliGRAM(s) IntraMuscular once  heparin  Lock Flush 100 Units/mL Injectable 600 Unit(s) IV Push once  insulin lispro (ADMELOG) corrective regimen sliding scale   SubCutaneous three times a day before meals  OLANZapine 5 milliGRAM(s) Oral at bedtime  pantoprazole    Tablet 40 milliGRAM(s) Oral every 12 hours  polyethylene glycol 3350 17 Gram(s) Oral every 12 hours  verapamil  milliGRAM(s) Oral every 24 hours    MEDICATIONS  (PRN):  acetaminophen     Tablet .. 650 milliGRAM(s) Oral every 6 hours PRN Mild Pain (1 - 3), Moderate Pain (4 - 6)  dextrose Oral Gel 15 Gram(s) Oral once PRN Blood Glucose LESS THAN 70 milliGRAM(s)/deciliter  diphenhydrAMINE Injectable 25 milliGRAM(s) IV Push once PRN Allergic Reaction  SUMAtriptan 100 milliGRAM(s) Oral two times a day PRN Migraine      T(C): 36.5 (03-15-24 @ 06:56), Max: 36.7 (03-14-24 @ 19:51)  HR: 82 (03-15-24 @ 08:50) (64 - 106)  BP: 168/96 (03-15-24 @ 08:50) (128/80 - 179/116)  RR: 17 (03-15-24 @ 08:12) (17 - 18)  SpO2: 97% (03-15-24 @ 08:12) (95% - 98%)        Physical Exam:     General: No acute distress, awake and alert  Eyes: Anicteric sclerae, moist conjunctivae, see below for CNs  Neck: trachea midline,  Cardiovascular: Regular rate and rhythm    Neurologic:  -Mental status: Awake, alert, oriented to person, place, and time. Speech is fluent with intact naming, repetition, and comprehension, no dysarthria. Recent and remote memory intact. Follows commands. Attention/concentration intact. Fund of knowledge appropriate.  -Cranial nerves:   II: Visual fields are full to confrontation.  III, IV, VI: Extraocular movements are intact without nystagmus.   VII: Face is symmetric  Motor: Normal bulk and tone. No pronator drift. Strength bilateral upper extremity 5/5, bilateral lower extremities 5/5. rapid finger movement slightly more fluid today.  Sensation: Intact to light touch bilaterally. No neglect or extinction on double simultaneous testing.  Coordination: No dysmetria on finger-to-nose   Gait: Narrow gait and steady, right arm swinging slightly more          Initial Functional Status Assessment :       Previous Level of Function:     · Ambulation Skills	independent  · Transfer Skills	independent  · ADL Skills	independent  · Work/Leisure Activity	independent  · Additional Comments	Pt was IND PTA, lives in an apt building, no ANGELO.    Cognitive Status Examination:   · Orientation	oriented to person, place, time and situation  · Level of Consciousness	alert  · Follows Commands and Answers Questions	100% of the time  · Personal Safety and Judgment	intact    Range of Motion Exam:   · Range of Motion Examination	no ROM deficits were identified    Manual Muscle Testing:   · Manual Muscle Testing Results	Left LE: hip flex 4-/5, knee flex 4-/5, knee ext 4-/5.  Strength: R 3+/5 L 4-/5. R Shoulder flex 4-/5    Transfer: Sit to Stand:     · Level of Geyser	independent  · Weight-Bearing Restrictions	full weight-bearing    Transfer: Stand to Sit:     · Level of [place]er	independent  · Weight-Bearing Restrictions	full weight-bearing    Gait Skills:     · Level of Ge[place]er	independent  · Weight-Bearing Restrictions	full weight-bearing  · Gait Distance	200 feet    Gait Analysis:     · Gait Pattern Used	2-point gait    Balance Skills Assessment:     · Sitting Balance: Static	good balance  · Sitting Balance: Dynamic	good balance  · Sit-to-Stand Balance	good balance  · Standing Balance: Static	good balance  · Standing Balance: Dynamic	good balance    Sensory Examination:   Sensory Examination:    Grossly Intact:   · Gross Sensory Examination	right forearm/hand 80% versus left 100%    · Coordination Assessed	finger to nose      Proprioception:   · Coordination Assessed	finger to nose      Fine Motor Coordination:   Fine Motor Coordination Examination:    Fine Motor Coordination:   · Left Hand, Finger to Nose	normal performance  · Right Hand, Finger to Nose	normal performance  · Left Hand Thumb/Finger Opposition Skills	normal performance  · Right Hand Thumb/Finger Opposition Skills	normal performance  · Left Hand, Diadochokinesis Skills	normal performance  · Right Hand, Diadochokinesis Skills	normal performance    Treatment Location:   · Comments	Cranial Nerves II - XII: II: PERRLA; visual fields are full to confrontation III, IV, VI: EOMI, no nystagmus appreciated V: facial sensation intact to light touch V1-V3 b/l VII: no ptosis, no facial droop, symmetric eyebrow raise and closure VIII: hearing intact to finger rub b/l  XI: head turning and shoulder shrug intact b/l XII: tongue protrusion midline  Vision: Intact      Visual Assessment:   · Visual Tracking	normal  · Visual Neglect	none  · Visual Field Cuts	none  · Eye Muscle Balance	normal  · Visual Scanning	WFL  · Visual Convergence	normal  · Visual Depth Perception	WFL    Fine Motor Coordination:     Grossly Intact:   · Grossly Intact	Decreased FMC on BUEs      Fine Motor Coordination:   · Left Hand, Finger to Nose	mild impairment  · Right Hand, Finger to Nose	moderate impairment  · Left Hand Thumb/Finger Opposition Skills	mild impairment  · Right Hand Thumb/Finger Opposition Skills	moderate impairment  · Left Hand, Manipulation of Objects	mild impairment  · Right Hand, Manipulation of Objects	moderate impairment  · Left Hand, Diadochokinesis Skills	normal performance  · Right Hand, Diadochokinesis Skills	normal performance    Upper Body Dressing Training:     · Level of Geyser	independent    Lower Body Dressing Training:     · Level of Geyser	independent    Grooming Training:     · Level of Geyser	independent    Eating/Self-Feeding Training:     · Level of Geyser	independent        PM&R Impression : as above    Current disposition plan recommendation :     d/c home with outpatient physical and occupational therapy

## 2024-03-15 NOTE — BH CONSULTATION LIAISON ASSESSMENT NOTE - NSBHCHARTREVIEWVS_PSY_A_CORE FT
Vital Signs Last 24 Hrs  T(C): 36.5 (15 Mar 2024 06:56), Max: 36.7 (14 Mar 2024 19:51)  T(F): 97.7 (15 Mar 2024 06:56), Max: 98 (14 Mar 2024 19:51)  HR: 82 (15 Mar 2024 08:50) (64 - 106)  BP: 168/96 (15 Mar 2024 08:50) (128/80 - 179/116)  BP(mean): 124 (15 Mar 2024 08:50) (101 - 141)  RR: 17 (15 Mar 2024 08:12) (17 - 18)  SpO2: 97% (15 Mar 2024 08:12) (95% - 98%)    Parameters below as of 15 Mar 2024 08:12  Patient On (Oxygen Delivery Method): room air

## 2024-03-15 NOTE — BH CONSULTATION LIAISON ASSESSMENT NOTE - SUMMARY
Patient is a 38yo M, PMH migraines, HTN, viral meningitis, GERD, PPH reported anxiety/depression, denies inpatient psychiatric hospitalizations, denies suicide attempts, BIBS for outpatient PET scan findings suspicious of autoimmune encephalitis and admitted for further workup. Psychiatry consulted for anxiety.    Upon assessment, patient is calm, linear, reporting anxiety and having panic attack yesterday. Patient reports Zyprexa helps relief the sxs during panic attack. No ridigty was noted during exam, gait was unremarkable. Patient does not meet criteria for inpatient psychiatric hospitalization at this time, and does not require constant observation.  Patient is a 38yo M, PMH migraines, HTN, viral meningitis, GERD, PPH reported anxiety/depression, denies inpatient psychiatric hospitalizations, denies suicide attempts, BIBS for outpatient PET scan findings suspicious of autoimmune encephalitis and admitted for further workup. Psychiatry consulted for anxiety.    Upon assessment, patient is calm, linear, reporting anxiety and having panic attack yesterday. Patient reports Zyprexa helps relief the sxs during panic attack.  Rigidity was not noted during exam, gait was unremarkable. Patient does not meet criteria for inpatient psychiatric hospitalization at this time, and does not require constant observation.  Patient is a 40yo M, w/ PMHx migraines, HTN, viral meningitis, and GERD, w/ PPHx reported anxiety and depression, denies inpatient psychiatric hospitalizations, denies suicide attempts, BIBS for outpatient PET scan findings suspicious of autoimmune encephalitis and admitted for worsening of headaches and UE stiffness. Of note patient was recently admitted medically for bilateral hand weakness, wide based gait, UE tremors, UE cogwheeling, dysmetria, and UE weakness. Psychiatry was consulted during the previous admission and patient was started on medication and psychiatrically cleared for discharge. Psychiatry was consulted during this readmission for evaluation of anxiety.    Upon assessment, patient is calm and cooperative, linear in his thought process and not displaying any symptoms suspicious of psychosis; However patient is reporting anxiety and endorses having a panic attack early this morning. Patients presentation is concerning for adjustment disorder, given the acute anxiety that seemed to begin after the patient got a diagnosis of an acute medical condition. Also on the differential are: panic disorder, HOLLIE, or substance withdrawal (alcohol vs benzodiazepines). Patient has risk factors for self harm or suicide, including: male gender,  race, chronic medical illness, medical illness/pain, substance use. But patient also has protect factors, including: housing stability, employment stability, social support, seeking out treatment, compliant with treatment, and he displays good insight and judgement regarding psychiatric illness. Patient does not meet criteria for inpatient psychiatric hospitalization at this time, and does not require constant observation.      Patient is a 40yo M, w/ PMHx migraines, HTN, viral meningitis, and GERD, w/ PPHx reported anxiety/depression, denies inpatient psychiatric hospitalizations, denies suicide attempts, BIBS and admitted for worsening of headaches and UE stiffness. Of note patient was recently admitted medically for bilateral hand weakness, wide based gait, UE tremors, UE cogwheeling, dysmetria, and UE weakness after an outpatient PET scan had findings suspicious of autoimmune encephalitis. Psychiatry was consulted during the previous admission and patient was started on medication and psychiatrically cleared for discharge. Patient was discharged to home and returned a few days later with current symptoms. Psychiatry was reconsulted during this readmission for evaluation of anxiety.    Upon assessment, patient is calm and cooperative, linear in his thought process and not displaying any symptoms suspicious of psychosis; However patient is reporting anxiety and endorses having a panic attack early this morning. Patients presentation is concerning for adjustment disorder, given the acute anxiety that seemed to begin after the patient got a diagnosis of an acute medical condition. Also on the differential are: panic disorder, HOLLIE, or substance withdrawal (alcohol vs benzodiazepines). Patient has risk factors for self harm or suicide, including: male gender,  race, chronic medical illness, medical illness/pain, substance use. But patient also has protect factors, including: housing stability, employment stability, social support, seeking out treatment, compliant with treatment, and he displays good insight and judgement regarding psychiatric illness. Patient does not meet criteria for inpatient psychiatric hospitalization at this time, and does not require constant observation.      Patient is a 40yo M, w/ PMHx migraines, HTN, viral meningitis, and GERD, w/ PPHx reported anxiety and depression, denies hx of inpatient psychiatric hospitalizations, denies hx of NSSIB/SI/SA, BIBS and admitted for worsening of headaches and UE stiffness. Of note patient was recently admitted medically for bilateral hand weakness, wide based gait, UE tremors, UE cogwheeling, dysmetria, and UE weakness after an outpatient PET scan had findings suspicious of autoimmune encephalitis. Psychiatry was consulted during the previous admission and patient was started on medication and psychiatrically cleared for discharge. Patient was discharged to home and returned a few days later with current symptoms. Psychiatry was reconsulted during this readmission for evaluation of anxiety.    Upon assessment, patient is calm and cooperative, linear in his thought process and not displaying any symptoms suspicious of psychosis; However patient is reporting anxiety and endorses having a panic attack early this morning. Patients presentation is concerning for adjustment disorder, given the acute anxiety that seemed to begin after the patient got a diagnosis of an acute medical condition. Also on the differential are: panic disorder, HOLLIE, or substance withdrawal (alcohol vs benzodiazepines). Patient has risk factors for self harm or suicide, including: male gender,  race, chronic medical illness, medical illness/pain, substance use. But patient also has protect factors, including: housing stability, employment stability, social support, seeking out treatment, compliant with treatment, and he displays good insight and judgement regarding psychiatric illness. Patient does not meet criteria for inpatient psychiatric hospitalization at this time, and does not require constant observation.

## 2024-03-15 NOTE — PROGRESS NOTE ADULT - ASSESSMENT
39y Male with PMHx of viral meningitis with sequela of HTN and intractable headaches (followed by Dr. Weston), recent admission 3/4/24 for autoimmune encephalitis management/treatment (had presented as worsening headaches and cognition - most notably e-mails that didn't make sense) s/p IV steroids and IVIG, presented again on 3/12 forfor bilateral hand weakness as well as wide based gait, UE tremors, UE cogwheeling, dysmetria, and UE weakness. Went for outpatient MRI brain and cervical spine. Admitted for further neurologic workup. Restarted on IV steroids, and plasmapheresis.    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  - s/p LP 3/4 with elevated opening pressure at 30 mmHg, remainder of CSF negative  - s/p one dose of decadron 4mg, continue decadron 2mg q6h x 3 doses daily (no overnight dose) Plan to wean steroids as tolerated after each plasma exchange session  - s/p LP 3/13. infectious panel so far negative, awaiting send out studies. autoimmune, and fungal studies, all herpes panels (HSV 1, HSV 2, HSV 6), CSF neopterin (write in), TB, RPR, VDRL, AFB, bacterial antigens, full viral PCR panel, oligoclonal bands, Igg index, myelin basic protein  - LP done 3/4 opening pressure was 30, LP done 3/13 with opening pressure of 13.5  -MRA head/neck done for vessel imaging - no stenosis or occlusion, no remarkable findings  -PET body - nonspecific right lung nodules  -f/u testicular ultrasound  -Pulm consult - lung nodules likely inflammation, no contraindication to Rituximab  - plasma exchange started on 3/13, plan for 5 sessions every other day. will need to send daily fibrinogen clauss levels   - HD catheter for the plasma exchange placed by IR on 3/13  -ID consult for rule out possible infectious causes, and prior to initiation of Rituximab - no contraindication to Rituximab  -D dimer elevated to 502, LE dopplers negative  -repeat Neuropsych consult on 3/14 - improved from prior admission  -Rheum consulted 3/15 for elevated LAKSHMI level  - continue verapamil 120mg  - f/u serum hepatitis panel, quantiferon, NMO antibody, d-dimer, ACE, sed rate, CRP, serum protein and immuno electrophoresis, ANCA, LAKSHMI, double stranded DNA, cryoglobulin, copper, ceruloplasmin  -f/u HIV  - urine heavy metal screen, urine porphyrins, urine porphobilinogen  - c/w gabapentin 100mg and baclofen 10mg three times a day  - continue home pantoprazole BID  - q8hr general neuro checks and vitals    #migraine  - sumatriptan 100mg PO PRN once (max 2 tabs)  - if needed, can given sumatriptan 6mg subcutaneously once on top of PO    Cards  #HTN  - goal normotension  - hold amlodipine  - continue verapamil 120mg    Pulm  - call provider if SPO2 < 94%  - uptake noted in right upper lobe of right lung on PET; CT C/A/P demonstrates clustered nodular and ground-glass opacities in bilateral upper lobes, more prominent over right; findings most consistent with infectious/inflammatory etiology  - pulm consulted during recent admission  - repeat CT in 6-12 weeks to ensure resolution    GI  #Nutrition/Fluids/Electrolytes   - replete K<4 and Mg <2  - Diet: Regular  - IVF: NS 500cc/hr then continue 100cc/hr    Infectious Disease  - afebrile on admission    Endocrine  - A1C results: 5.2  - continue FS monitoring while on steroids    - TSH results: 0.594    Psych  #anxiety  - Zyprexa standing (2.5 am, 2.5 pm, 5 at bedtime)  -Remeron increased to 15 mg bedtime  -psych consulted 3/15    DVT Prophylaxis  - SCDs for DVT prophylaxis     Dispo: home when cleared     Discussed daily hospital plans and goals with patient at bedside.    Discussed with Neurology Attending, Dr. Weston

## 2024-03-15 NOTE — BH CONSULTATION LIAISON ASSESSMENT NOTE - RISK ASSESSMENT
Modifiable risk factors include medical illness/pain, substance use  Static risk factors include male gender,  race, chronic medical illness  Protective factors include social support, seeking out treatment Modifiable risk factors include medical illness/pain, substance use, anxiety  Static risk factors include male gender,  race, chronic medical illness, prior diagnosis of mood disorder  Protective factors include social support, seeking out treatment

## 2024-03-15 NOTE — PROGRESS NOTE ADULT - ASSESSMENT
38 yo male with history of viral meningitis (1.5 years ago) with sequela of HTN and intractable headaches (followed by Dr. Weston), recent admission 3/4/24 recent admission 3/4/24 for autoimmune encephalitis treated with IVIG and steroids who t presented today to outpatient neurologist for b/l hand weakness, noted to have wide based gait, UE tremors, UE cogwheeling, dysmetria, and UE weakness and was sent to [facility] for admission to neurology service. Planned for decadron repeat LP, and plasma exchange. Cardiology consulted for tachycardia.    Review of Studies:  EKG: NSR   Tele 3/13/24: NSR @80-90 bpm    Tele 3/12/24: sinus tachycardia rates 100- 110, intermittently ST 140s  CT chest 3/8/24- heart size is normal, no pericardial effusion.  Clustered nodular and GGO RUL c/w infections/inflammatory etiology  ECHO 3/13/24: normal echo, mild LVH    #Sinus Tachycardia   Likely iso anxiety and steroids. Currently asymptomatic and denies chest pain, sob, or palpitations.   ECHO normal with mild LVH  Likely 2/2 steroids     #HTN  Verapamil recently switched to amlodipine on prior admission.   Amlodipine discontinued and verapamil restarted 3/12 by Dr. Vasquez  continue home verapamil for both BP and migraine ppx  recommend Losartan 25 mg PO daily for better BP control  38 yo male with history of viral meningitis (1.5 years ago) with sequela of HTN and intractable headaches (followed by Dr. Weston), recent admission 3/4/24 recent admission 3/4/24 for autoimmune encephalitis treated with IVIG and steroids who t presented today to outpatient neurologist for b/l hand weakness, noted to have wide based gait, UE tremors, UE cogwheeling, dysmetria, and UE weakness and was sent to [facility] for admission to neurology service. Planned for decadron repeat LP, and plasma exchange. Cardiology consulted for tachycardia.    Review of Studies:  EKG: NSR   Tele 3/13/24: NSR @80-90 bpm    Tele 3/12/24: sinus tachycardia rates 100- 110, intermittently ST 140s  CT chest 3/8/24- heart size is normal, no pericardial effusion.  Clustered nodular and GGO RUL c/w infections/inflammatory etiology  ECHO 3/13/24: normal echo, mild LVH    #Sinus Tachycardia   Likely iso anxiety and steroids. Currently asymptomatic and denies chest pain, sob, or palpitations.   ECHO normal with mild LVH  Likely 2/2 steroids     #HTN  Verapamil recently switched to amlodipine on prior admission.   Amlodipine discontinued and verapamil restarted 3/12 by Dr. Vasquez  continue home verapamil for both BP and migraine ppx  recommend Losartan 25 mg PO daily for better BP control   can also consider Propranolol 20 mg PO bid  for Migraine/Anxiety

## 2024-03-15 NOTE — BH CONSULTATION LIAISON ASSESSMENT NOTE - NSACTIVEVENT_PSY_ALL_CORE
I have personally seen and examined this patient.  I have fully participated in the care of this patient. I have reviewed all pertinent clinical information, including history, physical exam, plan and the Resident’s note and agree except as noted. Chronic pain or other acute medical condition

## 2024-03-15 NOTE — PROGRESS NOTE ADULT - TIME BILLING
direct patient encounter  reviewed labs and images  documentation  d/w Neurology ACP reviewed labs and images  documentation  d/w Neurology ACP

## 2024-03-15 NOTE — BH CONSULTATION LIAISON ASSESSMENT NOTE - OTHER PAST PSYCHIATRIC HISTORY (INCLUDE DETAILS REGARDING ONSET, COURSE OF ILLNESS, INPATIENT/OUTPATIENT TREATMENT)
Reports prior treatment with psychiatrist 3-4 years ago for which patient was started on Strattera for ADHD. Patient reports he is no longer on this medication. Patient reports trialing cymbalta for migraines/anxiety and self-tapered due to concern for worsening hypertension. Reports diagnoses in past of HOLLIE/depression. Reports prior treatment with psychiatrist 3-4 years ago for which patient was started on Strattera for ADHD. Patient reports he is no longer on this medication. Patient reports trialing cymbalta for migraines/anxiety and self-tapered due to concern for worsening hypertension. Reports diagnoses in past of HOLLIE and depression.

## 2024-03-15 NOTE — PROGRESS NOTE ADULT - ASSESSMENT
Cardiology/Neuro    39 y o male with history of viral meningitis (1.5 years ago) with sequela of HTN and intractable headaches (followed by Dr. Weston), recent admission 3/4/24 recent admission 3/4/24 for autoimmune encephalitis treated with IVIG and steroids who t presented today to outpatient neurologist for b/l hand weakness, noted to have wide based gait, UE tremors, UE cogwheeling, dysmetria, and UE weakness and was sent to [facility] for admission to neurology service. Planned for decadron repeat LP, and plasma exchange. Cardiology consulted for tachycardia.    Review of Studies:  EKG: NSR   Tele 3/13/24: NSR @80-90 bpm    Tele 3/12/24: sinus tachycardia rates 100- 110, intermittently ST 140s  CT chest 3/8/24- heart size is normal, no pericardial effusion.  Clustered nodular and GGO RUL c/w infections/inflammatory etiology  ECHO 3/13/24: normal echo, mild LVH    #Sinus Tachycardia   Likely iso anxiety and steroids. Currently asymptomatic and denies chest pain, sob, or palpitations.   ECHO normal with mild LVH  Likely 2/2 steroids     #HTN  Verapamil recently switched to amlodipine on prior admission.   Amlodipine discontinued and verapamil restarted 3/12 by Dr. Vasquez  continue home verapamil for both BP and migraine ppx  recommend Losartan 25 mg PO daily for better BP control       Neuro    #autoimmune encephalitis w/u  - Outpatient PET scan 3/1: Abnormal hypometabolism particularly pronounced in the parieto-occipital and temporal regions (right greater than left), and also involving the anteromedial temporal lobes with extension into the orbitofrontal and paramedian frontal regions bilaterally. 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  - s/p LP 3/4 with elevated opening pressure at 30 mmHg, remainder of CSF negative  - s/p one dose of decadron 4mg, continue decadron 2mg q6h x 3 doses daily (no overnight dose) Plan to wean steroids as tolerated after each plasma exchange session  - s/p LP 3/13. infectious panel so far negative, awaiting send out studies. autoimmune, and fungal studies, all herpes panels (HSV 1, HSV 2, HSV 6), CSF neopterin (write in), TB, RPR, VDRL, AFB, bacterial antigens, full viral PCR panel, oligoclonal bands, Igg index, myelin basic protein  - LP done 3/4 opening pressure was 30, LP done 3/13 with opening pressure of 13.5  -MRA head/neck done for vessel imaging - no stenosis or occlusion, no remarkable findings  -PET body - nonspecific right lung nodules  -f/u testicular ultrasound  -Pulm consult - lung nodules likely inflammation, no contraindication to Rituximab  - plasma exchange started on 3/13, plan for 5 sessions every other day. will need to send daily fibrinogen clauss levels   - HD catheter for the plasma exchange placed by IR on 3/13  -ID consult for rule out possible infectious causes, and prior to initiation of Rituximab - no contraindication to Rituximab  -D dimer elevated to 502, LE dopplers negative  -repeat Neuropsych consult on 3/14 - improved from prior admission  -Rheum consulted 3/15 for elevated LAKSHMI level  - continue verapamil 120mg  - f/u serum hepatitis panel, quantiferon, NMO antibody, d-dimer, ACE, sed rate, CRP, serum protein and immuno electrophoresis, ANCA, LAKSHMI, double stranded DNA, cryoglobulin, copper, ceruloplasmin  -f/u HIV  - urine heavy metal screen, urine porphyrins, urine porphobilinogen  - c/w gabapentin 100mg and baclofen 10mg three times a day  - continue home pantoprazole BID  - q8hr general neuro checks and vitals    #migraine  - sumatriptan 100mg PO PRN once (max 2 tabs)  - if needed, can given sumatriptan 6mg subcutaneously once on top of PO    Cards  #HTN  - goal normotension  - hold amlodipine  - continue verapamil 120mg    Pulm  - call provider if SPO2 < 94%  - uptake noted in right upper lobe of right lung on PET; CT C/A/P demonstrates clustered nodular and ground-glass opacities in bilateral upper lobes, more prominent over right; findings most consistent with infectious/inflammatory etiology  - pulm consulted during recent admission  - repeat CT in 6-12 weeks to ensure resolution    GI  #Nutrition/Fluids/Electrolytes   - replete K<4 and Mg <2  - Diet: Regular  - IVF: NS 500cc/hr then continue 100cc/hr    Infectious Disease  - afebrile on admission    Endocrine  - A1C results: 5.2  - continue FS monitoring while on steroids    - TSH results: 0.594    Psych  #anxiety  - Zyprexa standing (2.5 am, 2.5 pm, 5 at bedtime)  -Remeron increased to 15 mg bedtime  -psych consulted 3/15    DVT Prophylaxis  - SCDs for DVT prophylaxis     Dispo: home when cleared     Discussed daily hospital plans and goals with patient at bedside.    Discussed with Neurology Attending, Dr. Weston

## 2024-03-15 NOTE — BH CONSULTATION LIAISON ASSESSMENT NOTE - CURRENT MEDICATION
MEDICATIONS  (STANDING):  albumin human  5% IVPB 3500 milliLiter(s) IV Intermittent once  baclofen 10 milliGRAM(s) Oral <User Schedule>  calcium gluconate IVPB 2 Gram(s) IV Intermittent once  chlorhexidine 2% Cloths 1 Application(s) Topical daily  dexAMETHasone  Injectable 2 milliGRAM(s) IV Push <User Schedule>  dextrose 5%. 1000 milliLiter(s) (100 mL/Hr) IV Continuous <Continuous>  dextrose 5%. 1000 milliLiter(s) (50 mL/Hr) IV Continuous <Continuous>  dextrose 50% Injectable 25 Gram(s) IV Push once  dextrose 50% Injectable 12.5 Gram(s) IV Push once  dextrose 50% Injectable 25 Gram(s) IV Push once  enoxaparin Injectable 40 milliGRAM(s) SubCutaneous every 24 hours  gabapentin 100 milliGRAM(s) Oral <User Schedule>  gabapentin 300 milliGRAM(s) Oral at bedtime  glucagon  Injectable 1 milliGRAM(s) IntraMuscular once  heparin  Lock Flush 100 Units/mL Injectable 600 Unit(s) IV Push once  insulin lispro (ADMELOG) corrective regimen sliding scale   SubCutaneous three times a day before meals  OLANZapine 5 milliGRAM(s) Oral at bedtime  pantoprazole    Tablet 40 milliGRAM(s) Oral every 12 hours  polyethylene glycol 3350 17 Gram(s) Oral every 12 hours  verapamil  milliGRAM(s) Oral every 24 hours    MEDICATIONS  (PRN):  acetaminophen     Tablet .. 650 milliGRAM(s) Oral every 6 hours PRN Mild Pain (1 - 3), Moderate Pain (4 - 6)  dextrose Oral Gel 15 Gram(s) Oral once PRN Blood Glucose LESS THAN 70 milliGRAM(s)/deciliter  diphenhydrAMINE Injectable 25 milliGRAM(s) IV Push once PRN Allergic Reaction  SUMAtriptan 100 milliGRAM(s) Oral two times a day PRN Migraine

## 2024-03-15 NOTE — PROGRESS NOTE ADULT - ASSESSMENT
39YM w/PMHx of viral meningitis, possible autoimmune encephalitis (dx made by PET scan 3/4/24 showing parieto-occipital and temporal region hypometabolism), who presents for bilateral hand weakness after being sent in by OP neurologist. Transfusion medicine is consulted for plasma exchange.     On previous admission, patient had PET scan performed 3/4 showing parietoocciptial and temporal region hypometabolism, and per neurology concern was for autoimmune encephalitis. LP on previous admission was notable of opening pressure of 30 mmHg but otherwise unremarkable. MRI brain notable only for degenerative changes, and no evidence of venous sinus thrombosis. Patient was given IVIG and steroids (allergic reaction to solumedrol and so decadron chosen) which per report, patient had been compliant with.     Transfusion medicine is consulted, as primary team wishes to pursue plasma exchange     Plan   - Autoimmune (evelyn-NMDAR) encephalitis is a category I indication for therapeutic plasma exchange. We will plan for 5 sessions every other day  - He tolerated session one 03/13 well, plan for 03/15, 03/17, 03/19, 03/21   - Risks and benefits of plasma exchange explained to patient. All of his questions were answered and he verbalized understanding and consented to therapy.   - please monitor daily calcium and fibrinogen clauss levels. With plasmapharesis, both of these are expected to decrease. 2g of calcium are included with plasmapharesis orders.   - please maintain active T&S   - If fibrinogen <200, will advise on cryoprecipitate administration  - Baseline fibrinogen 240, 140 yesterday. Will fu todays levels  - further management of autoimmune encephalitis per Neurology/primary team    D/w transfusion medicine attending, Dr Nehemias Yancey

## 2024-03-15 NOTE — DIETITIAN INITIAL EVALUATION ADULT - ADD RECOMMEND
1. c/w current diet order  - consider consistent carb restriction for glucose control  - monitor intake & tolerance  - honor food preferences as able  2. Hydration per team  - low sodium noted  3. GI  - additional bowel regimen prn  4. Monitor chemistry, GI function, skin integrity  5. Pain & bowel regimen per team

## 2024-03-15 NOTE — BH CONSULTATION LIAISON ASSESSMENT NOTE - NSBHTIMEACTIVITIESPERFORMED_PSY_A_CORE
chart review, pt interview, psychoeducation, medical decision making, coordination of care with primary team

## 2024-03-15 NOTE — BH CONSULTATION LIAISON ASSESSMENT NOTE - DIFFERENTIAL
Adjustment disorder  Panic attack   Panic disorder    Adjustment disorder  Panic attack   Panic disorder   substance withdrawal

## 2024-03-15 NOTE — BH CONSULTATION LIAISON ASSESSMENT NOTE - NSBHCONSULTFOLLOWAFTERCARE_PSY_A_CORE FT
[facility]  [address] [place] [address], [address] Floor, [place] [address]  [phone]  Fax: [phone]

## 2024-03-15 NOTE — DIETITIAN INITIAL EVALUATION ADULT - PERTINENT MEDS FT
MEDICATIONS  (STANDING):  albumin human  5% IVPB 3500 milliLiter(s) IV Intermittent once  baclofen 10 milliGRAM(s) Oral <User Schedule>  calcium gluconate IVPB 2 Gram(s) IV Intermittent once  chlorhexidine 2% Cloths 1 Application(s) Topical daily  dexAMETHasone  Injectable 2 milliGRAM(s) IV Push <User Schedule>  dextrose 5%. 1000 milliLiter(s) (50 mL/Hr) IV Continuous <Continuous>  dextrose 5%. 1000 milliLiter(s) (100 mL/Hr) IV Continuous <Continuous>  dextrose 50% Injectable 25 Gram(s) IV Push once  dextrose 50% Injectable 12.5 Gram(s) IV Push once  dextrose 50% Injectable 25 Gram(s) IV Push once  enoxaparin Injectable 40 milliGRAM(s) SubCutaneous every 24 hours  gabapentin 100 milliGRAM(s) Oral <User Schedule>  gabapentin 300 milliGRAM(s) Oral at bedtime  glucagon  Injectable 1 milliGRAM(s) IntraMuscular once  heparin  Lock Flush 100 Units/mL Injectable 600 Unit(s) IV Push once  insulin lispro (ADMELOG) corrective regimen sliding scale   SubCutaneous Before meals and at bedtime  OLANZapine 2.5 milliGRAM(s) Oral <User Schedule>  OLANZapine 5 milliGRAM(s) Oral at bedtime  pantoprazole    Tablet 40 milliGRAM(s) Oral every 12 hours  polyethylene glycol 3350 17 Gram(s) Oral every 12 hours  verapamil  milliGRAM(s) Oral every 24 hours    MEDICATIONS  (PRN):  acetaminophen     Tablet .. 650 milliGRAM(s) Oral every 6 hours PRN Mild Pain (1 - 3), Moderate Pain (4 - 6)  dextrose Oral Gel 15 Gram(s) Oral once PRN Blood Glucose LESS THAN 70 milliGRAM(s)/deciliter  diphenhydrAMINE Injectable 25 milliGRAM(s) IV Push once PRN Allergic Reaction  SUMAtriptan 100 milliGRAM(s) Oral two times a day PRN Migraine

## 2024-03-15 NOTE — PROGRESS NOTE ADULT - ASSESSMENT
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  No evidence for current infection based upon CSF studies, CSF PCR panels X 2 neg for community acquired pathogens, prior Lyme/syphilis studies neg, MRI unremarkable.  Though Enteroviruses can cause encephalitis, PCR likely would be positive and findings would be present on MRI. Chronic/persistent meningoencephalitis from EVs occur in patients with B cell defects but he has no known immunocompromise at this time.  Regarding potential for latent Enterovirus infection - the Enterovirus primer in the CSF PCR panel detect members of the Enterovirus genus, including the numbered EV species, Coxsackie and Echoviruses.  The only data I see regarding latent/persistent infection of Enteroviruses is detection of the numbered EV RNA in myocardium of patients with cardiomyopathies.  I see no evidence for latent/dormant infection in the CNS or elsewhere.  Regarding rituximab and risk for subsequent infection - the pathogens for which he would be susceptible to reactivation include HBV, HCV and HEV.  He was HBV core Ab/sAg and HCV Ab neg, was vaccinated for HBV.  HEV not tested previously - serology likely will be altered by plasmapheresis but would test. Regarding infections to which he would be more susceptible - Enteroviruses (would be at risk for chronic encephalitis), PJP (increased with rituximab/steroids), PML are included.  He also would be at increased risk for respiratory infections.  Abnormalities seen on CT were non-specific and he is asymptomatic but would monitor off antibiotics for development of symptoms.  Suggest:  - Please add on Hepatitis E IgG to prior serology (IgM was added).   - F/U Quantiferon - pending from 3/12  - F/U CSF cultures (bacterial, fungal and AFB) from 3/4 and 3/13  - F/U CSF IgG index and VDRL  - F/U CSF EBV PCR, HHV-6 PCR, Lyme Abs, West Nile Virus IgM/IgG  - Agree with Pulmonary recommendation to repeat CT chest in 3-5 weeks (4-6 following prior on 3/8).  Discussed with primary team and Dr. Weston. Will follow with you, team 1.

## 2024-03-15 NOTE — PROGRESS NOTE ADULT - SUBJECTIVE AND OBJECTIVE BOX
Patient is a 39y old  Male who presents with a chief complaint of ENCEPHALITIS     (15 Mar 2024 16:40)      INTERVAL HPI/OVERNIGHT EVENTS:    Pt. about to undergo plasmapheresis, requested not to be disturbed; unable to obtain ROS    Review of Systems: 12 point review of systems otherwise negative    MEDICATIONS  (STANDING):  albumin human  5% IVPB 3500 milliLiter(s) IV Intermittent once  baclofen 10 milliGRAM(s) Oral <User Schedule>  calcium gluconate IVPB 2 Gram(s) IV Intermittent once  chlorhexidine 2% Cloths 1 Application(s) Topical daily  dexAMETHasone  Injectable 2 milliGRAM(s) IV Push <User Schedule>  dextrose 5%. 1000 milliLiter(s) (100 mL/Hr) IV Continuous <Continuous>  dextrose 5%. 1000 milliLiter(s) (50 mL/Hr) IV Continuous <Continuous>  dextrose 50% Injectable 25 Gram(s) IV Push once  dextrose 50% Injectable 25 Gram(s) IV Push once  dextrose 50% Injectable 12.5 Gram(s) IV Push once  enoxaparin Injectable 40 milliGRAM(s) SubCutaneous every 24 hours  gabapentin 100 milliGRAM(s) Oral <User Schedule>  gabapentin 300 milliGRAM(s) Oral at bedtime  glucagon  Injectable 1 milliGRAM(s) IntraMuscular once  heparin  Lock Flush 100 Units/mL Injectable 600 Unit(s) IV Push once  insulin lispro (ADMELOG) corrective regimen sliding scale   SubCutaneous Before meals and at bedtime  OLANZapine 2.5 milliGRAM(s) Oral <User Schedule>  OLANZapine 5 milliGRAM(s) Oral at bedtime  pantoprazole    Tablet 40 milliGRAM(s) Oral every 12 hours  polyethylene glycol 3350 17 Gram(s) Oral every 12 hours  verapamil  milliGRAM(s) Oral every 24 hours    MEDICATIONS  (PRN):  acetaminophen     Tablet .. 650 milliGRAM(s) Oral every 6 hours PRN Mild Pain (1 - 3), Moderate Pain (4 - 6)  dextrose Oral Gel 15 Gram(s) Oral once PRN Blood Glucose LESS THAN 70 milliGRAM(s)/deciliter  diphenhydrAMINE Injectable 25 milliGRAM(s) IV Push once PRN Allergic Reaction  SUMAtriptan 100 milliGRAM(s) Oral two times a day PRN Migraine      Allergies    methylPREDNISolone (Rash; Urticaria; Flushing; Hives)    Intolerances          Vital Signs Last 24 Hrs  T(C): 36.5 (15 Mar 2024 06:56), Max: 36.7 (14 Mar 2024 19:51)  T(F): 97.7 (15 Mar 2024 06:56), Max: 98 (14 Mar 2024 19:51)  HR: 104 (15 Mar 2024 14:51) (64 - 104)  BP: 155/86 (15 Mar 2024 14:51) (155/86 - 179/116)  BP(mean): 115 (15 Mar 2024 14:51) (115 - 141)  RR: 17 (15 Mar 2024 14:51) (17 - 18)  SpO2: 95% (15 Mar 2024 14:51) (95% - 98%)    Parameters below as of 15 Mar 2024 14:51  Patient On (Oxygen Delivery Method): room air      CAPILLARY BLOOD GLUCOSE      POCT Blood Glucose.: 338 mg/dL (15 Mar 2024 16:20)  POCT Blood Glucose.: 228 mg/dL (15 Mar 2024 11:26)  POCT Blood Glucose.: 131 mg/dL (15 Mar 2024 07:10)  POCT Blood Glucose.: 283 mg/dL (14 Mar 2024 21:46)       @ 07:  -  03-15 @ 07:00  --------------------------------------------------------  IN: 0 mL / OUT: 1400 mL / NET: -1400 mL    03-15 @ 07:  -  03-15 @ 17:52  --------------------------------------------------------  IN: 386 mL / OUT: 0 mL / NET: 386 mL        Physical Exam:  unable to perform    LABS:                        12.7   19.63 )-----------( 203      ( 15 Mar 2024 10:32 )             39.3     0315    129<L>  |  96  |  16  ----------------------------<  273<H>  4.7   |  22  |  0.82    Ca    9.0      15 Mar 2024 10:32  Phos  2.6     03-13  Mg     2.3     03-15        Urinalysis Basic - ( 15 Mar 2024 12:15 )    Color: Yellow / Appearance: Clear / S.013 / pH: x  Gluc: x / Ketone: Negative mg/dL  / Bili: Negative / Urobili: 1.0 mg/dL   Blood: x / Protein: Negative mg/dL / Nitrite: Negative   Leuk Esterase: Negative / RBC: x / WBC x   Sq Epi: x / Non Sq Epi: x / Bacteria: x

## 2024-03-15 NOTE — DIETITIAN INITIAL EVALUATION ADULT - OTHER CALCULATIONS
Ideal body weight (80.7kg) used for calculations as pt >100% IBW and BMI <30 per [facility] Standards of Care. Needs estimated for age and adjusted for current clinical status. Fluid needs per team.

## 2024-03-15 NOTE — BH CONSULTATION LIAISON ASSESSMENT NOTE - NSBHCONSULTMEDPRN_PSY_A_CORE
"Group Therapy Documentation    PATIENT'S NAME: Wood Hall  MRN:   [id number]  :   2007  ACCT. NUMBER: [id number]  DATE OF SERVICE: 23  START TIME:  8:30 AM  END TIME:  9:30 AM  FACILITATOR(S): Lula Wang  TOPIC: Child/Adol Group Therapy  Number of patients attending the group:  5  Group Length:  1 Hour  Interactive Complexity: Yes, visit entailed Interactive Complexity evidenced by:  See below.     Summary of Group / Topics Discussed:    ** RESILIENCY GROUP **    ACTIVITY:   Group members continued watching the movie  [place] in [facility]       Group also discussed benefits of the coping skill \"Box Breathing\" and it's effects of calming your central nervous system when in fight or flight mode.      OBJECTIVES:   Discuss mental health benefits of watching movies, 'Cinema Therapy,  such as:     Promotes relaxation    Can increase motivation    Explore relationships in your life    Stimulation cultural and social reflection    Encourages emotional release    Provide relief when dealing with stressful situations    Healthy escapism    NATE Banuelos      Group Attendance:  Attended group session  Interactive Complexity: Yes, visit entailed Interactive Complexity evidenced by:  -The need to manage maladaptive communication (related to, e.g., high anxiety, high reactivity, repeated questions, or disagreement) among participants that complicates delivery of care    Patient's response to the group topic/interactions:  refused to participate.    Patient appeared to be Non-participatory.       Client specific details:  Pt did not want to engage in any side activities during movie.  Writer offered several options pt declined and pt not able to come up with activity on own accord.  Writer later asked pt if they would like to help hang artwork on the wall and pt declined.  Pt struggling to stay awake all hour...unit RN reported psyche  working with pt regarding sleep concerns.      Pt introduced " themselves to other group members answering questions such as:   1.) Name, age, school  2.) What are your pronouns  3.) City you live in  4.) Mental health struggles  5.) What do you want to work on while you are here  6.) What brings you to the program  7.) What coping skills do currently use  8.) Tell the group about your family  9.) Do you have any pets  10.) Share something interesting about yourself             yes

## 2024-03-15 NOTE — BH CONSULTATION LIAISON ASSESSMENT NOTE - NSBHSABEN_PSY_A_CORE FT
reports klonopin 0.5mg-1mg daily prescribed by PCP; denies other benzodiazepine use reports klonopin 0.5mg-1mg daily prescribed by PCP; not confirmable by Istop; denies other benzodiazepine use

## 2024-03-15 NOTE — DIETITIAN INITIAL EVALUATION ADULT - OTHER INFO
39y Male with PMHx of viral meningitis with sequela of HTN and intractable headaches (followed by Dr. Weston), recent admission 3/4/24 for autoimmune encephalitis management/treatment presents today for bilateral hand weakness as well as wide based gait, UE tremors, UE cogwheeling, dysmetria, and UE weakness. Went for outpatient MRI brain and cervical spine today.    Chart reviewed. Pt seen OOB to chair with mother at bedside on 5 LA, on room air. Currently ordered for Regular PO diet and tolerating. No known food allergies, no cultural/ethnic/Congregation food preferences provided. Pt endorses good appetite & PO intake, no recent changes. Pt denies weight changes, no overt muscle wasting/subcutaneous losses noted. +constipation per pt, noted small BMs on bowel regimen. Adequate fluid & fiber encouraged. Labs reviewed- fingersticks trending 131-338 mg/dL [on steroids], Hgb A1c 5.2% [3/5], Na 131 <L> [corrected for hyperglycemia]- monitor fluid/volume status. Meds- miralax, protonix. RDN will continue to monitor, reassess, and intervene as appropriate.

## 2024-03-15 NOTE — PROGRESS NOTE ADULT - SUBJECTIVE AND OBJECTIVE BOX
Neurology Progress Note    INTERVAL HPI/OVERNIGHT EVENTS:  Patient seen and examined. Pt states he woke up around 4:45 am in a panic attack. Received an additional 2.5mg Zyprexa PO and did    MEDICATIONS  (STANDING):  albumin human  5% IVPB 3500 milliLiter(s) IV Intermittent once  baclofen 10 milliGRAM(s) Oral <User Schedule>  calcium gluconate IVPB 2 Gram(s) IV Intermittent once  chlorhexidine 2% Cloths 1 Application(s) Topical daily  dexAMETHasone  Injectable 2 milliGRAM(s) IV Push <User Schedule>  dextrose 5%. 1000 milliLiter(s) (100 mL/Hr) IV Continuous <Continuous>  dextrose 5%. 1000 milliLiter(s) (50 mL/Hr) IV Continuous <Continuous>  dextrose 50% Injectable 25 Gram(s) IV Push once  dextrose 50% Injectable 12.5 Gram(s) IV Push once  dextrose 50% Injectable 25 Gram(s) IV Push once  enoxaparin Injectable 40 milliGRAM(s) SubCutaneous every 24 hours  gabapentin 100 milliGRAM(s) Oral <User Schedule>  glucagon  Injectable 1 milliGRAM(s) IntraMuscular once  heparin  Lock Flush 100 Units/mL Injectable 600 Unit(s) IV Push once  insulin lispro (ADMELOG) corrective regimen sliding scale   SubCutaneous three times a day before meals  mirtazapine 15 milliGRAM(s) Oral at bedtime  OLANZapine 5 milliGRAM(s) Oral at bedtime  OLANZapine 2.5 milliGRAM(s) Oral <User Schedule>  pantoprazole    Tablet 40 milliGRAM(s) Oral every 12 hours  polyethylene glycol 3350 17 Gram(s) Oral every 12 hours  verapamil  milliGRAM(s) Oral every 24 hours    MEDICATIONS  (PRN):  dextrose Oral Gel 15 Gram(s) Oral once PRN Blood Glucose LESS THAN 70 milliGRAM(s)/deciliter  diphenhydrAMINE Injectable 25 milliGRAM(s) IV Push once PRN Allergic Reaction  SUMAtriptan 100 milliGRAM(s) Oral two times a day PRN Migraine  traMADol 25 milliGRAM(s) Oral every 8 hours PRN Severe Pain (7 - 10)      Allergies    methylPREDNISolone (Rash; Urticaria; Flushing; Hives)    Intolerances        Vital Signs Last 24 Hrs  T(C): 36.5 (15 Mar 2024 06:56), Max: 36.8 (14 Mar 2024 10:00)  T(F): 97.7 (15 Mar 2024 06:56), Max: 98.2 (14 Mar 2024 10:00)  HR: 64 (15 Mar 2024 08:12) (64 - 106)  BP: 168/107 (15 Mar 2024 08:12) (128/80 - 179/116)  BP(mean): 131 (15 Mar 2024 08:12) (101 - 141)  RR: 17 (15 Mar 2024 08:12) (17 - 18)  SpO2: 97% (15 Mar 2024 08:12) (95% - 98%)    Parameters below as of 15 Mar 2024 08:12  Patient On (Oxygen Delivery Method): room air        Physical exam:  General: No acute distress, awake and alert  Eyes: Anicteric sclerae, moist conjunctivae, see below for CNs  Neck: trachea midline,  Cardiovascular: Regular rate and rhythm, no murmurs  Pulmonary: Anterior breath sounds clear bilaterally No use of accessory muscles  GI: Abdomen soft, non-distended, non-tender  Extremities: no edema    Neurologic:  -Mental status: Awake, alert, oriented to person, place, and time. Speech is fluent with intact naming, repetition, and comprehension, no dysarthria. Recent and remote memory intact. Follows commands. Attention/concentration intact. Fund of knowledge appropriate.  -Cranial nerves:   II: Visual fields are full to confrontation.  III, IV, VI: Extraocular movements are intact without nystagmus. Pupils equally round and reactive to light  V:  Facial sensation V1-V3 equal and intact   VII: Face is symmetric with normal eye closure and smile  VIII: Hearing is bilaterally intact to finger rub  IX, X: Uvula is midline and soft palate rises symmetrically  XI: Head turning and shoulder shrug are intact.  XII: Tongue protrudes midline  Motor: Normal bulk and tone. No pronator drift. Strength bilateral upper extremity 5/5, bilateral lower extremities 5/5.  Rapid alternating movements intact and symmetric  Sensation: Intact to light touch bilaterally. No neglect or extinction on double simultaneous testing.  Coordination: No dysmetria on finger-to-nose and heel-to-shin bilaterally  Reflexes: Downgoing toes bilaterally   Gait: Narrow gait and steady    LABS:                        13.8   23.00 )-----------( 263      ( 14 Mar 2024 10:42 )             43.3     03-14    132<L>  |  101  |  16  ----------------------------<  260<H>  4.4   |  27  |  0.95    Ca    8.9      14 Mar 2024 10:42  Phos  2.6     03-13  Mg     2.0     03-13        Urinalysis Basic - ( 14 Mar 2024 10:42 )    Color: x / Appearance: x / SG: x / pH: x  Gluc: 260 mg/dL / Ketone: x  / Bili: x / Urobili: x   Blood: x / Protein: x / Nitrite: x   Leuk Esterase: x / RBC: x / WBC x   Sq Epi: x / Non Sq Epi: x / Bacteria: x        RADIOLOGY & ADDITIONAL TESTS:     Neurology Progress Note    INTERVAL HPI/OVERNIGHT EVENTS:  Patient seen and examined. Pt states he woke up around 4:45 am in a panic attack. Received an additional 2.5mg Zyprexa PO and did some deep breathing, feels slightly better. BP slightly elevated to 160s.    MEDICATIONS  (STANDING):  albumin human  5% IVPB 3500 milliLiter(s) IV Intermittent once  baclofen 10 milliGRAM(s) Oral <User Schedule>  calcium gluconate IVPB 2 Gram(s) IV Intermittent once  chlorhexidine 2% Cloths 1 Application(s) Topical daily  dexAMETHasone  Injectable 2 milliGRAM(s) IV Push <User Schedule>  dextrose 5%. 1000 milliLiter(s) (100 mL/Hr) IV Continuous <Continuous>  dextrose 5%. 1000 milliLiter(s) (50 mL/Hr) IV Continuous <Continuous>  dextrose 50% Injectable 25 Gram(s) IV Push once  dextrose 50% Injectable 12.5 Gram(s) IV Push once  dextrose 50% Injectable 25 Gram(s) IV Push once  enoxaparin Injectable 40 milliGRAM(s) SubCutaneous every 24 hours  gabapentin 100 milliGRAM(s) Oral <User Schedule>  glucagon  Injectable 1 milliGRAM(s) IntraMuscular once  heparin  Lock Flush 100 Units/mL Injectable 600 Unit(s) IV Push once  insulin lispro (ADMELOG) corrective regimen sliding scale   SubCutaneous three times a day before meals  mirtazapine 15 milliGRAM(s) Oral at bedtime  OLANZapine 5 milliGRAM(s) Oral at bedtime  OLANZapine 2.5 milliGRAM(s) Oral <User Schedule>  pantoprazole    Tablet 40 milliGRAM(s) Oral every 12 hours  polyethylene glycol 3350 17 Gram(s) Oral every 12 hours  verapamil  milliGRAM(s) Oral every 24 hours    MEDICATIONS  (PRN):  dextrose Oral Gel 15 Gram(s) Oral once PRN Blood Glucose LESS THAN 70 milliGRAM(s)/deciliter  diphenhydrAMINE Injectable 25 milliGRAM(s) IV Push once PRN Allergic Reaction  SUMAtriptan 100 milliGRAM(s) Oral two times a day PRN Migraine  traMADol 25 milliGRAM(s) Oral every 8 hours PRN Severe Pain (7 - 10)      Allergies    methylPREDNISolone (Rash; Urticaria; Flushing; Hives)    Intolerances        Vital Signs Last 24 Hrs  T(C): 36.5 (15 Mar 2024 06:56), Max: 36.8 (14 Mar 2024 10:00)  T(F): 97.7 (15 Mar 2024 06:56), Max: 98.2 (14 Mar 2024 10:00)  HR: 64 (15 Mar 2024 08:12) (64 - 106)  BP: 168/107 (15 Mar 2024 08:12) (128/80 - 179/116)  BP(mean): 131 (15 Mar 2024 08:12) (101 - 141)  RR: 17 (15 Mar 2024 08:12) (17 - 18)  SpO2: 97% (15 Mar 2024 08:12) (95% - 98%)    Parameters below as of 15 Mar 2024 08:12  Patient On (Oxygen Delivery Method): room air        Physical exam:  General: No acute distress, awake and alert  Eyes: Anicteric sclerae, moist conjunctivae, see below for CNs  Neck: trachea midline,  Cardiovascular: Regular rate and rhythm    Neurologic:  -Mental status: Awake, alert, oriented to person, place, and time. Speech is fluent with intact naming, repetition, and comprehension, no dysarthria. Recent and remote memory intact. Follows commands. Attention/concentration intact. Fund of knowledge appropriate.  -Cranial nerves:   II: Visual fields are full to confrontation.  III, IV, VI: Extraocular movements are intact without nystagmus.   VII: Face is symmetric  Motor: Normal bulk and tone. No pronator drift. Strength bilateral upper extremity 5/5, bilateral lower extremities 5/5. rapid finger movement slightly more fluid today.  Sensation: Intact to light touch bilaterally. No neglect or extinction on double simultaneous testing.  Coordination: No dysmetria on finger-to-nose   Gait: Narrow gait and steady, right arm swinging slightly more    LABS:                        13.8   23.00 )-----------( 263      ( 14 Mar 2024 10:42 )             43.3     03-14    132<L>  |  101  |  16  ----------------------------<  260<H>  4.4   |  27  |  0.95    Ca    8.9      14 Mar 2024 10:42  Phos  2.6     03-13  Mg     2.0     03-13        Urinalysis Basic - ( 14 Mar 2024 10:42 )    Color: x / Appearance: x / SG: x / pH: x  Gluc: 260 mg/dL / Ketone: x  / Bili: x / Urobili: x   Blood: x / Protein: x / Nitrite: x   Leuk Esterase: x / RBC: x / WBC x   Sq Epi: x / Non Sq Epi: x / Bacteria: x        RADIOLOGY & ADDITIONAL TESTS:

## 2024-03-15 NOTE — BH CONSULTATION LIAISON ASSESSMENT NOTE - HPI (INCLUDE ILLNESS QUALITY, SEVERITY, DURATION, TIMING, CONTEXT, MODIFYING FACTORS, ASSOCIATED SIGNS AND SYMPTOMS)
Patient is a 38yo M, PMH migraines, HTN, viral meningitis, GERD, PPH reported anxiety/depression, denies inpatient psychiatric hospitalizations, denies suicide attempts, BIBS for outpatient PET scan findings suspicious of autoimmune encephalitis and admitted for further workup. Psychiatry consulted for anxiety.    Upon approach, patient is lying in bed comfortably. Patient is AOx4 and engages with interviewer. Patient is cooperative and answers all questions appropriately. Patient reports being readmitted on 3/12 due to worsening sx over the weekend and can not recall last weeks event. Patient reports getting holistic treatment that has been helping him cope with his sx. Patient reports worsening anxiety and having a panic attack last night. patient reports experiencing elevated HR, having difficulty concentrating and jitters. Patient reports sx being relief with Zyprexa and had received an addition dosage during last night panic attack. Patients reports being open to therapy or addition medications. Patient reports feeling of stiffness     Patient denies any current passive or active suicidal ideation, intent or plan and denies any homicidal ideation. Patient denies any auditory or visual hallucinations. Patient reports mood as anxious. Reports sleep was good last night. Reports fair appetite. Patient is a 38yo M, w/ PMHx migraines, HTN, viral meningitis, and GERD, w/ PPHx reported anxiety/depression, denies inpatient psychiatric hospitalizations, denies suicide attempts, BIBS for outpatient PET scan findings suspicious of autoimmune encephalitis and admitted for worsening of headaches and UE stiffness. Of note patient was recently admitted medically for bilateral hand weakness, wide based gait, UE tremors, UE cogwheeling, dysmetria, and UE weakness. Psychiatry was consulted during the previous admission and patient was started on medication and psychiatrically cleared for discharge. Psychiatry was consulted during this readmission for evaluation of anxiety.    Upon approach, patient is lying in bed comfortably. Patient is AOx4 and engages with interviewer. Patient is cooperative and answers all questions appropriately. Patient reports being readmitted on 3/12 due to worsening sx over the weekend and can not recall last weeks event. Patient reports getting holistic treatment that has been helping him cope with his sx. Patient reports worsening anxiety and having a panic attack last night. patient reports experiencing elevated HR, having difficulty concentrating and jitters. Patient reports sx being relief with Zyprexa and had received an addition dosage during last night panic attack. Patient reports being open to therapy or additional medications. Patient reports feeling of stiffness in the upper extremities.     Patient denies any current passive or active suicidal ideation, intent or plan and denies any homicidal ideation. Patient reports in regards to depressive symptoms, he is "able to do my routine", and did not endorse low mood. Patient denies any auditory or visual hallucinations. Patient reports mood as "anxious". Reports sleep was good last night. Reports fair appetite.  Patient is a 38yo M, w/ PMHx migraines, HTN, viral meningitis, and GERD, w/ PPHx reported anxiety/depression, denies inpatient psychiatric hospitalizations, denies suicide attempts, BIBS and admitted for worsening of headaches and UE stiffness. Of note patient was recently admitted medically for bilateral hand weakness, wide based gait, UE tremors, UE cogwheeling, dysmetria, and UE weakness after an outpatient PET scan had findings suspicious of autoimmune encephalitis. Psychiatry was consulted during the previous admission and patient was started on medication and psychiatrically cleared for discharge. Patient was discharged to home and returned a few days later with current symptoms. Psychiatry was reconsulted during this readmission for evaluation of anxiety.    Upon approach, patient is lying in bed comfortably. Patient is AOx4 and engages with interviewer. Patient is cooperative and answers all questions appropriately. Patient reports being readmitted on 3/12 due to worsening sx over the weekend and can not recall last weeks event. Patient reports getting holistic treatment that has been helping him cope with his sx. Patient reports worsening anxiety and having a panic attack last night. patient reports experiencing elevated HR, having difficulty concentrating and jitters. Patient reports sx being relief with Zyprexa and had received an addition dosage during last night panic attack. Patient reports being open to therapy or additional medications. Patient reports feeling of stiffness in the upper extremities.     Patient denies any current passive or active suicidal ideation, intent or plan and denies any homicidal ideation. Patient reports in regards to depressive symptoms, he is "able to do my routine", and did not endorse low mood. Patient denies any auditory or visual hallucinations. Patient reports mood as "anxious". Reports sleep was good last night. Reports fair appetite.  Patient is a 40yo M, w/ PMHx migraines, HTN, viral meningitis, and GERD, w/ PPHx reported anxiety and depression, denies hx of inpatient psychiatric hospitalizations, denies hx of NSSIB/SI/SA, BIBS and admitted for worsening of headaches and UE stiffness. Of note patient was recently admitted medically for bilateral hand weakness, wide based gait, UE tremors, UE cogwheeling, dysmetria, and UE weakness after an outpatient PET scan had findings suspicious of autoimmune encephalitis. Psychiatry was consulted during the previous admission and patient was started on medication and psychiatrically cleared for discharge. Patient was discharged to home and returned a few days later with current symptoms. Psychiatry was reconsulted during this readmission for evaluation of anxiety.    Upon approach, patient is lying in bed comfortably. Patient is AOx4 and engages with interviewer. Patient is cooperative and answers all questions appropriately. Patient reports being readmitted on 3/12 due to worsening sx over the weekend and can not recall last weeks event. Patient reports getting holistic treatment that has been helping him cope with his sx. Patient reports worsening anxiety and having a panic attack last night. patient reports experiencing elevated HR, having difficulty concentrating and jitters. Patient reports sx being relief with Zyprexa and had received an addition dosage during last night panic attack. Patient reports being open to therapy or additional medications. Patient reports feeling of stiffness in the upper extremities.     Patient denies any current passive or active suicidal ideation, intent or plan and denies any homicidal ideation. Patient reports in regards to depressive symptoms, he is "able to do my routine", and did not endorse low mood. Patient denies any auditory or visual hallucinations. Patient reports mood as "anxious". Reports sleep was good last night. Reports fair appetite.

## 2024-03-15 NOTE — PROGRESS NOTE ADULT - SUBJECTIVE AND OBJECTIVE BOX
Cardiology Consult    O/N:  Interval History: patient's BP remains high     OBJECTIVE  Vitals:  T(C): 36.5 (03-15-24 @ 06:56), Max: 36.7 (03-14-24 @ 19:51)  HR: 82 (03-15-24 @ 08:50) (64 - 106)  BP: 168/96 (03-15-24 @ 08:50) (128/80 - 179/116)  RR: 17 (03-15-24 @ 08:12) (17 - 18)  SpO2: 97% (03-15-24 @ 08:12) (95% - 98%)  Wt(kg): --    I/O:  I&O's Summary    14 Mar 2024 07:01  -  15 Mar 2024 07:00  --------------------------------------------------------  IN: 0 mL / OUT: 1400 mL / NET: -1400 mL    15 Mar 2024 07:01  -  15 Mar 2024 11:57  --------------------------------------------------------  IN: 386 mL / OUT: 0 mL / NET: 386 mL        PHYSICAL EXAM:  Appearance: NAD. Speaking in full sentences. Pleasant gentleman   HEENT: No pallor noted.  Conjunctiva clear b/l. Moist oral mucosa.  Cardiovascular: RRR with no murmurs.  Respiratory: Lungs CTAB.   Extremities: No edema b/l.   Neurologic:  Alert and awake. Moving all extremities.   	  LABS:                        12.7   19.63 )-----------( 203      ( 15 Mar 2024 10:32 )             39.3     03-15    129<L>  |  96  |  16  ----------------------------<  273<H>  4.7   |  22  |  0.82    Ca    9.0      15 Mar 2024 10:32  Phos  2.6     03-13  Mg     2.3     03-15        Urinalysis Basic - ( 15 Mar 2024 10:32 )    Color: x / Appearance: x / SG: x / pH: x  Gluc: 273 mg/dL / Ketone: x  / Bili: x / Urobili: x   Blood: x / Protein: x / Nitrite: x   Leuk Esterase: x / RBC: x / WBC x   Sq Epi: x / Non Sq Epi: x / Bacteria: x        RADIOLOGY & ADDITIONAL TESTS:  Reviewed .    MEDICATIONS  (STANDING):  albumin human  5% IVPB 3500 milliLiter(s) IV Intermittent once  baclofen 10 milliGRAM(s) Oral <User Schedule>  calcium gluconate IVPB 2 Gram(s) IV Intermittent once  chlorhexidine 2% Cloths 1 Application(s) Topical daily  dexAMETHasone  Injectable 2 milliGRAM(s) IV Push <User Schedule>  dextrose 5%. 1000 milliLiter(s) (50 mL/Hr) IV Continuous <Continuous>  dextrose 5%. 1000 milliLiter(s) (100 mL/Hr) IV Continuous <Continuous>  dextrose 50% Injectable 25 Gram(s) IV Push once  dextrose 50% Injectable 12.5 Gram(s) IV Push once  dextrose 50% Injectable 25 Gram(s) IV Push once  enoxaparin Injectable 40 milliGRAM(s) SubCutaneous every 24 hours  gabapentin 100 milliGRAM(s) Oral <User Schedule>  gabapentin 300 milliGRAM(s) Oral at bedtime  glucagon  Injectable 1 milliGRAM(s) IntraMuscular once  heparin  Lock Flush 100 Units/mL Injectable 600 Unit(s) IV Push once  insulin lispro (ADMELOG) corrective regimen sliding scale   SubCutaneous three times a day before meals  OLANZapine 5 milliGRAM(s) Oral at bedtime  pantoprazole    Tablet 40 milliGRAM(s) Oral every 12 hours  polyethylene glycol 3350 17 Gram(s) Oral every 12 hours  verapamil  milliGRAM(s) Oral every 24 hours    MEDICATIONS  (PRN):  acetaminophen     Tablet .. 650 milliGRAM(s) Oral every 6 hours PRN Mild Pain (1 - 3), Moderate Pain (4 - 6)  dextrose Oral Gel 15 Gram(s) Oral once PRN Blood Glucose LESS THAN 70 milliGRAM(s)/deciliter  diphenhydrAMINE Injectable 25 milliGRAM(s) IV Push once PRN Allergic Reaction  SUMAtriptan 100 milliGRAM(s) Oral two times a day PRN Migraine

## 2024-03-15 NOTE — BH CONSULTATION LIAISON ASSESSMENT NOTE - NSBHATTESTCOMMENTATTENDFT_PSY_A_CORE
40yo M with PPH of anxiety and ADHD, social alcohol use (previously reported >16 drinks/week), PMH of headaches, viral meningitis (9/2022), HTN, admitted last week for workup of possible autoimmune encephalitis after concerning findings on outpatient PET and LP, readmitted with worsening neurological sx, s/p IVIG, now restarted on IV steroids and receiving plasmapheresis, results of full autoimmune/infectious panel pending. Psychiatry following last admission for anxiety and acute delirium in setting of multiple risk factors include encephalitis, steroid use; reconsulted today for further anxiety management. Pt has been receiving standing olanzapine TID with dosing changed to QID today per primary team due to increased anxiety and concern for possible EPS (cogwheel rigidity). Pt also started on mirtazapine this admission.      On interview today, pt presents calm, cooperative, fully oriented, with anxious mood, full affect, organized TP and reality-based TC, SI/HI/AVH, poor recall of last week’s admission but current subjectively improved cognition and motor function. States that his dexterity and gait have improved. No tremor or significant increased tone appreciated in upper extremities. Reports increased anxiety in the last day that he describes as “panic” in his body (with tachycardia, chest pressure), relieved temporarily by olanzapine; also notes improvement with holistic therapies including pressure point therapy and other relaxation techniques. Interested in psychotherapy in hospital. No other acute concerns voiced.    Impression: Largely resolved acute delirium, anxiety disorder with panic attacks, r/o adjustment d/o with anxiety, ro anxiety 2/2 a general medical condition, ADHD by history, r/o alcohol misuse. Tolerating olanzapine well and receptive to nonpharmacological measures for anxiety management.  Recommendations: Agree with continuing olanzapine for anxiety, prior agitation and mood lability given ongoing steroid use. Can given additional olanzapine 2.5mg po/im Q6H PRN for acute anxiety; monitor QTc and for any worsening EPS. A benzodiazepine such as lorazepam is an alternative PRN for anxiety, though would use with caution given concern for exacerbated confusion during prior admission. Will offer individual psychotherapy as available for anxiety management. Pt referred last admission to [facility] to re-establish outpatient psychiatric care. D/w primary team. Please contact with questions.

## 2024-03-15 NOTE — BH CONSULTATION LIAISON ASSESSMENT NOTE - ABORTED (SELF-INTERRUPTED) ATTEMPT:
Last seen: 4/12  RTC: nothing mentioned in note  Cancel: none  No-show: none  Next appt: none     Disp Refills Start End SARY    methylphenidate (CONCERTA) 36 MG CR tablet 60 tablet 0 6/9/2021  Yes   Sig - Route: Take 2 tablets (72 mg) by mouth every morning - Oral     Last refilled per : 6/12 #60, 5/14 #60, 4/14 #60    Medication pended and routed to provider for approval.     None known

## 2024-03-15 NOTE — BH CONSULTATION LIAISON ASSESSMENT NOTE - NSBHCONSULTRECOMMENDOTHER_PSY_A_CORE FT
- no IPP admission recommended at this time  - no constant observation recommended at this time  - c/w Zyprexa 2.5mg PO (7AM and 1PM) and Zyprexa 5mg PO qhs  - c/w mirtazapine 15mg PO qhs  - recommend avoiding benzodiazepines at this time given patients recent history of worsening mental status on this class of medication  - recommend psychotherapy, patient was agreeable to meet therapist in the hospital during this admission, will facilitate  - c/w coping strategies that have been efficacious so far, if patient agreeable (holistic nurse, acupuncture, breathing exercises, music therapy)

## 2024-03-15 NOTE — PROGRESS NOTE ADULT - SUBJECTIVE AND OBJECTIVE BOX
MORGAN. Endorses anxiety but overall feeling better. Feels gait and grasp improved. Plan for plex today       MEDICATIONS  (STANDING):  albumin human  5% IVPB 3500 milliLiter(s) IV Intermittent once  baclofen 10 milliGRAM(s) Oral <User Schedule>  calcium gluconate IVPB 2 Gram(s) IV Intermittent once  chlorhexidine 2% Cloths 1 Application(s) Topical daily  dexAMETHasone  Injectable 2 milliGRAM(s) IV Push <User Schedule>  dextrose 5%. 1000 milliLiter(s) (100 mL/Hr) IV Continuous <Continuous>  dextrose 5%. 1000 milliLiter(s) (50 mL/Hr) IV Continuous <Continuous>  dextrose 50% Injectable 25 Gram(s) IV Push once  dextrose 50% Injectable 12.5 Gram(s) IV Push once  dextrose 50% Injectable 25 Gram(s) IV Push once  enoxaparin Injectable 40 milliGRAM(s) SubCutaneous every 24 hours  gabapentin 100 milliGRAM(s) Oral <User Schedule>  glucagon  Injectable 1 milliGRAM(s) IntraMuscular once  heparin  Lock Flush 100 Units/mL Injectable 600 Unit(s) IV Push once  insulin lispro (ADMELOG) corrective regimen sliding scale   SubCutaneous three times a day before meals  mirtazapine 15 milliGRAM(s) Oral at bedtime  OLANZapine 5 milliGRAM(s) Oral at bedtime  OLANZapine 2.5 milliGRAM(s) Oral <User Schedule>  pantoprazole    Tablet 40 milliGRAM(s) Oral every 12 hours  polyethylene glycol 3350 17 Gram(s) Oral every 12 hours  verapamil  milliGRAM(s) Oral every 24 hours    MEDICATIONS  (PRN):  dextrose Oral Gel 15 Gram(s) Oral once PRN Blood Glucose LESS THAN 70 milliGRAM(s)/deciliter  diphenhydrAMINE Injectable 25 milliGRAM(s) IV Push once PRN Allergic Reaction  SUMAtriptan 100 milliGRAM(s) Oral two times a day PRN Migraine  traMADol 25 milliGRAM(s) Oral every 8 hours PRN Severe Pain (7 - 10)      Allergies    methylPREDNISolone (Rash; Urticaria; Flushing; Hives)    Intolerances    GENERAL: NAD, well-developed  HEAD:  Atraumatic, Normocephalic  EYES: EOMI, PERRLA, conjunctiva and sclera clear  NECK: Supple, No JVD  CHEST/LUNG: Clear to auscultation bilaterally; No wheeze  HEART: Regular rate and rhythm; No murmurs, rubs, or gallops  ABDOMEN: Soft, Nontender, Nondistended; Bowel sounds present  EXTREMITIES:  2+ Peripheral Pulses, No clubbing, cyanosis, or edema  NEUROLOGY:  weakness, improved from prior. AAOx3, CN grossly intact   SKIN: No rashes or lesions      Vital Signs Last 24 Hrs  T(C): 36.5 (15 Mar 2024 06:56), Max: 36.8 (14 Mar 2024 10:00)  T(F): 97.7 (15 Mar 2024 06:56), Max: 98.2 (14 Mar 2024 10:00)  HR: 64 (15 Mar 2024 08:12) (64 - 106)  BP: 168/107 (15 Mar 2024 08:12) (128/80 - 179/116)  BP(mean): 131 (15 Mar 2024 08:12) (101 - 141)  RR: 17 (15 Mar 2024 08:12) (17 - 18)  SpO2: 97% (15 Mar 2024 08:12) (95% - 98%)    Parameters below as of 15 Mar 2024 08:12  Patient On (Oxygen Delivery Method): room air

## 2024-03-15 NOTE — DIETITIAN INITIAL EVALUATION ADULT - PERTINENT LABORATORY DATA
03-15    129<L>  |  96  |  16  ----------------------------<  273<H>  4.7   |  22  |  0.82    Ca    9.0      15 Mar 2024 10:32  Phos  2.6     03-13  Mg     2.3     03-15    POCT Blood Glucose.: 338 mg/dL (03-15-24 @ 16:20)  A1C with Estimated Average Glucose Result: 5.2 % (03-05-24 @ 05:30)

## 2024-03-16 LAB
ANION GAP SERPL CALC-SCNC: 9 MMOL/L — SIGNIFICANT CHANGE UP (ref 5–17)
ARSENIC UR-MCNC: 17 MCG/24 H — SIGNIFICANT CHANGE UP
ARSENIC UR-MCNC: 4 MCG/L — SIGNIFICANT CHANGE UP
BUN SERPL-MCNC: 16 MG/DL — SIGNIFICANT CHANGE UP (ref 7–23)
CADMIUM UR-MCNC: 4.2 MCG/24 H — HIGH
CALCIUM SERPL-MCNC: 9.2 MG/DL — SIGNIFICANT CHANGE UP (ref 8.4–10.5)
CHLORIDE SERPL-SCNC: 102 MMOL/L — SIGNIFICANT CHANGE UP (ref 96–108)
CO2 SERPL-SCNC: 24 MMOL/L — SIGNIFICANT CHANGE UP (ref 22–31)
CREAT SERPL-MCNC: 0.85 MG/DL — SIGNIFICANT CHANGE UP (ref 0.5–1.3)
EGFR: 115 ML/MIN/1.73M2 — SIGNIFICANT CHANGE UP
FIBRINOGEN PPP-MCNC: 141 MG/DL — LOW (ref 200–445)
GAMMA INTERFERON BACKGROUND BLD IA-ACNC: 0.02 IU/ML — SIGNIFICANT CHANGE UP
GLUCOSE BLDC GLUCOMTR-MCNC: 193 MG/DL — HIGH (ref 70–99)
GLUCOSE BLDC GLUCOMTR-MCNC: 245 MG/DL — HIGH (ref 70–99)
GLUCOSE BLDC GLUCOMTR-MCNC: 260 MG/DL — HIGH (ref 70–99)
GLUCOSE BLDC GLUCOMTR-MCNC: 389 MG/DL — HIGH (ref 70–99)
GLUCOSE SERPL-MCNC: 255 MG/DL — HIGH (ref 70–99)
HCT VFR BLD CALC: 38.2 % — LOW (ref 39–50)
HEAVY MET PNL UR: 4300 ML — SIGNIFICANT CHANGE UP
HEAVY METALS 24H UR QL: 24 H — SIGNIFICANT CHANGE UP
HGB BLD-MCNC: 12.4 G/DL — LOW (ref 13–17)
LEAD UR-MCNC: <1 MCG/24 H — SIGNIFICANT CHANGE UP
M TB IFN-G BLD-IMP: NEGATIVE — SIGNIFICANT CHANGE UP
M TB IFN-G CD4+ BCKGRND COR BLD-ACNC: 0 IU/ML — SIGNIFICANT CHANGE UP
M TB IFN-G CD4+CD8+ BCKGRND COR BLD-ACNC: 0 IU/ML — SIGNIFICANT CHANGE UP
MCHC RBC-ENTMCNC: 26.1 PG — LOW (ref 27–34)
MCHC RBC-ENTMCNC: 32.5 GM/DL — SIGNIFICANT CHANGE UP (ref 32–36)
MCV RBC AUTO: 80.3 FL — SIGNIFICANT CHANGE UP (ref 80–100)
MERCURY UR-MCNC: <2 MCG/24 H — SIGNIFICANT CHANGE UP
NRBC # BLD: 0 /100 WBCS — SIGNIFICANT CHANGE UP (ref 0–0)
OLIGOCLONAL BANDS CSF ELPH-IMP: SIGNIFICANT CHANGE UP
PLATELET # BLD AUTO: 210 K/UL — SIGNIFICANT CHANGE UP (ref 150–400)
POTASSIUM SERPL-MCNC: 4.5 MMOL/L — SIGNIFICANT CHANGE UP (ref 3.5–5.3)
POTASSIUM SERPL-SCNC: 4.5 MMOL/L — SIGNIFICANT CHANGE UP (ref 3.5–5.3)
PROT SERPL-MCNC: 8.4 G/DL — HIGH (ref 6–8.3)
QUANT TB PLUS MITOGEN MINUS NIL: 1.12 IU/ML — SIGNIFICANT CHANGE UP
RBC # BLD: 4.76 M/UL — SIGNIFICANT CHANGE UP (ref 4.2–5.8)
RBC # FLD: 19.9 % — HIGH (ref 10.3–14.5)
SODIUM SERPL-SCNC: 135 MMOL/L — SIGNIFICANT CHANGE UP (ref 135–145)
WBC # BLD: 22.61 K/UL — HIGH (ref 3.8–10.5)
WBC # FLD AUTO: 22.61 K/UL — HIGH (ref 3.8–10.5)

## 2024-03-16 PROCEDURE — 99232 SBSQ HOSP IP/OBS MODERATE 35: CPT

## 2024-03-16 PROCEDURE — 99233 SBSQ HOSP IP/OBS HIGH 50: CPT

## 2024-03-16 RX ORDER — GABAPENTIN 400 MG/1
600 CAPSULE ORAL AT BEDTIME
Refills: 0 | Status: DISCONTINUED | OUTPATIENT
Start: 2024-03-16 | End: 2024-03-24

## 2024-03-16 RX ORDER — ALBUMIN HUMAN 25 %
4000 VIAL (ML) INTRAVENOUS ONCE
Refills: 0 | Status: COMPLETED | OUTPATIENT
Start: 2024-03-17 | End: 2024-03-17

## 2024-03-16 RX ORDER — DIPHENHYDRAMINE HCL 50 MG
25 CAPSULE ORAL ONCE
Refills: 0 | Status: DISCONTINUED | OUTPATIENT
Start: 2024-03-16 | End: 2024-03-26

## 2024-03-16 RX ORDER — CALCIUM GLUCONATE 100 MG/ML
2 VIAL (ML) INTRAVENOUS ONCE
Refills: 0 | Status: COMPLETED | OUTPATIENT
Start: 2024-03-17 | End: 2024-03-17

## 2024-03-16 RX ORDER — TRAMADOL HYDROCHLORIDE 50 MG/1
25 TABLET ORAL ONCE
Refills: 0 | Status: DISCONTINUED | OUTPATIENT
Start: 2024-03-16 | End: 2024-03-16

## 2024-03-16 RX ADMIN — Medication 10 MILLIGRAM(S): at 15:30

## 2024-03-16 RX ADMIN — POLYETHYLENE GLYCOL 3350 17 GRAM(S): 17 POWDER, FOR SOLUTION ORAL at 06:31

## 2024-03-16 RX ADMIN — GABAPENTIN 100 MILLIGRAM(S): 400 CAPSULE ORAL at 15:30

## 2024-03-16 RX ADMIN — Medication 120 MILLIGRAM(S): at 06:31

## 2024-03-16 RX ADMIN — Medication 650 MILLIGRAM(S): at 16:45

## 2024-03-16 RX ADMIN — PANTOPRAZOLE SODIUM 40 MILLIGRAM(S): 20 TABLET, DELAYED RELEASE ORAL at 18:31

## 2024-03-16 RX ADMIN — SENNA PLUS 2 TABLET(S): 8.6 TABLET ORAL at 21:50

## 2024-03-16 RX ADMIN — Medication 650 MILLIGRAM(S): at 06:32

## 2024-03-16 RX ADMIN — Medication 2 MILLIGRAM(S): at 18:31

## 2024-03-16 RX ADMIN — Medication 10 MILLIGRAM(S): at 21:49

## 2024-03-16 RX ADMIN — TRAMADOL HYDROCHLORIDE 25 MILLIGRAM(S): 50 TABLET ORAL at 20:37

## 2024-03-16 RX ADMIN — Medication 2 MILLIGRAM(S): at 06:30

## 2024-03-16 RX ADMIN — SUMATRIPTAN SUCCINATE 100 MILLIGRAM(S): 4 INJECTION, SOLUTION SUBCUTANEOUS at 16:45

## 2024-03-16 RX ADMIN — Medication 2: at 12:17

## 2024-03-16 RX ADMIN — GABAPENTIN 600 MILLIGRAM(S): 400 CAPSULE ORAL at 21:49

## 2024-03-16 RX ADMIN — POLYETHYLENE GLYCOL 3350 17 GRAM(S): 17 POWDER, FOR SOLUTION ORAL at 18:31

## 2024-03-16 RX ADMIN — Medication 10 MILLIGRAM(S): at 06:30

## 2024-03-16 RX ADMIN — ENOXAPARIN SODIUM 40 MILLIGRAM(S): 100 INJECTION SUBCUTANEOUS at 21:49

## 2024-03-16 RX ADMIN — Medication 3: at 21:49

## 2024-03-16 RX ADMIN — OLANZAPINE 2.5 MILLIGRAM(S): 15 TABLET, FILM COATED ORAL at 06:30

## 2024-03-16 RX ADMIN — GABAPENTIN 100 MILLIGRAM(S): 400 CAPSULE ORAL at 06:30

## 2024-03-16 RX ADMIN — PANTOPRAZOLE SODIUM 40 MILLIGRAM(S): 20 TABLET, DELAYED RELEASE ORAL at 06:30

## 2024-03-16 RX ADMIN — TRAMADOL HYDROCHLORIDE 25 MILLIGRAM(S): 50 TABLET ORAL at 21:37

## 2024-03-16 RX ADMIN — Medication 5: at 18:44

## 2024-03-16 RX ADMIN — OLANZAPINE 5 MILLIGRAM(S): 15 TABLET, FILM COATED ORAL at 21:49

## 2024-03-16 RX ADMIN — Medication 650 MILLIGRAM(S): at 07:31

## 2024-03-16 RX ADMIN — SUMATRIPTAN SUCCINATE 100 MILLIGRAM(S): 4 INJECTION, SOLUTION SUBCUTANEOUS at 16:13

## 2024-03-16 RX ADMIN — OLANZAPINE 2.5 MILLIGRAM(S): 15 TABLET, FILM COATED ORAL at 12:15

## 2024-03-16 RX ADMIN — SUMATRIPTAN SUCCINATE 100 MILLIGRAM(S): 4 INJECTION, SOLUTION SUBCUTANEOUS at 07:25

## 2024-03-16 RX ADMIN — Medication 10 MILLIGRAM(S): at 21:48

## 2024-03-16 RX ADMIN — Medication 650 MILLIGRAM(S): at 16:13

## 2024-03-16 NOTE — PROGRESS NOTE ADULT - TIME BILLING
Pt is a 38 y/o man c viral meningitis, HTn, headaches, autoimmune encephalitis (rx plasma exchanges/IVIG).

## 2024-03-16 NOTE — PROGRESS NOTE ADULT - TIME BILLING
Face to face with patient, care coordination and documentation.  Plan of care discussed with stroke team.

## 2024-03-16 NOTE — PROGRESS NOTE ADULT - ASSESSMENT
39YM w/PMHx of viral meningitis, possible autoimmune encephalitis (dx made by PET scan 3/4/24 showing parieto-occipital and temporal region hypometabolism), who presents for bilateral hand weakness after being sent in by OP neurologist. Transfusion medicine is consulted for plasma exchange.     On previous admission, patient had PET scan performed 3/4 showing parietoocciptial and temporal region hypometabolism, and per neurology concern was for autoimmune encephalitis. LP on previous admission was notable of opening pressure of 30 mmHg but otherwise unremarkable. MRI brain notable only for degenerative changes, and no evidence of venous sinus thrombosis. Patient was given IVIG and steroids (allergic reaction to solumedrol and so decadron chosen) which per report, patient had been compliant with.     Transfusion medicine is consulted for plasma exchange. Patient is s/p 2 treatment of plasma exchange 3/13/24 and 3/15. Next treatment planned for 3/17/24    Plan   - Autoimmune (evelyn-NMDAR) encephalitis is a category I indication for therapeutic plasma exchange. We will plan for 5 sessions every other day  - He tolerated sessions on 3/13, and 3/15 well, plan for 03/17, 03/19, 03/21   - Risks and benefits of plasma exchange explained to patient. All of his questions were answered and he verbalized understanding and consented to therapy.   - fibrinogen 141 today - can hold off on cryoprecipitate today, will plan for administration of cryoprecipitate after PLEX tomorrow if fibrinogen remains low  - please monitor daily calcium and fibrinogen clauss levels. With plasmapharesis, both of these are expected to decrease. 2g of calcium are included with plasmapharesis orders.   - please maintain active T&S   - If fibrinogen <200, will advise on cryoprecipitate administration  - further management of autoimmune encephalitis per Neurology/primary team  - will place orders for PLEX for 3/17/24    D/w transfusion medicine attending, Dr Nehemias Yancey

## 2024-03-16 NOTE — PROGRESS NOTE ADULT - ASSESSMENT
39y Male with PMHx of viral meningitis with sequela of HTN and intractable headaches (followed by Dr. Weston), recent admission 3/4/24 for autoimmune encephalitis management/treatment (had presented as worsening headaches and cognition - most notably e-mails that didn't make sense) s/p IV steroids and IVIG, presented again on 3/12 forfor bilateral hand weakness as well as wide based gait, UE tremors, UE cogwheeling, dysmetria, and UE weakness. Went for outpatient MRI brain and cervical spine. Admitted for further neurologic workup. Restarted on IV steroids, and plasmapheresis.    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  - s/p LP 3/4 with elevated opening pressure at 30 mmHg, remainder of CSF negative  - continue decadron 2mg q12h (no overnight dose) Plan to wean steroids as tolerated after each plasma exchange session  - s/p LP 3/13. infectious panel so far negative, awaiting send out studies. autoimmune, and fungal studies, all herpes panels (HSV 1, HSV 2, HSV 6), CSF neopterin (write in), TB, RPR, VDRL, AFB, bacterial antigens, full viral PCR panel, oligoclonal bands, Igg index, myelin basic protein  - LP done 3/4 opening pressure was 30, LP done 3/13 with opening pressure of 13.5  - MRA head/neck done for vessel imaging - no stenosis or occlusion, no remarkable findings  - PET body - nonspecific right lung nodules  - testicular ultrasound neg  - Pulm consult - lung nodules likely inflammation, no contraindication to Rituximab  - plasma exchange started on 3/13, plan for 5 sessions every other day. will need to send daily fibrinogen clauss levels   - HD catheter for the plasma exchange placed by IR on 3/13  - ID consult for rule out possible infectious causes, and prior to initiation of Rituximab - no contraindication to Rituximab  - D dimer elevated to 502, LE dopplers negative  - Sed rate 15  - CRP <3  - repeat Neuropsych consult on 3/14 - improved from prior admission  - Rheum consulted 3/15 for elevated LAKSHMI level, f/u recs  - f/u EEG x 12 hours  - continue verapamil 120mg  - f/u serum hepatitis panel, quantiferon, NMO antibody, ACE, serum protein and immuno electrophoresis, ANCA, LAKSHMI, double stranded DNA, cryoglobulin, copper, ceruloplasmin  - HIV neg  - urine heavy metal screen, urine porphyrins, urine porphobilinogen  - c/w gabapentin 100mg at 7am and 4pm, 300mg HS and baclofen 10mg three times a day  - continue home pantoprazole BID  - q8hr general neuro checks and vitals    #migraine  - sumatriptan 100mg PO PRN once (max 2 tabs)  - if needed, can given sumatriptan 6mg subcutaneously once on top of PO    Cards  #HTN w/ LVH  #sinus tachy  - cards following, appreciate recs  - goal normotension  - hold amlodipine  - continue verapamil 120mg    Pulm  - call provider if SPO2 < 94%  - uptake noted in right upper lobe of right lung on PET; CT C/A/P demonstrates clustered nodular and ground-glass opacities in bilateral upper lobes, more prominent over right; findings most consistent with infectious/inflammatory etiology  - pulm consulted during recent admission  - repeat CT in 6-12 weeks to ensure resolution    GI  #Nutrition/Fluids/Electrolytes   - replete K<4 and Mg <2  - Diet: Regular  - IVF: NS 500cc/hr then continue 100cc/hr    Infectious Disease  - afebrile on admission    Endocrine  #hyperglycemia  - A1C results: 5.2  - continue FS monitoring while on steroids  - on low dose SSI    - TSH results: 0.594    Psych  #anxiety  - Zyprexa standing (2.5 am, 2.5 pm, 5 at bedtime)  - Remeron d/c due to hyponatremia  - psych consulted 3/15  - can give additional zyprexa 2.5mg PRN (no more than 15mg total per day max)  - EKG every other day to monitor qtc (next due 3/17), last 3/15     DVT Prophylaxis  - SCDs for DVT prophylaxis     Dispo: home when cleared     Discussed daily hospital plans and goals with patient at bedside.    Discussed with Neurology Attending, Dr. Weston    39y Male with PMHx of viral meningitis with sequela of HTN and intractable headaches (followed by Dr. Weston), recent admission 3/4/24 for autoimmune encephalitis management/treatment (had presented as worsening headaches and cognition - most notably e-mails that didn't make sense) s/p IV steroids and IVIG, presented again on 3/12 forfor bilateral hand weakness as well as wide based gait, UE tremors, UE cogwheeling, dysmetria, and UE weakness. Went for outpatient MRI brain and cervical spine. Admitted for further neurologic workup. Restarted on IV steroids, and plasmapheresis.    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  - s/p LP 3/4 with elevated opening pressure at 30 mmHg, remainder of CSF negative  - continue decadron 2mg q12h (no overnight dose) Plan to wean steroids as tolerated after each plasma exchange session  - s/p LP 3/13. infectious panel so far negative, awaiting send out studies. autoimmune, and fungal studies, all herpes panels (HSV 1, HSV 2, HSV 6), CSF neopterin (write in), TB, RPR, VDRL, AFB, bacterial antigens, full viral PCR panel, oligoclonal bands, Igg index, myelin basic protein  - LP done 3/4 opening pressure was 30, LP done 3/13 with opening pressure of 13.5  - MRA head/neck done for vessel imaging - no stenosis or occlusion, no remarkable findings  - PET body - nonspecific right lung nodules  - testicular ultrasound neg  - Pulm consult - lung nodules likely inflammation, no contraindication to Rituximab  - plasma exchange started on 3/13, plan for 5 sessions every other day. will need to send daily fibrinogen clauss levels   - HD catheter for the plasma exchange placed by IR on 3/13  - ID consult for rule out possible infectious causes, and prior to initiation of Rituximab - no contraindication to Rituximab  - D dimer elevated to 502, LE dopplers negative  - Sed rate 15  - CRP <3  - repeat Neuropsych consult on 3/14 - improved from prior admission  - Rheum consulted 3/15 for elevated LAKSHMI level, f/u recs  - f/u EEG x 12 hours  - continue verapamil 120mg  - f/u serum hepatitis panel, quantiferon, NMO antibody, ACE, serum protein and immuno electrophoresis, ANCA, LAKSHMI, double stranded DNA, cryoglobulin, copper, ceruloplasmin  - HIV neg  - urine heavy metal screen, urine porphyrins, urine porphobilinogen  - c/w gabapentin 100mg at 7am and 4pm, 300mg HS and baclofen 10mg three times a day  - continue home pantoprazole BID  - q8hr general neuro checks and vitals    #migraine  - sumatriptan 100mg PO PRN once (max 2 tabs)  - if needed, can given sumatriptan 6mg subcutaneously once on top of PO    Cards  #HTN w/ LVH  #sinus tachy  - cards following, appreciate recs  - goal normotension  - hold amlodipine  - continue verapamil 120mg    Pulm  - call provider if SPO2 < 94%  - uptake noted in right upper lobe of right lung on PET; CT C/A/P demonstrates clustered nodular and ground-glass opacities in bilateral upper lobes, more prominent over right; findings most consistent with infectious/inflammatory etiology  - pulm consulted during recent admission  - repeat CT in 6-12 weeks to ensure resolution    GI  #Nutrition/Fluids/Electrolytes   - replete K<4 and Mg <2  - Diet: Regular  - IVF: NS 500cc/hr then continue 100cc/hr    Infectious Disease  - afebrile on admission    Endocrine  #hyperglycemia  - A1C results: 5.2  - continue FS monitoring while on steroids  - on low dose SSI  - defer lantus for now given decrease in steroid dose    - TSH results: 0.594    Psych  #anxiety  - Zyprexa standing (2.5 am, 2.5 pm, 5 at bedtime)  - Remeron d/c due to hyponatremia  - psych consulted 3/15  - can give additional zyprexa 2.5mg PRN (no more than 15mg total per day max)  - EKG every other day to monitor qtc (next due 3/17), last 3/15     DVT Prophylaxis  - SCDs for DVT prophylaxis     Dispo: home when cleared     Discussed daily hospital plans and goals with patient at bedside.    Discussed with Neurology Attending, Dr. Weston    39y Male with PMHx of viral meningitis with sequela of HTN and intractable headaches (followed by Dr. Weston), recent admission 3/4/24 for autoimmune encephalitis management/treatment (had presented as worsening headaches and cognition - most notably e-mails that didn't make sense) s/p IV steroids and IVIG, presented again on 3/12 forfor bilateral hand weakness as well as wide based gait, UE tremors, UE cogwheeling, dysmetria, and UE weakness. Went for outpatient MRI brain and cervical spine. Admitted for further neurologic workup. Restarted on IV steroids, and plasmapheresis.    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  - s/p LP 3/4 with elevated opening pressure at 30 mmHg, remainder of CSF negative  - continue decadron 2mg q12h (no overnight dose) Plan to wean steroids as tolerated after each plasma exchange session  - s/p LP 3/13. infectious panel so far negative, awaiting send out studies. autoimmune, and fungal studies, all herpes panels (HSV 1, HSV 2, HSV 6), CSF neopterin (write in), TB, RPR, VDRL, AFB, bacterial antigens, full viral PCR panel, oligoclonal bands, Igg index, myelin basic protein  - LP done 3/4 opening pressure was 30, LP done 3/13 with opening pressure of 13.5  - MRA head/neck done for vessel imaging - no stenosis or occlusion, no remarkable findings  - PET body - nonspecific right lung nodules  - testicular ultrasound neg  - Pulm consult - lung nodules likely inflammation, no contraindication to Rituximab  - plasma exchange started on 3/13, plan for 5 sessions every other day. will need to send daily fibrinogen clauss levels   - HD catheter for the plasma exchange placed by IR on 3/13  - ID consult for rule out possible infectious causes, and prior to initiation of Rituximab - no contraindication to Rituximab  - D dimer elevated to 502, LE dopplers negative  - Sed rate 15  - CRP <3  - repeat Neuropsych consult on 3/14 - improved from prior admission  - Rheum consulted 3/15 for elevated LAKSHMI level, f/u recs  - f/u EEG x 12 hours  - continue verapamil 120mg  - f/u serum hepatitis panel, quantiferon, NMO antibody, ACE, serum protein and immuno electrophoresis, ANCA, LAKSHMI, double stranded DNA, cryoglobulin, copper, ceruloplasmin  - HIV neg  - urine heavy metal screen, urine porphyrins, urine porphobilinogen  - c/w gabapentin 100mg at 7am and 4pm, 300mg HS and baclofen 10mg three times a day  - continue home pantoprazole BID  - q8hr general neuro checks and vitals    #migraine  - sumatriptan 100mg PO PRN once (max 2 tabs)  - if needed, can given sumatriptan 6mg subcutaneously once on top of PO    Cards  #HTN w/ LVH  #sinus tachy  - cards following, appreciate recs  - goal normotension  - hold amlodipine  - continue verapamil 120mg    Pulm  - call provider if SPO2 < 94%  - uptake noted in right upper lobe of right lung on PET; CT C/A/P demonstrates clustered nodular and ground-glass opacities in bilateral upper lobes, more prominent over right; findings most consistent with infectious/inflammatory etiology  - pulm consulted during recent admission  - repeat CT in 6-12 weeks to ensure resolution    GI  #Nutrition/Fluids/Electrolytes   - replete K<4 and Mg <2  - Diet: Regular  - IVF: NS 500cc/hr then continue 100cc/hr    Infectious Disease  - afebrile on admission  - leukocytosis likely due to steroids    Endocrine  #hyperglycemia  - A1C results: 5.2  - continue FS monitoring while on steroids  - on low dose SSI  - defer lantus for now given decrease in steroid dose    - TSH results: 0.594    Psych  #anxiety  - Zyprexa standing (2.5 am, 2.5 pm, 5 at bedtime)  - Remeron d/c due to hyponatremia  - psych consulted 3/15  - can give additional zyprexa 2.5mg PRN (no more than 15mg total per day max)  - EKG every other day to monitor qtc (next due 3/17), last 3/15     DVT Prophylaxis  - SCDs for DVT prophylaxis     Dispo: home when cleared     Discussed daily hospital plans and goals with patient at bedside.    Discussed with Neurology Attending, Dr. Weston    39y Male with PMHx of viral meningitis with sequela of HTN and intractable headaches (followed by Dr. Weston), recent admission 3/4/24 for autoimmune encephalitis management/treatment (had presented as worsening headaches and cognition - most notably e-mails that didn't make sense) s/p IV steroids and IVIG, presented again on 3/12 forfor bilateral hand weakness as well as wide based gait, UE tremors, UE cogwheeling, dysmetria, and UE weakness. Went for outpatient MRI brain and cervical spine. Admitted for further neurologic workup. Restarted on IV steroids, and plasmapheresis.    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  - s/p LP 3/4 with elevated opening pressure at 30 mmHg, remainder of CSF negative  - continue decadron 2mg q12h (no overnight dose) Plan to wean steroids as tolerated after each plasma exchange session  - s/p LP 3/13. infectious panel so far negative, awaiting send out studies. autoimmune, and fungal studies, all herpes panels (HSV 1, HSV 2, HSV 6), CSF neopterin (write in), TB, RPR, VDRL, AFB, bacterial antigens, full viral PCR panel, oligoclonal bands, Igg index, myelin basic protein  - LP done 3/4 opening pressure was 30, LP done 3/13 with opening pressure of 13.5  - MRA head/neck done for vessel imaging - no stenosis or occlusion, no remarkable findings  - PET body - nonspecific right lung nodules  - testicular ultrasound neg  - Pulm consult - lung nodules likely inflammation, no contraindication to Rituximab  - plasma exchange started on 3/13, plan for 5 sessions every other day. will need to send daily fibrinogen clauss levels   - HD catheter for the plasma exchange placed by IR on 3/13  - ID consult for rule out possible infectious causes, and prior to initiation of Rituximab - no contraindication to Rituximab  - D dimer elevated to 502, LE dopplers negative  - Sed rate 15  - CRP <3  - repeat Neuropsych consult on 3/14 - improved from prior admission  - Rheum consulted 3/15 for elevated LAKSHMI level, f/u recs  - f/u EEG x 12 hours  - continue verapamil 120mg  - f/u serum hepatitis panel, quantiferon, NMO antibody, ACE, serum protein and immuno electrophoresis, ANCA, LAKSHMI, double stranded DNA, cryoglobulin, copper, ceruloplasmin  - HIV neg  - urine heavy metal screen, urine porphyrins, urine porphobilinogen  - c/w gabapentin 100mg at 7am and 4pm, 600mg HS and baclofen 10mg three times a day  - continue home pantoprazole BID  - q8hr general neuro checks and vitals    #migraine  - sumatriptan 100mg PO PRN once (max 2 tabs)  - if needed, can given sumatriptan 6mg subcutaneously once on top of PO    Cards  #HTN w/ LVH  #sinus tachy  - cards following, appreciate recs  - goal normotension  - hold amlodipine  - continue verapamil 120mg    Pulm  - call provider if SPO2 < 94%  - uptake noted in right upper lobe of right lung on PET; CT C/A/P demonstrates clustered nodular and ground-glass opacities in bilateral upper lobes, more prominent over right; findings most consistent with infectious/inflammatory etiology  - pulm consulted during recent admission  - repeat CT in 6-12 weeks to ensure resolution    GI  #Nutrition/Fluids/Electrolytes   - replete K<4 and Mg <2  - Diet: Regular  - IVF: NS 500cc/hr then continue 100cc/hr    Infectious Disease  - afebrile on admission  - leukocytosis likely due to steroids    Endocrine  #hyperglycemia  - A1C results: 5.2  - continue FS monitoring while on steroids  - on low dose SSI  - defer lantus for now given decrease in steroid dose    - TSH results: 0.594    Psych  #anxiety  - Zyprexa standing (2.5 am, 2.5 pm, 5 at bedtime)  - Remeron d/c due to hyponatremia  - psych consulted 3/15  - can give additional zyprexa 2.5mg PRN (no more than 15mg total per day max)  - EKG every other day to monitor qtc (next due 3/17), last 3/15     DVT Prophylaxis  - SCDs for DVT prophylaxis     Dispo: home when cleared     Discussed daily hospital plans and goals with patient at bedside.    Discussed with Neurology Attending, Dr. Weston    39y Male with PMHx of viral meningitis with sequela of HTN and intractable headaches (followed by Dr. Weston), recent admission 3/4/24 for autoimmune encephalitis management/treatment (had presented as worsening headaches and cognition - most notably e-mails that didn't make sense) s/p IV steroids and IVIG, presented again on 3/12 forfor bilateral hand weakness as well as wide based gait, UE tremors, UE cogwheeling, dysmetria, and UE weakness. Went for outpatient MRI brain and cervical spine. Admitted for further neurologic workup. Restarted on IV steroids, and plasmapheresis.    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  - s/p LP 3/4 with elevated opening pressure at 30 mmHg, remainder of CSF negative  - continue decadron 2mg q12h (no overnight dose) Plan to wean steroids as tolerated after each plasma exchange session  - s/p LP 3/13. infectious panel so far negative, awaiting send out studies. autoimmune, and fungal studies, all herpes panels (HSV 1, HSV 2, HSV 6), CSF neopterin (write in), TB, RPR, VDRL, AFB, bacterial antigens, full viral PCR panel, oligoclonal bands, Igg index, myelin basic protein  - LP done 3/4 opening pressure was 30, LP done 3/13 with opening pressure of 13.5  - MRA head/neck done for vessel imaging - no stenosis or occlusion, no remarkable findings  - PET body - nonspecific right lung nodules  - testicular ultrasound neg  - Pulm consult - lung nodules likely inflammation, no contraindication to Rituximab  - plasma exchange started on 3/13, plan for 5 sessions every other day. will need to send daily fibrinogen clauss levels   - HD catheter for the plasma exchange placed by IR on 3/13  - ID consult for rule out possible infectious causes, and prior to initiation of Rituximab - no contraindication to Rituximab  - D dimer elevated to 502, LE dopplers negative  - Sed rate 15  - CRP <3  - repeat Neuropsych consult on 3/14 - improved from prior admission  - Rheum consulted 3/15 for elevated LAKSHMI level, f/u recs  - f/u EEG   - continue verapamil 120mg  - f/u serum hepatitis panel, quantiferon, NMO antibody, ACE, serum protein and immuno electrophoresis, ANCA, LAKSHMI, double stranded DNA, cryoglobulin, copper, ceruloplasmin  - HIV neg  - urine heavy metal screen, urine porphyrins, urine porphobilinogen  - c/w gabapentin 100mg at 7am and 4pm, 600mg HS and baclofen 10mg three times a day  - continue home pantoprazole BID  - q8hr general neuro checks and vitals    #migraine  - sumatriptan 100mg PO PRN once (max 2 tabs)  - if needed, can given sumatriptan 6mg subcutaneously once on top of PO    Cards  #HTN w/ LVH  #sinus tachy  - cards following, appreciate recs  - goal normotension  - hold amlodipine  - continue verapamil 120mg    Pulm  - call provider if SPO2 < 94%  - uptake noted in right upper lobe of right lung on PET; CT C/A/P demonstrates clustered nodular and ground-glass opacities in bilateral upper lobes, more prominent over right; findings most consistent with infectious/inflammatory etiology  - pulm consulted during recent admission  - repeat CT in 6-12 weeks to ensure resolution    GI  #Nutrition/Fluids/Electrolytes   - replete K<4 and Mg <2  - Diet: Regular  - IVF: NS 500cc/hr then continue 100cc/hr    Infectious Disease  - afebrile on admission  - leukocytosis likely due to steroids    Endocrine  #hyperglycemia  - A1C results: 5.2  - continue FS monitoring while on steroids  - on low dose SSI  - defer lantus for now given decrease in steroid dose    - TSH results: 0.594    Psych  #anxiety  - Zyprexa standing (2.5 am, 2.5 pm, 5 at bedtime)  - Remeron d/c due to hyponatremia  - psych consulted 3/15  - can give additional zyprexa 2.5mg PRN (no more than 15mg total per day max)  - EKG every other day to monitor qtc (next due 3/17), last 3/15     DVT Prophylaxis  - SCDs for DVT prophylaxis     Dispo: home when cleared     Discussed daily hospital plans and goals with patient at bedside.    Discussed with Neurology Attending, Dr. Weston

## 2024-03-16 NOTE — PROGRESS NOTE ADULT - SUBJECTIVE AND OBJECTIVE BOX
Feeling well this morning.  We spoke about blood sugar management, and will stay with the current plan with the sliding scale.  Will not proceed with lantus at this point, per discussion with patient.  Denies any other new symptoms.      Remaining ROS negative     PHYSICAL EXAM:    General: nad, sitting up in chair, wearing EEG  HEENT: NC/AT MMM  Cardiovascular: +S1/S2, RRR  Respiratory: CTA B/L; no W/R/R  Gastrointestinal: soft, NT/ND; +BSx4  Extremities: WWP; no edema  Neurological: speech fluent, follows commands, no acute deficits noted  Psychiatric: pleasant mood and affect  Dermatologic: no appreciable wounds or damage to the skin    VITAL SIGNS:  Vital Signs Last 24 Hrs  T(C): 36.8 (16 Mar 2024 10:31), Max: 36.8 (16 Mar 2024 10:31)  T(F): 98.3 (16 Mar 2024 10:31), Max: 98.3 (16 Mar 2024 10:31)  HR: 92 (16 Mar 2024 10:06) (92 - 105)  BP: 170/105 (16 Mar 2024 10:06) (134/75 - 170/105)  BP(mean): 132 (16 Mar 2024 10:06) (97 - 132)  RR: 18 (16 Mar 2024 10:06) (17 - 18)  SpO2: 96% (16 Mar 2024 06:20) (95% - 98%)    Parameters below as of 16 Mar 2024 06:20  Patient On (Oxygen Delivery Method): room air      MEDICATIONS:  MEDICATIONS  (STANDING):  albumin human  5% IVPB 3500 milliLiter(s) IV Intermittent once  baclofen 10 milliGRAM(s) Oral <User Schedule>  calcium gluconate IVPB 2 Gram(s) IV Intermittent once  chlorhexidine 2% Cloths 1 Application(s) Topical daily  dexAMETHasone  Injectable 2 milliGRAM(s) IV Push <User Schedule>  dextrose 5%. 1000 milliLiter(s) (100 mL/Hr) IV Continuous <Continuous>  dextrose 5%. 1000 milliLiter(s) (50 mL/Hr) IV Continuous <Continuous>  dextrose 50% Injectable 25 Gram(s) IV Push once  dextrose 50% Injectable 12.5 Gram(s) IV Push once  dextrose 50% Injectable 25 Gram(s) IV Push once  enoxaparin Injectable 40 milliGRAM(s) SubCutaneous every 24 hours  gabapentin 100 milliGRAM(s) Oral <User Schedule>  gabapentin 300 milliGRAM(s) Oral at bedtime  glucagon  Injectable 1 milliGRAM(s) IntraMuscular once  heparin  Lock Flush 100 Units/mL Injectable 600 Unit(s) IV Push once  insulin lispro (ADMELOG) corrective regimen sliding scale   SubCutaneous Before meals and at bedtime  OLANZapine 2.5 milliGRAM(s) Oral <User Schedule>  OLANZapine 5 milliGRAM(s) Oral at bedtime  pantoprazole    Tablet 40 milliGRAM(s) Oral every 12 hours  polyethylene glycol 3350 17 Gram(s) Oral every 12 hours  senna 2 Tablet(s) Oral at bedtime  verapamil  milliGRAM(s) Oral every 24 hours    MEDICATIONS  (PRN):  acetaminophen     Tablet .. 650 milliGRAM(s) Oral every 6 hours PRN Mild Pain (1 - 3), Moderate Pain (4 - 6)  bisacodyl Suppository 10 milliGRAM(s) Rectal daily PRN Constipation  dextrose Oral Gel 15 Gram(s) Oral once PRN Blood Glucose LESS THAN 70 milliGRAM(s)/deciliter  diphenhydrAMINE Injectable 25 milliGRAM(s) IV Push once PRN Allergic Reaction  diphenhydrAMINE IVPB 25 milliGRAM(s) IV Intermittent once PRN Allergic Reaction  SUMAtriptan 100 milliGRAM(s) Oral two times a day PRN Migraine      ALLERGIES:  Allergies    methylPREDNISolone (Rash; Urticaria; Flushing; Hives)    Intolerances        LABS:                        12.4   22.61 )-----------( 210      ( 16 Mar 2024 10:21 )             38.2     03-16    135  |  102  |  16  ----------------------------<  255<H>  4.5   |  24  |  0.85    Ca    9.2      16 Mar 2024 10:21  Mg     2.3     03-15        Urinalysis Basic - ( 16 Mar 2024 10:21 )    Color: x / Appearance: x / SG: x / pH: x  Gluc: 255 mg/dL / Ketone: x  / Bili: x / Urobili: x   Blood: x / Protein: x / Nitrite: x   Leuk Esterase: x / RBC: x / WBC x   Sq Epi: x / Non Sq Epi: x / Bacteria: x      CAPILLARY BLOOD GLUCOSE      POCT Blood Glucose.: 245 mg/dL (16 Mar 2024 11:52)      RADIOLOGY & ADDITIONAL TESTS: Reviewed.

## 2024-03-16 NOTE — PROGRESS NOTE ADULT - SUBJECTIVE AND OBJECTIVE BOX
No adverse events.   Reports overall improved after PLEX treatment yesterday, though still has some anxiety, fogginess, and difficulty with recall.   Reports improvement with strength and gait    MEDICATIONS  (STANDING):  albumin human  5% IVPB 3500 milliLiter(s) IV Intermittent once  baclofen 10 milliGRAM(s) Oral <User Schedule>  calcium gluconate IVPB 2 Gram(s) IV Intermittent once  chlorhexidine 2% Cloths 1 Application(s) Topical daily  dexAMETHasone  Injectable 2 milliGRAM(s) IV Push <User Schedule>  dextrose 5%. 1000 milliLiter(s) (100 mL/Hr) IV Continuous <Continuous>  dextrose 5%. 1000 milliLiter(s) (50 mL/Hr) IV Continuous <Continuous>  dextrose 50% Injectable 25 Gram(s) IV Push once  dextrose 50% Injectable 12.5 Gram(s) IV Push once  dextrose 50% Injectable 25 Gram(s) IV Push once  enoxaparin Injectable 40 milliGRAM(s) SubCutaneous every 24 hours  gabapentin 100 milliGRAM(s) Oral <User Schedule>  glucagon  Injectable 1 milliGRAM(s) IntraMuscular once  heparin  Lock Flush 100 Units/mL Injectable 600 Unit(s) IV Push once  insulin lispro (ADMELOG) corrective regimen sliding scale   SubCutaneous three times a day before meals  mirtazapine 15 milliGRAM(s) Oral at bedtime  OLANZapine 5 milliGRAM(s) Oral at bedtime  OLANZapine 2.5 milliGRAM(s) Oral <User Schedule>  pantoprazole    Tablet 40 milliGRAM(s) Oral every 12 hours  polyethylene glycol 3350 17 Gram(s) Oral every 12 hours  verapamil  milliGRAM(s) Oral every 24 hours    MEDICATIONS  (PRN):  dextrose Oral Gel 15 Gram(s) Oral once PRN Blood Glucose LESS THAN 70 milliGRAM(s)/deciliter  diphenhydrAMINE Injectable 25 milliGRAM(s) IV Push once PRN Allergic Reaction  SUMAtriptan 100 milliGRAM(s) Oral two times a day PRN Migraine  traMADol 25 milliGRAM(s) Oral every 8 hours PRN Severe Pain (7 - 10)      Allergies    methylPREDNISolone (Rash; Urticaria; Flushing; Hives)    Intolerances    GENERAL: NAD, well-developed  HEAD:  Atraumatic, Normocephalic  EYES: EOMI, PERRLA, conjunctiva and sclera clear  NECK: Supple, No JVD; shiley in place  CHEST/LUNG: Clear to auscultation bilaterally; No wheeze  HEART: Regular rate and rhythm; No murmurs, rubs, or gallops  ABDOMEN: Soft, Nontender, Nondistended; Bowel sounds present  EXTREMITIES:  2+ Peripheral Pulses, No clubbing, cyanosis, or edema  NEUROLOGY: Improved  strength. AAOx3, CN grossly intact   SKIN: No rashes or lesions      Vital Signs Last 24 Hrs  T(C): 36.5 (15 Mar 2024 06:56), Max: 36.8 (14 Mar 2024 10:00)  T(F): 97.7 (15 Mar 2024 06:56), Max: 98.2 (14 Mar 2024 10:00)  HR: 64 (15 Mar 2024 08:12) (64 - 106)  BP: 168/107 (15 Mar 2024 08:12) (128/80 - 179/116)  BP(mean): 131 (15 Mar 2024 08:12) (101 - 141)  RR: 17 (15 Mar 2024 08:12) (17 - 18)  SpO2: 97% (15 Mar 2024 08:12) (95% - 98%)    Parameters below as of 15 Mar 2024 08:12  Patient On (Oxygen Delivery Method): room air

## 2024-03-16 NOTE — PROGRESS NOTE ADULT - ASSESSMENT
40 yo male with history of viral meningitis (1.5 years ago) with sequela of HTN and intractable headaches (followed by Dr. Weston), recent admission 3/4/24 recent admission 3/4/24 for autoimmune encephalitis treated with IVIG and steroids who t presented today to outpatient neurologist for b/l hand weakness, noted to have wide based gait, UE tremors, UE cogwheeling, dysmetria, and UE weakness and was sent to [facility] for admission to neurology service. Planned for decadron repeat LP, and plasma exchange. Cardiology consulted for tachycardia.    Review of Studies:  EKG: NSR   Tele 3/13/24: NSR @80-90 bpm    Tele 3/12/24: sinus tachycardia rates 100- 110, intermittently ST 140s  CT chest 3/8/24- heart size is normal, no pericardial effusion.  Clustered nodular and GGO RUL c/w infections/inflammatory etiology  ECHO 3/13/24: normal echo, mild LVH    #Sinus Tachycardia   Likely iso anxiety and steroids. Currently asymptomatic and denies chest pain, sob, or palpitations.   ECHO normal with mild LVH  Likely 2/2 steroids     #HTN  Verapamil recently switched to amlodipine on prior admission.   Amlodipine discontinued and verapamil restarted 3/12 by Dr. Vasquez  continue home verapamil for both BP and migraine ppx  recommend Losartan 25 mg PO daily for better BP control   can also consider Propranolol 20 mg PO bid  for Migraine/Anxiety       Recommendations above are preliminary pending discussion with an attending cardiologist  Pending discussion w/primary team  We will continue to follow, thank you for the consultation    [facility] PGY5 CVD Fellow  Cardiology Consult Pager: [phone]   38 yo male with history of viral meningitis (1.5 years ago) with sequela of HTN and intractable headaches (followed by Dr. Weston), recent admission 3/4/24 recent admission 3/4/24 for autoimmune encephalitis treated with IVIG and steroids who t presented today to outpatient neurologist for b/l hand weakness, noted to have wide based gait, UE tremors, UE cogwheeling, dysmetria, and UE weakness and was sent to [facility] for admission to neurology service. Planned for decadron repeat LP, and plasma exchange. Cardiology consulted for tachycardia.    Review of Studies:  EKG: NSR   Tele 3/13/24: NSR @80-90 bpm    Tele 3/12/24: sinus tachycardia rates 100- 110, intermittently ST 140s  CT chest 3/8/24- heart size is normal, no pericardial effusion.  Clustered nodular and GGO RUL c/w infections/inflammatory etiology  ECHO 3/13/24: normal echo, mild LVH    #Sinus Tachycardia   Likely iso anxiety and steroids. Currently asymptomatic and denies chest pain, sob, or palpitations.   ECHO normal with mild LVH  Likely 2/2 steroids     #HTN  C/w Verapamil SR 120mg qd w/holding parameters SBP <100 and/or HR <60  Ok with mild permissive hypertension (-150s) given BP changes during plasmapheresis      Case reviewed w/attending cardiologist  Plan discussed w/primary team  We will continue to follow, thank you for the consultation      [facility] PGY5 CVD Fellow  Cardiology Consult Pager: [phone]

## 2024-03-16 NOTE — PROGRESS NOTE ADULT - SUBJECTIVE AND OBJECTIVE BOX
**Incomplete Note**      Cardiology Consult    O/N:  Interval History/HPI: Pt seen and examined at bedside, NAD, denies chest pain/discomfort/pressure. ROS unremarkable   Telemetry:      Review of Systems:  CONSTITUTIONAL:  No weight loss, fever, chills, weakness or fatigue.  HEENT:  Eyes:  No visual loss, blurred vision, double vision or yellow sclerae. Ears, Nose, Throat:  No hearing loss, sneezing, congestion, runny nose or sore throat.  SKIN:  No rash or itching.  CARDIOVASCULAR:  No chest pain, chest pressure or chest discomfort. No palpitations or edema.  RESPIRATORY:  No shortness of breath, cough or sputum.  GASTROINTESTINAL:  No anorexia, nausea, vomiting or diarrhea. No abdominal pain or blood.  GENITOURINARY: No Burning on urination.   NEUROLOGICAL:  see HPI  MUSCULOSKELETAL:  No muscle, back pain, joint pain or stiffness.  HEMATOLOGIC:  No anemia, bleeding or bruising.  LYMPHATICS:  No enlarged nodes. No history of splenectomy.  PSYCHIATRIC:  No history of depression or anxiety.  ENDOCRINOLOGIC:  No reports of sweating, cold or heat intolerance. No polyuria or polydipsia.  ALLERGIES:  No history of asthma, hives, eczema or rhinitis.    OBJECTIVE  T(C): --  HR: 100 (03-16-24 @ 06:20) (64 - 105)  BP: 144/84 (03-16-24 @ 06:20) (134/75 - 168/107)  RR: 18 (03-16-24 @ 06:20) (17 - 18)  SpO2: 96% (03-16-24 @ 06:20) (95% - 98%)    03-15-24 @ 07:01  -  03-16-24 @ 07:00  --------------------------------------------------------  IN: 386 mL / OUT: 0 mL / NET: 386 mL        PHYSICAL EXAM:    Constitutional: resting comfortably in bed; NAD  HEENT: NC/AT, PERRL, EOMI, anicteric sclera, no nasal discharge; uvula midline, no oropharyngeal erythema or exudates; MMM  Neck: supple; no thyromegaly, JVP ? cm H20, JVD +/-  Respiratory: CTA B/L; no W/R/R, no retractions  Cardiac: +S1/S2; RRR; no M/R/G; PMI non-displaced  Gastrointestinal: soft, NT/ND; no rebound or guarding; +BSx4  Extremities: WWP, no clubbing or cyanosis; no peripheral edema  Musculoskeletal: NROM x4; no joint swelling, tenderness or erythema  Vascular: 2+ radial, DP/PT pulses B/L  Dermatologic: skin warm, dry and intact; no rashes, wounds, or scars  Lymphatic: no submandibular or cervical LAD  Neurologic: AAOx3; CNII-XII grossly intact; no focal deficits    LABS:                        12.7   19.63 )-----------( 203      ( 15 Mar 2024 10:32 )             39.3     03-15    133<L>  |  101  |  20  ----------------------------<  469<HH>  4.4   |  23  |  0.85    Ca    9.2      15 Mar 2024 18:56  Mg     2.3     03-15        Urinalysis Basic - ( 15 Mar 2024 18:56 )    Color: x / Appearance: x / SG: x / pH: x  Gluc: 469 mg/dL / Ketone: x  / Bili: x / Urobili: x   Blood: x / Protein: x / Nitrite: x   Leuk Esterase: x / RBC: x / WBC x   Sq Epi: x / Non Sq Epi: x / Bacteria: x        RADIOLOGY & ADDITIONAL TESTS:  Reviewed .    MEDICATIONS  (STANDING):  albumin human  5% IVPB 3500 milliLiter(s) IV Intermittent once  baclofen 10 milliGRAM(s) Oral <User Schedule>  calcium gluconate IVPB 2 Gram(s) IV Intermittent once  chlorhexidine 2% Cloths 1 Application(s) Topical daily  dexAMETHasone  Injectable 2 milliGRAM(s) IV Push <User Schedule>  dextrose 5%. 1000 milliLiter(s) (100 mL/Hr) IV Continuous <Continuous>  dextrose 5%. 1000 milliLiter(s) (50 mL/Hr) IV Continuous <Continuous>  dextrose 50% Injectable 25 Gram(s) IV Push once  dextrose 50% Injectable 12.5 Gram(s) IV Push once  dextrose 50% Injectable 25 Gram(s) IV Push once  enoxaparin Injectable 40 milliGRAM(s) SubCutaneous every 24 hours  gabapentin 100 milliGRAM(s) Oral <User Schedule>  gabapentin 300 milliGRAM(s) Oral at bedtime  glucagon  Injectable 1 milliGRAM(s) IntraMuscular once  heparin  Lock Flush 100 Units/mL Injectable 600 Unit(s) IV Push once  insulin lispro (ADMELOG) corrective regimen sliding scale   SubCutaneous Before meals and at bedtime  OLANZapine 2.5 milliGRAM(s) Oral <User Schedule>  OLANZapine 5 milliGRAM(s) Oral at bedtime  pantoprazole    Tablet 40 milliGRAM(s) Oral every 12 hours  polyethylene glycol 3350 17 Gram(s) Oral every 12 hours  senna 2 Tablet(s) Oral at bedtime  verapamil  milliGRAM(s) Oral every 24 hours    MEDICATIONS  (PRN):  acetaminophen     Tablet .. 650 milliGRAM(s) Oral every 6 hours PRN Mild Pain (1 - 3), Moderate Pain (4 - 6)  bisacodyl Suppository 10 milliGRAM(s) Rectal daily PRN Constipation  dextrose Oral Gel 15 Gram(s) Oral once PRN Blood Glucose LESS THAN 70 milliGRAM(s)/deciliter  diphenhydrAMINE Injectable 25 milliGRAM(s) IV Push once PRN Allergic Reaction  SUMAtriptan 100 milliGRAM(s) Oral two times a day PRN Migraine       Cardiology Consult    O/N: plasmapheresis started yesterday  Interval History/HPI: Pt seen and examined at bedside, NAD, denies chest pain/discomfort/pressure. ROS unremarkable   Telemetry: Sinus tachycardia to 120s this AM now HR of 80-90s      Review of Systems:  CONSTITUTIONAL:  No weight loss, fever, chills, weakness or fatigue.  HEENT:  Eyes:  No visual loss, blurred vision, double vision or yellow sclerae. Ears, Nose, Throat:  No hearing loss, sneezing, congestion, runny nose or sore throat.  SKIN:  No rash or itching.  CARDIOVASCULAR:  No chest pain, chest pressure or chest discomfort. No palpitations or edema.  RESPIRATORY:  No shortness of breath, cough or sputum.  GASTROINTESTINAL:  No anorexia, nausea, vomiting or diarrhea. No abdominal pain or blood.  GENITOURINARY: No Burning on urination.   NEUROLOGICAL:  see HPI  MUSCULOSKELETAL:  No muscle, back pain, joint pain or stiffness.  HEMATOLOGIC:  No anemia, bleeding or bruising.  LYMPHATICS:  No enlarged nodes. No history of splenectomy.  PSYCHIATRIC:  No history of depression or anxiety.  ENDOCRINOLOGIC:  No reports of sweating, cold or heat intolerance. No polyuria or polydipsia.  ALLERGIES:  No history of asthma, hives, eczema or rhinitis.    OBJECTIVE  T(C): --  HR: 100 (03-16-24 @ 06:20) (64 - 105)  BP: 144/84 (03-16-24 @ 06:20) (134/75 - 168/107)  RR: 18 (03-16-24 @ 06:20) (17 - 18)  SpO2: 96% (03-16-24 @ 06:20) (95% - 98%)    03-15-24 @ 07:01  -  03-16-24 @ 07:00  --------------------------------------------------------  IN: 386 mL / OUT: 0 mL / NET: 386 mL        PHYSICAL EXAM:    Constitutional: resting comfortably in bed; EEG placed, NAD  HEENT: NC/AT, PERRL, EOMI, anicteric sclera, no nasal discharge; uvula midline, no oropharyngeal erythema or exudates; MMM  Neck: supple; no thyromegaly, JVP <8 cm H20, JVD -  Respiratory: CTA B/L; no W/R/R, no retractions  Cardiac: +S1/S2; RRR; no M/R/G; PMI non-displaced  Gastrointestinal: soft, NT/ND; no rebound or guarding; +BSx4  Extremities: WWP, no clubbing or cyanosis; no peripheral edema  Musculoskeletal: NROM x4; no joint swelling, tenderness or erythema  Vascular: 2+ radial, DP/PT pulses B/L  Dermatologic: skin warm, dry and intact; no rashes, wounds, or scars  Lymphatic: no submandibular or cervical LAD      LABS:                        12.7   19.63 )-----------( 203      ( 15 Mar 2024 10:32 )             39.3     03-15    133<L>  |  101  |  20  ----------------------------<  469<HH>  4.4   |  23  |  0.85    Ca    9.2      15 Mar 2024 18:56  Mg     2.3     03-15        Urinalysis Basic - ( 15 Mar 2024 18:56 )    Color: x / Appearance: x / SG: x / pH: x  Gluc: 469 mg/dL / Ketone: x  / Bili: x / Urobili: x   Blood: x / Protein: x / Nitrite: x   Leuk Esterase: x / RBC: x / WBC x   Sq Epi: x / Non Sq Epi: x / Bacteria: x        RADIOLOGY & ADDITIONAL TESTS:  Reviewed .    MEDICATIONS  (STANDING):  albumin human  5% IVPB 3500 milliLiter(s) IV Intermittent once  baclofen 10 milliGRAM(s) Oral <User Schedule>  calcium gluconate IVPB 2 Gram(s) IV Intermittent once  chlorhexidine 2% Cloths 1 Application(s) Topical daily  dexAMETHasone  Injectable 2 milliGRAM(s) IV Push <User Schedule>  dextrose 5%. 1000 milliLiter(s) (100 mL/Hr) IV Continuous <Continuous>  dextrose 5%. 1000 milliLiter(s) (50 mL/Hr) IV Continuous <Continuous>  dextrose 50% Injectable 25 Gram(s) IV Push once  dextrose 50% Injectable 12.5 Gram(s) IV Push once  dextrose 50% Injectable 25 Gram(s) IV Push once  enoxaparin Injectable 40 milliGRAM(s) SubCutaneous every 24 hours  gabapentin 100 milliGRAM(s) Oral <User Schedule>  gabapentin 300 milliGRAM(s) Oral at bedtime  glucagon  Injectable 1 milliGRAM(s) IntraMuscular once  heparin  Lock Flush 100 Units/mL Injectable 600 Unit(s) IV Push once  insulin lispro (ADMELOG) corrective regimen sliding scale   SubCutaneous Before meals and at bedtime  OLANZapine 2.5 milliGRAM(s) Oral <User Schedule>  OLANZapine 5 milliGRAM(s) Oral at bedtime  pantoprazole    Tablet 40 milliGRAM(s) Oral every 12 hours  polyethylene glycol 3350 17 Gram(s) Oral every 12 hours  senna 2 Tablet(s) Oral at bedtime  verapamil  milliGRAM(s) Oral every 24 hours    MEDICATIONS  (PRN):  acetaminophen     Tablet .. 650 milliGRAM(s) Oral every 6 hours PRN Mild Pain (1 - 3), Moderate Pain (4 - 6)  bisacodyl Suppository 10 milliGRAM(s) Rectal daily PRN Constipation  dextrose Oral Gel 15 Gram(s) Oral once PRN Blood Glucose LESS THAN 70 milliGRAM(s)/deciliter  diphenhydrAMINE Injectable 25 milliGRAM(s) IV Push once PRN Allergic Reaction  SUMAtriptan 100 milliGRAM(s) Oral two times a day PRN Migraine

## 2024-03-16 NOTE — PROGRESS NOTE ADULT - SUBJECTIVE AND OBJECTIVE BOX
Neurology Stroke Progress Note    INTERVAL HPI/OVERNIGHT EVENTS:  Patient seen and examined. Reports sleeping from 1045 - 130am last night. Then again for an hour early this morning. Woke up with 7/10 anxiety, received scheduled zyprexa. Received migraine meds for a headache.    MEDICATIONS  (STANDING):  albumin human  5% IVPB 3500 milliLiter(s) IV Intermittent once  baclofen 10 milliGRAM(s) Oral <User Schedule>  calcium gluconate IVPB 2 Gram(s) IV Intermittent once  chlorhexidine 2% Cloths 1 Application(s) Topical daily  dexAMETHasone  Injectable 2 milliGRAM(s) IV Push <User Schedule>  dextrose 5%. 1000 milliLiter(s) (100 mL/Hr) IV Continuous <Continuous>  dextrose 5%. 1000 milliLiter(s) (50 mL/Hr) IV Continuous <Continuous>  dextrose 50% Injectable 25 Gram(s) IV Push once  dextrose 50% Injectable 12.5 Gram(s) IV Push once  dextrose 50% Injectable 25 Gram(s) IV Push once  enoxaparin Injectable 40 milliGRAM(s) SubCutaneous every 24 hours  gabapentin 100 milliGRAM(s) Oral <User Schedule>  gabapentin 300 milliGRAM(s) Oral at bedtime  glucagon  Injectable 1 milliGRAM(s) IntraMuscular once  heparin  Lock Flush 100 Units/mL Injectable 600 Unit(s) IV Push once  insulin lispro (ADMELOG) corrective regimen sliding scale   SubCutaneous Before meals and at bedtime  OLANZapine 5 milliGRAM(s) Oral at bedtime  OLANZapine 2.5 milliGRAM(s) Oral <User Schedule>  pantoprazole    Tablet 40 milliGRAM(s) Oral every 12 hours  polyethylene glycol 3350 17 Gram(s) Oral every 12 hours  senna 2 Tablet(s) Oral at bedtime  verapamil  milliGRAM(s) Oral every 24 hours    MEDICATIONS  (PRN):  acetaminophen     Tablet .. 650 milliGRAM(s) Oral every 6 hours PRN Mild Pain (1 - 3), Moderate Pain (4 - 6)  bisacodyl Suppository 10 milliGRAM(s) Rectal daily PRN Constipation  dextrose Oral Gel 15 Gram(s) Oral once PRN Blood Glucose LESS THAN 70 milliGRAM(s)/deciliter  diphenhydrAMINE Injectable 25 milliGRAM(s) IV Push once PRN Allergic Reaction  SUMAtriptan 100 milliGRAM(s) Oral two times a day PRN Migraine      Allergies    methylPREDNISolone (Rash; Urticaria; Flushing; Hives)    Intolerances        Vital Signs Last 24 Hrs  T(C): --  T(F): --  HR: 100 (16 Mar 2024 06:20) (82 - 105)  BP: 144/84 (16 Mar 2024 06:20) (134/75 - 168/96)  BP(mean): 108 (16 Mar 2024 06:20) (97 - 124)  RR: 18 (16 Mar 2024 06:20) (17 - 18)  SpO2: 96% (16 Mar 2024 06:20) (95% - 98%)    Parameters below as of 16 Mar 2024 06:20  Patient On (Oxygen Delivery Method): room air      Neurologic:  -Mental status: Awake, alert, oriented to person, place, and time. Speech is fluent with intact naming, repetition, and comprehension, no dysarthria. Recent and remote memory intact. Follows commands. Attention/concentration intact. Fund of knowledge appropriate.  -Cranial nerves:   II: Visual fields are full to confrontation.  III, IV, VI: Extraocular movements are intact without nystagmus.   VII: Face is symmetric  Motor: Normal bulk and tone. No pronator drift. Strength bilateral upper extremity 5/5, bilateral lower extremities 5/5. rapid finger movement slightly more fluid today.  Sensation: Intact to light touch bilaterally. No neglect or extinction on double simultaneous testing.  Coordination: No dysmetria on finger-to-nose   Gait: deferred    LABS:                        12.7   19.63 )-----------( 203      ( 15 Mar 2024 10:32 )             39.3     03-15    133<L>  |  101  |  20  ----------------------------<  469<HH>  4.4   |  23  |  0.85    Ca    9.2      15 Mar 2024 18:56  Mg     2.3     03-15        Urinalysis Basic - ( 15 Mar 2024 18:56 )    Color: x / Appearance: x / SG: x / pH: x  Gluc: 469 mg/dL / Ketone: x  / Bili: x / Urobili: x   Blood: x / Protein: x / Nitrite: x   Leuk Esterase: x / RBC: x / WBC x   Sq Epi: x / Non Sq Epi: x / Bacteria: x        RADIOLOGY & ADDITIONAL TESTS:  reviewed

## 2024-03-17 LAB
ANION GAP SERPL CALC-SCNC: 2 MMOL/L — LOW (ref 5–17)
BUN SERPL-MCNC: 13 MG/DL — SIGNIFICANT CHANGE UP (ref 7–23)
CALCIUM SERPL-MCNC: 9.1 MG/DL — SIGNIFICANT CHANGE UP (ref 8.4–10.5)
CHLORIDE SERPL-SCNC: 101 MMOL/L — SIGNIFICANT CHANGE UP (ref 96–108)
CO2 SERPL-SCNC: 31 MMOL/L — SIGNIFICANT CHANGE UP (ref 22–31)
CREAT SERPL-MCNC: 0.78 MG/DL — SIGNIFICANT CHANGE UP (ref 0.5–1.3)
EGFR: 118 ML/MIN/1.73M2 — SIGNIFICANT CHANGE UP
FIBRINOGEN PPP-MCNC: 166 MG/DL — LOW (ref 200–445)
GLUCOSE BLDC GLUCOMTR-MCNC: 114 MG/DL — HIGH (ref 70–99)
GLUCOSE BLDC GLUCOMTR-MCNC: 225 MG/DL — HIGH (ref 70–99)
GLUCOSE BLDC GLUCOMTR-MCNC: 298 MG/DL — HIGH (ref 70–99)
GLUCOSE SERPL-MCNC: 153 MG/DL — HIGH (ref 70–99)
HCT VFR BLD CALC: 36.6 % — LOW (ref 39–50)
HEV IGM SER QL: NEGATIVE — SIGNIFICANT CHANGE UP
HGB BLD-MCNC: 12 G/DL — LOW (ref 13–17)
MAGNESIUM SERPL-MCNC: 2.3 MG/DL — SIGNIFICANT CHANGE UP (ref 1.6–2.6)
MCHC RBC-ENTMCNC: 27 PG — SIGNIFICANT CHANGE UP (ref 27–34)
MCHC RBC-ENTMCNC: 32.8 GM/DL — SIGNIFICANT CHANGE UP (ref 32–36)
MCV RBC AUTO: 82.2 FL — SIGNIFICANT CHANGE UP (ref 80–100)
NRBC # BLD: 0 /100 WBCS — SIGNIFICANT CHANGE UP (ref 0–0)
PHOSPHATE SERPL-MCNC: 4.1 MG/DL — SIGNIFICANT CHANGE UP (ref 2.5–4.5)
PLATELET # BLD AUTO: 201 K/UL — SIGNIFICANT CHANGE UP (ref 150–400)
POTASSIUM SERPL-MCNC: 4.2 MMOL/L — SIGNIFICANT CHANGE UP (ref 3.5–5.3)
POTASSIUM SERPL-SCNC: 4.2 MMOL/L — SIGNIFICANT CHANGE UP (ref 3.5–5.3)
RBC # BLD: 4.45 M/UL — SIGNIFICANT CHANGE UP (ref 4.2–5.8)
RBC # FLD: 20.5 % — HIGH (ref 10.3–14.5)
SODIUM SERPL-SCNC: 134 MMOL/L — LOW (ref 135–145)
WBC # BLD: 20.81 K/UL — HIGH (ref 3.8–10.5)
WBC # FLD AUTO: 20.81 K/UL — HIGH (ref 3.8–10.5)

## 2024-03-17 PROCEDURE — 99232 SBSQ HOSP IP/OBS MODERATE 35: CPT

## 2024-03-17 PROCEDURE — 95720 EEG PHY/QHP EA INCR W/VEEG: CPT

## 2024-03-17 PROCEDURE — 99233 SBSQ HOSP IP/OBS HIGH 50: CPT

## 2024-03-17 RX ORDER — OLANZAPINE 15 MG/1
2.5 TABLET, FILM COATED ORAL ONCE
Refills: 0 | Status: COMPLETED | OUTPATIENT
Start: 2024-03-17 | End: 2024-03-17

## 2024-03-17 RX ORDER — SODIUM CHLORIDE 9 MG/ML
500 INJECTION INTRAMUSCULAR; INTRAVENOUS; SUBCUTANEOUS ONCE
Refills: 0 | Status: COMPLETED | OUTPATIENT
Start: 2024-03-17 | End: 2024-03-17

## 2024-03-17 RX ORDER — MIRTAZAPINE 45 MG/1
7.5 TABLET, ORALLY DISINTEGRATING ORAL EVERY 24 HOURS
Refills: 0 | Status: DISCONTINUED | OUTPATIENT
Start: 2024-03-17 | End: 2024-03-18

## 2024-03-17 RX ADMIN — PANTOPRAZOLE SODIUM 40 MILLIGRAM(S): 20 TABLET, DELAYED RELEASE ORAL at 07:12

## 2024-03-17 RX ADMIN — Medication 2000 MILLILITER(S): at 09:12

## 2024-03-17 RX ADMIN — GABAPENTIN 100 MILLIGRAM(S): 400 CAPSULE ORAL at 07:12

## 2024-03-17 RX ADMIN — MIRTAZAPINE 7.5 MILLIGRAM(S): 45 TABLET, ORALLY DISINTEGRATING ORAL at 21:34

## 2024-03-17 RX ADMIN — OLANZAPINE 5 MILLIGRAM(S): 15 TABLET, FILM COATED ORAL at 21:34

## 2024-03-17 RX ADMIN — SUMATRIPTAN SUCCINATE 100 MILLIGRAM(S): 4 INJECTION, SOLUTION SUBCUTANEOUS at 21:35

## 2024-03-17 RX ADMIN — Medication 200 GRAM(S): at 10:36

## 2024-03-17 RX ADMIN — Medication 3: at 16:54

## 2024-03-17 RX ADMIN — Medication 2: at 12:36

## 2024-03-17 RX ADMIN — OLANZAPINE 2.5 MILLIGRAM(S): 15 TABLET, FILM COATED ORAL at 12:36

## 2024-03-17 RX ADMIN — CHLORHEXIDINE GLUCONATE 1 APPLICATION(S): 213 SOLUTION TOPICAL at 12:47

## 2024-03-17 RX ADMIN — SUMATRIPTAN SUCCINATE 100 MILLIGRAM(S): 4 INJECTION, SOLUTION SUBCUTANEOUS at 05:07

## 2024-03-17 RX ADMIN — ENOXAPARIN SODIUM 40 MILLIGRAM(S): 100 INJECTION SUBCUTANEOUS at 21:34

## 2024-03-17 RX ADMIN — SODIUM CHLORIDE 1000 MILLILITER(S): 9 INJECTION INTRAMUSCULAR; INTRAVENOUS; SUBCUTANEOUS at 21:33

## 2024-03-17 RX ADMIN — Medication 10 MILLIGRAM(S): at 16:53

## 2024-03-17 RX ADMIN — Medication 10 MILLIGRAM(S): at 21:33

## 2024-03-17 RX ADMIN — SUMATRIPTAN SUCCINATE 100 MILLIGRAM(S): 4 INJECTION, SOLUTION SUBCUTANEOUS at 06:07

## 2024-03-17 RX ADMIN — OLANZAPINE 2.5 MILLIGRAM(S): 15 TABLET, FILM COATED ORAL at 23:46

## 2024-03-17 RX ADMIN — GABAPENTIN 600 MILLIGRAM(S): 400 CAPSULE ORAL at 21:34

## 2024-03-17 RX ADMIN — Medication 10 MILLIGRAM(S): at 23:38

## 2024-03-17 RX ADMIN — POLYETHYLENE GLYCOL 3350 17 GRAM(S): 17 POWDER, FOR SOLUTION ORAL at 07:13

## 2024-03-17 RX ADMIN — SUMATRIPTAN SUCCINATE 100 MILLIGRAM(S): 4 INJECTION, SOLUTION SUBCUTANEOUS at 22:58

## 2024-03-17 RX ADMIN — Medication 10 MILLIGRAM(S): at 07:14

## 2024-03-17 RX ADMIN — Medication 2 MILLIGRAM(S): at 07:13

## 2024-03-17 RX ADMIN — SENNA PLUS 2 TABLET(S): 8.6 TABLET ORAL at 21:34

## 2024-03-17 RX ADMIN — PANTOPRAZOLE SODIUM 40 MILLIGRAM(S): 20 TABLET, DELAYED RELEASE ORAL at 19:00

## 2024-03-17 RX ADMIN — POLYETHYLENE GLYCOL 3350 17 GRAM(S): 17 POWDER, FOR SOLUTION ORAL at 18:59

## 2024-03-17 RX ADMIN — OLANZAPINE 2.5 MILLIGRAM(S): 15 TABLET, FILM COATED ORAL at 07:12

## 2024-03-17 RX ADMIN — Medication 2 MILLIGRAM(S): at 19:00

## 2024-03-17 RX ADMIN — OLANZAPINE 2.5 MILLIGRAM(S): 15 TABLET, FILM COATED ORAL at 05:15

## 2024-03-17 RX ADMIN — GABAPENTIN 100 MILLIGRAM(S): 400 CAPSULE ORAL at 16:53

## 2024-03-17 RX ADMIN — Medication 120 MILLIGRAM(S): at 07:12

## 2024-03-17 NOTE — PROGRESS NOTE ADULT - SUBJECTIVE AND OBJECTIVE BOX
Neurology Stroke Progress Note    INTERVAL HPI/OVERNIGHT EVENTS:  Pt given additional dose of Zyprexa 2.5mg in AM for anxiety/insomnia. Pt states he was unable to sleep for most of the night, thinks remeron helped him sleep better. Denies feeling anxious currently, states overall anxiety levels are controlled. Pt endorses difficulty focusing, was trying to learn meditation techniques but was unable to focus on the details. States improvement in hand weakness/ bilaterally. Plan for plasmapheresis today.     MEDICATIONS  (STANDING):  albumin human  5% IVPB 3500 milliLiter(s) IV Intermittent once  baclofen 10 milliGRAM(s) Oral <User Schedule>  calcium gluconate IVPB 2 Gram(s) IV Intermittent once  chlorhexidine 2% Cloths 1 Application(s) Topical daily  dexAMETHasone  Injectable 2 milliGRAM(s) IV Push <User Schedule>  dextrose 5%. 1000 milliLiter(s) (100 mL/Hr) IV Continuous <Continuous>  dextrose 5%. 1000 milliLiter(s) (50 mL/Hr) IV Continuous <Continuous>  dextrose 50% Injectable 25 Gram(s) IV Push once  dextrose 50% Injectable 12.5 Gram(s) IV Push once  dextrose 50% Injectable 25 Gram(s) IV Push once  enoxaparin Injectable 40 milliGRAM(s) SubCutaneous every 24 hours  gabapentin 100 milliGRAM(s) Oral <User Schedule>  gabapentin 600 milliGRAM(s) Oral at bedtime  glucagon  Injectable 1 milliGRAM(s) IntraMuscular once  heparin  Lock Flush 100 Units/mL Injectable 600 Unit(s) IV Push once  heparin  Lock Flush 100 Units/mL Injectable 600 Unit(s) IV Push once  insulin lispro (ADMELOG) corrective regimen sliding scale   SubCutaneous Before meals and at bedtime  OLANZapine 2.5 milliGRAM(s) Oral <User Schedule>  OLANZapine 5 milliGRAM(s) Oral at bedtime  pantoprazole    Tablet 40 milliGRAM(s) Oral every 12 hours  polyethylene glycol 3350 17 Gram(s) Oral every 12 hours  senna 2 Tablet(s) Oral at bedtime  verapamil  milliGRAM(s) Oral every 24 hours    MEDICATIONS  (PRN):  acetaminophen     Tablet .. 650 milliGRAM(s) Oral every 6 hours PRN Mild Pain (1 - 3), Moderate Pain (4 - 6)  bisacodyl Suppository 10 milliGRAM(s) Rectal daily PRN Constipation  dextrose Oral Gel 15 Gram(s) Oral once PRN Blood Glucose LESS THAN 70 milliGRAM(s)/deciliter  diphenhydrAMINE Injectable 25 milliGRAM(s) IV Push once PRN Allergic Reaction  diphenhydrAMINE IVPB 25 milliGRAM(s) IV Intermittent once PRN Allergic Reaction  SUMAtriptan 100 milliGRAM(s) Oral two times a day PRN Migraine      Allergies    methylPREDNISolone (Rash; Urticaria; Flushing; Hives)    Intolerances        Vital Signs Last 24 Hrs  T(C): 37.2 (17 Mar 2024 11:02), Max: 37.2 (16 Mar 2024 18:41)  T(F): 98.9 (17 Mar 2024 11:02), Max: 98.9 (16 Mar 2024 18:41)  HR: 75 (17 Mar 2024 08:15) (75 - 88)  BP: 137/72 (17 Mar 2024 08:15) (135/84 - 156/90)  BP(mean): 111 (17 Mar 2024 05:10) (104 - 118)  RR: 18 (17 Mar 2024 08:15) (18 - 18)  SpO2: 94% (17 Mar 2024 08:15) (94% - 94%)    Parameters below as of 17 Mar 2024 08:15  Patient On (Oxygen Delivery Method): room air    Physical exam:  General: No acute distress, awake and alert  Eyes: Anicteric sclerae, moist conjunctivae, see below for CNs  Neck: trachea midline  Cardiovascular: Regular rate and rhythm  Pulmonary: No use of accessory muscles    Neurologic:  -Mental status: Awake, alert, oriented to person, place, and time. Speech is fluent with intact naming, repetition, and comprehension, no dysarthria. Recent and remote memory intact. Follows commands.  -Cranial nerves:   II: Visual fields are full to confrontation, endorsing some blurry vision in peripheral vision bilaterally.   III, IV, VI: Extraocular movements are intact without nystagmus.   VII: Face is symmetric  Motor: Normal bulk and tone. No pronator drift. Strength bilateral upper extremity 5/5, bilateral lower extremities 5/5. rapid finger movement slightly more fluid today  Sensation: Intact to light touch bilaterally. No neglect or extinction on double simultaneous testing.  Coordination: No dysmetria on finger-to-nose but pt states it is harder to execute on LUE.  Gait: deferred    LABS:                        12.0   20.81 )-----------( 201      ( 17 Mar 2024 09:50 )             36.6     03-17    134<L>  |  101  |  13  ----------------------------<  153<H>  4.2   |  31  |  0.78    Ca    9.1      17 Mar 2024 09:50  Phos  4.1     03-17  Mg     2.3     03-17        Urinalysis Basic - ( 17 Mar 2024 09:50 )    Color: x / Appearance: x / SG: x / pH: x  Gluc: 153 mg/dL / Ketone: x  / Bili: x / Urobili: x   Blood: x / Protein: x / Nitrite: x   Leuk Esterase: x / RBC: x / WBC x   Sq Epi: x / Non Sq Epi: x / Bacteria: x        RADIOLOGY & ADDITIONAL TESTS:    · EEG Report  Day 1:  3/16/2024, 10:31:25 AM to next morning at 07:00 AM Daily Summary:    •	Unremarkable awake and sleep recording.  •	There were no findings of active epilepsy, however this alone does not rule out the diagnosis.      Neurology Stroke Progress Note    INTERVAL HPI/OVERNIGHT EVENTS:  Pt given additional dose of Zyprexa 2.5mg ~0400 AM for insomnia. Pt states he was unable to sleep for most of the night, thinks remeron helped him sleep better. Denies feeling anxious currently, states overall anxiety levels are controlled. Pt endorses difficulty focusing, was trying to learn meditation techniques but was unable to focus on the details. States improvement in hand weakness/ bilaterally. Plan for plasmapheresis today.     MEDICATIONS  (STANDING):  albumin human  5% IVPB 3500 milliLiter(s) IV Intermittent once  baclofen 10 milliGRAM(s) Oral <User Schedule>  calcium gluconate IVPB 2 Gram(s) IV Intermittent once  chlorhexidine 2% Cloths 1 Application(s) Topical daily  dexAMETHasone  Injectable 2 milliGRAM(s) IV Push <User Schedule>  dextrose 5%. 1000 milliLiter(s) (100 mL/Hr) IV Continuous <Continuous>  dextrose 5%. 1000 milliLiter(s) (50 mL/Hr) IV Continuous <Continuous>  dextrose 50% Injectable 25 Gram(s) IV Push once  dextrose 50% Injectable 12.5 Gram(s) IV Push once  dextrose 50% Injectable 25 Gram(s) IV Push once  enoxaparin Injectable 40 milliGRAM(s) SubCutaneous every 24 hours  gabapentin 100 milliGRAM(s) Oral <User Schedule>  gabapentin 600 milliGRAM(s) Oral at bedtime  glucagon  Injectable 1 milliGRAM(s) IntraMuscular once  heparin  Lock Flush 100 Units/mL Injectable 600 Unit(s) IV Push once  heparin  Lock Flush 100 Units/mL Injectable 600 Unit(s) IV Push once  insulin lispro (ADMELOG) corrective regimen sliding scale   SubCutaneous Before meals and at bedtime  OLANZapine 2.5 milliGRAM(s) Oral <User Schedule>  OLANZapine 5 milliGRAM(s) Oral at bedtime  pantoprazole    Tablet 40 milliGRAM(s) Oral every 12 hours  polyethylene glycol 3350 17 Gram(s) Oral every 12 hours  senna 2 Tablet(s) Oral at bedtime  verapamil  milliGRAM(s) Oral every 24 hours    MEDICATIONS  (PRN):  acetaminophen     Tablet .. 650 milliGRAM(s) Oral every 6 hours PRN Mild Pain (1 - 3), Moderate Pain (4 - 6)  bisacodyl Suppository 10 milliGRAM(s) Rectal daily PRN Constipation  dextrose Oral Gel 15 Gram(s) Oral once PRN Blood Glucose LESS THAN 70 milliGRAM(s)/deciliter  diphenhydrAMINE Injectable 25 milliGRAM(s) IV Push once PRN Allergic Reaction  diphenhydrAMINE IVPB 25 milliGRAM(s) IV Intermittent once PRN Allergic Reaction  SUMAtriptan 100 milliGRAM(s) Oral two times a day PRN Migraine      Allergies    methylPREDNISolone (Rash; Urticaria; Flushing; Hives)    Intolerances        Vital Signs Last 24 Hrs  T(C): 37.2 (17 Mar 2024 11:02), Max: 37.2 (16 Mar 2024 18:41)  T(F): 98.9 (17 Mar 2024 11:02), Max: 98.9 (16 Mar 2024 18:41)  HR: 75 (17 Mar 2024 08:15) (75 - 88)  BP: 137/72 (17 Mar 2024 08:15) (135/84 - 156/90)  BP(mean): 111 (17 Mar 2024 05:10) (104 - 118)  RR: 18 (17 Mar 2024 08:15) (18 - 18)  SpO2: 94% (17 Mar 2024 08:15) (94% - 94%)    Parameters below as of 17 Mar 2024 08:15  Patient On (Oxygen Delivery Method): room air    Physical exam:  General: No acute distress, awake and alert  Eyes: Anicteric sclerae, moist conjunctivae, see below for CNs  Neck: trachea midline  Cardiovascular: Regular rate and rhythm  Pulmonary: No use of accessory muscles    Neurologic:  -Mental status: Awake, alert, oriented to person, place, and time. Speech is fluent with intact naming, repetition, and comprehension, no dysarthria. Recent and remote memory intact. Follows commands.  -Cranial nerves:   II: Visual fields are full to confrontation, endorsing some blurry vision in peripheral vision bilaterally.   III, IV, VI: Extraocular movements are intact without nystagmus.   VII: Face is symmetric  Motor: Normal bulk and tone. No pronator drift. Strength bilateral upper extremity 5/5, bilateral lower extremities 5/5. rapid finger movement slightly more fluid today  Sensation: Intact to light touch bilaterally. No neglect or extinction on double simultaneous testing.  Coordination: No dysmetria on finger-to-nose but pt states it is harder to execute on LUE.  Gait: deferred    LABS:                        12.0   20.81 )-----------( 201      ( 17 Mar 2024 09:50 )             36.6     03-17    134<L>  |  101  |  13  ----------------------------<  153<H>  4.2   |  31  |  0.78    Ca    9.1      17 Mar 2024 09:50  Phos  4.1     03-17  Mg     2.3     03-17        Urinalysis Basic - ( 17 Mar 2024 09:50 )    Color: x / Appearance: x / SG: x / pH: x  Gluc: 153 mg/dL / Ketone: x  / Bili: x / Urobili: x   Blood: x / Protein: x / Nitrite: x   Leuk Esterase: x / RBC: x / WBC x   Sq Epi: x / Non Sq Epi: x / Bacteria: x        RADIOLOGY & ADDITIONAL TESTS:    · EEG Report  Day 1:  3/16/2024, 10:31:25 AM to next morning at 07:00 AM Daily Summary:    •	Unremarkable awake and sleep recording.  •	There were no findings of active epilepsy, however this alone does not rule out the diagnosis.

## 2024-03-17 NOTE — PROGRESS NOTE ADULT - ASSESSMENT
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 Transfusion medicine is consulted for plasma exchange.     On previous admission, patient had PET scan performed 3/4 showing parietoocciptial and temporal region hypometabolism, and per neurology concern was for autoimmune encephalitis. LP on previous admission was notable of opening pressure of 30 mmHg but otherwise unremarkable. MRI brain notable only for degenerative changes, and no evidence of venous sinus thrombosis. Patient was given IVIG and steroids (allergic reaction to solumedrol and so decadron chosen) which per report, patient had been compliant with.     Transfusion medicine is consulted for plasma exchange. Patient is s/p 2 treatment of plasma exchange 3/13/24 and 3/15, and is receiving his 3rd session today. Will plan for sessions 3/19 and 3/21    Plan   - Autoimmune (evelyn-NMDAR) encephalitis is a category I indication for therapeutic plasma exchange. We will plan for 5 sessions every other day  - Risks and benefits of plasma exchange explained to patient. All of his questions were answered and he verbalized understanding and consented to therapy.   - fibrinogen 166 today - please administer 5 units of cryoprecipitate  - please monitor daily calcium and fibrinogen clauss levels. With plasmapharesis, both of these are expected to decrease. 2g of calcium are included with plasmapharesis orders.   - please maintain active T&S   - If fibrinogen <200, will advise on cryoprecipitate administration  - further management of autoimmune encephalitis per Neurology/primary team  - next session planned 3/19/24    D/w transfusion medicine attending, Dr Nehemias Yancey

## 2024-03-17 NOTE — PROGRESS NOTE ADULT - SUBJECTIVE AND OBJECTIVE BOX
INTERVAL HPI/OVERNIGHT EVENTS:  Afebrile, s/p 3rd plasmapheresis.  Feels better, improved mentation, strength is 80% to baselien    CONSTITUTIONAL:  No fever, chills, night sweats  EYES:  No photophobia or visual changes  CARDIOVASCULAR:  No chest pain  RESPIRATORY:  No cough, wheezing, or SOB   GASTROINTESTINAL:  No nausea, vomiting, diarrhea, constipation, or abdominal pain  GENITOURINARY:  No frequency, urgency, dysuria or hematuria  NEUROLOGIC:  No headache, lightheadedness      ANTIBIOTICS/RELEVANT:          Vital Signs Last 24 Hrs  T(C): 36.8 (17 Mar 2024 13:38), Max: 37.2 (16 Mar 2024 18:41)  T(F): 98.2 (17 Mar 2024 13:38), Max: 98.9 (16 Mar 2024 18:41)  HR: 98 (17 Mar 2024 13:38) (75 - 105)  BP: 139/88 (17 Mar 2024 13:38) (135/84 - 143/88)  BP(mean): 107 (17 Mar 2024 13:38) (104 - 111)  RR: 19 (17 Mar 2024 13:38) (17 - 19)  SpO2: 98% (17 Mar 2024 13:38) (94% - 98%)    Parameters below as of 17 Mar 2024 08:15  Patient On (Oxygen Delivery Method): room air        PHYSICAL EXAM:  Constitutional:  Alert  HEENT:  NC, Sclerae anicteric, conjunctivae clear, PERRL.  No nasal exudate or sinus tenderness;  No buccal mucosal lesions, no pharyngeal erythema or exudate	  Neck:  Supple, no adenopathy.  RIJ HD catheter  Respiratory:  Clear bilaterally  Cardiovascular:  RRR, S1S2, no murmur appreciated  Gastrointestinal:  Symmetric, normoactive BS, soft, NT, no masses, guarding or rebound.  No HSM  Extremities:  No edema      LABS:                        12.0   20.81 )-----------( 201      ( 17 Mar 2024 09:50 )             36.6         03-17    134<L>  |  101  |  13  ----------------------------<  153<H>  4.2   |  31  |  0.78    Ca    9.1      17 Mar 2024 09:50  Phos  4.1     03-17  Mg     2.3     03-17        Urinalysis Basic - ( 17 Mar 2024 09:50 )    Color: x / Appearance: x / SG: x / pH: x  Gluc: 153 mg/dL / Ketone: x  / Bili: x / Urobili: x   Blood: x / Protein: x / Nitrite: x   Leuk Esterase: x / RBC: x / WBC x   Sq Epi: x / Non Sq Epi: x / Bacteria: x        MICROBIOLOGY:    Quantiferon Gold 3/12 ND      RADIOLOGY & ADDITIONAL STUDIES:

## 2024-03-17 NOTE — EEG REPORT - NS EEG TEXT BOX
[facility] Department of Neurology  Inpatient Continuous video-Electroencephalography Report    Acquisition Details:  Electroencephalography was acquired using a minimum of 21 channels on an [employer] Neu[employer] system v 9.3.1 with electrode placement according to the standard International 10-20 system following ACNS (American Clinical Neurophysiology Society) guidelines for Long-Term Video EEG monitoring.  Anterior temporal T1 and T2 electrodes were utilized whenever possible.   The [employer] automated spike & seizure detections were all reviewed in detail, in addition to extensive portions of raw EEG.      Day 1:  3/16/2024, 10:31:25 AM to next morning at 07:00 AM   Background:  continuous, with predominantly alpha and beta frequencies.  Generalized Slowing:  None  Symmetry/Focality: No persistent asymmetries of voltage or frequency.     Voltage:  Normal (20+ uV)  Organization:  Appropriate anterior-posterior gradient  Posterior Dominant Rhythm:  10 Hz symmetric, well-organized, and well-modulated  Sleep:  Symmetric, synchronous spindles and K complexes.  Variability:   Yes		Reactivity:  Yes    Spontaneous Activity:  No epileptiform discharges   Events:  •	No electrographic seizures or significant clinical events occurred during this study.  Provocations:  •	Hyperventilation: was not performed.  •	Photic stimulation: was not performed.  Daily Summary:    •	Unremarkable awake and sleep recording.  •	There were no findings of active epilepsy, however this alone does not rule out the diagnosis.         Yasmine Armstrong DO  Attending Neurologist, [facility] Epilepsy Program

## 2024-03-17 NOTE — PROGRESS NOTE ADULT - ASSESSMENT
39y Male with PMHx of viral meningitis with sequela of HTN and intractable headaches (followed by Dr. Weston), recent admission 3/4/24 for autoimmune encephalitis management/treatment (had presented as worsening headaches and cognition - most notably e-mails that didn't make sense) s/p IV steroids and IVIG, presented again on 3/12 forfor bilateral hand weakness as well as wide based gait, UE tremors, UE cogwheeling, dysmetria, and UE weakness. Went for outpatient MRI brain and cervical spine. Admitted for further neurologic workup. Restarted on IV steroids, and plasmapheresis.    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  - s/p LP 3/4 with elevated opening pressure at 30 mmHg, remainder of CSF negative  - continue decadron 2mg q12h (no overnight dose) Plan to wean steroids as tolerated after each plasma exchange session  - s/p LP 3/13. infectious panel so far negative, awaiting send out studies. autoimmune, and fungal studies, all herpes panels (HSV 1, HSV 2, HSV 6), CSF neopterin (write in), TB, RPR, VDRL, AFB, bacterial antigens, full viral PCR panel, oligoclonal bands, Igg index, myelin basic protein  - LP done 3/4 opening pressure was 30, LP done 3/13 with opening pressure of 13.5  - MRA head/neck done for vessel imaging - no stenosis or occlusion, no remarkable findings  - PET body - nonspecific right lung nodules  - testicular ultrasound neg  - Pulm consult - lung nodules likely inflammation, no contraindication to Rituximab  - plasma exchange started on 3/13, plan for 5 sessions every other day. will need to send daily fibrinogen clauss levels   - HD catheter for the plasma exchange placed by IR on 3/13  - ID consult for rule out possible infectious causes, and prior to initiation of Rituximab - no contraindication to Rituximab  - D dimer elevated to 502, LE dopplers negative  - Sed rate 15  - CRP <3  - repeat Neuropsych consult on 3/14 - improved from prior admission  - Rheum consulted 3/15 for elevated LAKSHMI level, f/u recs  - EEG negative for epilpetiform discharges, discontinued on 3/17 AM  - continue verapamil 120mg  - f/u serum hepatitis panel, quantiferon, NMO antibody, ACE, serum protein and immuno electrophoresis, ANCA, LAKSHMI, double stranded DNA, cryoglobulin, copper, ceruloplasmin  - HIV neg  - urine heavy metal screen, urine porphyrins, urine porphobilinogen  - c/w gabapentin 100mg at 7am and 4pm, 600mg HS and baclofen 10mg three times a day  - continue home pantoprazole BID  - q8hr general neuro checks and vitals    #migraine  - sumatriptan 100mg PO PRN once (max 2 tabs)  - if needed, can given sumatriptan 6mg subcutaneously once on top of PO    Cards  #HTN w/ LVH  #sinus tachy  - cards following, appreciate recs  - goal normotension  - hold amlodipine  - continue verapamil 120mg    Pulm  - call provider if SPO2 < 94%  - uptake noted in right upper lobe of right lung on PET; CT C/A/P demonstrates clustered nodular and ground-glass opacities in bilateral upper lobes, more prominent over right; findings most consistent with infectious/inflammatory etiology  - pulm consulted during recent admission  - repeat CT in 6-12 weeks to ensure resolution    GI  #Nutrition/Fluids/Electrolytes   - replete K<4 and Mg <2  - Diet: Regular  - IVF: NS 500cc/hr then continue 100cc/hr    Infectious Disease  - afebrile on admission  - leukocytosis likely due to steroids    Endocrine  #hyperglycemia  - A1C results: 5.2  - continue FS monitoring while on steroids  - on low dose SSI  - defer lantus for now given decrease in steroid dose    - TSH results: 0.594    Renal  #hyponatremia as low as 129, slowly uptrending  - discontinued remeron if contributing to possible SIADH  - Na today 135 correcting for hyperglycemia   - monitor daily sodium levels     Heme  #low fibrinogen level, likely 2/2 plasmapheresis  - daily fibrinogen clauss levels  - fibrinogen prior to plasmapheresis 166 today, per heme will give 5u cryoprecipitate after plasmapheresis today     Psych  #anxiety  - Zyprexa standing (2.5 am, 2.5 pm, 5 at bedtime)  - Remeron d/c due to hyponatremia  - psych consulted 3/15, agrees with current regimen   - can give additional zyprexa 2.5mg PRN (no more than 15mg total per day max)  - EKG every other day to monitor qtc (next due 3/17), last 3/15     DVT Prophylaxis  - SCDs for DVT prophylaxis     Dispo: home when cleared     Discussed daily hospital plans and goals with patient at bedside.    Discussed with Neurology Attending, Dr. Weston  39y Male with PMHx of viral meningitis with sequela of HTN and intractable headaches (followed by Dr. Weston), recent admission 3/4/24 for autoimmune encephalitis management/treatment (had presented as worsening headaches and cognition - most notably e-mails that didn't make sense) s/p IV steroids and IVIG, presented again on 3/12 forfor bilateral hand weakness as well as wide based gait, UE tremors, UE cogwheeling, dysmetria, and UE weakness. Went for outpatient MRI brain and cervical spine. Admitted for further neurologic workup. Restarted on IV steroids, and plasmapheresis.    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  - s/p LP 3/4 with elevated opening pressure at 30 mmHg, remainder of CSF negative  - continue decadron 2mg q12h (no overnight dose) Plan to wean steroids as tolerated after each plasma exchange session  - s/p LP 3/13. infectious panel so far negative, awaiting send out studies. autoimmune, and fungal studies, all herpes panels (HSV 1, HSV 2, HSV 6), CSF neopterin (write in), TB, RPR, VDRL, AFB, bacterial antigens, full viral PCR panel, oligoclonal bands, Igg index, myelin basic protein  - LP done 3/4 opening pressure was 30, LP done 3/13 with opening pressure of 13.5  - MRA head/neck done for vessel imaging - no stenosis or occlusion, no remarkable findings  - PET body - nonspecific right lung nodules  - testicular ultrasound neg  - Pulm consult - lung nodules likely inflammation, no contraindication to Rituximab  - plasma exchange started on 3/13, will plan now for 7 TOTAL sessions every other day as pt continues to improve with plasmapheresis. will need to send daily fibrinogen clauss levels   - HD catheter for the plasma exchange placed by IR on 3/13  - ID consult for rule out possible infectious causes, and prior to initiation of Rituximab - no contraindication to Rituximab  - D dimer elevated to 502, LE dopplers negative  - Sed rate 15  - CRP <3  - repeat Neuropsych consult on 3/14 - improved from prior admission  - Rheum consulted 3/15 for elevated LAKSHMI level, f/u recs  - EEG negative for epileptiform discharges, discontinued on 3/17 AM  - continue verapamil 120mg  - f/u serum hepatitis panel, quantiferon, NMO antibody, ACE, serum protein and immuno electrophoresis, ANCA, LAKSHMI, double stranded DNA, cryoglobulin, copper, ceruloplasmin  - HIV neg  - urine heavy metal screen, urine porphyrins, urine porphobilinogen  - c/w gabapentin 100mg at 7am and 4pm, 600mg QHS and baclofen 10mg three times a day  - continue home pantoprazole BID  - q8hr general neuro checks and vitals    #migraine  - sumatriptan 100mg PO PRN once (max 2 tabs)  - if needed, can given sumatriptan 6mg subcutaneously once on top of PO    #insomnia  - restart remeron 7.5mg qhs, will monitor daily sodium level     Cards  #HTN w/ LVH  #sinus tachy  - cards following, appreciate recs  - goal normotension  - hold amlodipine  - continue verapamil 120mg    Pulm  - call provider if SPO2 < 94%  - uptake noted in right upper lobe of right lung on PET; CT C/A/P demonstrates clustered nodular and ground-glass opacities in bilateral upper lobes, more prominent over right; findings most consistent with infectious/inflammatory etiology  - pulm consulted during recent admission  - repeat CT in 6-12 weeks to ensure resolution    GI  #Nutrition/Fluids/Electrolytes   - replete K<4 and Mg <2  - Diet: Regular  - IVF: NS 500cc/hr then continue 100cc/hr    Infectious Disease  - afebrile on admission  - leukocytosis likely due to steroids    Endocrine  #hyperglycemia  - A1C results: 5.2  - continue FS monitoring while on steroids  - on low dose SSI  - defer lantus for now given decrease in steroid dose    - TSH results: 0.594    Renal  #hyponatremia as low as 129, slowly uptrending  - discontinued remeron if contributing to possible SIADH  - Na today 135 correcting for hyperglycemia   - monitor daily sodium levels     Heme  #low fibrinogen level, likely 2/2 plasmapheresis  - daily fibrinogen clauss levels  - fibrinogen prior to plasmapheresis 166 today, per heme will give 5u cryoprecipitate after plasmapheresis today     Psych  #anxiety  - Zyprexa standing (2.5 am, 2.5 pm, 5 at bedtime)  - Remeron d/c due to hyponatremia  - psych consulted 3/15, agrees with current regimen   - can give additional zyprexa 2.5mg PRN (no more than 15mg total per day max)  - EKG every other day to monitor qtc (next due 3/17), last 3/15     DVT Prophylaxis  - SCDs for DVT prophylaxis     Dispo: home when cleared     Discussed daily hospital plans and goals with patient at bedside.    Discussed with Neurology Attending, Dr. Weston

## 2024-03-17 NOTE — PROGRESS NOTE ADULT - ASSESSMENT
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  No evidence for current infection based upon CSF studies, CSF PCR panels X 2 neg for community acquired pathogens, prior Lyme/syphilis studies neg, MRI unremarkable.  Though Enteroviruses can cause encephalitis, PCR likely would be positive and findings would be present on MRI. Chronic/persistent meningoencephalitis from EVs occur in patients with B cell defects but he has no known immunocompromise at this time.  Regarding potential for latent Enterovirus infection - the Enterovirus primer in the CSF PCR panel detect members of the Enterovirus genus, including the numbered EV species, Coxsackie and Echoviruses.  The only data I see regarding latent/persistent infection of Enteroviruses is detection of the numbered EV RNA in myocardium of patients with cardiomyopathies.  I see no evidence for latent/dormant infection in the CNS or elsewhere.  Regarding rituximab and risk for subsequent infection - the pathogens for which he would be susceptible to reactivation include HBV, HCV and HEV.  He was HBV core Ab/sAg and HCV Ab neg, was vaccinated for HBV.  HEV not tested previously - serology likely will be altered by plasmapheresis but would test. Regarding infections to which he would be more susceptible - Enteroviruses (would be at risk for chronic encephalitis), PJP (increased with rituximab/steroids), PML are included.  He also would be at increased risk for respiratory infections. Quantiferon is neg, as are CSF EBV and HHV-6 PCRs. Abnormalities seen on CT were non-specific and he is asymptomatic but would monitor off antibiotics for development of symptoms.  Suggest:  - Please add on Hepatitis E IgG to prior serology (IgM was added).   - F/U CSF cultures (bacterial, fungal and AFB) from 3/4 and 3/13  - F/U CSF IgG index and VDRL  - F/U CSF Lyme Abs, West Nile Virus IgM/IgG  - Agree with Pulmonary recommendation to repeat CT chest in 3-5 weeks (4-6 following prior on 3/8).  Will follow with you, team 1.

## 2024-03-17 NOTE — PROGRESS NOTE ADULT - SUBJECTIVE AND OBJECTIVE BOX
No issues, feeling well.  OVERNIGHT EVENTS: NAEO      Remaining ROS negative       PHYSICAL EXAM:    General: nad, sitting up in bed  HEENT: NC/AT MMM  Cardiovascular: +S1/S2, RRR  Respiratory: CTA B/L; no W/R/R  Gastrointestinal: soft, NT/ND; +BSx4  Extremities: WWP; no edema  Neurological: speech fluent, follows commands, no acute deficits noted  Psychiatric: pleasant mood and affect  Dermatologic: no appreciable wounds or damage to the skin      VITAL SIGNS:  Vital Signs Last 24 Hrs  T(C): 37.2 (17 Mar 2024 11:02), Max: 37.2 (16 Mar 2024 18:41)  T(F): 98.9 (17 Mar 2024 11:02), Max: 98.9 (16 Mar 2024 18:41)  HR: 75 (17 Mar 2024 08:15) (75 - 88)  BP: 137/72 (17 Mar 2024 08:15) (135/84 - 156/90)  BP(mean): 111 (17 Mar 2024 05:10) (104 - 118)  RR: 18 (17 Mar 2024 08:15) (18 - 18)  SpO2: 94% (17 Mar 2024 08:15) (94% - 94%)    Parameters below as of 17 Mar 2024 08:15  Patient On (Oxygen Delivery Method): room air          MEDICATIONS:  MEDICATIONS  (STANDING):  albumin human  5% IVPB 3500 milliLiter(s) IV Intermittent once  baclofen 10 milliGRAM(s) Oral <User Schedule>  calcium gluconate IVPB 2 Gram(s) IV Intermittent once  chlorhexidine 2% Cloths 1 Application(s) Topical daily  dexAMETHasone  Injectable 2 milliGRAM(s) IV Push <User Schedule>  dextrose 5%. 1000 milliLiter(s) (100 mL/Hr) IV Continuous <Continuous>  dextrose 5%. 1000 milliLiter(s) (50 mL/Hr) IV Continuous <Continuous>  dextrose 50% Injectable 25 Gram(s) IV Push once  dextrose 50% Injectable 12.5 Gram(s) IV Push once  dextrose 50% Injectable 25 Gram(s) IV Push once  enoxaparin Injectable 40 milliGRAM(s) SubCutaneous every 24 hours  gabapentin 100 milliGRAM(s) Oral <User Schedule>  gabapentin 600 milliGRAM(s) Oral at bedtime  glucagon  Injectable 1 milliGRAM(s) IntraMuscular once  heparin  Lock Flush 100 Units/mL Injectable 600 Unit(s) IV Push once  heparin  Lock Flush 100 Units/mL Injectable 600 Unit(s) IV Push once  insulin lispro (ADMELOG) corrective regimen sliding scale   SubCutaneous Before meals and at bedtime  OLANZapine 2.5 milliGRAM(s) Oral <User Schedule>  OLANZapine 5 milliGRAM(s) Oral at bedtime  pantoprazole    Tablet 40 milliGRAM(s) Oral every 12 hours  polyethylene glycol 3350 17 Gram(s) Oral every 12 hours  senna 2 Tablet(s) Oral at bedtime  verapamil  milliGRAM(s) Oral every 24 hours    MEDICATIONS  (PRN):  acetaminophen     Tablet .. 650 milliGRAM(s) Oral every 6 hours PRN Mild Pain (1 - 3), Moderate Pain (4 - 6)  bisacodyl Suppository 10 milliGRAM(s) Rectal daily PRN Constipation  dextrose Oral Gel 15 Gram(s) Oral once PRN Blood Glucose LESS THAN 70 milliGRAM(s)/deciliter  diphenhydrAMINE Injectable 25 milliGRAM(s) IV Push once PRN Allergic Reaction  diphenhydrAMINE IVPB 25 milliGRAM(s) IV Intermittent once PRN Allergic Reaction  SUMAtriptan 100 milliGRAM(s) Oral two times a day PRN Migraine      ALLERGIES:  Allergies    methylPREDNISolone (Rash; Urticaria; Flushing; Hives)    Intolerances        LABS:                        12.0   20.81 )-----------( 201      ( 17 Mar 2024 09:50 )             36.6     03-17    134<L>  |  101  |  13  ----------------------------<  153<H>  4.2   |  31  |  0.78    Ca    9.1      17 Mar 2024 09:50  Phos  4.1     03-17  Mg     2.3     03-17        Urinalysis Basic - ( 17 Mar 2024 09:50 )    Color: x / Appearance: x / SG: x / pH: x  Gluc: 153 mg/dL / Ketone: x  / Bili: x / Urobili: x   Blood: x / Protein: x / Nitrite: x   Leuk Esterase: x / RBC: x / WBC x   Sq Epi: x / Non Sq Epi: x / Bacteria: x      CAPILLARY BLOOD GLUCOSE      POCT Blood Glucose.: 225 mg/dL (17 Mar 2024 11:55)      RADIOLOGY & ADDITIONAL TESTS: Reviewed.

## 2024-03-17 NOTE — PROGRESS NOTE ADULT - SUBJECTIVE AND OBJECTIVE BOX
Seen while PLEX running at bedside.   He reports overall improved symptoms both cognitively and motor. He reports he is 70% of "normal."    MEDICATIONS  (STANDING):  albumin human  5% IVPB 3500 milliLiter(s) IV Intermittent once  baclofen 10 milliGRAM(s) Oral <User Schedule>  calcium gluconate IVPB 2 Gram(s) IV Intermittent once  chlorhexidine 2% Cloths 1 Application(s) Topical daily  dexAMETHasone  Injectable 2 milliGRAM(s) IV Push <User Schedule>  dextrose 5%. 1000 milliLiter(s) (100 mL/Hr) IV Continuous <Continuous>  dextrose 5%. 1000 milliLiter(s) (50 mL/Hr) IV Continuous <Continuous>  dextrose 50% Injectable 25 Gram(s) IV Push once  dextrose 50% Injectable 12.5 Gram(s) IV Push once  dextrose 50% Injectable 25 Gram(s) IV Push once  enoxaparin Injectable 40 milliGRAM(s) SubCutaneous every 24 hours  gabapentin 100 milliGRAM(s) Oral <User Schedule>  glucagon  Injectable 1 milliGRAM(s) IntraMuscular once  heparin  Lock Flush 100 Units/mL Injectable 600 Unit(s) IV Push once  insulin lispro (ADMELOG) corrective regimen sliding scale   SubCutaneous three times a day before meals  mirtazapine 15 milliGRAM(s) Oral at bedtime  OLANZapine 5 milliGRAM(s) Oral at bedtime  OLANZapine 2.5 milliGRAM(s) Oral <User Schedule>  pantoprazole    Tablet 40 milliGRAM(s) Oral every 12 hours  polyethylene glycol 3350 17 Gram(s) Oral every 12 hours  verapamil  milliGRAM(s) Oral every 24 hours    MEDICATIONS  (PRN):  dextrose Oral Gel 15 Gram(s) Oral once PRN Blood Glucose LESS THAN 70 milliGRAM(s)/deciliter  diphenhydrAMINE Injectable 25 milliGRAM(s) IV Push once PRN Allergic Reaction  SUMAtriptan 100 milliGRAM(s) Oral two times a day PRN Migraine  traMADol 25 milliGRAM(s) Oral every 8 hours PRN Severe Pain (7 - 10)      Allergies    methylPREDNISolone (Rash; Urticaria; Flushing; Hives)    Intolerances    GENERAL: NAD, well-developed  HEAD:  Atraumatic, Normocephalic  EYES: EOMI, PERRLA, conjunctiva and sclera clear  NECK: Supple, No JVD; shiley in place  CHEST/LUNG: Clear to auscultation bilaterally; No wheeze  HEART: Regular rate and rhythm; No murmurs, rubs, or gallops  ABDOMEN: Soft, Nontender, Nondistended; Bowel sounds present  EXTREMITIES:  2+ Peripheral Pulses, No clubbing, cyanosis, or edema  NEUROLOGY: Improved  strength. AAOx3, CN grossly intact   SKIN: No rashes or lesions      Vital Signs Last 24 Hrs  T(C): 36.5 (15 Mar 2024 06:56), Max: 36.8 (14 Mar 2024 10:00)  T(F): 97.7 (15 Mar 2024 06:56), Max: 98.2 (14 Mar 2024 10:00)  HR: 64 (15 Mar 2024 08:12) (64 - 106)  BP: 168/107 (15 Mar 2024 08:12) (128/80 - 179/116)  BP(mean): 131 (15 Mar 2024 08:12) (101 - 141)  RR: 17 (15 Mar 2024 08:12) (17 - 18)  SpO2: 97% (15 Mar 2024 08:12) (95% - 98%)    Parameters below as of 15 Mar 2024 08:12  Patient On (Oxygen Delivery Method): room air

## 2024-03-18 DIAGNOSIS — R91.8 OTHER NONSPECIFIC ABNORMAL FINDING OF LUNG FIELD: ICD-10-CM

## 2024-03-18 DIAGNOSIS — F41.9 ANXIETY DISORDER, UNSPECIFIED: ICD-10-CM

## 2024-03-18 LAB
% ALBUMIN: 44.8 % — SIGNIFICANT CHANGE UP
% ALBUMIN: 45.8 % — SIGNIFICANT CHANGE UP
% ALPHA 1: 3.3 % — SIGNIFICANT CHANGE UP
% ALPHA 1: 3.4 % — SIGNIFICANT CHANGE UP
% ALPHA 2: 8 % — SIGNIFICANT CHANGE UP
% ALPHA 2: 8.4 % — SIGNIFICANT CHANGE UP
% BETA: 11.5 % — SIGNIFICANT CHANGE UP
% BETA: 11.6 % — SIGNIFICANT CHANGE UP
% GAMMA: 31.4 % — SIGNIFICANT CHANGE UP
% GAMMA: 31.8 % — SIGNIFICANT CHANGE UP
ALBUMIN SERPL ELPH-MCNC: 3.7 G/DL — SIGNIFICANT CHANGE UP (ref 3.6–5.5)
ALBUMIN SERPL ELPH-MCNC: 3.8 G/DL — SIGNIFICANT CHANGE UP (ref 3.6–5.5)
ALBUMIN/GLOB SERPL ELPH: 0.8 RATIO — SIGNIFICANT CHANGE UP
ALBUMIN/GLOB SERPL ELPH: 0.8 RATIO — SIGNIFICANT CHANGE UP
ALPHA1 GLOB SERPL ELPH-MCNC: 0.3 G/DL — SIGNIFICANT CHANGE UP (ref 0.1–0.4)
ALPHA1 GLOB SERPL ELPH-MCNC: 0.3 G/DL — SIGNIFICANT CHANGE UP (ref 0.1–0.4)
ALPHA2 GLOB SERPL ELPH-MCNC: 0.6 G/DL — SIGNIFICANT CHANGE UP (ref 0.5–1)
ALPHA2 GLOB SERPL ELPH-MCNC: 0.7 G/DL — SIGNIFICANT CHANGE UP (ref 0.5–1)
ANION GAP SERPL CALC-SCNC: 11 MMOL/L — SIGNIFICANT CHANGE UP (ref 5–17)
B-GLOBULIN SERPL ELPH-MCNC: 0.9 G/DL — SIGNIFICANT CHANGE UP (ref 0.5–1)
B-GLOBULIN SERPL ELPH-MCNC: 1 G/DL — SIGNIFICANT CHANGE UP (ref 0.5–1)
BUN SERPL-MCNC: 14 MG/DL — SIGNIFICANT CHANGE UP (ref 7–23)
CALCIUM SERPL-MCNC: 8.9 MG/DL — SIGNIFICANT CHANGE UP (ref 8.4–10.5)
CHLORIDE SERPL-SCNC: 100 MMOL/L — SIGNIFICANT CHANGE UP (ref 96–108)
CO2 SERPL-SCNC: 23 MMOL/L — SIGNIFICANT CHANGE UP (ref 22–31)
COPPER SERPL-MCNC: 56 UG/DL — LOW (ref 69–132)
CREAT SERPL-MCNC: 0.7 MG/DL — SIGNIFICANT CHANGE UP (ref 0.5–1.3)
CRYOGLOB SERPL-MCNC: SIGNIFICANT CHANGE UP
CULTURE RESULTS: NO GROWTH — SIGNIFICANT CHANGE UP
EGFR: 122 ML/MIN/1.73M2 — SIGNIFICANT CHANGE UP
FIBRINOGEN PPP-MCNC: 161 MG/DL — LOW (ref 200–445)
GAMMA GLOBULIN: 2.5 G/DL — HIGH (ref 0.6–1.6)
GAMMA GLOBULIN: 2.7 G/DL — HIGH (ref 0.6–1.6)
GLUCOSE BLDC GLUCOMTR-MCNC: 187 MG/DL — HIGH (ref 70–99)
GLUCOSE BLDC GLUCOMTR-MCNC: 339 MG/DL — HIGH (ref 70–99)
GLUCOSE BLDC GLUCOMTR-MCNC: 345 MG/DL — HIGH (ref 70–99)
GLUCOSE SERPL-MCNC: 339 MG/DL — HIGH (ref 70–99)
HCT VFR BLD CALC: 38.1 % — LOW (ref 39–50)
HGB BLD-MCNC: 12.6 G/DL — LOW (ref 13–17)
INTERPRETATION SERPL IFE-IMP: SIGNIFICANT CHANGE UP
INTERPRETATION SERPL IFE-IMP: SIGNIFICANT CHANGE UP
MAGNESIUM SERPL-MCNC: 2.3 MG/DL — SIGNIFICANT CHANGE UP (ref 1.6–2.6)
MATRIX METALLOPROTEINASE-9: 1710 NG/ML — HIGH
MCHC RBC-ENTMCNC: 26.9 PG — LOW (ref 27–34)
MCHC RBC-ENTMCNC: 33.1 GM/DL — SIGNIFICANT CHANGE UP (ref 32–36)
MCV RBC AUTO: 81.2 FL — SIGNIFICANT CHANGE UP (ref 80–100)
MISCELLANEOUS TEST NAME: SIGNIFICANT CHANGE UP
NRBC # BLD: 0 /100 WBCS — SIGNIFICANT CHANGE UP (ref 0–0)
PBG UR-MCNC: 0 MG/L — SIGNIFICANT CHANGE UP (ref 0–2)
PHOSPHATE SERPL-MCNC: 3.4 MG/DL — SIGNIFICANT CHANGE UP (ref 2.5–4.5)
PLATELET # BLD AUTO: 188 K/UL — SIGNIFICANT CHANGE UP (ref 150–400)
PORPHYRINS UR-MCNC: 0 MG/24 HR — SIGNIFICANT CHANGE UP (ref 0–1.5)
POTASSIUM SERPL-MCNC: 4.7 MMOL/L — SIGNIFICANT CHANGE UP (ref 3.5–5.3)
POTASSIUM SERPL-SCNC: 4.7 MMOL/L — SIGNIFICANT CHANGE UP (ref 3.5–5.3)
PROT PATTERN SERPL ELPH-IMP: SIGNIFICANT CHANGE UP
PROT PATTERN SERPL ELPH-IMP: SIGNIFICANT CHANGE UP
PROT SERPL-MCNC: 8.1 G/DL — SIGNIFICANT CHANGE UP (ref 6–8.3)
PROT SERPL-MCNC: 8.4 G/DL — HIGH (ref 6–8.3)
RBC # BLD: 4.69 M/UL — SIGNIFICANT CHANGE UP (ref 4.2–5.8)
RBC # FLD: 21 % — HIGH (ref 10.3–14.5)
SODIUM SERPL-SCNC: 134 MMOL/L — LOW (ref 135–145)
SPECIMEN SOURCE: SIGNIFICANT CHANGE UP
VDRL CSF-TITR: SIGNIFICANT CHANGE UP
WBC # BLD: 21.73 K/UL — HIGH (ref 3.8–10.5)
WBC # FLD AUTO: 21.73 K/UL — HIGH (ref 3.8–10.5)

## 2024-03-18 PROCEDURE — 99232 SBSQ HOSP IP/OBS MODERATE 35: CPT

## 2024-03-18 PROCEDURE — 99233 SBSQ HOSP IP/OBS HIGH 50: CPT

## 2024-03-18 PROCEDURE — 99253 IP/OBS CNSLTJ NEW/EST LOW 45: CPT

## 2024-03-18 RX ORDER — METOPROLOL TARTRATE 50 MG
25 TABLET ORAL EVERY 24 HOURS
Refills: 0 | Status: DISCONTINUED | OUTPATIENT
Start: 2024-03-18 | End: 2024-03-21

## 2024-03-18 RX ORDER — ALBUMIN HUMAN 25 %
4000 VIAL (ML) INTRAVENOUS ONCE
Refills: 0 | Status: DISCONTINUED | OUTPATIENT
Start: 2024-03-19 | End: 2024-03-20

## 2024-03-18 RX ORDER — OLANZAPINE 15 MG/1
2.5 TABLET, FILM COATED ORAL ONCE
Refills: 0 | Status: COMPLETED | OUTPATIENT
Start: 2024-03-18 | End: 2024-03-20

## 2024-03-18 RX ORDER — MIRTAZAPINE 45 MG/1
15 TABLET, ORALLY DISINTEGRATING ORAL EVERY 24 HOURS
Refills: 0 | Status: DISCONTINUED | OUTPATIENT
Start: 2024-03-18 | End: 2024-03-21

## 2024-03-18 RX ORDER — CALCIUM GLUCONATE 100 MG/ML
2 VIAL (ML) INTRAVENOUS ONCE
Refills: 0 | Status: DISCONTINUED | OUTPATIENT
Start: 2024-03-19 | End: 2024-03-20

## 2024-03-18 RX ADMIN — Medication 2 MILLIGRAM(S): at 19:06

## 2024-03-18 RX ADMIN — Medication 10 MILLIGRAM(S): at 22:07

## 2024-03-18 RX ADMIN — POLYETHYLENE GLYCOL 3350 17 GRAM(S): 17 POWDER, FOR SOLUTION ORAL at 05:54

## 2024-03-18 RX ADMIN — Medication 10 MILLIGRAM(S): at 05:53

## 2024-03-18 RX ADMIN — OLANZAPINE 5 MILLIGRAM(S): 15 TABLET, FILM COATED ORAL at 22:07

## 2024-03-18 RX ADMIN — GABAPENTIN 600 MILLIGRAM(S): 400 CAPSULE ORAL at 22:06

## 2024-03-18 RX ADMIN — GABAPENTIN 100 MILLIGRAM(S): 400 CAPSULE ORAL at 16:03

## 2024-03-18 RX ADMIN — Medication 120 MILLIGRAM(S): at 05:54

## 2024-03-18 RX ADMIN — Medication 25 MILLIGRAM(S): at 22:19

## 2024-03-18 RX ADMIN — SENNA PLUS 2 TABLET(S): 8.6 TABLET ORAL at 22:06

## 2024-03-18 RX ADMIN — Medication 1: at 06:12

## 2024-03-18 RX ADMIN — Medication 4: at 16:24

## 2024-03-18 RX ADMIN — ENOXAPARIN SODIUM 40 MILLIGRAM(S): 100 INJECTION SUBCUTANEOUS at 22:06

## 2024-03-18 RX ADMIN — PANTOPRAZOLE SODIUM 40 MILLIGRAM(S): 20 TABLET, DELAYED RELEASE ORAL at 19:07

## 2024-03-18 RX ADMIN — OLANZAPINE 2.5 MILLIGRAM(S): 15 TABLET, FILM COATED ORAL at 05:53

## 2024-03-18 RX ADMIN — SUMATRIPTAN SUCCINATE 100 MILLIGRAM(S): 4 INJECTION, SOLUTION SUBCUTANEOUS at 06:05

## 2024-03-18 RX ADMIN — SUMATRIPTAN SUCCINATE 100 MILLIGRAM(S): 4 INJECTION, SOLUTION SUBCUTANEOUS at 06:50

## 2024-03-18 RX ADMIN — GABAPENTIN 100 MILLIGRAM(S): 400 CAPSULE ORAL at 05:52

## 2024-03-18 RX ADMIN — POLYETHYLENE GLYCOL 3350 17 GRAM(S): 17 POWDER, FOR SOLUTION ORAL at 19:07

## 2024-03-18 RX ADMIN — Medication 2 MILLIGRAM(S): at 05:53

## 2024-03-18 RX ADMIN — CHLORHEXIDINE GLUCONATE 1 APPLICATION(S): 213 SOLUTION TOPICAL at 17:29

## 2024-03-18 RX ADMIN — Medication 10 MILLIGRAM(S): at 16:03

## 2024-03-18 RX ADMIN — Medication 4: at 11:41

## 2024-03-18 RX ADMIN — MIRTAZAPINE 15 MILLIGRAM(S): 45 TABLET, ORALLY DISINTEGRATING ORAL at 22:06

## 2024-03-18 RX ADMIN — PANTOPRAZOLE SODIUM 40 MILLIGRAM(S): 20 TABLET, DELAYED RELEASE ORAL at 05:53

## 2024-03-18 RX ADMIN — OLANZAPINE 2.5 MILLIGRAM(S): 15 TABLET, FILM COATED ORAL at 12:53

## 2024-03-18 NOTE — CONSULT NOTE ADULT - SKIN COMMENTS
Intubated by MICU MD acneiform rash on the back and erythematous rash anterior chest , patient denies rash prior to hospitalization but has history of acne

## 2024-03-18 NOTE — CONSULT NOTE ADULT - PROVIDER SPECIALTY LIST ADULT
Cardiology
Rehab Medicine
Intervent Radiology
Transfusion Medicine
Heme/Onc
Infectious Disease
Pulmonology
Rheumatology
Internal Medicine

## 2024-03-18 NOTE — CONSULT NOTE ADULT - ASSESSMENT
3/5/24 and  in 2015 LAKSHMI was negative   3/12/24  LAKSHMI 1:160 low positive and after IVIG tx, as prior to that was negative possible false positive , with specific antiobodies 3/5/ and 3/12/24 Ds DNA negative, unlikely underlying systemic lupus as no other associated symtpoms also  SSA and SSB was negative   ANCA was negative, please check proteinase 3 and anti myeloperoxidase ab  -RF was negative please check CCP , RA is less likely   -no other symptoms of systemic autoimmune condition but as being currently tx for suspected autoimmune encephalitis and also non specific pulmonary nodules and ground glass opacities please check myositis panel- [facility] also   -   39 y old M with PMH of Severe Migraine since age 22ies- followed by Dr Weston, s/p  Menengitis 2022, took 2-3 month to recover, worse headaches  since 11/2023 Tx Trigger point injections and Botox Tx for Migraine headaches, s/p Recent Admission   3/4/24 for suspected Autoimmune encephalitis due to PET scan done 3/1/24  showing increased uptake Temporal and frontal areas, LP with opening pressure of 30 and was send to ED for evaluation and treatment, tx with Pulse steroids had allergic reaction to solumedrol 1000 mg switched to decadron, IVIG   , discharged with improved symptoms , came back to hospital   3/12/24 with new symptoms with BL hand weakness, Tremor, and abnormal wide based gait  and re Admitted for further workup , tx Steroids , and started on Plasmapheresis   3/13/24 , with improved  symptoms , being treated for Autoimmune encephalitis and Rheumatology consulted as blood work 3/12/24 resulted in LAKSHMI 1: 160 for further evaluation , prior to that 3/4/24 LAKSHMI was negative that was sent prior  to IVIG tx and also in 2015 LAKSHMI , and rest of the ALANNAH was negative checked that time for evaluation for LE rash that was thought to be due to infection     -no other systemic symptoms to suggest SLE or other connective tissue disease also no other symptoms of systemic medium vessel vasculitis , with LAKSHMI mildly positive after IVIG tx possible false positive as prior to that was negative  -DS DNA and SSA and SSB was negative cryoglobulins also negative, C and P ANCA also negative, RF negative     - ANCA was negative, please check proteinase 3 and anti myeloperoxidase ab , ANCA associated  vasculitis less likely   -RF was negative please check CCP , RA is less likely   -no other symptoms of systemic autoimmune condition but as being currently tx for suspected autoimmune encephalitis and also non specific pulmonary nodules and ground glass opacities please check myositis panel- [facility] 3, no history of URI, no chest pain , evaluated by pulmonary and planned for repeat imaging   -reviewed imaging no sig lymphadenopathy , IGG was not elevated prior to IVIG tx, also ACE was normal , if repeat LP planned please send CSF ACE  -check C3 and C4 , CK,   -Rheumatology will follow

## 2024-03-18 NOTE — CONSULT NOTE ADULT - CONSULT REQUESTED BY NAME
DR Weston
neurology
Dr. Wetson
Kyle Weston
Dr. Weston
Primary Team
Dr. Weston
Primary Team
house staff

## 2024-03-18 NOTE — PROGRESS NOTE ADULT - ASSESSMENT
This is a 39 year old man with autoimmune encephaltiis undergoing PLEX for a planned 5 day treatment  He will get his 4th treatment tomorrow.  Follow CBC, PT, PTT and fibrinogen

## 2024-03-18 NOTE — CONSULT NOTE ADULT - CONSULT REQUESTED DATE/TIME
14-Mar-2024 13:10
13-Mar-2024 13:46
14-Mar-2024
13-Mar-2024 10:51
14-Mar-2024 06:55
12-Mar-2024 23:29
14-Mar-2024 16:43
18-Mar-2024 15:40
14-Mar-2024 14:28

## 2024-03-18 NOTE — PROGRESS NOTE ADULT - SUBJECTIVE AND OBJECTIVE BOX
Patient completed third plasma exchange yesterday - reported feeling 70% better, but slept poorly and no reports thinking is foggy  No new complaints  PE: VSS  Chest: Clear  Cor: REg S1S2  Ext: no edema  Labs reviewed  Fibrinogen 160

## 2024-03-18 NOTE — PROGRESS NOTE ADULT - ASSESSMENT
39y Male with PMHx of viral meningitis with sequela of HTN and intractable headaches (followed by Dr. Weston), recent admission 3/4/24 for autoimmune encephalitis management/treatment (had presented as worsening headaches and cognition - most notably e-mails that didn't make sense) s/p IV steroids and IVIG, presented again on 3/12 forfor bilateral hand weakness as well as wide based gait, UE tremors, UE cogwheeling, dysmetria, and UE weakness. Went for outpatient MRI brain and cervical spine. Admitted for further neurologic workup. Restarted on IV steroids, and plasmapheresis.    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  - s/p LP 3/4 with elevated opening pressure at 30 mmHg, remainder of CSF negative  - continue decadron 2mg q12h (no overnight dose) Plan to wean steroids as tolerated after each plasma exchange session  - s/p LP 3/13. infectious panel so far negative, awaiting send out studies. autoimmune, and fungal studies, all herpes panels (HSV 1, HSV 2, HSV 6), CSF neopterin (write in), TB, RPR, VDRL, AFB, bacterial antigens, full viral PCR panel, oligoclonal bands, Igg index, myelin basic protein  - LP done 3/4 opening pressure was 30, LP done 3/13 with opening pressure of 13.5  - MRA head/neck done for vessel imaging - no stenosis or occlusion, no remarkable findings  - PET body - nonspecific right lung nodules  - testicular ultrasound neg  - Pulm consult - lung nodules likely inflammation, no contraindication to Rituximab  - plasma exchange started on 3/13, will plan now for 7 TOTAL sessions every other day as pt continues to improve with plasmapheresis. will need to send daily fibrinogen clauss levels   - HD catheter for the plasma exchange placed by IR on 3/13  - ID consult for rule out possible infectious causes, and prior to initiation of Rituximab - no contraindication to Rituximab  - D dimer elevated to 502, LE dopplers negative  - Sed rate 15  - CRP <3  - repeat Neuropsych consult on 3/14 - improved from prior admission  - Rheum consulted 3/15 for elevated LAKSHMI level, f/u recs  - EEG negative for epileptiform discharges, discontinued on 3/17 AM  - continue verapamil 120mg  - f/u serum hepatitis panel, quantiferon, NMO antibody, ACE, serum protein and immuno electrophoresis, ANCA, LAKSHMI, double stranded DNA, cryoglobulin, copper, ceruloplasmin  - HIV neg  - urine heavy metal screen, urine porphyrins, urine porphobilinogen  - c/w gabapentin 100mg at 7am and 4pm, 600mg QHS and baclofen 10mg three times a day  - continue home pantoprazole BID  - q8hr general neuro checks and vitals    #migraine  - sumatriptan 100mg PO PRN once (max 2 tabs)  - if needed, can given sumatriptan 6mg subcutaneously once on top of PO    #insomnia  - increase remeron to 15mg qhs, will monitor daily sodium level    Cards  #HTN w/ LVH  #sinus tachy  - cards following, appreciate recs  - goal normotension  - hold amlodipine  - discontinue verapamil and start toprol 25mg daily     Pulm  - call provider if SPO2 < 94%  - uptake noted in right upper lobe of right lung on PET; CT C/A/P demonstrates clustered nodular and ground-glass opacities in bilateral upper lobes, more prominent over right; findings most consistent with infectious/inflammatory etiology  - pulm consulted during recent admission  - repeat CT in 6-12 weeks to ensure resolution    GI  #Nutrition/Fluids/Electrolytes   - replete K<4 and Mg <2  - Diet: Regular    Infectious Disease  - afebrile on admission  - leukocytosis likely due to steroids    Endocrine  #hyperglycemia  - A1C results: 5.2  - continue FS monitoring while on steroids  - on low dose SSI  - defer lantus for now given decrease in steroid dose    - TSH results: 0.594    Renal  #hyponatremia as low as 129, slowly uptrending  - sodium now stable, restarted Remeron 15mg  - monitor daily sodium levels     Heme  #low fibrinogen level, likely 2/2 plasmapheresis  - daily fibrinogen clauss levels  - fibrinogen prior to plasmapheresis 166 3/17, s/p 5u cryoprecipitate    Psych  #anxiety  - Zyprexa standing (2.5 am, 2.5 pm, 5 at bedtime)  - psych consulted 3/15, agrees with current regimen   - can give additional zyprexa 2.5mg PRN (no more than 15mg total per day max)  - EKG every other day to monitor qtc (next due 3/17), last 3/15     Rheum  #+ ANCA  - f/u rheum labs - proteinase 3, anti myeloperoxidase ab, CCP, myositis panel, myomarker    DVT Prophylaxis  - SCDs for DVT prophylaxis     Dispo: home when cleared     Discussed daily hospital plans and goals with patient at bedside.    Discussed with Neurology Attending, Dr. Weston

## 2024-03-18 NOTE — PROGRESS NOTE ADULT - ASSESSMENT
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 Weston), recent admission 3/4/24 for autoimmune encephalitis management/treatment (had presented as worsening headaches and cognition - most notably e-mails that didn't make sense) s/p IV steroids and IVIG, presented again on 3/12 for bilateral hand weakness now restarted on IV steroids and plasmapheresis for 7 sessions

## 2024-03-18 NOTE — PROGRESS NOTE ADULT - SUBJECTIVE AND OBJECTIVE BOX
Neurology Stroke Progress Note    INTERVAL HPI/OVERNIGHT EVENTS:  Pt unable to sleep, zyprexa 2.5mg given. Patient seen and examined. Pt endorsing brain fob, difficulty focusing on tasks. Otherwise feels okay.     MEDICATIONS  (STANDING):  baclofen 10 milliGRAM(s) Oral <User Schedule>  chlorhexidine 2% Cloths 1 Application(s) Topical daily  dexAMETHasone  Injectable 2 milliGRAM(s) IV Push <User Schedule>  dextrose 5%. 1000 milliLiter(s) (100 mL/Hr) IV Continuous <Continuous>  dextrose 5%. 1000 milliLiter(s) (50 mL/Hr) IV Continuous <Continuous>  dextrose 50% Injectable 25 Gram(s) IV Push once  dextrose 50% Injectable 12.5 Gram(s) IV Push once  dextrose 50% Injectable 25 Gram(s) IV Push once  enoxaparin Injectable 40 milliGRAM(s) SubCutaneous every 24 hours  gabapentin 100 milliGRAM(s) Oral <User Schedule>  gabapentin 600 milliGRAM(s) Oral at bedtime  glucagon  Injectable 1 milliGRAM(s) IntraMuscular once  insulin lispro (ADMELOG) corrective regimen sliding scale   SubCutaneous Before meals and at bedtime  mirtazapine 15 milliGRAM(s) Oral every 24 hours  OLANZapine 5 milliGRAM(s) Oral at bedtime  OLANZapine 2.5 milliGRAM(s) Oral <User Schedule>  pantoprazole    Tablet 40 milliGRAM(s) Oral every 12 hours  polyethylene glycol 3350 17 Gram(s) Oral every 12 hours  senna 2 Tablet(s) Oral at bedtime    MEDICATIONS  (PRN):  acetaminophen     Tablet .. 650 milliGRAM(s) Oral every 6 hours PRN Mild Pain (1 - 3), Moderate Pain (4 - 6)  bisacodyl Suppository 10 milliGRAM(s) Rectal daily PRN Constipation  dextrose Oral Gel 15 Gram(s) Oral once PRN Blood Glucose LESS THAN 70 milliGRAM(s)/deciliter  diphenhydrAMINE Injectable 25 milliGRAM(s) IV Push once PRN Allergic Reaction  diphenhydrAMINE IVPB 25 milliGRAM(s) IV Intermittent once PRN Allergic Reaction  SUMAtriptan 100 milliGRAM(s) Oral two times a day PRN Migraine      Allergies    methylPREDNISolone (Rash; Urticaria; Flushing; Hives)    Intolerances        Vital Signs Last 24 Hrs  T(C): 37.2 (18 Mar 2024 14:21), Max: 37.2 (18 Mar 2024 14:21)  T(F): 98.9 (18 Mar 2024 14:21), Max: 98.9 (18 Mar 2024 14:21)  HR: 86 (18 Mar 2024 05:42) (86 - 100)  BP: 155/98 (18 Mar 2024 16:05) (135/80 - 167/111)  BP(mean): 122 (18 Mar 2024 16:05) (99 - 134)  RR: 20 (18 Mar 2024 16:05) (20 - 20)  SpO2: 97% (18 Mar 2024 05:42) (97% - 97%)    Parameters below as of 18 Mar 2024 16:05  Patient On (Oxygen Delivery Method): room air      Physical exam:  General: No acute distress, awake and alert  Eyes: Anicteric sclerae, moist conjunctivae, see below for CNs  Neck: trachea midline  Cardiovascular: No carotid bruits.   Pulmonary: No use of accessory muscles    Neurologic:  -Mental status: Awake, alert, oriented to person, place, and time. Speech is fluent with intact naming, repetition, and comprehension, no dysarthria. Recent and remote memory intact. Follows commands.  -Cranial nerves:   II: Visual fields are full to confrontation, endorsing some blurry vision in peripheral vision bilaterally.   III, IV, VI: Extraocular movements are intact without nystagmus.   VII: Face is symmetric  Motor: Normal bulk and tone. No pronator drift. Strength bilateral upper extremity 5/5, bilateral lower extremities 5/5. rapid finger movement slightly more fluid today  Sensation: Intact to light touch bilaterally. No neglect or extinction on double simultaneous testing.  Coordination: No dysmetria on finger-to-nose but pt states it is harder to execute on LUE.  Gait: better monalisa and arm swing than prior, not fully dynamic R arm swing     LABS:                        12.6   21.73 )-----------( 188      ( 18 Mar 2024 10:00 )             38.1     03-18    134<L>  |  100  |  14  ----------------------------<  339<H>  4.7   |  23  |  0.70    Ca    8.9      18 Mar 2024 10:00  Phos  3.4     03-18  Mg     2.3     03-18    Urinalysis Basic - ( 18 Mar 2024 10:00 )    Color: x / Appearance: x / SG: x / pH: x  Gluc: 339 mg/dL / Ketone: x  / Bili: x / Urobili: x   Blood: x / Protein: x / Nitrite: x   Leuk Esterase: x / RBC: x / WBC x   Sq Epi: x / Non Sq Epi: x / Bacteria: x    RADIOLOGY & ADDITIONAL TESTS:      reviewed

## 2024-03-18 NOTE — CONSULT NOTE ADULT - SUBJECTIVE AND OBJECTIVE BOX
patient tx for suspected Autoimmune encephalitis , with workup had + LAKSHMI 1:160  3/12/24 Rheumatology consulted for further evaluation     HPI:  39 y old M with PMH of Severe Migraine since age 22ies- followed by Dr Weston, Enteroviral Menengitis 2022 + PCR + , took 2-3 month to recover, since then had worse migraine headaches most recently worse since 11/2023 receiving Trigger point injections and Botox Tx for Migraine headaches, s/p Recent Admission   3/4/24 for suspected Autoimmune encephalitis due to PET scan done    showing increased uptake Temporal and frontal areas, LP with opening pressure of 30 and was send to ED for evaluation and treatment, tx with Pulse steroids had allergic reaction to solumedrol 1000 mg switched to decadron, IVIG   , discharged with improved symptoms , came back to hospital   3/12/24 with new symptoms with BL hand weakness, Tremor, and abnormal wide based gait  and re Admitted for further workup , tx Steroids , and started on Plasmapheresis   3/13/24 , with improved neurological symptoms , being treated for Autoimmune encephalitis and Rheumatology called as blood work 3/12/24 resulted in LAKSHMI 1: 160 for further evaluation     Patient has arthralgia and myalgia diffuse body pain but no joint swelling, no weakness prior to this episode, no history of recurrent infections in the past, no photosensitivity,  no oral or genital area ulcers, no vision change no history of uveitis, no sob no chest pain, no cough, no known recent infection otherwise,     2015 after visit in [facility] has LE rash and evaluated by Rheumatology for Sarcoidosis but was thought due to strep infection tx with antibiotics ASO abs + that time, with evaluation 2015 noted to have not increased normal LAKSHMI, SSA, SSB, Patricia , RNP,     Rest of the review system negative   FH no similliar symptoms in family members, mother was at bedside no major illnesses or hospitalization as growing up         Labs and imaging reviewed:    PET scan outpatient 3/1/2024     Abnormal hypometabolism particularly pronounced in the parieto-occipital and temporal regions (right greater than left), and also involving the anteromedial temporal lobes with extension into the orbitofrontal and paramedian frontal regions bilaterally. 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    LP 3/4 with elevated opening pressure at 30 mmHg, remainder of CSF negative  s/p LP 3/13. infectious panel so far negative, awaiting send out studies. autoimmune, and fungal studies, all herpes panels (HSV 1, HSV 2, HSV 6), CSF neopterin (write in), TB, RPR, VDRL, AFB, bacterial antigens, full viral PCR panel, oligoclonal bands, Igg index, myelin basic protein  HIV negative, Hep B immune, HCV negative,     - LP done 3/4 opening pressure was 30, LP done 3/13 with opening pressure of 13.5  MRA head/neck done for vessel imaging - no stenosis or occlusion, no remarkable findings    - PET body - nonspecific right lung nodules    - testicular ultrasound neg    -LE doppler US negative for DVT    -EEG negative for epileptiform discharges, discontinued on 3/17 AM      3/12/24  LAKSHMI 1:160 - after tx IVIG during first admission  ( 2015 LAKSHMI , SSA, SSB, Negative )   ANCA   DS DNA  Cryoglobulin          -       patient tx for suspected Autoimmune encephalitis , with workup had + LAKSHMI 1:160  3/12/24 Rheumatology consulted for further evaluation     HPI:  39 y old M with PMH of Severe Migraine since age 22ies- followed by Dr Weston, Enteroviral Menengitis 2022 + PCR + , took 2-3 month to recover, since then had worse migraine headaches most recently worse since 11/2023 receiving Trigger point injections and Botox Tx for Migraine headaches, s/p Recent Admission   3/4/24 for suspected Autoimmune encephalitis due to PET scan done    showing increased uptake Temporal and frontal areas, LP with opening pressure of 30 and was send to ED for evaluation and treatment, tx with Pulse steroids had allergic reaction to solumedrol 1000 mg switched to decadron, IVIG   , discharged with improved symptoms , came back to hospital   3/12/24 with new symptoms with BL hand weakness, Tremor, and abnormal wide based gait  and re Admitted for further workup , tx Steroids , and started on Plasmapheresis   3/13/24 , with improved neurological symptoms , being treated for Autoimmune encephalitis and Rheumatology called as blood work 3/12/24 resulted in LAKSHMI 1: 160 for further evaluation     Patient has arthralgia and myalgia diffuse body pain but no joint swelling, no weakness prior to this episode, no history of recurrent infections in the past, no photosensitivity,  no oral or genital area ulcers, no vision change no history of uveitis, no sob no chest pain, no cough, no known recent infection otherwise,     2015 after visit in [facility] has LE rash and evaluated by Rheumatology for Sarcoidosis but was thought due to strep infection tx with antibiotics ASO abs + that time, with evaluation 2015 noted to have not increased normal LAKSHMI, SSA, SSB, Patricia , RNP,     Rest of the review system negative   FH no similliar symptoms in family members, mother was at bedside no major illnesses or hospitalization as growing up         Labs and imaging reviewed:    PET scan outpatient 3/1/2024     Abnormal hypometabolism particularly pronounced in the parieto-occipital and temporal regions (right greater than left), and also involving the anteromedial temporal lobes with extension into the orbitofrontal and paramedian frontal regions bilaterally. 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    LP 3/4 with elevated opening pressure at 30 mmHg, remainder of CSF negative  s/p LP 3/13. infectious panel so far negative, awaiting send out studies. autoimmune, and fungal studies, all herpes panels (HSV 1, HSV 2, HSV 6), CSF neopterin (write in), TB, RPR, VDRL, AFB, bacterial antigens, full viral PCR panel, oligoclonal bands, Igg index, myelin basic protein  HIV negative, Hep B immune, HCV negative, trep AB negative, Encephalitis viral panel negative,  lyme ab serum and sCSF negative     - LP done 3/4 opening pressure was 30, LP done 3/13 with opening pressure of 13.5  MRA head/neck done for vessel imaging - no stenosis or occlusion, no remarkable findings    - PET body - nonspecific right lung nodules    - testicular ultrasound neg    -LE doppler US negative for DVT    -EEG negative for epileptiform discharges, discontinued on 3/17 AM      3/12/24  LAKSHMI 1:160 - after tx IVIG during first admission  ( 2015 LAKSHMI , SSA, SSB, Negative )   3/5/24 prior to IVIG LAKSHMI was negative   3/5/ and 3/12/24 anti DS DNA negative   3/5/24 SSA and SSB negative   3/5/  and 3/12 /24 Cryoglobulin negative     3/13/24 serum immunofixation no monoclonal band , 3/12/24 immunoelectrophoresis polyclonal Gammopathy  ( after IVIG tx)     3/5/24 ALEXANDRA negative, Lupus anticoagulant negative, Beta 2 Glycoprotein negative    3/5/24 IgG     3/5/2024 RF negative     ANCA   3/12/24 C and P ANCA negative atypical ANCA indeterminate     3/6/24   Gliadin and anti Endomysial ab negative    CT abd and chest   3/8/24    No evidence of suspicious mass or adenopathy in the chest, abdomen, or   pelvis.    Clustered nodular and groundglass opacities in bilateral upper lobes,   more prominent on the right. Findings are most consistent with   infectious/inflammatory etiology.    MRI/MRA head and Neck    No acute hemorrhage mass mass effect or abnormal enhancement   seen.  C3/C4 mod stenosis , multilevel DJD     PET scan 3/8/24     1.  Increased activity in a right upper lobe poorly defined groundglass   lesion in the anterior segment.  While this activity might be due to   inflammatory disease, a malignancy cannot be excluded and might explain   the patient's paraneoplastic syndrome.  2.  Increased activity in the bone marrow.  This is a nonspecific finding   usually seen in patients with anemia, but other causes of bone marrow   stimulation should be considered.  3.  Asymmetric sacralization of L5 with sclerotic changes on the right   side.  No FDG uptake is seen in this area.                -

## 2024-03-18 NOTE — PROGRESS NOTE ADULT - SUBJECTIVE AND OBJECTIVE BOX
Patient is a 39y old  Male who presents with a chief complaint of treated for Autoimmune encephalitis (18 Mar 2024 15:40)      SUBJECTIVE:  Patient seen and examined at bedside.  Sitting in armchair comfortably.  Reports tolerating PLEX will, no CP, SOB during exchange.  FSG vary with diet and timing of FSG but does not want to start basal bolus insulin.      ROS:  Denies fevers, chills, headache, vision changes, neck pain, cough, SOB, chest pain, Abdominal pain, N/V, dysuria or new rash.  All other ROS negative except as above    Vital Signs Last 12 Hrs  T(F): 98.9 (03-18-24 @ 14:21), Max: 98.9 (03-18-24 @ 14:21)  HR: 86 (03-18-24 @ 05:42) (86 - 86)  BP: 155/98 (03-18-24 @ 16:05) (140/97 - 167/111)  BP(mean): 122 (03-18-24 @ 16:05) (116 - 134)  RR: 20 (03-18-24 @ 16:05) (20 - 20)  SpO2: 97% (03-18-24 @ 05:42) (97% - 97%)  I&O's Summary    17 Mar 2024 07:01  -  18 Mar 2024 07:00  --------------------------------------------------------  IN: 1312 mL / OUT: 400 mL / NET: 912 mL    18 Mar 2024 07:01  -  18 Mar 2024 16:19  --------------------------------------------------------  IN: 850 mL / OUT: 0 mL / NET: 850 mL        PHYSICAL EXAM:  Constitutional: NAD, comfortable in bedside chair  HEENT: pupils equal in size, MMM, R IJ HD cath  Respiratory: CTA B/L in anterior fields. No w/r/r.   Cardiovascular: RRR, normal S1 and S2, no m/r/g.   Gastrointestinal: Nondistended   Extremities: wwp; no edema.   Neurological: AAOx3, moves all extremities        LABS:                        12.6   21.73 )-----------( 188      ( 18 Mar 2024 10:00 )             38.1     03-18    134<L>  |  100  |  14  ----------------------------<  339<H>  4.7   |  23  |  0.70    Ca    8.9      18 Mar 2024 10:00  Phos  3.4     03-18  Mg     2.3     03-18        Urinalysis Basic - ( 18 Mar 2024 10:00 )    Color: x / Appearance: x / SG: x / pH: x  Gluc: 339 mg/dL / Ketone: x  / Bili: x / Urobili: x   Blood: x / Protein: x / Nitrite: x   Leuk Esterase: x / RBC: x / WBC x   Sq Epi: x / Non Sq Epi: x / Bacteria: x          RADIOLOGY & ADDITIONAL TESTS:  Reviewed    MEDICATIONS  (STANDING):  baclofen 10 milliGRAM(s) Oral <User Schedule>  chlorhexidine 2% Cloths 1 Application(s) Topical daily  dexAMETHasone  Injectable 2 milliGRAM(s) IV Push <User Schedule>  dextrose 5%. 1000 milliLiter(s) (100 mL/Hr) IV Continuous <Continuous>  dextrose 5%. 1000 milliLiter(s) (50 mL/Hr) IV Continuous <Continuous>  dextrose 50% Injectable 25 Gram(s) IV Push once  dextrose 50% Injectable 12.5 Gram(s) IV Push once  dextrose 50% Injectable 25 Gram(s) IV Push once  enoxaparin Injectable 40 milliGRAM(s) SubCutaneous every 24 hours  gabapentin 100 milliGRAM(s) Oral <User Schedule>  gabapentin 600 milliGRAM(s) Oral at bedtime  glucagon  Injectable 1 milliGRAM(s) IntraMuscular once  insulin lispro (ADMELOG) corrective regimen sliding scale   SubCutaneous Before meals and at bedtime  mirtazapine 7.5 milliGRAM(s) Oral every 24 hours  OLANZapine 2.5 milliGRAM(s) Oral <User Schedule>  OLANZapine 5 milliGRAM(s) Oral at bedtime  pantoprazole    Tablet 40 milliGRAM(s) Oral every 12 hours  polyethylene glycol 3350 17 Gram(s) Oral every 12 hours  senna 2 Tablet(s) Oral at bedtime  verapamil  milliGRAM(s) Oral every 24 hours    MEDICATIONS  (PRN):  acetaminophen     Tablet .. 650 milliGRAM(s) Oral every 6 hours PRN Mild Pain (1 - 3), Moderate Pain (4 - 6)  bisacodyl Suppository 10 milliGRAM(s) Rectal daily PRN Constipation  dextrose Oral Gel 15 Gram(s) Oral once PRN Blood Glucose LESS THAN 70 milliGRAM(s)/deciliter  diphenhydrAMINE Injectable 25 milliGRAM(s) IV Push once PRN Allergic Reaction  diphenhydrAMINE IVPB 25 milliGRAM(s) IV Intermittent once PRN Allergic Reaction  SUMAtriptan 100 milliGRAM(s) Oral two times a day PRN Migraine

## 2024-03-19 ENCOUNTER — TRANSCRIPTION ENCOUNTER (OUTPATIENT)
Age: 39
End: 2024-03-19

## 2024-03-19 LAB
ANION GAP SERPL CALC-SCNC: 9 MMOL/L — SIGNIFICANT CHANGE UP (ref 5–17)
APTT BLD: 23.1 SEC — LOW (ref 24.5–35.6)
BUN SERPL-MCNC: 17 MG/DL — SIGNIFICANT CHANGE UP (ref 7–23)
CALCIUM SERPL-MCNC: 9.2 MG/DL — SIGNIFICANT CHANGE UP (ref 8.4–10.5)
CHLORIDE SERPL-SCNC: 101 MMOL/L — SIGNIFICANT CHANGE UP (ref 96–108)
CO2 SERPL-SCNC: 26 MMOL/L — SIGNIFICANT CHANGE UP (ref 22–31)
CREAT SERPL-MCNC: 0.78 MG/DL — SIGNIFICANT CHANGE UP (ref 0.5–1.3)
EGFR: 118 ML/MIN/1.73M2 — SIGNIFICANT CHANGE UP
FIBRINOGEN PPP-MCNC: 160 MG/DL — LOW (ref 200–445)
GLUCOSE BLDC GLUCOMTR-MCNC: 165 MG/DL — HIGH (ref 70–99)
GLUCOSE BLDC GLUCOMTR-MCNC: 202 MG/DL — HIGH (ref 70–99)
GLUCOSE BLDC GLUCOMTR-MCNC: 358 MG/DL — HIGH (ref 70–99)
GLUCOSE SERPL-MCNC: 243 MG/DL — HIGH (ref 70–99)
HCT VFR BLD CALC: 39.3 % — SIGNIFICANT CHANGE UP (ref 39–50)
HGB BLD-MCNC: 12.4 G/DL — LOW (ref 13–17)
INR BLD: 0.89 — SIGNIFICANT CHANGE UP (ref 0.85–1.18)
LYME IGG LINE BLOT INTERP.: NEGATIVE — SIGNIFICANT CHANGE UP
LYME IGM LINE BLOT INTERP.: NEGATIVE — SIGNIFICANT CHANGE UP
MAGNESIUM SERPL-MCNC: 2.2 MG/DL — SIGNIFICANT CHANGE UP (ref 1.6–2.6)
MBP CSF-MCNC: 2.3 NG/ML — SIGNIFICANT CHANGE UP (ref 0–3.8)
MCHC RBC-ENTMCNC: 27.1 PG — SIGNIFICANT CHANGE UP (ref 27–34)
MCHC RBC-ENTMCNC: 31.6 GM/DL — LOW (ref 32–36)
MCV RBC AUTO: 85.8 FL — SIGNIFICANT CHANGE UP (ref 80–100)
MISCELLANEOUS TEST NAME: SIGNIFICANT CHANGE UP
NEOPTERIN SERPL-MCNC: 1.7 NG/ML — SIGNIFICANT CHANGE UP
NRBC # BLD: 0 /100 WBCS — SIGNIFICANT CHANGE UP (ref 0–0)
P18 AB. IGG: SIGNIFICANT CHANGE UP
P23 AB. IGG: SIGNIFICANT CHANGE UP
P23 AB. IGM: SIGNIFICANT CHANGE UP
P28 AB. IGG: SIGNIFICANT CHANGE UP
P30 AB. IGG: SIGNIFICANT CHANGE UP
P39 AB. IGG: SIGNIFICANT CHANGE UP
P39 AB. IGM: SIGNIFICANT CHANGE UP
P41 AB. IGG: SIGNIFICANT CHANGE UP
P41 AB. IGM: SIGNIFICANT CHANGE UP
P45 AB. IGG: SIGNIFICANT CHANGE UP
P58 AB. IGG: SIGNIFICANT CHANGE UP
P66 AB. IGG: SIGNIFICANT CHANGE UP
P93 AB. IGG: SIGNIFICANT CHANGE UP
PHOSPHATE SERPL-MCNC: 3.3 MG/DL — SIGNIFICANT CHANGE UP (ref 2.5–4.5)
PLATELET # BLD AUTO: 181 K/UL — SIGNIFICANT CHANGE UP (ref 150–400)
POTASSIUM SERPL-MCNC: 4.6 MMOL/L — SIGNIFICANT CHANGE UP (ref 3.5–5.3)
POTASSIUM SERPL-SCNC: 4.6 MMOL/L — SIGNIFICANT CHANGE UP (ref 3.5–5.3)
PROTHROM AB SERPL-ACNC: 10.2 SEC — SIGNIFICANT CHANGE UP (ref 9.5–13)
RBC # BLD: 4.58 M/UL — SIGNIFICANT CHANGE UP (ref 4.2–5.8)
RBC # FLD: 21.4 % — HIGH (ref 10.3–14.5)
SODIUM SERPL-SCNC: 136 MMOL/L — SIGNIFICANT CHANGE UP (ref 135–145)
WBC # BLD: 17.95 K/UL — HIGH (ref 3.8–10.5)
WBC # FLD AUTO: 17.95 K/UL — HIGH (ref 3.8–10.5)
WNV IGG CSF IA-ACNC: NEGATIVE — SIGNIFICANT CHANGE UP
WNV IGM CSF IA-ACNC: NEGATIVE — SIGNIFICANT CHANGE UP

## 2024-03-19 PROCEDURE — 99232 SBSQ HOSP IP/OBS MODERATE 35: CPT

## 2024-03-19 PROCEDURE — 99231 SBSQ HOSP IP/OBS SF/LOW 25: CPT

## 2024-03-19 RX ORDER — OLANZAPINE 15 MG/1
2.5 TABLET, FILM COATED ORAL EVERY 6 HOURS
Refills: 0 | Status: DISCONTINUED | OUTPATIENT
Start: 2024-03-19 | End: 2024-03-26

## 2024-03-19 RX ADMIN — Medication 2: at 11:21

## 2024-03-19 RX ADMIN — Medication 10 MILLIGRAM(S): at 06:11

## 2024-03-19 RX ADMIN — PANTOPRAZOLE SODIUM 40 MILLIGRAM(S): 20 TABLET, DELAYED RELEASE ORAL at 18:48

## 2024-03-19 RX ADMIN — ENOXAPARIN SODIUM 40 MILLIGRAM(S): 100 INJECTION SUBCUTANEOUS at 21:15

## 2024-03-19 RX ADMIN — GABAPENTIN 600 MILLIGRAM(S): 400 CAPSULE ORAL at 21:15

## 2024-03-19 RX ADMIN — CHLORHEXIDINE GLUCONATE 1 APPLICATION(S): 213 SOLUTION TOPICAL at 11:21

## 2024-03-19 RX ADMIN — OLANZAPINE 2.5 MILLIGRAM(S): 15 TABLET, FILM COATED ORAL at 06:11

## 2024-03-19 RX ADMIN — OLANZAPINE 5 MILLIGRAM(S): 15 TABLET, FILM COATED ORAL at 21:16

## 2024-03-19 RX ADMIN — SUMATRIPTAN SUCCINATE 100 MILLIGRAM(S): 4 INJECTION, SOLUTION SUBCUTANEOUS at 06:55

## 2024-03-19 RX ADMIN — GABAPENTIN 100 MILLIGRAM(S): 400 CAPSULE ORAL at 06:10

## 2024-03-19 RX ADMIN — GABAPENTIN 100 MILLIGRAM(S): 400 CAPSULE ORAL at 15:58

## 2024-03-19 RX ADMIN — SUMATRIPTAN SUCCINATE 100 MILLIGRAM(S): 4 INJECTION, SOLUTION SUBCUTANEOUS at 22:33

## 2024-03-19 RX ADMIN — Medication 2 MILLIGRAM(S): at 06:10

## 2024-03-19 RX ADMIN — Medication 650 MILLIGRAM(S): at 06:55

## 2024-03-19 RX ADMIN — Medication 1: at 06:45

## 2024-03-19 RX ADMIN — SENNA PLUS 2 TABLET(S): 8.6 TABLET ORAL at 21:17

## 2024-03-19 RX ADMIN — MIRTAZAPINE 15 MILLIGRAM(S): 45 TABLET, ORALLY DISINTEGRATING ORAL at 21:19

## 2024-03-19 RX ADMIN — Medication 2 MILLIGRAM(S): at 18:48

## 2024-03-19 RX ADMIN — Medication 10 MILLIGRAM(S): at 21:15

## 2024-03-19 RX ADMIN — Medication 25 MILLIGRAM(S): at 21:17

## 2024-03-19 RX ADMIN — SUMATRIPTAN SUCCINATE 100 MILLIGRAM(S): 4 INJECTION, SOLUTION SUBCUTANEOUS at 21:17

## 2024-03-19 RX ADMIN — Medication 10 MILLIGRAM(S): at 15:58

## 2024-03-19 RX ADMIN — Medication 5: at 17:17

## 2024-03-19 RX ADMIN — OLANZAPINE 2.5 MILLIGRAM(S): 15 TABLET, FILM COATED ORAL at 13:24

## 2024-03-19 RX ADMIN — PANTOPRAZOLE SODIUM 40 MILLIGRAM(S): 20 TABLET, DELAYED RELEASE ORAL at 06:10

## 2024-03-19 NOTE — PROGRESS NOTE ADULT - ASSESSMENT
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  No evidence for current infection based upon CSF studies, CSF PCR panels X 2 neg for community acquired pathogens, prior Lyme/syphilis studies neg, MRI unremarkable.  Though Enteroviruses can cause encephalitis, PCR likely would be positive and findings would be present on MRI. Chronic/persistent meningoencephalitis from EVs occur in patients with B cell defects but he has no known immunocompromise at this time.  Regarding potential for latent Enterovirus infection - the Enterovirus primer in the CSF PCR panel detect members of the Enterovirus genus, including the numbered EV species, Coxsackie and Echoviruses.  The only data I see regarding latent/persistent infection of Enteroviruses is detection of the numbered EV RNA in myocardium of patients with cardiomyopathies.  I see no evidence for latent/dormant infection in the CNS or elsewhere.  Regarding rituximab and risk for subsequent infection - the pathogens for which he would be susceptible to reactivation include HBV, HCV and HEV.  He was HBV core Ab/sAg and HCV Ab neg, was vaccinated for HBV.  HEV not tested previously - pre-plasmapheresis serum no longer available. Regarding infections to which he would be more susceptible - Enteroviruses (would be at risk for chronic encephalitis), PJP (increased with rituximab/steroids), PML are included.  He also would be at increased risk for respiratory infections. Quantiferon is neg, as are CSF EBV, HHV-6, WNV and Lyme PCRs, Lyme and WNV Abs neg, VDRL NR. Abnormalities seen on CT were non-specific and he is asymptomatic but would monitor off antibiotics for development of symptoms.  Suggest:  - F/U CSF cultures (bacterial, fungal and AFB) from 3/4 and 3/13  - Agree with Pulmonary recommendation to repeat CT chest in 3-5 weeks (4-6 following prior on 3/8).  Please recall if CSF cultures are positive or if further ID input is desired - team 1.

## 2024-03-19 NOTE — PROGRESS NOTE ADULT - SUBJECTIVE AND OBJECTIVE BOX
INTERVAL HPI/OVERNIGHT EVENTS:  Afebrile, ongoing clinical improvement with plasmapheresis.  Still with cognitive slowness, motor mainly resolved.    CONSTITUTIONAL:  No fever, chills, night sweats  EYES:  No photophobia or visual changes  CARDIOVASCULAR:  No chest pain  RESPIRATORY:  No cough, wheezing, or SOB   GASTROINTESTINAL:  No nausea, vomiting, diarrhea, constipation, or abdominal pain  GENITOURINARY:  No frequency, urgency, dysuria or hematuria  NEUROLOGIC:  No headache, lightheadedness      ANTIBIOTICS/RELEVANT:          Vital Signs Last 24 Hrs  T(C): 36.8 (19 Mar 2024 18:27), Max: 36.8 (19 Mar 2024 18:27)  T(F): 98.2 (19 Mar 2024 18:27), Max: 98.2 (19 Mar 2024 18:27)  HR: 97 (19 Mar 2024 14:58) (77 - 97)  BP: 147/101 (19 Mar 2024 14:58) (119/75 - 158/99)  BP(mean): 122 (19 Mar 2024 12:30) (96 - 122)  RR: 19 (19 Mar 2024 14:58) (18 - 21)  SpO2: 99% (19 Mar 2024 14:58) (97% - 99%)    Parameters below as of 19 Mar 2024 14:58  Patient On (Oxygen Delivery Method): room air        PHYSICAL EXAM:  Constitutional:  Alert  HEENT:  NC, Sclerae anicteric, conjunctivae clear, PERRL.  No nasal exudate or sinus tenderness;  No buccal mucosal lesions, no pharyngeal erythema or exudate	  Neck:  Supple, no adenopathy  Respiratory:  Clear bilaterally  Cardiovascular:  RRR, S1S2, no murmur appreciated  Gastrointestinal:  Symmetric, normoactive BS, soft, NT, no masses, guarding or rebound.  No HSM  Extremities:  No edema.  Strong  strength      LABS:                        12.4   17.95 )-----------( 181      ( 19 Mar 2024 10:44 )             39.3         03-19    136  |  101  |  17  ----------------------------<  243<H>  4.6   |  26  |  0.78    Ca    9.2      19 Mar 2024 10:44  Phos  3.3     03-19  Mg     2.2     03-19        Urinalysis Basic - ( 19 Mar 2024 10:44 )    Color: x / Appearance: x / SG: x / pH: x  Gluc: 243 mg/dL / Ketone: x  / Bili: x / Urobili: x   Blood: x / Protein: x / Nitrite: x   Leuk Esterase: x / RBC: x / WBC x   Sq Epi: x / Non Sq Epi: x / Bacteria: x        MICROBIOLOGY:        RADIOLOGY & ADDITIONAL STUDIES:

## 2024-03-19 NOTE — PROGRESS NOTE ADULT - ASSESSMENT
39y Male with PMHx of viral meningitis with sequela of HTN and intractable headaches (followed by Dr. Weston), recent admission 3/4/24 for autoimmune encephalitis management/treatment (had presented as worsening headaches and cognition - most notably e-mails that didn't make sense) s/p IV steroids and IVIG, presented again on 3/12 forfor bilateral hand weakness as well as wide based gait, UE tremors, UE cogwheeling, dysmetria, and UE weakness. Went for outpatient MRI brain and cervical spine. Admitted for further neurologic workup. Restarted on IV steroids, and plasmapheresis.    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  - s/p LP 3/4 with elevated opening pressure at 30 mmHg, remainder of CSF negative  - continue decadron 2mg q12h (no overnight dose) Plan to wean steroids as tolerated after each plasma exchange session  - s/p LP 3/13. infectious panel so far negative, awaiting send out studies. autoimmune, and fungal studies, all herpes panels (HSV 1, HSV 2, HSV 6), CSF neopterin (write in), TB, RPR, VDRL, AFB, bacterial antigens, full viral PCR panel, oligoclonal bands, Igg index, myelin basic protein  - LP done 3/4 opening pressure was 30, LP done 3/13 with opening pressure of 13.5  - MRA head/neck done for vessel imaging - no stenosis or occlusion, no remarkable findings  - PET body - nonspecific right lung nodules  - testicular ultrasound neg  - Pulm consult - lung nodules likely inflammation, no contraindication to Rituximab  - plasma exchange started on 3/13, will plan now for 7 TOTAL sessions every other day as pt continues to improve with plasmapheresis. will need to send daily fibrinogen clauss levels   - HD catheter for the plasma exchange placed by IR on 3/13  - ID consult for rule out possible infectious causes, and prior to initiation of Rituximab - no contraindication to Rituximab  - D dimer elevated to 502, LE dopplers negative  - Sed rate 15  - CRP <3  - repeat Neuropsych consult on 3/14 - improved from prior admission  - Rheum consulted 3/15 for elevated LAKSHMI level, f/u recs  - EEG negative for epileptiform discharges, discontinued on 3/17 AM  - dc'ed verapamil, transitioned to Toprol 25mg QHS   - f/u serum hepatitis panel, quantiferon, NMO antibody, ACE, serum protein and immuno electrophoresis, ANCA, LAKSHMI, double stranded DNA, cryoglobulin, copper, ceruloplasmin  - HIV neg  - urine heavy metal screen, urine porphyrins, urine porphobilinogen  - c/w gabapentin 100mg at 7am and 4pm, 600mg QHS and baclofen 10mg three times a day  - continue home pantoprazole BID  - q8hr general neuro checks and vitals    #migraine  - sumatriptan 100mg PO PRN once (max 2 tabs)  - if needed, can given sumatriptan 6mg subcutaneously once on top of PO    #insomnia  - continue remeron to 15mg qhs, sodium levels stable     Cards  #HTN w/ LVH  #sinus tachy  - cards following, appreciate recs  - goal normotension  - hold amlodipine  - continue toprol 25mg daily     Pulm  - call provider if SPO2 < 94%  - uptake noted in right upper lobe of right lung on PET; CT C/A/P demonstrates clustered nodular and ground-glass opacities in bilateral upper lobes, more prominent over right; findings most consistent with infectious/inflammatory etiology  - pulm consulted during recent admission  - repeat CT in 6-12 weeks to ensure resolution    GI  #Nutrition/Fluids/Electrolytes   - replete K<4 and Mg <2  - Diet: Regular    Infectious Disease  - afebrile on admission  - leukocytosis likely due to steroids    Endocrine  #hyperglycemia  - A1C results: 5.2  - continue FS monitoring while on steroids  - on low dose SSI  - defer lantus for now given decrease in steroid dose    - TSH results: 0.594    Renal  #hyponatremia as low as 129, slowly uptrending  - sodium now stable, restarted Remeron 15mg  - monitor daily sodium levels     Heme  #low fibrinogen level, likely 2/2 plasmapheresis  - daily fibrinogen clauss levels  - fibrinogen prior to plasmapheresis 166 3/17, s/p 5u cryoprecipitate  - fibrinogen 160 pre-PLEX, s/p 5u cryoprecipitate       Psych  #anxiety  - Zyprexa standing (2.5 am, 2.5 pm, 5 at bedtime)  - psych consulted 3/15, agrees with current regimen   - can give additional zyprexa 2.5mg PRN (no more than 15mg total per day max)  - EKG every other day to monitor qtc (next due 3/17), last 3/15     Rheum  #+ ANCA  - f/u rheum labs - proteinase 3, anti myeloperoxidase ab, CCP, myositis panel, myomarker    DVT Prophylaxis  - SCDs for DVT prophylaxis     Dispo: home when cleared     Discussed daily hospital plans and goals with patient at bedside.    Discussed with Neurology Attending, Dr. Weston   39y Male with PMHx of viral meningitis with sequela of HTN and intractable headaches (followed by Dr. Wseton), recent admission 3/4/24 for autoimmune encephalitis management/treatment (had presented as worsening headaches and cognition - most notably e-mails that didn't make sense) s/p IV steroids and IVIG, presented again on 3/12 forfor bilateral hand weakness as well as wide based gait, UE tremors, UE cogwheeling, dysmetria, and UE weakness. Went for outpatient MRI brain and cervical spine. Admitted for further neurologic workup. Restarted on IV steroids, and plasmapheresis.    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  - s/p LP 3/4 with elevated opening pressure at 30 mmHg, remainder of CSF negative  - continue decadron 2mg q12h (no overnight dose) Plan to wean steroids as tolerated after each plasma exchange session  - s/p LP 3/13. infectious panel so far negative, awaiting send out studies. autoimmune, and fungal studies, all herpes panels (HSV 1, HSV 2, HSV 6), CSF neopterin (write in), TB, RPR, VDRL, AFB, bacterial antigens, full viral PCR panel, oligoclonal bands, Igg index, myelin basic protein  - LP done 3/4 opening pressure was 30, LP done 3/13 with opening pressure of 13.5  - MRA head/neck done for vessel imaging - no stenosis or occlusion, no remarkable findings  - PET body - nonspecific right lung nodules  - testicular ultrasound neg  - Pulm consult - lung nodules likely inflammation, no contraindication to Rituximab  - plasma exchange started on 3/13, will plan now for 7 TOTAL sessions every other day as pt continues to improve with plasmapheresis. will need to send daily fibrinogen clauss levels   - HD catheter for the plasma exchange placed by IR on 3/13  - ID consult for rule out possible infectious causes, and prior to initiation of Rituximab - no contraindication to Rituximab  - D dimer elevated to 502, LE dopplers negative  - Sed rate 15  - CRP <3  - repeat Neuropsych consult on 3/14 - improved from prior admission  - Rheum consulted 3/15 for elevated LAKSHMI level, f/u recs  - EEG negative for epileptiform discharges, discontinued on 3/17 AM  - dc'ed verapamil, transitioned to Toprol 25mg QHS   - f/u serum hepatitis panel, quantiferon, NMO antibody, ACE, serum protein and immuno electrophoresis, ANCA, LAKSHMI, double stranded DNA, cryoglobulin, copper, ceruloplasmin  - HIV neg  - urine heavy metal screen, urine porphyrins, urine porphobilinogen  - c/w gabapentin 100mg at 7am and 4pm, 600mg QHS and baclofen 10mg three times a day  - continue home pantoprazole BID  - q8hr general neuro checks and vitals    #migraine  - sumatriptan 100mg PO PRN once (max 2 tabs)  - if needed, can given sumatriptan 6mg subcutaneously once on top of PO    #insomnia  - continue remeron to 15mg qhs, sodium levels stable     Cards  #HTN w/ LVH  #sinus tachy  - cards following, appreciate recs  - goal normotension  - hold amlodipine  - continue toprol 25mg daily     Pulm  - call provider if SPO2 < 94%  - uptake noted in right upper lobe of right lung on PET; CT C/A/P demonstrates clustered nodular and ground-glass opacities in bilateral upper lobes, more prominent over right; findings most consistent with infectious/inflammatory etiology  - pulm consulted during recent admission  - repeat CT in 6-12 weeks to ensure resolution    GI  #Nutrition/Fluids/Electrolytes   - replete K<4 and Mg <2  - Diet: Regular    Infectious Disease  - afebrile on admission  - leukocytosis likely due to steroids    Endocrine  #hyperglycemia  - A1C results: 5.2  - continue FS monitoring while on steroids  - on low dose SSI  - defer lantus for now given decrease in steroid dose    - TSH results: 0.594    Renal  #hyponatremia as low as 129, slowly uptrending  - sodium now stable, restarted Remeron 15mg  - monitor daily sodium levels     Heme  #low fibrinogen level, likely 2/2 plasmapheresis  - daily fibrinogen clauss levels  - fibrinogen prior to plasmapheresis 166 3/17, s/p 5u cryoprecipitate  - fibrinogen 160 pre-PLEX, s/p 5u cryoprecipitate     Psych  #anxiety  - Zyprexa standing (2.5 am, 2.5 pm, 5 at bedtime)  - psych consulted 3/15, agrees with current regimen   - can give additional zyprexa 2.5mg q6h PRN (no more than 15mg total per day max)  - EKG every other day to monitor qtc (next due 3/17), last 3/15     Rheum  #+ ANCA  - f/u rheum labs - proteinase 3, anti myeloperoxidase ab, CCP, myositis panel, myomarker    DVT Prophylaxis  - SCDs for DVT prophylaxis     Dispo: home when cleared     Discussed daily hospital plans and goals with patient at bedside.    Discussed with Neurology Attending, Dr. Weston

## 2024-03-19 NOTE — PROGRESS NOTE ADULT - ASSESSMENT
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 Transfusion medicine is consulted for plasma exchange.     On previous admission, patient had PET scan performed 3/4 showing parietoocciptial and temporal region hypometabolism, and per neurology concern was for autoimmune encephalitis. LP on previous admission was notable of opening pressure of 30 mmHg but otherwise unremarkable. MRI brain notable only for degenerative changes, and no evidence of venous sinus thrombosis. Patient was given IVIG and steroids (allergic reaction to solumedrol and so decadron chosen) which per report, patient had been compliant with.     Transfusion medicine is consulted for plasma exchange. Patient is s/p 3 treatment of plasma exchange 3/13/24, 3/15/24, and 3/17/24, and is receiving his 4th session today 3/19. Will plan for 5th session 3/21    Plan   - Autoimmune (evelyn-NMDAR) encephalitis is a category I indication for therapeutic plasma exchange. We will plan for 5 sessions every other day, possibly 7 cycles per neurology team/Dr. Najjar  - Risks and benefits of plasma exchange explained to patient. All of his questions were answered and he verbalized understanding and consented to therapy.   - please monitor daily calcium and fibrinogen clauss levels. With plasmapharesis, both of these are expected to decrease. 2g of calcium are included with plasmapharesis orders.   - please maintain active T&S   - If fibrinogen <200, transfusion medicine will advise on cryoprecipitate administration  - fibrinogen 160 today - please administer 5 units of cryoprecipitate  - further management of autoimmune encephalitis per Neurology/primary team  - next session planned 3/21/24    Discussed with transfusion medicine attending Dr. Nehemias Yancey

## 2024-03-19 NOTE — PROGRESS NOTE ADULT - ASSESSMENT
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 Weston), recent admission 3/4/24 for autoimmune encephalitis management/treatment (had presented as worsening headaches and cognition - most notably e-mails that didn't make sense) s/p IV steroids and IVIG, presented again on 3/12 for bilateral hand weakness now restarted on IV steroids and plasmapheresis for 7 sessions

## 2024-03-19 NOTE — PROGRESS NOTE ADULT - SUBJECTIVE AND OBJECTIVE BOX
Patient is a 39y old  Male who presents with a chief complaint of treated for Autoimmune encephalitis (18 Mar 2024 15:40)      SUBJECTIVE:  Patient seen and examined at bedside. Resting comfortably in bed on plasmapheresis.  No CP, SOB.  Still with varying FSG and BP. Improved sleep with Remeron    ROS:  Denies fevers, chills, headache, SOB, chest pain, Abdominal pain, N/V.  All other ROS negative except as above    Vital Signs Last 12 Hrs  T(F): 97.4 (03-19-24 @ 10:09), Max: 97.4 (03-19-24 @ 10:09)  HR: 94 (03-19-24 @ 12:30) (77 - 94)  BP: 150/105 (03-19-24 @ 12:30) (150/105 - 151/88)  BP(mean): 122 (03-19-24 @ 12:30) (112 - 122)  RR: 18 (03-19-24 @ 12:30) (18 - 18)  SpO2: 97% (03-19-24 @ 12:30) (97% - 99%)  I&O's Summary    18 Mar 2024 07:01  -  19 Mar 2024 07:00  --------------------------------------------------------  IN: 850 mL / OUT: 0 mL / NET: 850 mL    19 Mar 2024 07:01  -  19 Mar 2024 15:18  --------------------------------------------------------  IN: 0 mL / OUT: 600 mL / NET: -600 mL        PHYSICAL EXAM:  Constitutional: NAD, comfortable in bed.  HEENT: pupils equal, EOMI, MMM  Respiratory: No increased WOB, normal chest rise, on RA   Neurological: AAOx3, moves extremities spontaneously   Skin: No gross skin abnormalities or rashes, R HD cath        LABS:                        12.4   17.95 )-----------( 181      ( 19 Mar 2024 10:44 )             39.3     03-19    136  |  101  |  17  ----------------------------<  243<H>  4.6   |  26  |  0.78    Ca    9.2      19 Mar 2024 10:44  Phos  3.3     03-19  Mg     2.2     03-19      PT/INR - ( 19 Mar 2024 10:44 )   PT: 10.2 sec;   INR: 0.89          PTT - ( 19 Mar 2024 10:44 )  PTT:23.1 sec  Urinalysis Basic - ( 19 Mar 2024 10:44 )    Color: x / Appearance: x / SG: x / pH: x  Gluc: 243 mg/dL / Ketone: x  / Bili: x / Urobili: x   Blood: x / Protein: x / Nitrite: x   Leuk Esterase: x / RBC: x / WBC x   Sq Epi: x / Non Sq Epi: x / Bacteria: x          RADIOLOGY & ADDITIONAL TESTS:  No new imaging    MEDICATIONS  (STANDING):  albumin human  5% IVPB 4000 milliLiter(s) IV Intermittent once  baclofen 10 milliGRAM(s) Oral <User Schedule>  calcium gluconate IVPB 2 Gram(s) IV Intermittent once  chlorhexidine 2% Cloths 1 Application(s) Topical daily  dexAMETHasone  Injectable 2 milliGRAM(s) IV Push <User Schedule>  dextrose 5%. 1000 milliLiter(s) (100 mL/Hr) IV Continuous <Continuous>  dextrose 5%. 1000 milliLiter(s) (50 mL/Hr) IV Continuous <Continuous>  dextrose 50% Injectable 25 Gram(s) IV Push once  dextrose 50% Injectable 12.5 Gram(s) IV Push once  dextrose 50% Injectable 25 Gram(s) IV Push once  enoxaparin Injectable 40 milliGRAM(s) SubCutaneous every 24 hours  gabapentin 600 milliGRAM(s) Oral at bedtime  gabapentin 100 milliGRAM(s) Oral <User Schedule>  glucagon  Injectable 1 milliGRAM(s) IntraMuscular once  heparin  Lock Flush 100 Units/mL Injectable 600 Unit(s) IV Push once  insulin lispro (ADMELOG) corrective regimen sliding scale   SubCutaneous Before meals and at bedtime  metoprolol succinate ER 25 milliGRAM(s) Oral every 24 hours  mirtazapine 15 milliGRAM(s) Oral every 24 hours  OLANZapine 2.5 milliGRAM(s) Oral <User Schedule>  OLANZapine 5 milliGRAM(s) Oral at bedtime  pantoprazole    Tablet 40 milliGRAM(s) Oral every 12 hours  polyethylene glycol 3350 17 Gram(s) Oral every 12 hours  senna 2 Tablet(s) Oral at bedtime    MEDICATIONS  (PRN):  acetaminophen     Tablet .. 650 milliGRAM(s) Oral every 6 hours PRN Mild Pain (1 - 3), Moderate Pain (4 - 6)  bisacodyl Suppository 10 milliGRAM(s) Rectal daily PRN Constipation  dextrose Oral Gel 15 Gram(s) Oral once PRN Blood Glucose LESS THAN 70 milliGRAM(s)/deciliter  diphenhydrAMINE Injectable 25 milliGRAM(s) IV Push once PRN Allergic Reaction  diphenhydrAMINE IVPB 25 milliGRAM(s) IV Intermittent once PRN Allergic Reaction  OLANZapine 2.5 milliGRAM(s) Oral once PRN anxiety  OLANZapine 2.5 milliGRAM(s) Oral every 6 hours PRN anxiety/insomnia  SUMAtriptan 100 milliGRAM(s) Oral two times a day PRN Migraine

## 2024-03-19 NOTE — PROGRESS NOTE ADULT - SUBJECTIVE AND OBJECTIVE BOX
Neurology Stroke Progress Note    INTERVAL HPI/OVERNIGHT EVENTS:  No acute overnight events. Reports sleeping well, had some mild anxiety this AM, not more than usual. Patient seen and examined. 4th session of PLEX scheduled today. All questions answered.     MEDICATIONS  (STANDING):  albumin human  5% IVPB 4000 milliLiter(s) IV Intermittent once  baclofen 10 milliGRAM(s) Oral <User Schedule>  calcium gluconate IVPB 2 Gram(s) IV Intermittent once  chlorhexidine 2% Cloths 1 Application(s) Topical daily  dexAMETHasone  Injectable 2 milliGRAM(s) IV Push <User Schedule>  dextrose 5%. 1000 milliLiter(s) (100 mL/Hr) IV Continuous <Continuous>  dextrose 5%. 1000 milliLiter(s) (50 mL/Hr) IV Continuous <Continuous>  dextrose 50% Injectable 25 Gram(s) IV Push once  dextrose 50% Injectable 12.5 Gram(s) IV Push once  dextrose 50% Injectable 25 Gram(s) IV Push once  enoxaparin Injectable 40 milliGRAM(s) SubCutaneous every 24 hours  gabapentin 600 milliGRAM(s) Oral at bedtime  gabapentin 100 milliGRAM(s) Oral <User Schedule>  glucagon  Injectable 1 milliGRAM(s) IntraMuscular once  heparin  Lock Flush 100 Units/mL Injectable 600 Unit(s) IV Push once  insulin lispro (ADMELOG) corrective regimen sliding scale   SubCutaneous Before meals and at bedtime  metoprolol succinate ER 25 milliGRAM(s) Oral every 24 hours  mirtazapine 15 milliGRAM(s) Oral every 24 hours  OLANZapine 2.5 milliGRAM(s) Oral <User Schedule>  OLANZapine 5 milliGRAM(s) Oral at bedtime  pantoprazole    Tablet 40 milliGRAM(s) Oral every 12 hours  polyethylene glycol 3350 17 Gram(s) Oral every 12 hours  senna 2 Tablet(s) Oral at bedtime    MEDICATIONS  (PRN):  acetaminophen     Tablet .. 650 milliGRAM(s) Oral every 6 hours PRN Mild Pain (1 - 3), Moderate Pain (4 - 6)  bisacodyl Suppository 10 milliGRAM(s) Rectal daily PRN Constipation  dextrose Oral Gel 15 Gram(s) Oral once PRN Blood Glucose LESS THAN 70 milliGRAM(s)/deciliter  diphenhydrAMINE Injectable 25 milliGRAM(s) IV Push once PRN Allergic Reaction  diphenhydrAMINE IVPB 25 milliGRAM(s) IV Intermittent once PRN Allergic Reaction  OLANZapine 2.5 milliGRAM(s) Oral once PRN anxiety  OLANZapine 2.5 milliGRAM(s) Oral every 6 hours PRN anxiety/insomnia  SUMAtriptan 100 milliGRAM(s) Oral two times a day PRN Migraine      Allergies    methylPREDNISolone (Rash; Urticaria; Flushing; Hives)    Intolerances        Vital Signs Last 24 Hrs  T(C): 36.8 (19 Mar 2024 18:27), Max: 36.8 (19 Mar 2024 18:27)  T(F): 98.2 (19 Mar 2024 18:27), Max: 98.2 (19 Mar 2024 18:27)  HR: 97 (19 Mar 2024 14:58) (77 - 97)  BP: 147/101 (19 Mar 2024 14:58) (119/75 - 158/99)  BP(mean): 122 (19 Mar 2024 12:30) (96 - 122)  RR: 19 (19 Mar 2024 14:58) (18 - 21)  SpO2: 99% (19 Mar 2024 14:58) (97% - 99%)    Parameters below as of 19 Mar 2024 14:58  Patient On (Oxygen Delivery Method): room air    Physical exam:  General: No acute distress, awake and alert  Eyes: Anicteric sclerae, moist conjunctivae, see below for CNs  Neck: trachea midline  Cardiovascular: No carotid bruits.   Pulmonary: No use of accessory muscles    Neurologic:  -Mental status: Awake, alert, oriented to person, place, and time. Speech is fluent with intact naming, repetition, and comprehension, no dysarthria. Recent and remote memory intact. Follows commands.  -Cranial nerves:   II: Visual fields are full to confrontation, endorsing some blurry vision in peripheral vision bilaterally.   III, IV, VI: Extraocular movements are intact without nystagmus.   VII: Face is symmetric  Motor: Normal bulk and tone. No pronator drift. Strength bilateral upper extremity 5/5, bilateral lower extremities 5/5. rapid finger movement slightly more fluid today  Sensation: Intact to light touch bilaterally. No neglect or extinction on double simultaneous testing.  Coordination: No dysmetria on finger-to-nose  Gait: better monalisa and arm swing than prior, not fully dynamic R arm swing but improved     LABS:                        12.4   17.95 )-----------( 181      ( 19 Mar 2024 10:44 )             39.3     03-19    136  |  101  |  17  ----------------------------<  243<H>  4.6   |  26  |  0.78    Ca    9.2      19 Mar 2024 10:44  Phos  3.3     03-19  Mg     2.2     03-19      PT/INR - ( 19 Mar 2024 10:44 )   PT: 10.2 sec;   INR: 0.89          PTT - ( 19 Mar 2024 10:44 )  PTT:23.1 sec  Urinalysis Basic - ( 19 Mar 2024 10:44 )    Color: x / Appearance: x / SG: x / pH: x  Gluc: 243 mg/dL / Ketone: x  / Bili: x / Urobili: x   Blood: x / Protein: x / Nitrite: x   Leuk Esterase: x / RBC: x / WBC x   Sq Epi: x / Non Sq Epi: x / Bacteria: x        RADIOLOGY & ADDITIONAL TESTS:    reveiwed  Neurology Stroke Progress Note    INTERVAL HPI/OVERNIGHT EVENTS:  No acute overnight events. Reports sleeping well, had some mild anxiety this AM, not more than usual. Patient seen and examined. 4th session of PLEX scheduled today. All questions answered.     MEDICATIONS  (STANDING):  albumin human  5% IVPB 4000 milliLiter(s) IV Intermittent once  baclofen 10 milliGRAM(s) Oral <User Schedule>  calcium gluconate IVPB 2 Gram(s) IV Intermittent once  chlorhexidine 2% Cloths 1 Application(s) Topical daily  dexAMETHasone  Injectable 2 milliGRAM(s) IV Push <User Schedule>  dextrose 5%. 1000 milliLiter(s) (100 mL/Hr) IV Continuous <Continuous>  dextrose 5%. 1000 milliLiter(s) (50 mL/Hr) IV Continuous <Continuous>  dextrose 50% Injectable 25 Gram(s) IV Push once  dextrose 50% Injectable 12.5 Gram(s) IV Push once  dextrose 50% Injectable 25 Gram(s) IV Push once  enoxaparin Injectable 40 milliGRAM(s) SubCutaneous every 24 hours  gabapentin 600 milliGRAM(s) Oral at bedtime  gabapentin 100 milliGRAM(s) Oral <User Schedule>  glucagon  Injectable 1 milliGRAM(s) IntraMuscular once  heparin  Lock Flush 100 Units/mL Injectable 600 Unit(s) IV Push once  insulin lispro (ADMELOG) corrective regimen sliding scale   SubCutaneous Before meals and at bedtime  metoprolol succinate ER 25 milliGRAM(s) Oral every 24 hours  mirtazapine 15 milliGRAM(s) Oral every 24 hours  OLANZapine 2.5 milliGRAM(s) Oral <User Schedule>  OLANZapine 5 milliGRAM(s) Oral at bedtime  pantoprazole    Tablet 40 milliGRAM(s) Oral every 12 hours  polyethylene glycol 3350 17 Gram(s) Oral every 12 hours  senna 2 Tablet(s) Oral at bedtime    MEDICATIONS  (PRN):  acetaminophen     Tablet .. 650 milliGRAM(s) Oral every 6 hours PRN Mild Pain (1 - 3), Moderate Pain (4 - 6)  bisacodyl Suppository 10 milliGRAM(s) Rectal daily PRN Constipation  dextrose Oral Gel 15 Gram(s) Oral once PRN Blood Glucose LESS THAN 70 milliGRAM(s)/deciliter  diphenhydrAMINE Injectable 25 milliGRAM(s) IV Push once PRN Allergic Reaction  diphenhydrAMINE IVPB 25 milliGRAM(s) IV Intermittent once PRN Allergic Reaction  OLANZapine 2.5 milliGRAM(s) Oral once PRN anxiety  OLANZapine 2.5 milliGRAM(s) Oral every 6 hours PRN anxiety/insomnia  SUMAtriptan 100 milliGRAM(s) Oral two times a day PRN Migraine      Allergies    methylPREDNISolone (Rash; Urticaria; Flushing; Hives)    Intolerances        Vital Signs Last 24 Hrs  T(C): 36.8 (19 Mar 2024 18:27), Max: 36.8 (19 Mar 2024 18:27)  T(F): 98.2 (19 Mar 2024 18:27), Max: 98.2 (19 Mar 2024 18:27)  HR: 97 (19 Mar 2024 14:58) (77 - 97)  BP: 147/101 (19 Mar 2024 14:58) (119/75 - 158/99)  BP(mean): 122 (19 Mar 2024 12:30) (96 - 122)  RR: 19 (19 Mar 2024 14:58) (18 - 21)  SpO2: 99% (19 Mar 2024 14:58) (97% - 99%)    Parameters below as of 19 Mar 2024 14:58  Patient On (Oxygen Delivery Method): room air    Physical exam:  General: No acute distress, awake and alert  Eyes: Anicteric sclerae, moist conjunctivae, see below for CNs  Neck: trachea midline  Cardiovascular: No carotid bruits.   Pulmonary: No use of accessory muscles    Neurologic:  -Mental status: Awake, alert, oriented to person, place, and time. Speech is fluent with intact naming, repetition, and comprehension, no dysarthria. Recent and remote memory intact. Follows commands.  -Cranial nerves:   II: Visual fields are full to confrontation, endorsing some blurry vision in peripheral vision bilaterally.   III, IV, VI: Extraocular movements are intact without nystagmus.   VII: Face is symmetric  Motor: Normal bulk and tone. No pronator drift. Strength bilateral upper extremity 5/5, bilateral lower extremities 5/5. rapid finger movement slightly more fluid today  Sensation: Intact to light touch bilaterally. No neglect or extinction on double simultaneous testing.  Coordination: No dysmetria on finger-to-nose  Gait: better monalisa and arm swing than prior, not fully dynamic R arm swing but improved     LABS:                        12.4   17.95 )-----------( 181      ( 19 Mar 2024 10:44 )             39.3     03-19    136  |  101  |  17  ----------------------------<  243<H>  4.6   |  26  |  0.78    Ca    9.2      19 Mar 2024 10:44  Phos  3.3     03-19  Mg     2.2     03-19      PT/INR - ( 19 Mar 2024 10:44 )   PT: 10.2 sec;   INR: 0.89          PTT - ( 19 Mar 2024 10:44 )  PTT:23.1 sec  Urinalysis Basic - ( 19 Mar 2024 10:44 )    Color: x / Appearance: x / SG: x / pH: x  Gluc: 243 mg/dL / Ketone: x  / Bili: x / Urobili: x   Blood: x / Protein: x / Nitrite: x   Leuk Esterase: x / RBC: x / WBC x   Sq Epi: x / Non Sq Epi: x / Bacteria: x    RADIOLOGY & ADDITIONAL TESTS:    reviewed

## 2024-03-19 NOTE — PROGRESS NOTE ADULT - SUBJECTIVE AND OBJECTIVE BOX
Transfusion Medicine Progress Note      SUBJECTIVE: Patient seen and examined at bedside. Due for PLEX #4 today. Denies fever, headache, nausea, vomiting, chest pain, shortness of breath, abdominal pain, changes in bowel movements or urination.     OBJECTIVE:    VITAL SIGNS:  ICU Vital Signs Last 24 Hrs  T(C): 36.3 (19 Mar 2024 10:09), Max: 37.2 (18 Mar 2024 14:21)  T(F): 97.4 (19 Mar 2024 10:09), Max: 98.9 (18 Mar 2024 14:21)  HR: 94 (19 Mar 2024 12:30) (77 - 94)  BP: 150/105 (19 Mar 2024 12:30) (135/71 - 158/99)  BP(mean): 122 (19 Mar 2024 12:30) (96 - 122)  ABP: --  ABP(mean): --  RR: 18 (19 Mar 2024 12:30) (18 - 21)  SpO2: 97% (19 Mar 2024 12:30) (97% - 99%)    O2 Parameters below as of 19 Mar 2024 12:30  Patient On (Oxygen Delivery Method): room air              03-18 @ 07:01  -  03-19 @ 07:00  --------------------------------------------------------  IN: 850 mL / OUT: 0 mL / NET: 850 mL    03-19 @ 07:01  -  03-19 @ 14:13  --------------------------------------------------------  IN: 0 mL / OUT: 600 mL / NET: -600 mL      CAPILLARY BLOOD GLUCOSE      POCT Blood Glucose.: 202 mg/dL (19 Mar 2024 11:19)      PHYSICAL EXAM:  General: NAD, answering questions, pleasantly conversant  HEENT: NC/AT; PERRL, clear conjunctiva  Neck: supple  Respiratory: CTA b/l  Cardiovascular: +S1/S2; RRR  Abdomen: soft, NT/ND; +BS x4  Extremities: WWP, 2+ peripheral pulses b/l; no LE edema  Skin: normal color and turgor; no rash  Neurological: AAOx3  Vasc: Right chest wall HD cath, clean, intact    MEDICATIONS:  MEDICATIONS  (STANDING):  albumin human  5% IVPB 4000 milliLiter(s) IV Intermittent once  baclofen 10 milliGRAM(s) Oral <User Schedule>  calcium gluconate IVPB 2 Gram(s) IV Intermittent once  chlorhexidine 2% Cloths 1 Application(s) Topical daily  dexAMETHasone  Injectable 2 milliGRAM(s) IV Push <User Schedule>  dextrose 5%. 1000 milliLiter(s) (100 mL/Hr) IV Continuous <Continuous>  dextrose 5%. 1000 milliLiter(s) (50 mL/Hr) IV Continuous <Continuous>  dextrose 50% Injectable 25 Gram(s) IV Push once  dextrose 50% Injectable 12.5 Gram(s) IV Push once  dextrose 50% Injectable 25 Gram(s) IV Push once  enoxaparin Injectable 40 milliGRAM(s) SubCutaneous every 24 hours  gabapentin 600 milliGRAM(s) Oral at bedtime  gabapentin 100 milliGRAM(s) Oral <User Schedule>  glucagon  Injectable 1 milliGRAM(s) IntraMuscular once  heparin  Lock Flush 100 Units/mL Injectable 600 Unit(s) IV Push once  insulin lispro (ADMELOG) corrective regimen sliding scale   SubCutaneous Before meals and at bedtime  metoprolol succinate ER 25 milliGRAM(s) Oral every 24 hours  mirtazapine 15 milliGRAM(s) Oral every 24 hours  OLANZapine 2.5 milliGRAM(s) Oral <User Schedule>  OLANZapine 5 milliGRAM(s) Oral at bedtime  pantoprazole    Tablet 40 milliGRAM(s) Oral every 12 hours  polyethylene glycol 3350 17 Gram(s) Oral every 12 hours  senna 2 Tablet(s) Oral at bedtime    MEDICATIONS  (PRN):  acetaminophen     Tablet .. 650 milliGRAM(s) Oral every 6 hours PRN Mild Pain (1 - 3), Moderate Pain (4 - 6)  bisacodyl Suppository 10 milliGRAM(s) Rectal daily PRN Constipation  dextrose Oral Gel 15 Gram(s) Oral once PRN Blood Glucose LESS THAN 70 milliGRAM(s)/deciliter  diphenhydrAMINE Injectable 25 milliGRAM(s) IV Push once PRN Allergic Reaction  diphenhydrAMINE IVPB 25 milliGRAM(s) IV Intermittent once PRN Allergic Reaction  OLANZapine 2.5 milliGRAM(s) Oral once PRN anxiety  OLANZapine 2.5 milliGRAM(s) Oral every 6 hours PRN anxiety/insomnia  SUMAtriptan 100 milliGRAM(s) Oral two times a day PRN Migraine      ALLERGIES:  Allergies    methylPREDNISolone (Rash; Urticaria; Flushing; Hives)    Intolerances        LABS:                        12.4   17.95 )-----------( 181      ( 19 Mar 2024 10:44 )             39.3     03-19    136  |  101  |  17  ----------------------------<  243<H>  4.6   |  26  |  0.78    Ca    9.2      19 Mar 2024 10:44  Phos  3.3     03-19  Mg     2.2     03-19      PT/INR - ( 19 Mar 2024 10:44 )   PT: 10.2 sec;   INR: 0.89          PTT - ( 19 Mar 2024 10:44 )  PTT:23.1 sec  Urinalysis Basic - ( 19 Mar 2024 10:44 )    Color: x / Appearance: x / SG: x / pH: x  Gluc: 243 mg/dL / Ketone: x  / Bili: x / Urobili: x   Blood: x / Protein: x / Nitrite: x   Leuk Esterase: x / RBC: x / WBC x   Sq Epi: x / Non Sq Epi: x / Bacteria: x            RADIOLOGY, PATHOLOGY, & ADDITIONAL TESTS: Reviewed.

## 2024-03-20 LAB
ANION GAP SERPL CALC-SCNC: 16 MMOL/L — SIGNIFICANT CHANGE UP (ref 5–17)
AQP4 H2O CHANNEL AB SERPL IA-ACNC: NEGATIVE — SIGNIFICANT CHANGE UP
BUN SERPL-MCNC: 20 MG/DL — SIGNIFICANT CHANGE UP (ref 7–23)
CALCIUM SERPL-MCNC: 9 MG/DL — SIGNIFICANT CHANGE UP (ref 8.4–10.5)
CHLORIDE SERPL-SCNC: 94 MMOL/L — LOW (ref 96–108)
CO2 SERPL-SCNC: 19 MMOL/L — LOW (ref 22–31)
CREAT SERPL-MCNC: 0.75 MG/DL — SIGNIFICANT CHANGE UP (ref 0.5–1.3)
EGFR: 119 ML/MIN/1.73M2 — SIGNIFICANT CHANGE UP
FIBRINOGEN PPP-MCNC: 153 MG/DL — LOW (ref 200–445)
GLUCOSE BLDC GLUCOMTR-MCNC: 168 MG/DL — HIGH (ref 70–99)
GLUCOSE BLDC GLUCOMTR-MCNC: 383 MG/DL — HIGH (ref 70–99)
GLUCOSE BLDC GLUCOMTR-MCNC: 432 MG/DL — HIGH (ref 70–99)
GLUCOSE BLDC GLUCOMTR-MCNC: 476 MG/DL — CRITICAL HIGH (ref 70–99)
GLUCOSE SERPL-MCNC: 433 MG/DL — HIGH (ref 70–99)
HCT VFR BLD CALC: 41.2 % — SIGNIFICANT CHANGE UP (ref 39–50)
HGB BLD-MCNC: 13.4 G/DL — SIGNIFICANT CHANGE UP (ref 13–17)
MAGNESIUM SERPL-MCNC: 2.2 MG/DL — SIGNIFICANT CHANGE UP (ref 1.6–2.6)
MCHC RBC-ENTMCNC: 28.6 PG — SIGNIFICANT CHANGE UP (ref 27–34)
MCHC RBC-ENTMCNC: 32.5 GM/DL — SIGNIFICANT CHANGE UP (ref 32–36)
MCV RBC AUTO: 87.8 FL — SIGNIFICANT CHANGE UP (ref 80–100)
MISCELLANEOUS TEST NAME: SIGNIFICANT CHANGE UP
MYELOPEROXIDASE AB SER-ACNC: <5 UNITS — SIGNIFICANT CHANGE UP
MYELOPEROXIDASE CELLS FLD QL: NEGATIVE — SIGNIFICANT CHANGE UP
NRBC # BLD: 0 /100 WBCS — SIGNIFICANT CHANGE UP (ref 0–0)
PHOSPHATE SERPL-MCNC: 3.5 MG/DL — SIGNIFICANT CHANGE UP (ref 2.5–4.5)
PLATELET # BLD AUTO: 189 K/UL — SIGNIFICANT CHANGE UP (ref 150–400)
POTASSIUM SERPL-MCNC: 4.7 MMOL/L — SIGNIFICANT CHANGE UP (ref 3.5–5.3)
POTASSIUM SERPL-SCNC: 4.7 MMOL/L — SIGNIFICANT CHANGE UP (ref 3.5–5.3)
PROTEINASE3 AB FLD-ACNC: <5 UNITS — SIGNIFICANT CHANGE UP
PROTEINASE3 AB SER-ACNC: NEGATIVE — SIGNIFICANT CHANGE UP
RBC # BLD: 4.69 M/UL — SIGNIFICANT CHANGE UP (ref 4.2–5.8)
RBC # FLD: 22.2 % — HIGH (ref 10.3–14.5)
SODIUM SERPL-SCNC: 129 MMOL/L — LOW (ref 135–145)
WBC # BLD: 24.4 K/UL — HIGH (ref 3.8–10.5)
WBC # FLD AUTO: 24.4 K/UL — HIGH (ref 3.8–10.5)

## 2024-03-20 PROCEDURE — 99232 SBSQ HOSP IP/OBS MODERATE 35: CPT

## 2024-03-20 RX ORDER — CALCIUM GLUCONATE 100 MG/ML
2 VIAL (ML) INTRAVENOUS ONCE
Refills: 0 | Status: DISCONTINUED | OUTPATIENT
Start: 2024-03-21 | End: 2024-03-22

## 2024-03-20 RX ORDER — ALBUMIN HUMAN 25 %
4000 VIAL (ML) INTRAVENOUS ONCE
Refills: 0 | Status: DISCONTINUED | OUTPATIENT
Start: 2024-03-21 | End: 2024-03-22

## 2024-03-20 RX ORDER — INSULIN LISPRO 100/ML
VIAL (ML) SUBCUTANEOUS
Refills: 0 | Status: DISCONTINUED | OUTPATIENT
Start: 2024-03-20 | End: 2024-03-26

## 2024-03-20 RX ADMIN — Medication 10 MILLIGRAM(S): at 16:58

## 2024-03-20 RX ADMIN — GABAPENTIN 600 MILLIGRAM(S): 400 CAPSULE ORAL at 21:39

## 2024-03-20 RX ADMIN — OLANZAPINE 5 MILLIGRAM(S): 15 TABLET, FILM COATED ORAL at 21:39

## 2024-03-20 RX ADMIN — Medication 10 MILLIGRAM(S): at 05:19

## 2024-03-20 RX ADMIN — CHLORHEXIDINE GLUCONATE 1 APPLICATION(S): 213 SOLUTION TOPICAL at 12:18

## 2024-03-20 RX ADMIN — SENNA PLUS 2 TABLET(S): 8.6 TABLET ORAL at 21:40

## 2024-03-20 RX ADMIN — OLANZAPINE 2.5 MILLIGRAM(S): 15 TABLET, FILM COATED ORAL at 03:29

## 2024-03-20 RX ADMIN — Medication 10 MILLIGRAM(S): at 21:39

## 2024-03-20 RX ADMIN — Medication 10 MILLIGRAM(S): at 21:56

## 2024-03-20 RX ADMIN — GABAPENTIN 100 MILLIGRAM(S): 400 CAPSULE ORAL at 05:18

## 2024-03-20 RX ADMIN — PANTOPRAZOLE SODIUM 40 MILLIGRAM(S): 20 TABLET, DELAYED RELEASE ORAL at 05:19

## 2024-03-20 RX ADMIN — MIRTAZAPINE 15 MILLIGRAM(S): 45 TABLET, ORALLY DISINTEGRATING ORAL at 21:49

## 2024-03-20 RX ADMIN — Medication 25 MILLIGRAM(S): at 21:41

## 2024-03-20 RX ADMIN — SUMATRIPTAN SUCCINATE 100 MILLIGRAM(S): 4 INJECTION, SOLUTION SUBCUTANEOUS at 21:54

## 2024-03-20 RX ADMIN — SUMATRIPTAN SUCCINATE 100 MILLIGRAM(S): 4 INJECTION, SOLUTION SUBCUTANEOUS at 22:23

## 2024-03-20 RX ADMIN — Medication 2 MILLIGRAM(S): at 18:00

## 2024-03-20 RX ADMIN — Medication 5: at 12:19

## 2024-03-20 RX ADMIN — OLANZAPINE 2.5 MILLIGRAM(S): 15 TABLET, FILM COATED ORAL at 12:20

## 2024-03-20 RX ADMIN — Medication 1: at 07:57

## 2024-03-20 RX ADMIN — OLANZAPINE 2.5 MILLIGRAM(S): 15 TABLET, FILM COATED ORAL at 05:20

## 2024-03-20 RX ADMIN — GABAPENTIN 100 MILLIGRAM(S): 400 CAPSULE ORAL at 16:58

## 2024-03-20 RX ADMIN — ENOXAPARIN SODIUM 40 MILLIGRAM(S): 100 INJECTION SUBCUTANEOUS at 21:40

## 2024-03-20 RX ADMIN — Medication 2 MILLIGRAM(S): at 05:18

## 2024-03-20 RX ADMIN — PANTOPRAZOLE SODIUM 40 MILLIGRAM(S): 20 TABLET, DELAYED RELEASE ORAL at 18:00

## 2024-03-20 RX ADMIN — Medication 6: at 16:56

## 2024-03-20 RX ADMIN — POLYETHYLENE GLYCOL 3350 17 GRAM(S): 17 POWDER, FOR SOLUTION ORAL at 05:18

## 2024-03-20 NOTE — PROGRESS NOTE ADULT - SUBJECTIVE AND OBJECTIVE BOX
Neurology Stroke Progress Note    INTERVAL HPI/OVERNIGHT EVENTS:  Pt received Zyprexa 2.5mg ~3-4am. Pt states he is feeling okay, reports not getting restful sleep last night, feels slightly foggy.     MEDICATIONS  (STANDING):  albumin human  5% IVPB 4000 milliLiter(s) IV Intermittent once  baclofen 10 milliGRAM(s) Oral <User Schedule>  calcium gluconate IVPB 2 Gram(s) IV Intermittent once  chlorhexidine 2% Cloths 1 Application(s) Topical daily  dexAMETHasone  Injectable 2 milliGRAM(s) IV Push <User Schedule>  dextrose 5%. 1000 milliLiter(s) (100 mL/Hr) IV Continuous <Continuous>  dextrose 5%. 1000 milliLiter(s) (50 mL/Hr) IV Continuous <Continuous>  dextrose 50% Injectable 25 Gram(s) IV Push once  dextrose 50% Injectable 12.5 Gram(s) IV Push once  dextrose 50% Injectable 25 Gram(s) IV Push once  enoxaparin Injectable 40 milliGRAM(s) SubCutaneous every 24 hours  gabapentin 100 milliGRAM(s) Oral <User Schedule>  gabapentin 600 milliGRAM(s) Oral at bedtime  glucagon  Injectable 1 milliGRAM(s) IntraMuscular once  heparin  Lock Flush 100 Units/mL Injectable 600 Unit(s) IV Push once  insulin lispro (ADMELOG) corrective regimen sliding scale   SubCutaneous three times a day before meals  metoprolol succinate ER 25 milliGRAM(s) Oral every 24 hours  mirtazapine 15 milliGRAM(s) Oral every 24 hours  OLANZapine 5 milliGRAM(s) Oral at bedtime  OLANZapine 2.5 milliGRAM(s) Oral <User Schedule>  pantoprazole    Tablet 40 milliGRAM(s) Oral every 12 hours  polyethylene glycol 3350 17 Gram(s) Oral every 12 hours  senna 2 Tablet(s) Oral at bedtime    MEDICATIONS  (PRN):  acetaminophen     Tablet .. 650 milliGRAM(s) Oral every 6 hours PRN Mild Pain (1 - 3), Moderate Pain (4 - 6)  bisacodyl Suppository 10 milliGRAM(s) Rectal daily PRN Constipation  dextrose Oral Gel 15 Gram(s) Oral once PRN Blood Glucose LESS THAN 70 milliGRAM(s)/deciliter  diphenhydrAMINE Injectable 25 milliGRAM(s) IV Push once PRN Allergic Reaction  diphenhydrAMINE IVPB 25 milliGRAM(s) IV Intermittent once PRN Allergic Reaction  OLANZapine 2.5 milliGRAM(s) Oral every 6 hours PRN anxiety/insomnia  SUMAtriptan 100 milliGRAM(s) Oral two times a day PRN Migraine      Allergies    methylPREDNISolone (Rash; Urticaria; Flushing; Hives)    Intolerances        Vital Signs Last 24 Hrs  T(C): 36.4 (20 Mar 2024 22:48), Max: 36.6 (20 Mar 2024 10:11)  T(F): 97.6 (20 Mar 2024 22:48), Max: 97.9 (20 Mar 2024 10:11)  HR: 89 (20 Mar 2024 21:26) (71 - 92)  BP: 153/99 (20 Mar 2024 21:26) (132/81 - 153/99)  BP(mean): 120 (20 Mar 2024 21:26) (98 - 120)  RR: 20 (20 Mar 2024 21:26) (18 - 20)  SpO2: 95% (20 Mar 2024 21:26) (95% - 97%)    Parameters below as of 20 Mar 2024 21:26  Patient On (Oxygen Delivery Method): room air    Physical exam:  General: No acute distress, awake and alert  Eyes: Anicteric sclerae, moist conjunctivae, see below for CNs  Neck: trachea midline  Cardiovascular: No carotid bruits.   Pulmonary: No use of accessory muscles    Neurologic:  -Mental status: Awake, alert, oriented to person, place, and time. Speech is fluent with intact naming, repetition, and comprehension, no dysarthria. Recent and remote memory intact. Follows commands.  -Cranial nerves:   II: Visual fields are full to confrontation, endorsing some blurry vision in peripheral vision bilaterally.   III, IV, VI: Extraocular movements are intact without nystagmus.   VII: Face is symmetric  Motor: Normal bulk and tone. No pronator drift. Strength bilateral upper extremity 5/5, bilateral lower extremities 5/5. rapid finger movement slightly more fluid today  Sensation: Intact to light touch bilaterally. No neglect or extinction on double simultaneous testing.  Coordination: No dysmetria on finger-to-nose  Gait: better monalisa and arm swing than prior, not fully dynamic R arm swing but improved    LABS:                        13.4   24.40 )-----------( 189      ( 20 Mar 2024 17:05 )             41.2     03-20    129<L>  |  94<L>  |  20  ----------------------------<  433<H>  4.7   |  19<L>  |  0.75    Ca    9.0      20 Mar 2024 17:05  Phos  3.5     03-20  Mg     2.2     03-20      PT/INR - ( 19 Mar 2024 10:44 )   PT: 10.2 sec;   INR: 0.89          PTT - ( 19 Mar 2024 10:44 )  PTT:23.1 sec  Urinalysis Basic - ( 20 Mar 2024 17:05 )    Color: x / Appearance: x / SG: x / pH: x  Gluc: 433 mg/dL / Ketone: x  / Bili: x / Urobili: x   Blood: x / Protein: x / Nitrite: x   Leuk Esterase: x / RBC: x / WBC x   Sq Epi: x / Non Sq Epi: x / Bacteria: x    RADIOLOGY & ADDITIONAL TESTS:    reviewed

## 2024-03-20 NOTE — PROGRESS NOTE ADULT - SUBJECTIVE AND OBJECTIVE BOX
Patient is a 39y old  Male who presents with a chief complaint of treated for Autoimmune encephalitis (18 Mar 2024 15:40)      SUBJECTIVE:  Patient seen and examined at bedside.  Sleep still overall poor, some improvement yesterday.  Still with difficulty focusing, did some work today but could not focus on video games.  Some improvement since starting PLEX    ROS:  Denies fevers, chills, SOB, chest pain, Abdominal pain, N/V.  All other ROS negative except as above    Vital Signs Last 12 Hrs  T(F): 97.9 (03-20-24 @ 10:11), Max: 97.9 (03-20-24 @ 10:11)  HR: 71 (03-20-24 @ 07:58) (71 - 71)  BP: 132/81 (03-20-24 @ 07:58) (132/81 - 132/81)  BP(mean): 98 (03-20-24 @ 07:58) (98 - 98)  RR: 18 (03-20-24 @ 07:58) (18 - 18)  SpO2: 97% (03-20-24 @ 07:58) (97% - 97%)  I&O's Summary    19 Mar 2024 07:01  -  20 Mar 2024 07:00  --------------------------------------------------------  IN: 0 mL / OUT: 600 mL / NET: -600 mL        PHYSICAL EXAM:  Constitutional: NAD, comfortable in bed.  HEENT: no conjunctival pallor or scleral icterus, MMM  Respiratory: CTA B/L anteriorly   Cardiovascular: RRR, normal S1 and S2, no m/r/g.   Extremities: no edema.    Neurological: AAOx3, moves all extremities    Skin: R HD cath        LABS:                        12.4   17.95 )-----------( 181      ( 19 Mar 2024 10:44 )             39.3     03-19    136  |  101  |  17  ----------------------------<  243<H>  4.6   |  26  |  0.78    Ca    9.2      19 Mar 2024 10:44  Phos  3.3     03-19  Mg     2.2     03-19      PT/INR - ( 19 Mar 2024 10:44 )   PT: 10.2 sec;   INR: 0.89          PTT - ( 19 Mar 2024 10:44 )  PTT:23.1 sec  Urinalysis Basic - ( 19 Mar 2024 10:44 )    Color: x / Appearance: x / SG: x / pH: x  Gluc: 243 mg/dL / Ketone: x  / Bili: x / Urobili: x   Blood: x / Protein: x / Nitrite: x   Leuk Esterase: x / RBC: x / WBC x   Sq Epi: x / Non Sq Epi: x / Bacteria: x          RADIOLOGY & ADDITIONAL TESTS:  No new imaging    MEDICATIONS  (STANDING):  albumin human  5% IVPB 4000 milliLiter(s) IV Intermittent once  baclofen 10 milliGRAM(s) Oral <User Schedule>  calcium gluconate IVPB 2 Gram(s) IV Intermittent once  chlorhexidine 2% Cloths 1 Application(s) Topical daily  dexAMETHasone  Injectable 2 milliGRAM(s) IV Push <User Schedule>  dextrose 5%. 1000 milliLiter(s) (100 mL/Hr) IV Continuous <Continuous>  dextrose 5%. 1000 milliLiter(s) (50 mL/Hr) IV Continuous <Continuous>  dextrose 50% Injectable 25 Gram(s) IV Push once  dextrose 50% Injectable 12.5 Gram(s) IV Push once  dextrose 50% Injectable 25 Gram(s) IV Push once  enoxaparin Injectable 40 milliGRAM(s) SubCutaneous every 24 hours  gabapentin 100 milliGRAM(s) Oral <User Schedule>  gabapentin 600 milliGRAM(s) Oral at bedtime  glucagon  Injectable 1 milliGRAM(s) IntraMuscular once  heparin  Lock Flush 100 Units/mL Injectable 600 Unit(s) IV Push once  insulin lispro (ADMELOG) corrective regimen sliding scale   SubCutaneous Before meals and at bedtime  metoprolol succinate ER 25 milliGRAM(s) Oral every 24 hours  mirtazapine 15 milliGRAM(s) Oral every 24 hours  OLANZapine 5 milliGRAM(s) Oral at bedtime  OLANZapine 2.5 milliGRAM(s) Oral <User Schedule>  pantoprazole    Tablet 40 milliGRAM(s) Oral every 12 hours  polyethylene glycol 3350 17 Gram(s) Oral every 12 hours  senna 2 Tablet(s) Oral at bedtime    MEDICATIONS  (PRN):  acetaminophen     Tablet .. 650 milliGRAM(s) Oral every 6 hours PRN Mild Pain (1 - 3), Moderate Pain (4 - 6)  bisacodyl Suppository 10 milliGRAM(s) Rectal daily PRN Constipation  dextrose Oral Gel 15 Gram(s) Oral once PRN Blood Glucose LESS THAN 70 milliGRAM(s)/deciliter  diphenhydrAMINE Injectable 25 milliGRAM(s) IV Push once PRN Allergic Reaction  diphenhydrAMINE IVPB 25 milliGRAM(s) IV Intermittent once PRN Allergic Reaction  OLANZapine 2.5 milliGRAM(s) Oral every 6 hours PRN anxiety/insomnia  SUMAtriptan 100 milliGRAM(s) Oral two times a day PRN Migraine

## 2024-03-20 NOTE — PROGRESS NOTE ADULT - ASSESSMENT
39y Male with PMHx of viral meningitis with sequela of HTN and intractable headaches (followed by Dr. Weston), recent admission 3/4/24 for autoimmune encephalitis management/treatment (had presented as worsening headaches and cognition - most notably e-mails that didn't make sense) s/p IV steroids and IVIG, presented again on 3/12 forfor bilateral hand weakness as well as wide based gait, UE tremors, UE cogwheeling, dysmetria, and UE weakness. Went for outpatient MRI brain and cervical spine. Admitted for further neurologic workup. Restarted on IV steroids, and plasmapheresis. Planned for 5th session PLEX tomorrow.     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  - s/p LP 3/4 with elevated opening pressure at 30 mmHg, remainder of CSF negative  - continue decadron 2mg q12h (no overnight dose) Plan to wean steroids as tolerated after each plasma exchange session  - s/p LP 3/13. infectious panel so far negative, awaiting send out studies. autoimmune, and fungal studies, all herpes panels (HSV 1, HSV 2, HSV 6), CSF neopterin (write in), TB, RPR, VDRL, AFB, bacterial antigens, full viral PCR panel, oligoclonal bands, Igg index, myelin basic protein  - LP done 3/4 opening pressure was 30, LP done 3/13 with opening pressure of 13.5  - MRA head/neck done for vessel imaging - no stenosis or occlusion, no remarkable findings  - PET body - nonspecific right lung nodules  - testicular ultrasound neg  - Pulm consult - lung nodules likely inflammation, no contraindication to Rituximab  - plasma exchange started on 3/13, will plan now for 7 TOTAL sessions every other day as pt continues to improve with plasmapheresis. will need to send daily fibrinogen clauss levels   - HD catheter for the plasma exchange placed by IR on 3/13  - ID consult for rule out possible infectious causes, and prior to initiation of Rituximab - no contraindication to Rituximab  - D dimer elevated to 502, LE dopplers negative  - Sed rate 15  - CRP <3  - repeat Neuropsych consult on 3/14 - improved from prior admission  - Rheum consulted 3/15 for elevated LAKSHMI level, f/u recs  - EEG negative for epileptiform discharges, discontinued on 3/17 AM  - dc'ed verapamil, transitioned to Toprol 25mg QHS   - f/u serum hepatitis panel, quantiferon, NMO antibody, ACE, serum protein and immuno electrophoresis, ANCA, LAKSHMI, double stranded DNA, cryoglobulin, copper, ceruloplasmin  - HIV neg  - urine heavy metal screen, urine porphyrins, urine porphobilinogen  - f/u iron panel   - c/w gabapentin 100mg at 7am and 4pm, 600mg QHS and baclofen 10mg three times a day  - continue home pantoprazole BID  - q8hr general neuro checks and vitals    #low copper  - f/u zinc and thiamine levels     #migraine  - sumatriptan 100mg PO PRN once (max 2 tabs)  - if needed, can given sumatriptan 6mg subcutaneously once on top of PO    #insomnia  - continue remeron to 15mg qhs, sodium levels stable     Cards  #HTN w/ LVH  #sinus tachy  - cards following, appreciate recs  - goal normotension  - hold amlodipine  - continue toprol 25mg daily     Pulm  - call provider if SPO2 < 94%  - uptake noted in right upper lobe of right lung on PET; CT C/A/P demonstrates clustered nodular and ground-glass opacities in bilateral upper lobes, more prominent over right; findings most consistent with infectious/inflammatory etiology  - pulm consulted during recent admission  - repeat CT in 6-12 weeks to ensure resolution    GI  #Nutrition/Fluids/Electrolytes   - replete K<4 and Mg <2  - Diet: Regular    Infectious Disease  - afebrile on admission  - leukocytosis likely due to steroids    Endocrine  #hyperglycemia  - A1C results: 5.2  - continue FS monitoring while on steroids  - on low dose SSI  - defer lantus for now given decrease in steroid dose    - TSH results: 0.594    Renal  #hyponatremia as low as 129, slowly uptrending  - sodium now stable, restarted Remeron 15mg  - monitor daily sodium levels, today 134    Heme  #low fibrinogen level, likely 2/2 plasmapheresis  - daily fibrinogen clauss levels  - fibrinogen prior to plasmapheresis 166 3/17, s/p 5u cryoprecipitate  - fibrinogen 160 pre-PLEX, s/p 5u cryoprecipitate     Psych  #anxiety  - Zyprexa standing (2.5 am, 2.5 pm, 5 at bedtime)  - psych consulted 3/15, agrees with current regimen   - can give additional zyprexa 2.5mg q6h PRN (no more than 15mg total per day max)  - EKG every other day to monitor qtc (next due 3/17), last 3/15     Rheum  #+ ANCA  - f/u rheum labs - proteinase 3, anti myeloperoxidase ab, CCP, myositis panel, myomarker    DVT Prophylaxis  - SCDs for DVT prophylaxis     Dispo: home when cleared     Discussed daily hospital plans and goals with patient at bedside.    Discussed with Neurology Attending, Dr. Weston

## 2024-03-21 DIAGNOSIS — R74.01 ELEVATION OF LEVELS OF LIVER TRANSAMINASE LEVELS: ICD-10-CM

## 2024-03-21 LAB
ALBUMIN SERPL ELPH-MCNC: 4.5 G/DL — SIGNIFICANT CHANGE UP (ref 3.3–5)
ALP SERPL-CCNC: 67 U/L — SIGNIFICANT CHANGE UP (ref 40–120)
ALT FLD-CCNC: 124 U/L — HIGH (ref 10–45)
ANION GAP SERPL CALC-SCNC: 10 MMOL/L — SIGNIFICANT CHANGE UP (ref 5–17)
AST SERPL-CCNC: 41 U/L — HIGH (ref 10–40)
BILIRUB SERPL-MCNC: 0.4 MG/DL — SIGNIFICANT CHANGE UP (ref 0.2–1.2)
BUN SERPL-MCNC: 18 MG/DL — SIGNIFICANT CHANGE UP (ref 7–23)
CALCIUM SERPL-MCNC: 9.2 MG/DL — SIGNIFICANT CHANGE UP (ref 8.4–10.5)
CCP IGG SERPL-ACNC: <8 UNITS — SIGNIFICANT CHANGE UP
CHLORIDE SERPL-SCNC: 106 MMOL/L — SIGNIFICANT CHANGE UP (ref 96–108)
CO2 SERPL-SCNC: 24 MMOL/L — SIGNIFICANT CHANGE UP (ref 22–31)
COPRO 24H UR-MCNC: 7 UG/L — SIGNIFICANT CHANGE UP
COPROPORPHYRIN (CP) III: 16 UG/L — SIGNIFICANT CHANGE UP
COPROPORPHYRIN I - 24 HOUR URINE: 29 UG/24 HR — HIGH (ref 0–24)
COPROPORPHYRIN III - 24 HOUR URINE: 67 UG/24 HR — SIGNIFICANT CHANGE UP (ref 0–74)
CREAT SERPL-MCNC: 0.74 MG/DL — SIGNIFICANT CHANGE UP (ref 0.5–1.3)
EGFR: 120 ML/MIN/1.73M2 — SIGNIFICANT CHANGE UP
FERRITIN SERPL-MCNC: 285 NG/ML — SIGNIFICANT CHANGE UP (ref 30–400)
FIBRINOGEN PPP-MCNC: 190 MG/DL — LOW (ref 200–445)
GLUCOSE BLDC GLUCOMTR-MCNC: 187 MG/DL — HIGH (ref 70–99)
GLUCOSE BLDC GLUCOMTR-MCNC: 328 MG/DL — HIGH (ref 70–99)
GLUCOSE BLDC GLUCOMTR-MCNC: 362 MG/DL — HIGH (ref 70–99)
GLUCOSE SERPL-MCNC: 375 MG/DL — HIGH (ref 70–99)
HCT VFR BLD CALC: 37.3 % — LOW (ref 39–50)
HEPTA-CP UR-MCNC: 25 UG/24 HR — HIGH (ref 0–4)
HEPTACARBOXYL (7-CP): 6 UG/L — SIGNIFICANT CHANGE UP
HEXA-CP UR-MCNC: <4 UG/24 HR — HIGH (ref 0–1)
HEXACARBOXYL (6-CP): <1 UG/L — SIGNIFICANT CHANGE UP
HGB BLD-MCNC: 12.5 G/DL — LOW (ref 13–17)
IRON SATN MFR SERPL: 31 % — SIGNIFICANT CHANGE UP (ref 16–55)
IRON SATN MFR SERPL: 65 UG/DL — SIGNIFICANT CHANGE UP (ref 45–165)
MAGNESIUM SERPL-MCNC: 2.1 MG/DL — SIGNIFICANT CHANGE UP (ref 1.6–2.6)
MCHC RBC-ENTMCNC: 28.2 PG — SIGNIFICANT CHANGE UP (ref 27–34)
MCHC RBC-ENTMCNC: 33.5 GM/DL — SIGNIFICANT CHANGE UP (ref 32–36)
MCV RBC AUTO: 84.2 FL — SIGNIFICANT CHANGE UP (ref 80–100)
NRBC # BLD: 0 /100 WBCS — SIGNIFICANT CHANGE UP (ref 0–0)
PENTA-CPI 24H STL-MRATE: 21 UG/24 HR — HIGH (ref 0–4)
PENTACARBOXYL (5-CP): 5 UG/L — SIGNIFICANT CHANGE UP
PHOSPHATE SERPL-MCNC: 3.9 MG/DL — SIGNIFICANT CHANGE UP (ref 2.5–4.5)
PLATELET # BLD AUTO: 182 K/UL — SIGNIFICANT CHANGE UP (ref 150–400)
POTASSIUM SERPL-MCNC: 4.7 MMOL/L — SIGNIFICANT CHANGE UP (ref 3.5–5.3)
POTASSIUM SERPL-SCNC: 4.7 MMOL/L — SIGNIFICANT CHANGE UP (ref 3.5–5.3)
PROT SERPL-MCNC: 5.9 G/DL — LOW (ref 6–8.3)
RBC # BLD: 4.43 M/UL — SIGNIFICANT CHANGE UP (ref 4.2–5.8)
RBC # FLD: 22.1 % — HIGH (ref 10.3–14.5)
RF+CCP IGG SER-IMP: NEGATIVE — SIGNIFICANT CHANGE UP
SODIUM SERPL-SCNC: 140 MMOL/L — SIGNIFICANT CHANGE UP (ref 135–145)
TIBC SERPL-MCNC: 211 UG/DL — LOW (ref 220–430)
UIBC SERPL-MCNC: 146 UG/DL — SIGNIFICANT CHANGE UP (ref 110–370)
UROPOR 24H UR-MCNC: 4 UG/L — SIGNIFICANT CHANGE UP
UROPORPHYRIN I - 24 HOUR URINE: 17 UG/24 HR — SIGNIFICANT CHANGE UP (ref 0–24)
WBC # BLD: 18.5 K/UL — HIGH (ref 3.8–10.5)
WBC # FLD AUTO: 18.5 K/UL — HIGH (ref 3.8–10.5)

## 2024-03-21 PROCEDURE — 93010 ELECTROCARDIOGRAM REPORT: CPT

## 2024-03-21 PROCEDURE — 99232 SBSQ HOSP IP/OBS MODERATE 35: CPT

## 2024-03-21 PROCEDURE — 99233 SBSQ HOSP IP/OBS HIGH 50: CPT

## 2024-03-21 RX ORDER — MIRTAZAPINE 45 MG/1
30 TABLET, ORALLY DISINTEGRATING ORAL AT BEDTIME
Refills: 0 | Status: DISCONTINUED | OUTPATIENT
Start: 2024-03-21 | End: 2024-03-26

## 2024-03-21 RX ORDER — METOPROLOL TARTRATE 50 MG
50 TABLET ORAL EVERY 24 HOURS
Refills: 0 | Status: DISCONTINUED | OUTPATIENT
Start: 2024-03-21 | End: 2024-03-26

## 2024-03-21 RX ADMIN — POLYETHYLENE GLYCOL 3350 17 GRAM(S): 17 POWDER, FOR SOLUTION ORAL at 21:26

## 2024-03-21 RX ADMIN — ENOXAPARIN SODIUM 40 MILLIGRAM(S): 100 INJECTION SUBCUTANEOUS at 21:02

## 2024-03-21 RX ADMIN — Medication 10 MILLIGRAM(S): at 06:15

## 2024-03-21 RX ADMIN — Medication 2 MILLIGRAM(S): at 06:15

## 2024-03-21 RX ADMIN — TRAMADOL HYDROCHLORIDE 25 MILLIGRAM(S): 50 TABLET ORAL at 03:15

## 2024-03-21 RX ADMIN — MIRTAZAPINE 30 MILLIGRAM(S): 45 TABLET, ORALLY DISINTEGRATING ORAL at 21:27

## 2024-03-21 RX ADMIN — Medication 10 MILLIGRAM(S): at 21:01

## 2024-03-21 RX ADMIN — OLANZAPINE 2.5 MILLIGRAM(S): 15 TABLET, FILM COATED ORAL at 07:33

## 2024-03-21 RX ADMIN — Medication 8: at 12:14

## 2024-03-21 RX ADMIN — PANTOPRAZOLE SODIUM 40 MILLIGRAM(S): 20 TABLET, DELAYED RELEASE ORAL at 06:15

## 2024-03-21 RX ADMIN — Medication 2: at 07:33

## 2024-03-21 RX ADMIN — GABAPENTIN 600 MILLIGRAM(S): 400 CAPSULE ORAL at 21:02

## 2024-03-21 RX ADMIN — POLYETHYLENE GLYCOL 3350 17 GRAM(S): 17 POWDER, FOR SOLUTION ORAL at 06:16

## 2024-03-21 RX ADMIN — Medication 10 MILLIGRAM(S): at 18:21

## 2024-03-21 RX ADMIN — Medication 50 MILLIGRAM(S): at 21:01

## 2024-03-21 RX ADMIN — Medication 2 MILLIGRAM(S): at 18:21

## 2024-03-21 RX ADMIN — OLANZAPINE 2.5 MILLIGRAM(S): 15 TABLET, FILM COATED ORAL at 00:27

## 2024-03-21 RX ADMIN — PANTOPRAZOLE SODIUM 40 MILLIGRAM(S): 20 TABLET, DELAYED RELEASE ORAL at 18:21

## 2024-03-21 RX ADMIN — CHLORHEXIDINE GLUCONATE 1 APPLICATION(S): 213 SOLUTION TOPICAL at 12:17

## 2024-03-21 RX ADMIN — SENNA PLUS 2 TABLET(S): 8.6 TABLET ORAL at 21:01

## 2024-03-21 RX ADMIN — Medication 10: at 16:39

## 2024-03-21 RX ADMIN — OLANZAPINE 5 MILLIGRAM(S): 15 TABLET, FILM COATED ORAL at 21:28

## 2024-03-21 RX ADMIN — OLANZAPINE 2.5 MILLIGRAM(S): 15 TABLET, FILM COATED ORAL at 12:14

## 2024-03-21 RX ADMIN — GABAPENTIN 100 MILLIGRAM(S): 400 CAPSULE ORAL at 06:15

## 2024-03-21 RX ADMIN — OLANZAPINE 2.5 MILLIGRAM(S): 15 TABLET, FILM COATED ORAL at 06:13

## 2024-03-21 RX ADMIN — GABAPENTIN 100 MILLIGRAM(S): 400 CAPSULE ORAL at 18:21

## 2024-03-21 NOTE — PROGRESS NOTE ADULT - SUBJECTIVE AND OBJECTIVE BOX
Neurology Stroke Progress Note    INTERVAL HPI/OVERNIGHT EVENTS:  Patient seen and examined. O/N received an additional dose of PRN Zyprexa. States that he did not get much sleep last night.     MEDICATIONS  (STANDING):  albumin human  5% IVPB 4000 milliLiter(s) IV Intermittent once  baclofen 10 milliGRAM(s) Oral <User Schedule>  calcium gluconate IVPB 2 Gram(s) IV Intermittent once  chlorhexidine 2% Cloths 1 Application(s) Topical daily  dexAMETHasone  Injectable 2 milliGRAM(s) IV Push <User Schedule>  dextrose 5%. 1000 milliLiter(s) (100 mL/Hr) IV Continuous <Continuous>  dextrose 5%. 1000 milliLiter(s) (50 mL/Hr) IV Continuous <Continuous>  dextrose 50% Injectable 25 Gram(s) IV Push once  dextrose 50% Injectable 12.5 Gram(s) IV Push once  dextrose 50% Injectable 25 Gram(s) IV Push once  enoxaparin Injectable 40 milliGRAM(s) SubCutaneous every 24 hours  gabapentin 100 milliGRAM(s) Oral <User Schedule>  gabapentin 600 milliGRAM(s) Oral at bedtime  glucagon  Injectable 1 milliGRAM(s) IntraMuscular once  heparin  Lock Flush 100 Units/mL Injectable 600 Unit(s) IV Push once  insulin lispro (ADMELOG) corrective regimen sliding scale   SubCutaneous Before meals and at bedtime  metoprolol succinate ER 50 milliGRAM(s) Oral every 24 hours  mirtazapine 30 milliGRAM(s) Oral at bedtime  OLANZapine 5 milliGRAM(s) Oral at bedtime  OLANZapine 2.5 milliGRAM(s) Oral <User Schedule>  pantoprazole    Tablet 40 milliGRAM(s) Oral every 12 hours  polyethylene glycol 3350 17 Gram(s) Oral every 12 hours  senna 2 Tablet(s) Oral at bedtime    MEDICATIONS  (PRN):  acetaminophen     Tablet .. 650 milliGRAM(s) Oral every 6 hours PRN Mild Pain (1 - 3), Moderate Pain (4 - 6)  bisacodyl Suppository 10 milliGRAM(s) Rectal daily PRN Constipation  dextrose Oral Gel 15 Gram(s) Oral once PRN Blood Glucose LESS THAN 70 milliGRAM(s)/deciliter  diphenhydrAMINE Injectable 25 milliGRAM(s) IV Push once PRN Allergic Reaction  diphenhydrAMINE IVPB 25 milliGRAM(s) IV Intermittent once PRN Allergic Reaction  OLANZapine 2.5 milliGRAM(s) Oral every 6 hours PRN anxiety/insomnia  SUMAtriptan 100 milliGRAM(s) Oral two times a day PRN Migraine      Allergies    methylPREDNISolone (Rash; Urticaria; Flushing; Hives)    Intolerances        Vital Signs Last 24 Hrs  T(C): 36.9 (21 Mar 2024 13:19), Max: 36.9 (21 Mar 2024 13:19)  T(F): 98.4 (21 Mar 2024 13:19), Max: 98.4 (21 Mar 2024 13:19)  HR: 76 (21 Mar 2024 07:27) (76 - 92)  BP: 134/94 (21 Mar 2024 07:27) (134/94 - 153/99)  BP(mean): 109 (21 Mar 2024 07:27) (100 - 120)  RR: 18 (21 Mar 2024 07:27) (18 - 20)  SpO2: 98% (21 Mar 2024 07:27) (95% - 98%)    Parameters below as of 21 Mar 2024 07:27  Patient On (Oxygen Delivery Method): room air        Physical exam:  General: No acute distress, awake and alert  Eyes: Anicteric sclerae, moist conjunctivae, see below for CNs  Neck: trachea midline, FROM, supple, no thyromegaly or lymphadenopathy  Cardiovascular: Regular rate and rhythm, no murmurs, rubs, or gallops. No carotid bruits.   Pulmonary: Anterior breath sounds clear bilaterally, no crackles or wheezing. No use of accessory muscles  GI: Abdomen soft, non-distended, non-tender  Extremities: Radial and DP pulses +2, no edema    Neurologic:  -Mental status: Awake, alert, oriented to person, place, and time. Speech is fluent with intact naming, repetition, and comprehension, no dysarthria. Recent and remote memory intact. Follows commands.  -Cranial nerves:   II: Visual fields are full to confrontation, endorsing some blurry vision in peripheral vision bilaterally.   III, IV, VI: Extraocular movements are intact without nystagmus.   VII: Face is symmetric  Motor: Normal bulk and tone. No pronator drift. Strength bilateral upper extremity 5/5, bilateral lower extremities 5/5. rapid finger movement slightly more fluid today  Sensation: Intact to light touch bilaterally. No neglect or extinction on double simultaneous testing.  Coordination: No dysmetria on finger-to-nose  Gait: better monalisa and arm swing than prior, not fully dynamic R arm swing but improved    LABS:                        12.5   18.50 )-----------( 182      ( 21 Mar 2024 13:28 )             37.3     03-21    140  |  106  |  18  ----------------------------<  375<H>  4.7   |  24  |  0.74    Ca    9.2      21 Mar 2024 13:28  Phos  3.9     03-21  Mg     2.1     03-21    TPro  5.9<L>  /  Alb  4.5  /  TBili  0.4  /  DBili  x   /  AST  41<H>  /  ALT  124<H>  /  AlkPhos  67  03-21      Urinalysis Basic - ( 21 Mar 2024 13:28 )    Color: x / Appearance: x / SG: x / pH: x  Gluc: 375 mg/dL / Ketone: x  / Bili: x / Urobili: x   Blood: x / Protein: x / Nitrite: x   Leuk Esterase: x / RBC: x / WBC x   Sq Epi: x / Non Sq Epi: x / Bacteria: x        RADIOLOGY & ADDITIONAL TESTS: Reviewed.

## 2024-03-21 NOTE — PROGRESS NOTE ADULT - ASSESSMENT
39y Male with PMHx of viral meningitis with sequela of HTN and intractable headaches (followed by Dr. Weston), recent admission 3/4/24 for autoimmune encephalitis management/treatment (had presented as worsening headaches and cognition - most notably e-mails that didn't make sense) s/p IV steroids and IVIG, presented again on 3/12 forfor bilateral hand weakness as well as wide based gait, UE tremors, UE cogwheeling, dysmetria, and UE weakness. Went for outpatient MRI brain and cervical spine. Admitted for further neurologic workup. Restarted on IV steroids, and plasmapheresis. Planned for 5th session PLEX tomorrow.     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  - s/p LP 3/4 with elevated opening pressure at 30 mmHg, remainder of CSF negative  - continue decadron 2mg q12h (no overnight dose) Plan to wean steroids as tolerated after each plasma exchange session  - s/p LP 3/13. infectious panel so far negative, awaiting send out studies. autoimmune, and fungal studies, all herpes panels (HSV 1, HSV 2, HSV 6), CSF neopterin (write in), TB, RPR, VDRL, AFB, bacterial antigens, full viral PCR panel, oligoclonal bands, Igg index, myelin basic protein  - LP done 3/4 opening pressure was 30, LP done 3/13 with opening pressure of 13.5  - MRA head/neck done for vessel imaging - no stenosis or occlusion, no remarkable findings  - PET body - nonspecific right lung nodules  - testicular ultrasound neg  - Pulm consult - lung nodules likely inflammation, no contraindication to Rituximab  - plasma exchange started on 3/13, will plan now for 7 TOTAL sessions every other day as pt continues to improve with plasmapheresis. will need to send daily fibrinogen clauss levels   - HD catheter for the plasma exchange placed by IR on 3/13  - ID consult for rule out possible infectious causes, and prior to initiation of Rituximab - no contraindication to Rituximab  - D dimer elevated to 502, LE dopplers negative  - Sed rate 15  - CRP <3  - repeat Neuropsych consult on 3/14 - improved from prior admission  - Rheum consulted 3/15 for elevated LAKSHMI level, f/u recs  - EEG negative for epileptiform discharges, discontinued on 3/17 AM  - dc'ed verapamil, increased Toprol from 25 mg QHS to 50 mg QHS  - hepatitis panel quantiferon, NMO. c-ANCA, p-ANCA negative  - ACE 42  - LAKSHMI 1: 160 , ds-DNA 14  - copper 56  - ceruloplasmin 16  - cryoglobulin indeterminate  - serum protein and immuno electrophoresis grossly negative (just with elevated gamma globulin)  - HIV neg  - urine heavy metal screen, urine porphyrins, urine porphobilinogen  - TIBC 211 otherwise remainder of iron panel normal  - c/w gabapentin 100mg at 7am and 4pm, 600mg QHS and baclofen 10mg three times a day  - continue home pantoprazole BID  - q8hr general neuro checks and vitals    #low copper  - f/u zinc and thiamine levels     #migraine  - sumatriptan 100mg PO PRN once (max 2 tabs)  - if needed, can given sumatriptan 6mg subcutaneously once on top of PO    #insomnia  - increase Remeron to 30 mg QHS, sodium levels stable     Cards  #HTN w/ LVH  #sinus tachy  - cards following, appreciate recs  - goal normotension  - hold amlodipine  - continue toprol 50mg daily     Pulm  - call provider if SPO2 < 94%  - uptake noted in right upper lobe of right lung on PET; CT C/A/P demonstrates clustered nodular and ground-glass opacities in bilateral upper lobes, more prominent over right; findings most consistent with infectious/inflammatory etiology  - pulm consulted during recent admission  - repeat CT in 6-12 weeks to ensure resolution    GI  #Nutrition/Fluids/Electrolytes   - replete K<4 and Mg <2  - Diet: Regular    Infectious Disease  - afebrile on admission  - leukocytosis likely due to steroids    Endocrine  #hyperglycemia  - A1C results: 5.2  - continue FS monitoring while on steroids  - on low dose SSI  - defer lantus for now given decrease in steroid dose    - TSH results: 0.594    Renal  #hyponatremia as low as 129, slowly uptrending  - sodium now stable, continue Remeron 30mg  - monitor daily sodium levels, today 140    Heme  #low fibrinogen level, likely 2/2 plasmapheresis  - daily fibrinogen clauss levels  - fibrinogen prior to plasmapheresis 166 3/17, s/p 5u cryoprecipitate  - fibrinogen 160 pre-PLEX, s/p 5u cryoprecipitate     Psych  #anxiety  - Zyprexa standing (2.5 am, 2.5 pm, 5 at bedtime)  - psych consulted 3/15, agrees with current regimen   - can give additional zyprexa 2.5mg q6h PRN (no more than 15mg total per day max)  - EKG every other day to monitor qtc     Rheum  #+ ANCA  - proteinase 3 < 5.0   - anti myeloperoxidase ab < 5.0  - CCP negative   - f/u myositis panel, myomarker    DVT Prophylaxis  - SCDs for DVT prophylaxis     Dispo: home when cleared     Discussed daily hospital plans and goals with patient at bedside.    Discussed with Neurology Attending, Dr. Weston   39y Male with PMHx of viral meningitis with sequela of HTN and intractable headaches (followed by Dr. Weston), recent admission 3/4/24 for autoimmune encephalitis management/treatment (had presented as worsening headaches and cognition - most notably e-mails that didn't make sense) s/p IV steroids and IVIG, presented again on 3/12 forfor bilateral hand weakness as well as wide based gait, UE tremors, UE cogwheeling, dysmetria, and UE weakness. Went for outpatient MRI brain and cervical spine. Admitted for further neurologic workup. Restarted on IV steroids, and plasmapheresis. Planned for 5th session PLEX tomorrow.     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  - s/p LP 3/4 with elevated opening pressure at 30 mmHg, remainder of CSF negative  - continue decadron 2mg q12h (no overnight dose) Plan to wean steroids as tolerated after each plasma exchange session  - s/p LP 3/13. infectious panel so far negative, awaiting send out studies. autoimmune, and fungal studies, all herpes panels (HSV 1, HSV 2, HSV 6), CSF neopterin (write in), TB, RPR, VDRL, AFB, bacterial antigens, full viral PCR panel, oligoclonal bands, Igg index, myelin basic protein  - LP done 3/4 opening pressure was 30, LP done 3/13 with opening pressure of 13.5  - MRA head/neck done for vessel imaging - no stenosis or occlusion, no remarkable findings  - PET body - nonspecific right lung nodules  - testicular ultrasound neg  - Pulm consult - lung nodules likely inflammation, no contraindication to Rituximab  - plasma exchange started on 3/13, will plan now for 7 TOTAL sessions every other day as pt continues to improve with plasmapheresis. will need to send daily fibrinogen clauss levels   - HD catheter for the plasma exchange placed by IR on 3/13  - ID consult for rule out possible infectious causes, and prior to initiation of Rituximab - no contraindication to Rituximab  - D dimer elevated to 502, LE dopplers negative  - Sed rate 15  - CRP <3  - repeat Neuropsych consult on 3/14 - improved from prior admission  - Rheum consulted 3/15 for elevated LAKSHMI level, f/u recs  - EEG negative for epileptiform discharges, discontinued on 3/17 AM  - dc'ed verapamil, increased Toprol from 25 mg QHS to 50 mg QHS  - hepatitis panel quantiferon, NMO. c-ANCA, p-ANCA negative  - ACE 42  - LAKSHMI 1: 160 , ds-DNA 14  - copper 56  - ceruloplasmin 16  - cryoglobulin indeterminate  - serum protein and immuno electrophoresis grossly negative (just with elevated gamma globulin)  - HIV neg  - urine heavy metal screen, urine porphyrins, urine porphobilinogen  - TIBC 211 otherwise remainder of iron panel normal  - c/w gabapentin 100mg at 7am and 4pm, 600mg QHS and baclofen 10mg three times a day  - continue home pantoprazole BID  - q8hr general neuro checks and vitals    #low copper  - f/u zinc and thiamine levels     #migraine  - sumatriptan 100mg PO PRN once (max 2 tabs)  - if needed, can given sumatriptan 6mg subcutaneously once on top of PO    #insomnia  - increase Remeron to 30 mg QHS, sodium levels stable     Cards  #HTN w/ LVH  #sinus tachy  - cards following, appreciate recs  - goal normotension  - hold amlodipine  - continue toprol 50mg daily     Pulm  - call provider if SPO2 < 94%  - uptake noted in right upper lobe of right lung on PET; CT C/A/P demonstrates clustered nodular and ground-glass opacities in bilateral upper lobes, more prominent over right; findings most consistent with infectious/inflammatory etiology  - pulm consulted during recent admission  - repeat CT in 6-12 weeks to ensure resolution    GI  #Nutrition/Fluids/Electrolytes   - replete K<4 and Mg <2  - Diet: Regular    #elevated LFTs   - AST/ALT 41/124 likely from steroids and Remeron   - would continue to trend for now    Infectious Disease  - afebrile on admission  - leukocytosis likely due to steroids    Endocrine  #hyperglycemia  - A1C results: 5.2  - continue FS monitoring while on steroids  - on low dose SSI  - defer lantus for now given decrease in steroid dose    - TSH results: 0.594    Renal  #hyponatremia as low as 129, slowly uptrending  - sodium now stable, continue Remeron 30mg  - monitor daily sodium levels, today 140    Heme  #low fibrinogen level, likely 2/2 plasmapheresis  - daily fibrinogen clauss levels  - fibrinogen prior to plasmapheresis 166 3/17, s/p 5u cryoprecipitate  - fibrinogen 160 pre-PLEX, s/p 5u cryoprecipitate     Psych  #anxiety  - Zyprexa standing (2.5 am, 2.5 pm, 5 at bedtime)  - psych consulted 3/15, agrees with current regimen   - can give additional zyprexa 2.5mg q6h PRN (no more than 15mg total per day max)  - EKG every other day to monitor qtc     Rheum  #+ ANCA  - proteinase 3 < 5.0   - anti myeloperoxidase ab < 5.0  - CCP negative   - f/u myositis panel, myomarker    DVT Prophylaxis  - SCDs for DVT prophylaxis     Dispo: home when cleared     Discussed daily hospital plans and goals with patient at bedside.    Discussed with Neurology Attending, Dr. Weston

## 2024-03-21 NOTE — PROGRESS NOTE ADULT - SUBJECTIVE AND OBJECTIVE BOX
Transfusion Medicine Progress Note      SUBJECTIVE: Patient seen and examined at bedside. Reports slight bruising on arms near old IV sites. Denies fever, headache, nausea, vomiting, chest pain, shortness of breath, abdominal pain, changes in bowel movements or urination.     OBJECTIVE:    VITAL SIGNS:  ICU Vital Signs Last 24 Hrs  T(C): 36.9 (21 Mar 2024 13:19), Max: 36.9 (21 Mar 2024 13:19)  T(F): 98.4 (21 Mar 2024 13:19), Max: 98.4 (21 Mar 2024 13:19)  HR: 76 (21 Mar 2024 07:27) (76 - 92)  BP: 134/94 (21 Mar 2024 07:27) (134/94 - 153/99)  BP(mean): 109 (21 Mar 2024 07:27) (100 - 120)  ABP: --  ABP(mean): --  RR: 18 (21 Mar 2024 07:27) (18 - 20)  SpO2: 98% (21 Mar 2024 07:27) (95% - 98%)    O2 Parameters below as of 21 Mar 2024 07:27  Patient On (Oxygen Delivery Method): room air              03-20 @ 07:01  -  03-21 @ 07:00  --------------------------------------------------------  IN: 0 mL / OUT: 500 mL / NET: -500 mL    03-21 @ 07:01  -  03-21 @ 14:36  --------------------------------------------------------  IN: 900 mL / OUT: 0 mL / NET: 900 mL      CAPILLARY BLOOD GLUCOSE      POCT Blood Glucose.: 328 mg/dL (21 Mar 2024 11:35)      PHYSICAL EXAM:  General: NAD, answering questions, pleasantly conversant  HEENT: NC/AT; PERRL, clear conjunctiva  Neck: supple  Respiratory: CTA b/l  Cardiovascular: +S1/S2; RRR  Abdomen: soft, NT/ND; +BS x4  Extremities: WWP, 2+ peripheral pulses b/l; no LE edema  Skin: normal color and turgor; ecchymosis on left arm near old IV site  Neurological: AAOX3    MEDICATIONS:  MEDICATIONS  (STANDING):  albumin human  5% IVPB 4000 milliLiter(s) IV Intermittent once  baclofen 10 milliGRAM(s) Oral <User Schedule>  calcium gluconate IVPB 2 Gram(s) IV Intermittent once  chlorhexidine 2% Cloths 1 Application(s) Topical daily  dexAMETHasone  Injectable 2 milliGRAM(s) IV Push <User Schedule>  dextrose 5%. 1000 milliLiter(s) (100 mL/Hr) IV Continuous <Continuous>  dextrose 5%. 1000 milliLiter(s) (50 mL/Hr) IV Continuous <Continuous>  dextrose 50% Injectable 25 Gram(s) IV Push once  dextrose 50% Injectable 12.5 Gram(s) IV Push once  dextrose 50% Injectable 25 Gram(s) IV Push once  enoxaparin Injectable 40 milliGRAM(s) SubCutaneous every 24 hours  gabapentin 100 milliGRAM(s) Oral <User Schedule>  gabapentin 600 milliGRAM(s) Oral at bedtime  glucagon  Injectable 1 milliGRAM(s) IntraMuscular once  heparin  Lock Flush 100 Units/mL Injectable 600 Unit(s) IV Push once  insulin lispro (ADMELOG) corrective regimen sliding scale   SubCutaneous Before meals and at bedtime  metoprolol succinate ER 50 milliGRAM(s) Oral every 24 hours  mirtazapine 30 milliGRAM(s) Oral at bedtime  OLANZapine 5 milliGRAM(s) Oral at bedtime  OLANZapine 2.5 milliGRAM(s) Oral <User Schedule>  pantoprazole    Tablet 40 milliGRAM(s) Oral every 12 hours  polyethylene glycol 3350 17 Gram(s) Oral every 12 hours  senna 2 Tablet(s) Oral at bedtime    MEDICATIONS  (PRN):  acetaminophen     Tablet .. 650 milliGRAM(s) Oral every 6 hours PRN Mild Pain (1 - 3), Moderate Pain (4 - 6)  bisacodyl Suppository 10 milliGRAM(s) Rectal daily PRN Constipation  dextrose Oral Gel 15 Gram(s) Oral once PRN Blood Glucose LESS THAN 70 milliGRAM(s)/deciliter  diphenhydrAMINE Injectable 25 milliGRAM(s) IV Push once PRN Allergic Reaction  diphenhydrAMINE IVPB 25 milliGRAM(s) IV Intermittent once PRN Allergic Reaction  OLANZapine 2.5 milliGRAM(s) Oral every 6 hours PRN anxiety/insomnia  SUMAtriptan 100 milliGRAM(s) Oral two times a day PRN Migraine      ALLERGIES:  Allergies    methylPREDNISolone (Rash; Urticaria; Flushing; Hives)    Intolerances        LABS:                        12.5   18.50 )-----------( 182      ( 21 Mar 2024 13:28 )             37.3     03-21    140  |  106  |  18  ----------------------------<  375<H>  4.7   |  24  |  0.74    Ca    9.2      21 Mar 2024 13:28  Phos  3.9     03-21  Mg     2.1     03-21    TPro  5.9<L>  /  Alb  4.5  /  TBili  0.4  /  DBili  x   /  AST  41<H>  /  ALT  124<H>  /  AlkPhos  67  03-21      Urinalysis Basic - ( 21 Mar 2024 13:28 )    Color: x / Appearance: x / SG: x / pH: x  Gluc: 375 mg/dL / Ketone: x  / Bili: x / Urobili: x   Blood: x / Protein: x / Nitrite: x   Leuk Esterase: x / RBC: x / WBC x   Sq Epi: x / Non Sq Epi: x / Bacteria: x            RADIOLOGY, PATHOLOGY, & ADDITIONAL TESTS: Reviewed.

## 2024-03-21 NOTE — PROGRESS NOTE ADULT - SUBJECTIVE AND OBJECTIVE BOX
Patient is a 39y old  Male who presents with a chief complaint of treated for Autoimmune encephalitis (18 Mar 2024 15:40)      SUBJECTIVE:  Patient seen and examined at bedside.  Seen during PLEX, tolerating it well, no adverse reaction.  Stable brain fogginess    ROS:  Denies fevers, chills, headache, vision changes, neck pain, cough, SOB, chest pain, Abdominal pain, N/V, dysuria or new rash.  All other ROS negative except as above    Vital Signs Last 12 Hrs  T(F): 98.4 (03-21-24 @ 13:19), Max: 98.4 (03-21-24 @ 13:19)  HR: 76 (03-21-24 @ 07:27) (76 - 76)  BP: 134/94 (03-21-24 @ 07:27) (134/94 - 134/94)  BP(mean): 109 (03-21-24 @ 07:27) (109 - 109)  RR: 18 (03-21-24 @ 07:27) (18 - 18)  SpO2: 98% (03-21-24 @ 07:27) (98% - 98%)  I&O's Summary    20 Mar 2024 07:01  -  21 Mar 2024 07:00  --------------------------------------------------------  IN: 0 mL / OUT: 500 mL / NET: -500 mL    21 Mar 2024 07:01  -  21 Mar 2024 15:24  --------------------------------------------------------  IN: 900 mL / OUT: 0 mL / NET: 900 mL        PHYSICAL EXAM:  Constitutional: NAD, lying comfortably in bed.  HEENT: No scleral icterus, MMM  Respiratory: Normal chest rise, no increased WOB   Gastrointestinal: nondistended   Neurological: AAOx3, moves all extremities spontaneously.   Skin: R HD Cath        LABS:                        12.5   18.50 )-----------( 182      ( 21 Mar 2024 13:28 )             37.3     03-21    140  |  106  |  18  ----------------------------<  375<H>  4.7   |  24  |  0.74    Ca    9.2      21 Mar 2024 13:28  Phos  3.9     03-21  Mg     2.1     03-21    TPro  5.9<L>  /  Alb  4.5  /  TBili  0.4  /  DBili  x   /  AST  41<H>  /  ALT  124<H>  /  AlkPhos  67  03-21      Urinalysis Basic - ( 21 Mar 2024 13:28 )    Color: x / Appearance: x / SG: x / pH: x  Gluc: 375 mg/dL / Ketone: x  / Bili: x / Urobili: x   Blood: x / Protein: x / Nitrite: x   Leuk Esterase: x / RBC: x / WBC x   Sq Epi: x / Non Sq Epi: x / Bacteria: x          RADIOLOGY & ADDITIONAL TESTS:  No new imaging    MEDICATIONS  (STANDING):  albumin human  5% IVPB 4000 milliLiter(s) IV Intermittent once  baclofen 10 milliGRAM(s) Oral <User Schedule>  calcium gluconate IVPB 2 Gram(s) IV Intermittent once  chlorhexidine 2% Cloths 1 Application(s) Topical daily  dexAMETHasone  Injectable 2 milliGRAM(s) IV Push <User Schedule>  dextrose 5%. 1000 milliLiter(s) (100 mL/Hr) IV Continuous <Continuous>  dextrose 5%. 1000 milliLiter(s) (50 mL/Hr) IV Continuous <Continuous>  dextrose 50% Injectable 25 Gram(s) IV Push once  dextrose 50% Injectable 12.5 Gram(s) IV Push once  dextrose 50% Injectable 25 Gram(s) IV Push once  enoxaparin Injectable 40 milliGRAM(s) SubCutaneous every 24 hours  gabapentin 100 milliGRAM(s) Oral <User Schedule>  gabapentin 600 milliGRAM(s) Oral at bedtime  glucagon  Injectable 1 milliGRAM(s) IntraMuscular once  heparin  Lock Flush 100 Units/mL Injectable 600 Unit(s) IV Push once  insulin lispro (ADMELOG) corrective regimen sliding scale   SubCutaneous Before meals and at bedtime  metoprolol succinate ER 50 milliGRAM(s) Oral every 24 hours  mirtazapine 30 milliGRAM(s) Oral at bedtime  OLANZapine 5 milliGRAM(s) Oral at bedtime  OLANZapine 2.5 milliGRAM(s) Oral <User Schedule>  pantoprazole    Tablet 40 milliGRAM(s) Oral every 12 hours  polyethylene glycol 3350 17 Gram(s) Oral every 12 hours  senna 2 Tablet(s) Oral at bedtime    MEDICATIONS  (PRN):  acetaminophen     Tablet .. 650 milliGRAM(s) Oral every 6 hours PRN Mild Pain (1 - 3), Moderate Pain (4 - 6)  bisacodyl Suppository 10 milliGRAM(s) Rectal daily PRN Constipation  dextrose Oral Gel 15 Gram(s) Oral once PRN Blood Glucose LESS THAN 70 milliGRAM(s)/deciliter  diphenhydrAMINE Injectable 25 milliGRAM(s) IV Push once PRN Allergic Reaction  diphenhydrAMINE IVPB 25 milliGRAM(s) IV Intermittent once PRN Allergic Reaction  OLANZapine 2.5 milliGRAM(s) Oral every 6 hours PRN anxiety/insomnia  SUMAtriptan 100 milliGRAM(s) Oral two times a day PRN Migraine

## 2024-03-21 NOTE — PROGRESS NOTE ADULT - ATTENDING COMMENTS
Please see attending attestation from Cardiology Consult note on 3/12 pm
Patient seen and examined at bedside  Apheresis ongoing  Above reports of bruising noted; no epistaxis or gum bleeding; no BRBPR  Patient voiced concerns about removing catheter if fibrinogen remains low.  PE - AS noted above  Labs - as noted above  Today is #5 of initially planned 5 sessions - now extended two more sessions at the request of neurology. Patient to get 10 u cryo today in an attempt to increase  the baseline fibrinogen. Please check in AM and again on Saturday.
40 yo man PMHx of viral meningitis, autoimmune encephalitis, and HTN who was admitted for headache.     Assessment  1. Sinus tachycardia  Likely in setting of migraine, anxiety, and IV steroid  2. HTN w/ LVH  3. Autoimmune encephalitis    Plan  1. Verapamil 120m PO QD as per neurology (for migraine but will help sinus tachycardia too)  2. BP goal <140/90. Since therapeutic plasma exchange is known to drop BP, will hold off starting anti-hypertensive today. BP to be checked in 15 min increments during plasma exchange.  3. If after plasma exchange, BP remains >140/90, can resume amlodipine or use HCTZ 12.5mg QD or losartan 25mg PO QD, with uptitration as needed to achieve BP goal as above.    During non face-to-face time, I reviewed relevant portions of the patient’s medical record. During face-to-face time, I took a relevant history and examined the patient. I also explained differential diagnoses, relevant cardiac diagnoses, workup, and management plan, which required a high level of medical decision making. I answered all questions related to the patient's medical conditions.     Eder Aguilar M.D.  Attending Cardiologist  [facility]
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 Mild symmetric left ventricular hypertrophy. Normal right ventricular size and systolic function. Normal atria. No significant valvular disease. No pericardial effusion.  - Tele 3/12-13/24: sinus tachycardia rates 100- 110, intermittently ST 140s; Currently in the 70's     #Sinus Tachycardia   - Patient's intermittent sinus tachycardia is likely multifactorial, mostly driven by steroids. Patient reports feeling of excitement since initiation of Steroid therapy. Currently on Decadron being weaned  - He was previously started on Verapamil by Neurology for Migraine treatment. Verapamil was discontinued during his last hospitalization due to  bradycardic episode that was suspected to be vagal mediated  - Tele reviewed this hospitalization showing rare ST episode with HR ranging in the   - Echo performed this hospitalization revealed normal BIV function without valvular heart disease  - Clinically patient is warm, well perfused and well compensated. He tolerated first session of plasma exchange well with plan for repeat session on 03/17  - At this time recommend Continuing Verapamil 120 mG daily more for Migraine than for the Tachycardia as suspect Tachycardia will resolve once patient weaned off steroids and underlying condition treated  - Alternatively, can also consider Propranolol for Migraine/Anxiety benefits in lieu of Verapamil. If propranolol preferred would start at 20 mg po BID (Lowest dose) and uptitrate as tolerated    # HTN  - Patient has history of essential HTN previously on Coozar 50 mg po daily  - SBP this hospitalization have ranged from 140-150's/90's-100  - At this time suspect iatrogenic component to worsening BP  - BPs have ranged 140-160's persistently. Would initiate Losartan at 25 mg po daily dosing with goal to uptitrate as tolerated. Patient will need good BP control prior to Planned Rituximab infusion this hospitalization    Cardiology will continue to follow along with you, please call with any questions  Case discussed with Dr Weston .
Hx and PE as noted above  Labs reviewed  Low fivirngen noted - pre-Rx  Will transfuse 5 u cryo post apheresis and recheck in AM. If fibrinogen is <100, please transfuse cryo (10 units) tomorrow
Pt is a 40 y/o man c viral meningitis, HTn, headaches, autoimmune encephalitis (rx plasma exchanges/IVIG).  Cardiology consulted for BP/HR control    Agree that pt's HR is under control  F/u BP as BP drops with plasmaphoresis   only when pt was moving to chair and getting vEEG set up  Recheck SBP and consider rx if consistently elevated

## 2024-03-21 NOTE — CHART NOTE - NSCHARTNOTEFT_GEN_A_CORE
Admitting Diagnosis:   Patient is a 39y old  Male who presents with a chief complaint of treated for Autoimmune encephalitis (18 Mar 2024 15:40)      PAST MEDICAL & SURGICAL HISTORY:  Hypertension      Migraine      Meningitis          Current Nutrition Order: Regular    PO Intake: Good (%) [ X ]  Fair (50-75%) [   ] Poor (<25%) [   ]     GI Issues: last BM today    Pain: no pain/discomfort noted    Skin Integrity: lumbar incision; Wiliam score 23    I&Os:   03-20-24 @ 07:01  -  03-21-24 @ 07:00  --------------------------------------------------------  IN: 0 mL / OUT: 500 mL / NET: -500 mL    03-21-24 @ 07:01  -  03-21-24 @ 16:56  --------------------------------------------------------  IN: 1625 mL / OUT: 0 mL / NET: 1625 mL        Labs:   03-21    140  |  106  |  18  ----------------------------<  375<H>  4.7   |  24  |  0.74    Ca    9.2      21 Mar 2024 13:28  Phos  3.9     03-21  Mg     2.1     03-21    TPro  5.9<L>  /  Alb  4.5  /  TBili  0.4  /  DBili  x   /  AST  41<H>  /  ALT  124<H>  /  AlkPhos  67  03-21    CAPILLARY BLOOD GLUCOSE      POCT Blood Glucose.: 362 mg/dL (21 Mar 2024 16:20)  POCT Blood Glucose.: 328 mg/dL (21 Mar 2024 11:35)  POCT Blood Glucose.: 187 mg/dL (21 Mar 2024 07:29)      Medications:  MEDICATIONS  (STANDING):  albumin human  5% IVPB 4000 milliLiter(s) IV Intermittent once  baclofen 10 milliGRAM(s) Oral <User Schedule>  calcium gluconate IVPB 2 Gram(s) IV Intermittent once  chlorhexidine 2% Cloths 1 Application(s) Topical daily  dexAMETHasone  Injectable 2 milliGRAM(s) IV Push <User Schedule>  dextrose 5%. 1000 milliLiter(s) (100 mL/Hr) IV Continuous <Continuous>  dextrose 5%. 1000 milliLiter(s) (50 mL/Hr) IV Continuous <Continuous>  dextrose 50% Injectable 25 Gram(s) IV Push once  dextrose 50% Injectable 12.5 Gram(s) IV Push once  dextrose 50% Injectable 25 Gram(s) IV Push once  enoxaparin Injectable 40 milliGRAM(s) SubCutaneous every 24 hours  gabapentin 100 milliGRAM(s) Oral <User Schedule>  gabapentin 600 milliGRAM(s) Oral at bedtime  glucagon  Injectable 1 milliGRAM(s) IntraMuscular once  heparin  Lock Flush 100 Units/mL Injectable 600 Unit(s) IV Push once  insulin lispro (ADMELOG) corrective regimen sliding scale   SubCutaneous Before meals and at bedtime  metoprolol succinate ER 50 milliGRAM(s) Oral every 24 hours  mirtazapine 30 milliGRAM(s) Oral at bedtime  OLANZapine 5 milliGRAM(s) Oral at bedtime  OLANZapine 2.5 milliGRAM(s) Oral <User Schedule>  pantoprazole    Tablet 40 milliGRAM(s) Oral every 12 hours  polyethylene glycol 3350 17 Gram(s) Oral every 12 hours  senna 2 Tablet(s) Oral at bedtime    MEDICATIONS  (PRN):  acetaminophen     Tablet .. 650 milliGRAM(s) Oral every 6 hours PRN Mild Pain (1 - 3), Moderate Pain (4 - 6)  bisacodyl Suppository 10 milliGRAM(s) Rectal daily PRN Constipation  dextrose Oral Gel 15 Gram(s) Oral once PRN Blood Glucose LESS THAN 70 milliGRAM(s)/deciliter  diphenhydrAMINE Injectable 25 milliGRAM(s) IV Push once PRN Allergic Reaction  diphenhydrAMINE IVPB 25 milliGRAM(s) IV Intermittent once PRN Allergic Reaction  OLANZapine 2.5 milliGRAM(s) Oral every 6 hours PRN anxiety/insomnia  SUMAtriptan 100 milliGRAM(s) Oral two times a day PRN Migraine      Weight: 93.4kg [13 Mar 2024] -- no other wts obtained  Daily     Daily     Weight Change: No other weights obtained. Recommend weekly weights for trending.    Estimated energy needs:   Ideal body weight (80.7kg) used for calculations as pt >100% IBW and BMI <30 per [facility] Standards of Care. Needs estimated for age and adjusted for current clinical status. Fluid needs per team.    Weight used for calculations	IBW  Estimated Energy Needs Weight (lbs)	178 lb  Estimated Energy Needs Weight (kg)	80.7 kg  Estimated Energy Needs From (humera/kg)	25  Estimated Energy Needs To (humera/kg)	30  Estimated Energy Needs Calculated From (humera/kg)	2017  Estimated Energy Needs Calculated To (humera/kg)	2421  Weight used for calculations	IBW  Estimated Protein Needs Weight (lbs)	178 lb  Estimated Protein Needs Weight (kg)	80.7 kg  Estimated Protein Needs From (g/kg)	1.2  Estimated Protein Needs To (g/kg)	1.5  Estimated Protein Needs Calculated From (g/kg)	96.84  Estimated Protein Needs Calculated To (g/kg)	121.05    Subjective:   38 yo Male with Hx of enteroviral meningitis in 9/2022 followed by autoimmune encephalitis,  HTN , migraines (followed by Dr. Weston), recent admission 3/4/24 for autoimmune encephalitis management/treatment (had presented as worsening headaches and cognition - most notably e-mails that didn't make sense) s/p IV steroids and IVIG, presented again on 3/12 for bilateral hand weakness now restarted on IV steroids and plasmapheresis for 7 sessions     Chart reviewed. Pt seen OOB to chair on 5 LA, on room air. Ordering outside food on assessment. Good appetite & PO intake ongoing. Fingersticks trending high likely in setting of medication/steroid use, trending 168-476 mg/dL x 24hrs. Diet education prn. Meds reviewed- on ILSS, calcium gluconate, remeron, dulcolax, senna, decadron. RDN will continue to monitor, reassess, and intervene as appropriate.     Previous Nutrition Diagnosis: Altered Nutrition Related Lab Values related to medical condition/illness as evidenced by   fingersticks trending 131-338 mg/dL    Active [ X  ]  Resolved [   ]    If resolved, new PES:     Goal:  Pt will meet at least 75% of protein & energy needs via most appropriate route for nutrition     Recommendations:  1. Consider consistent carb diet  - monitor intake & tolerance  - honor food preferences as able  2. Ongoing diet education prn  3. Bowel regimen per team  - previously noted chronic constipation  4. Monitor fingersticks and adjust insulin to cover prn  5. Monitor chemistry, GI Function, skin integrity  6. Pain & bowel regimen per team    Education: deferred    Risk Level: High [   ] Moderate [  X ] Low [   ]

## 2024-03-21 NOTE — PROGRESS NOTE ADULT - ASSESSMENT
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 Transfusion medicine is consulted for plasma exchange.     On previous admission, patient had PET scan performed 3/4 showing parietoocciptial and temporal region hypometabolism, and per neurology concern was for autoimmune encephalitis. LP on previous admission was notable of opening pressure of 30 mmHg but otherwise unremarkable. MRI brain notable only for degenerative changes, and no evidence of venous sinus thrombosis. Patient was given IVIG and steroids (allergic reaction to solumedrol and so decadron chosen) which per report, patient had been compliant with.     Transfusion medicine is consulted for plasma exchange. Patient is s/p 5 treatment of plasma exchange 3/13, 3/15, 3/17/24, 3/19, 3/21. Total of 7 sessions planned. every other day.    Plan   - Autoimmune (evelyn-NMDAR) encephalitis is a category I indication for therapeutic plasma exchange. We will plan for 5 sessions every other day, possibly 7 cycles per neurology team/Dr. Najjar  - Risks and benefits of plasma exchange explained to patient. All of his questions were answered and he verbalized understanding and consented to therapy.   - please monitor daily calcium and fibrinogen clauss levels. With plasmapharesis, both of these are expected to decrease. 2g of calcium are included with plasmapharesis orders.   - please maintain active T&S   - If fibrinogen <200, transfusion medicine will advise on cryoprecipitate administration  - fibrinogen 190 today - please administer 10 units of cryoprecipitate (given slight increased bruising)  - Please check fibrinogen level in AM  - further management of autoimmune encephalitis per Neurology/primary team  - next session planned 3/23/24 (#6)    Discussed with transfusion medicine attending Dr. Nehemias Yancey

## 2024-03-22 LAB
ADD ON TEST-SPECIMEN IN LAB: SIGNIFICANT CHANGE UP
ADD ON TEST-SPECIMEN IN LAB: SIGNIFICANT CHANGE UP
ALBUMIN SERPL ELPH-MCNC: 4.9 G/DL — SIGNIFICANT CHANGE UP (ref 3.3–5)
ALP SERPL-CCNC: 49 U/L — SIGNIFICANT CHANGE UP (ref 40–120)
ALT FLD-CCNC: 67 U/L — HIGH (ref 10–45)
AMPA-R AB CBA, CSF: NEGATIVE — SIGNIFICANT CHANGE UP
AMPA-RECEPTOR ANTIBODY BY CBA, SERUM: NEGATIVE — SIGNIFICANT CHANGE UP
AMPHIPHYSIN AB TITR CSF: NEGATIVE — SIGNIFICANT CHANGE UP
AMPHIPHYSIN AB TITR SER: NEGATIVE — SIGNIFICANT CHANGE UP
ANION GAP SERPL CALC-SCNC: 11 MMOL/L — SIGNIFICANT CHANGE UP (ref 5–17)
AST SERPL-CCNC: 39 U/L — SIGNIFICANT CHANGE UP (ref 10–40)
BILIRUB DIRECT SERPL-MCNC: 0.2 MG/DL — SIGNIFICANT CHANGE UP (ref 0–0.3)
BILIRUB INDIRECT FLD-MCNC: 0.4 MG/DL — SIGNIFICANT CHANGE UP (ref 0.2–1)
BILIRUB SERPL-MCNC: 0.6 MG/DL — SIGNIFICANT CHANGE UP (ref 0.2–1.2)
BUN SERPL-MCNC: 18 MG/DL — SIGNIFICANT CHANGE UP (ref 7–23)
CALCIUM SERPL-MCNC: 10 MG/DL — SIGNIFICANT CHANGE UP (ref 8.4–10.5)
CASPR2-IGG CBA, CSF: NEGATIVE — SIGNIFICANT CHANGE UP
CASPR2-IGG CBA, SERUM: NEGATIVE — SIGNIFICANT CHANGE UP
CHLORIDE SERPL-SCNC: 101 MMOL/L — SIGNIFICANT CHANGE UP (ref 96–108)
CO2 SERPL-SCNC: 27 MMOL/L — SIGNIFICANT CHANGE UP (ref 22–31)
CREAT SERPL-MCNC: 0.92 MG/DL — SIGNIFICANT CHANGE UP (ref 0.5–1.3)
CRMP-5-IGG WESTERN BLOT: NEGATIVE — SIGNIFICANT CHANGE UP
CV2 IGG TITR CSF: NEGATIVE — SIGNIFICANT CHANGE UP
DPPX ANTIBODY IFA, CSF: NEGATIVE — SIGNIFICANT CHANGE UP
DPPX IGG SERPL QL IF: NEGATIVE — SIGNIFICANT CHANGE UP
EGFR: 110 ML/MIN/1.73M2 — SIGNIFICANT CHANGE UP
FIBRINOGEN PPP-MCNC: 153 MG/DL — LOW (ref 200–445)
GABA-B-R AB CBA, CSF: NEGATIVE — SIGNIFICANT CHANGE UP
GABA-B-RECEPTOR ANTIBODY BY CBA, SERUM: NEGATIVE — SIGNIFICANT CHANGE UP
GAD65 AB CSF-SCNC: 0 NMOL/L — SIGNIFICANT CHANGE UP
GAD65 AB SER-MCNC: 0.14 NMOL/L — HIGH
GFAP ALPHA IGG SER QL IF: NEGATIVE — SIGNIFICANT CHANGE UP
GFAP IFA, CSF: NEGATIVE — SIGNIFICANT CHANGE UP
GLIAL NUC TYPE 1 AB TITR CSF: NEGATIVE — SIGNIFICANT CHANGE UP
GLIAL NUC TYPE 1 AB TITR SER: NEGATIVE — SIGNIFICANT CHANGE UP
GLUCOSE BLDC GLUCOMTR-MCNC: 228 MG/DL — HIGH (ref 70–99)
GLUCOSE BLDC GLUCOMTR-MCNC: 313 MG/DL — HIGH (ref 70–99)
GLUCOSE BLDC GLUCOMTR-MCNC: 359 MG/DL — HIGH (ref 70–99)
GLUCOSE SERPL-MCNC: 322 MG/DL — HIGH (ref 70–99)
HCT VFR BLD CALC: 40.4 % — SIGNIFICANT CHANGE UP (ref 39–50)
HGB BLD-MCNC: 13.2 G/DL — SIGNIFICANT CHANGE UP (ref 13–17)
HU1 AB TITR CSF IF: NEGATIVE — SIGNIFICANT CHANGE UP
HU1 AB TITR SER: NEGATIVE — SIGNIFICANT CHANGE UP
HU2 AB TITR CSF IF: NEGATIVE — SIGNIFICANT CHANGE UP
HU2 AB TITR SER IF: NEGATIVE — SIGNIFICANT CHANGE UP
HU3 AB TITR CSF: NEGATIVE — SIGNIFICANT CHANGE UP
HU3 AB TITR SER: NEGATIVE — SIGNIFICANT CHANGE UP
IFA NOTES: SIGNIFICANT CHANGE UP
IFA NOTES: SIGNIFICANT CHANGE UP
IGLON5 IFA, CSF: NEGATIVE — SIGNIFICANT CHANGE UP
IMMUNOLOGIST REVIEW: SIGNIFICANT CHANGE UP
IMMUNOLOGIST REVIEW: SIGNIFICANT CHANGE UP
LGI1-IGG CBA, CSF: NEGATIVE — SIGNIFICANT CHANGE UP
LGI1-IGG CBA, SERUM: NEGATIVE — SIGNIFICANT CHANGE UP
MAGNESIUM SERPL-MCNC: 2.3 MG/DL — SIGNIFICANT CHANGE UP (ref 1.6–2.6)
MCHC RBC-ENTMCNC: 27.9 PG — SIGNIFICANT CHANGE UP (ref 27–34)
MCHC RBC-ENTMCNC: 32.7 GM/DL — SIGNIFICANT CHANGE UP (ref 32–36)
MCV RBC AUTO: 85.4 FL — SIGNIFICANT CHANGE UP (ref 80–100)
MGLUR1 AB IFA, CSF: NEGATIVE — SIGNIFICANT CHANGE UP
MGLUR1 IGG SER QL IF: NEGATIVE — SIGNIFICANT CHANGE UP
NEUROCHONDRIN AB SERPL QL IF: NEGATIVE — SIGNIFICANT CHANGE UP
NMDAR1 IGG SER QL CBA IFA: NEGATIVE — SIGNIFICANT CHANGE UP
NRBC # BLD: 0 /100 WBCS — SIGNIFICANT CHANGE UP (ref 0–0)
PCA-1 AB TITR SER: NEGATIVE — SIGNIFICANT CHANGE UP
PCA-2 AB TITR SER: NEGATIVE — SIGNIFICANT CHANGE UP
PCA-TR AB TITR CSF: NEGATIVE — SIGNIFICANT CHANGE UP
PCA-TR AB TITR SER: NEGATIVE — SIGNIFICANT CHANGE UP
PHOSPHATE SERPL-MCNC: 4 MG/DL — SIGNIFICANT CHANGE UP (ref 2.5–4.5)
PLATELET # BLD AUTO: 180 K/UL — SIGNIFICANT CHANGE UP (ref 150–400)
POTASSIUM SERPL-MCNC: 4.5 MMOL/L — SIGNIFICANT CHANGE UP (ref 3.5–5.3)
POTASSIUM SERPL-SCNC: 4.5 MMOL/L — SIGNIFICANT CHANGE UP (ref 3.5–5.3)
PROT SERPL-MCNC: 5.9 G/DL — LOW (ref 6–8.3)
RBC # BLD: 4.73 M/UL — SIGNIFICANT CHANGE UP (ref 4.2–5.8)
RBC # FLD: 22.4 % — HIGH (ref 10.3–14.5)
SODIUM SERPL-SCNC: 139 MMOL/L — SIGNIFICANT CHANGE UP (ref 135–145)
WBC # BLD: 19.25 K/UL — HIGH (ref 3.8–10.5)
WBC # FLD AUTO: 19.25 K/UL — HIGH (ref 3.8–10.5)

## 2024-03-22 PROCEDURE — 99232 SBSQ HOSP IP/OBS MODERATE 35: CPT

## 2024-03-22 RX ORDER — ALBUMIN HUMAN 25 %
4000 VIAL (ML) INTRAVENOUS ONCE
Refills: 0 | Status: DISCONTINUED | OUTPATIENT
Start: 2024-03-23 | End: 2024-03-24

## 2024-03-22 RX ORDER — CALCIUM GLUCONATE 100 MG/ML
2 VIAL (ML) INTRAVENOUS ONCE
Refills: 0 | Status: COMPLETED | OUTPATIENT
Start: 2024-03-23 | End: 2024-03-23

## 2024-03-22 RX ORDER — TRAMADOL HYDROCHLORIDE 50 MG/1
25 TABLET ORAL ONCE
Refills: 0 | Status: DISCONTINUED | OUTPATIENT
Start: 2024-03-21 | End: 2024-03-21

## 2024-03-22 RX ADMIN — Medication 10 MILLIGRAM(S): at 06:51

## 2024-03-22 RX ADMIN — POLYETHYLENE GLYCOL 3350 17 GRAM(S): 17 POWDER, FOR SOLUTION ORAL at 18:12

## 2024-03-22 RX ADMIN — Medication 10 MILLIGRAM(S): at 16:17

## 2024-03-22 RX ADMIN — Medication 5 MILLIGRAM(S): at 08:27

## 2024-03-22 RX ADMIN — Medication 2 MILLIGRAM(S): at 07:34

## 2024-03-22 RX ADMIN — OLANZAPINE 5 MILLIGRAM(S): 15 TABLET, FILM COATED ORAL at 21:09

## 2024-03-22 RX ADMIN — Medication 10: at 16:30

## 2024-03-22 RX ADMIN — OLANZAPINE 2.5 MILLIGRAM(S): 15 TABLET, FILM COATED ORAL at 00:38

## 2024-03-22 RX ADMIN — MIRTAZAPINE 30 MILLIGRAM(S): 45 TABLET, ORALLY DISINTEGRATING ORAL at 21:09

## 2024-03-22 RX ADMIN — OLANZAPINE 2.5 MILLIGRAM(S): 15 TABLET, FILM COATED ORAL at 06:51

## 2024-03-22 RX ADMIN — Medication 10 MILLIGRAM(S): at 21:09

## 2024-03-22 RX ADMIN — SUMATRIPTAN SUCCINATE 100 MILLIGRAM(S): 4 INJECTION, SOLUTION SUBCUTANEOUS at 07:34

## 2024-03-22 RX ADMIN — SENNA PLUS 2 TABLET(S): 8.6 TABLET ORAL at 21:08

## 2024-03-22 RX ADMIN — POLYETHYLENE GLYCOL 3350 17 GRAM(S): 17 POWDER, FOR SOLUTION ORAL at 06:52

## 2024-03-22 RX ADMIN — PANTOPRAZOLE SODIUM 40 MILLIGRAM(S): 20 TABLET, DELAYED RELEASE ORAL at 18:13

## 2024-03-22 RX ADMIN — Medication 650 MILLIGRAM(S): at 06:51

## 2024-03-22 RX ADMIN — Medication 50 MILLIGRAM(S): at 21:08

## 2024-03-22 RX ADMIN — OLANZAPINE 2.5 MILLIGRAM(S): 15 TABLET, FILM COATED ORAL at 13:19

## 2024-03-22 RX ADMIN — GABAPENTIN 600 MILLIGRAM(S): 400 CAPSULE ORAL at 21:08

## 2024-03-22 RX ADMIN — PANTOPRAZOLE SODIUM 40 MILLIGRAM(S): 20 TABLET, DELAYED RELEASE ORAL at 06:51

## 2024-03-22 RX ADMIN — CHLORHEXIDINE GLUCONATE 1 APPLICATION(S): 213 SOLUTION TOPICAL at 11:38

## 2024-03-22 RX ADMIN — Medication 650 MILLIGRAM(S): at 07:58

## 2024-03-22 RX ADMIN — ENOXAPARIN SODIUM 40 MILLIGRAM(S): 100 INJECTION SUBCUTANEOUS at 21:09

## 2024-03-22 RX ADMIN — SUMATRIPTAN SUCCINATE 100 MILLIGRAM(S): 4 INJECTION, SOLUTION SUBCUTANEOUS at 07:58

## 2024-03-22 RX ADMIN — Medication 4: at 07:48

## 2024-03-22 RX ADMIN — GABAPENTIN 100 MILLIGRAM(S): 400 CAPSULE ORAL at 16:17

## 2024-03-22 RX ADMIN — GABAPENTIN 100 MILLIGRAM(S): 400 CAPSULE ORAL at 06:51

## 2024-03-22 RX ADMIN — Medication 8: at 11:40

## 2024-03-22 NOTE — PROGRESS NOTE ADULT - SUBJECTIVE AND OBJECTIVE BOX
Patient seen and examined at bedside  Reports clearer thinking and normal gait  Labs reviewed. Low fibrinogen despite 10 u cryo last night  Will recheck fibrinogen in the AM  TPE #6 tomorrow and #7 on Monday

## 2024-03-22 NOTE — PROGRESS NOTE ADULT - ASSESSMENT
39y Male with PMHx of viral meningitis with sequela of HTN and intractable headaches (followed by Dr. Weston), recent admission 3/4/24 for autoimmune encephalitis management/treatment (had presented as worsening headaches and cognition - most notably e-mails that didn't make sense) s/p IV steroids and IVIG, presented again on 3/12 forfor bilateral hand weakness as well as wide based gait, UE tremors, UE cogwheeling, dysmetria, and UE weakness. Went for outpatient MRI brain and cervical spine. Admitted for further neurologic workup. Restarted on IV steroids, and plasmapheresis for 7 total sessions.     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  - s/p LP 3/4 with elevated opening pressure at 30 mmHg, remainder of CSF negative  - stop decadron, start PO prednisone 40mg daily on 3/23  - s/p LP 3/13. infectious panel so far negative, awaiting send out studies. autoimmune, and fungal studies, all herpes panels (HSV 1, HSV 2, HSV 6), CSF neopterin (write in), TB, RPR, VDRL, AFB, bacterial antigens, full viral PCR panel, oligoclonal bands, Igg index, myelin basic protein  - LP done 3/4 opening pressure was 30, LP done 3/13 with opening pressure of 13.5  - MRA head/neck done for vessel imaging - no stenosis or occlusion, no remarkable findings  - PET body - nonspecific right lung nodules  - testicular ultrasound neg  - Pulm consult - lung nodules likely inflammation, no contraindication to Rituximab  - plasma exchange started on 3/13, will plan now for 7 TOTAL sessions every other day as pt continues to improve with plasmapheresis. will need to send daily fibrinogen clauss levels   - HD catheter for the plasma exchange placed by IR on 3/13  - ID consult for rule out possible infectious causes, and prior to initiation of Rituximab - no contraindication to Rituximab  -Plan for Rituximab on Tuesday 3/26  - D dimer elevated to 502, LE dopplers negative. Repeat D dimer on 3/23  - Sed rate 15  - CRP <3  - repeat Neuropsych consult on 3/14 - improved from prior admission  - Rheum consulted 3/15 for elevated LAKSHMI level, f/u recs  - EEG negative for epileptiform discharges, discontinued on 3/17 AM  - dc'ed verapamil, increased Toprol from 25 mg QHS to 50 mg QHS  - hepatitis panel quantiferon, NMO. c-ANCA, p-ANCA negative  - ACE 42  - LAKSHMI 1: 160 , ds-DNA 14  - copper 56  - ceruloplasmin 16  - cryoglobulin indeterminate  - serum protein and immuno electrophoresis grossly negative (just with elevated gamma globulin)  -send out antibodies negative except slightly elevated HOLLIE.  - HIV neg  - urine heavy metal screen, urine porphyrins, urine porphobilinogen  - TIBC 211 otherwise remainder of iron panel normal  - c/w gabapentin 100mg at 7am and 4pm, 600mg QHS and baclofen 10mg three times a day  - continue home pantoprazole BID  - q8hr general neuro checks and vitals    #low copper  - f/u zinc and thiamine levels     #migraine  - sumatriptan 100mg PO PRN once (max 2 tabs)  - if needed, can given sumatriptan 6mg subcutaneously once on top of PO    #insomnia  - increase Remeron to 30 mg QHS, sodium levels stable     Cards  #HTN w/ LVH  #sinus tachy  - cards following, appreciate recs  - goal normotension  - hold amlodipine  - continue toprol 50mg daily     Pulm  - call provider if SPO2 < 94%  - uptake noted in right upper lobe of right lung on PET; CT C/A/P demonstrates clustered nodular and ground-glass opacities in bilateral upper lobes, more prominent over right; findings most consistent with infectious/inflammatory etiology  - pulm consulted during recent admission  - repeat CT in 6-12 weeks to ensure resolution    GI  #Nutrition/Fluids/Electrolytes   - replete K<4 and Mg <2  - Diet: Regular    #elevated LFTs   - AST/ALT 41/124 likely from steroids and Remeron   - would continue to trend for now    Infectious Disease  - afebrile on admission  - leukocytosis likely due to steroids    Endocrine  #hyperglycemia  - A1C results: 5.2  - continue FS monitoring while on steroids  - on low dose SSI  - defer lantus for now given decrease in steroid dose    - TSH results: 0.594    Renal  #hyponatremia as low as 129, slowly uptrending  - sodium now stable, continue Remeron 30mg  - monitor daily sodium levels, today 140    Heme  #low fibrinogen level, likely 2/2 plasmapheresis  - daily fibrinogen clauss levels  - fibrinogen prior to plasmapheresis 166 3/17, s/p 5u cryoprecipitate  - fibrinogen 160 pre-PLEX, s/p 5u cryoprecipitate     Psych  #anxiety  - Zyprexa standing (2.5 am, 2.5 pm, 5 at bedtime)  - psych consulted 3/15, agrees with current regimen   - can give additional zyprexa 2.5mg q6h PRN (no more than 15mg total per day max)  - EKG every other day to monitor qtc     Rheum  #+ ANCA  - proteinase 3 < 5.0   - anti myeloperoxidase ab < 5.0  - CCP negative   - f/u myositis panel, myomarker    DVT Prophylaxis  - SCDs for DVT prophylaxis     Dispo: home when cleared     Discussed daily hospital plans and goals with patient at bedside.    Discussed with Neurology Attending, Dr. Weston

## 2024-03-22 NOTE — PROGRESS NOTE ADULT - SUBJECTIVE AND OBJECTIVE BOX
Neurology Stroke Progress Note    INTERVAL HPI/OVERNIGHT EVENTS:  Patient seen and examined. Said he didn't get good sleep last night, but overall feels much better, almost back to his normal cognition. Asking for PO dulcolax.     MEDICATIONS  (STANDING):  baclofen 10 milliGRAM(s) Oral <User Schedule>  bisacodyl 5 milliGRAM(s) Oral <User Schedule>  chlorhexidine 2% Cloths 1 Application(s) Topical daily  dextrose 5%. 1000 milliLiter(s) (100 mL/Hr) IV Continuous <Continuous>  dextrose 5%. 1000 milliLiter(s) (50 mL/Hr) IV Continuous <Continuous>  dextrose 50% Injectable 25 Gram(s) IV Push once  dextrose 50% Injectable 12.5 Gram(s) IV Push once  dextrose 50% Injectable 25 Gram(s) IV Push once  enoxaparin Injectable 40 milliGRAM(s) SubCutaneous every 24 hours  gabapentin 100 milliGRAM(s) Oral <User Schedule>  gabapentin 600 milliGRAM(s) Oral at bedtime  glucagon  Injectable 1 milliGRAM(s) IntraMuscular once  insulin lispro (ADMELOG) corrective regimen sliding scale   SubCutaneous Before meals and at bedtime  metoprolol succinate ER 50 milliGRAM(s) Oral every 24 hours  mirtazapine 30 milliGRAM(s) Oral at bedtime  OLANZapine 5 milliGRAM(s) Oral at bedtime  OLANZapine 2.5 milliGRAM(s) Oral <User Schedule>  pantoprazole    Tablet 40 milliGRAM(s) Oral every 12 hours  polyethylene glycol 3350 17 Gram(s) Oral every 12 hours  senna 2 Tablet(s) Oral at bedtime    MEDICATIONS  (PRN):  acetaminophen     Tablet .. 650 milliGRAM(s) Oral every 6 hours PRN Mild Pain (1 - 3), Moderate Pain (4 - 6)  bisacodyl Suppository 10 milliGRAM(s) Rectal daily PRN Constipation  dextrose Oral Gel 15 Gram(s) Oral once PRN Blood Glucose LESS THAN 70 milliGRAM(s)/deciliter  diphenhydrAMINE Injectable 25 milliGRAM(s) IV Push once PRN Allergic Reaction  diphenhydrAMINE IVPB 25 milliGRAM(s) IV Intermittent once PRN Allergic Reaction  OLANZapine 2.5 milliGRAM(s) Oral every 6 hours PRN anxiety/insomnia  SUMAtriptan 100 milliGRAM(s) Oral two times a day PRN Migraine      Allergies    methylPREDNISolone (Rash; Urticaria; Flushing; Hives)    Intolerances        Vital Signs Last 24 Hrs  T(C): 36.5 (21 Mar 2024 19:45), Max: 36.5 (21 Mar 2024 19:45)  T(F): 97.7 (21 Mar 2024 19:45), Max: 97.7 (21 Mar 2024 19:45)  HR: 98 (22 Mar 2024 12:54) (84 - 104)  BP: 171/97 (22 Mar 2024 12:54) (116/84 - 171/97)  BP(mean): 127 (22 Mar 2024 12:54) (95 - 127)  RR: 18 (22 Mar 2024 12:54) (17 - 18)  SpO2: 98% (22 Mar 2024 12:54) (97% - 98%)    Parameters below as of 22 Mar 2024 12:54  Patient On (Oxygen Delivery Method): room air        Physical exam:  General: No acute distress, awake and alert  Eyes: Anicteric sclerae, moist conjunctivae, see below for CNs  Neck: trachea midline,  Cardiovascular: Regular rate and rhythm    Neurologic:  -Mental status: Awake, alert, oriented to person, place, and time. Speech is fluent with intact naming, repetition, and comprehension, no dysarthria. Recent and remote memory intact. Follows commands. Attention/concentration intact. Fund of knowledge appropriate.  -Cranial nerves:   II: Visual fields are full to confrontation.  III, IV, VI: Extraocular movements are intact without nystagmus.  VII: Face is symmetric   Motor: Normal bulk and tone. No pronator drift. Strength bilateral upper extremity 5/5, bilateral lower extremities 5/5.  Rapid alternating movements intact and symmetric  Sensation: Intact to light touch bilaterally. No neglect or extinction on double simultaneous testing.  Coordination: No dysmetria on finger-to-nose   Gait: Narrow gait and steady, improved right arm swing    LABS:                        13.2   19.25 )-----------( 180      ( 22 Mar 2024 10:25 )             40.4     03-22    139  |  101  |  18  ----------------------------<  322<H>  4.5   |  27  |  0.92    Ca    10.0      22 Mar 2024 10:25  Phos  4.0     03-22  Mg     2.3     03-22    TPro  5.9<L>  /  Alb  4.9  /  TBili  0.6  /  DBili  0.2  /  AST  39  /  ALT  67<H>  /  AlkPhos  49  03-22      Urinalysis Basic - ( 22 Mar 2024 10:25 )    Color: x / Appearance: x / SG: x / pH: x  Gluc: 322 mg/dL / Ketone: x  / Bili: x / Urobili: x   Blood: x / Protein: x / Nitrite: x   Leuk Esterase: x / RBC: x / WBC x   Sq Epi: x / Non Sq Epi: x / Bacteria: x        RADIOLOGY & ADDITIONAL TESTS:

## 2024-03-22 NOTE — PROGRESS NOTE ADULT - SUBJECTIVE AND OBJECTIVE BOX
Patient is a 39y old  Male who presents with a chief complaint of treated for Autoimmune encephalitis (18 Mar 2024 15:40)      SUBJECTIVE:  Patient seen and examined at bedside. resting comfortably taking a nap    ROS: No new complaints    Vital Signs Last 12 Hrs  T(F): --  HR: 97 (03-22-24 @ 18:00) (84 - 98)  BP: 124/67 (03-22-24 @ 18:00) (116/84 - 171/97)  BP(mean): 88 (03-22-24 @ 18:00) (88 - 127)  RR: 18 (03-22-24 @ 18:00) (18 - 18)  SpO2: 97% (03-22-24 @ 18:00) (97% - 98%)  I&O's Summary    21 Mar 2024 07:01  -  22 Mar 2024 07:00  --------------------------------------------------------  IN: 1625 mL / OUT: 0 mL / NET: 1625 mL    22 Mar 2024 07:01  -  22 Mar 2024 19:34  --------------------------------------------------------  IN: 350 mL / OUT: 0 mL / NET: 350 mL        PHYSICAL EXAM:  Constitutional: NAD, lying comfortably in bed.  HEENT: No scleral icterus, MMM  Respiratory: Normal chest rise, no increased WOB   Gastrointestinal: nondistended   Neurological: AAOx3  Skin: R HD Cath      LABS:                        13.2   19.25 )-----------( 180      ( 22 Mar 2024 10:25 )             40.4     03-22    139  |  101  |  18  ----------------------------<  322<H>  4.5   |  27  |  0.92    Ca    10.0      22 Mar 2024 10:25  Phos  4.0     03-22  Mg     2.3     03-22    TPro  5.9<L>  /  Alb  4.9  /  TBili  0.6  /  DBili  0.2  /  AST  39  /  ALT  67<H>  /  AlkPhos  49  03-22      Urinalysis Basic - ( 22 Mar 2024 10:25 )    Color: x / Appearance: x / SG: x / pH: x  Gluc: 322 mg/dL / Ketone: x  / Bili: x / Urobili: x   Blood: x / Protein: x / Nitrite: x   Leuk Esterase: x / RBC: x / WBC x   Sq Epi: x / Non Sq Epi: x / Bacteria: x          RADIOLOGY & ADDITIONAL TESTS:  No new imaging    MEDICATIONS  (STANDING):  baclofen 10 milliGRAM(s) Oral <User Schedule>  bisacodyl 5 milliGRAM(s) Oral <User Schedule>  chlorhexidine 2% Cloths 1 Application(s) Topical daily  dextrose 5%. 1000 milliLiter(s) (100 mL/Hr) IV Continuous <Continuous>  dextrose 5%. 1000 milliLiter(s) (50 mL/Hr) IV Continuous <Continuous>  dextrose 50% Injectable 25 Gram(s) IV Push once  dextrose 50% Injectable 12.5 Gram(s) IV Push once  dextrose 50% Injectable 25 Gram(s) IV Push once  enoxaparin Injectable 40 milliGRAM(s) SubCutaneous every 24 hours  gabapentin 600 milliGRAM(s) Oral at bedtime  gabapentin 100 milliGRAM(s) Oral <User Schedule>  glucagon  Injectable 1 milliGRAM(s) IntraMuscular once  insulin lispro (ADMELOG) corrective regimen sliding scale   SubCutaneous Before meals and at bedtime  metoprolol succinate ER 50 milliGRAM(s) Oral every 24 hours  mirtazapine 30 milliGRAM(s) Oral at bedtime  OLANZapine 5 milliGRAM(s) Oral at bedtime  OLANZapine 2.5 milliGRAM(s) Oral <User Schedule>  pantoprazole    Tablet 40 milliGRAM(s) Oral every 12 hours  polyethylene glycol 3350 17 Gram(s) Oral every 12 hours  senna 2 Tablet(s) Oral at bedtime    MEDICATIONS  (PRN):  acetaminophen     Tablet .. 650 milliGRAM(s) Oral every 6 hours PRN Mild Pain (1 - 3), Moderate Pain (4 - 6)  bisacodyl Suppository 10 milliGRAM(s) Rectal daily PRN Constipation  dextrose Oral Gel 15 Gram(s) Oral once PRN Blood Glucose LESS THAN 70 milliGRAM(s)/deciliter  diphenhydrAMINE Injectable 25 milliGRAM(s) IV Push once PRN Allergic Reaction  diphenhydrAMINE IVPB 25 milliGRAM(s) IV Intermittent once PRN Allergic Reaction  OLANZapine 2.5 milliGRAM(s) Oral every 6 hours PRN anxiety/insomnia  SUMAtriptan 100 milliGRAM(s) Oral two times a day PRN Migraine

## 2024-03-23 LAB
ANION GAP SERPL CALC-SCNC: 10 MMOL/L — SIGNIFICANT CHANGE UP (ref 5–17)
BLD GP AB SCN SERPL QL: NEGATIVE — SIGNIFICANT CHANGE UP
BUN SERPL-MCNC: 21 MG/DL — SIGNIFICANT CHANGE UP (ref 7–23)
CALCIUM SERPL-MCNC: 9.8 MG/DL — SIGNIFICANT CHANGE UP (ref 8.4–10.5)
CHLORIDE SERPL-SCNC: 102 MMOL/L — SIGNIFICANT CHANGE UP (ref 96–108)
CO2 SERPL-SCNC: 24 MMOL/L — SIGNIFICANT CHANGE UP (ref 22–31)
CREAT SERPL-MCNC: 0.89 MG/DL — SIGNIFICANT CHANGE UP (ref 0.5–1.3)
D DIMER BLD IA.RAPID-MCNC: <150 NG/ML DDU — SIGNIFICANT CHANGE UP
EGFR: 113 ML/MIN/1.73M2 — SIGNIFICANT CHANGE UP
FIBRINOGEN PPP-MCNC: 201 MG/DL — SIGNIFICANT CHANGE UP (ref 200–445)
GLUCOSE BLDC GLUCOMTR-MCNC: 171 MG/DL — HIGH (ref 70–99)
GLUCOSE BLDC GLUCOMTR-MCNC: 251 MG/DL — HIGH (ref 70–99)
GLUCOSE BLDC GLUCOMTR-MCNC: 353 MG/DL — HIGH (ref 70–99)
GLUCOSE SERPL-MCNC: 293 MG/DL — HIGH (ref 70–99)
HCT VFR BLD CALC: 38.8 % — LOW (ref 39–50)
HGB BLD-MCNC: 12.5 G/DL — LOW (ref 13–17)
MAGNESIUM SERPL-MCNC: 2.3 MG/DL — SIGNIFICANT CHANGE UP (ref 1.6–2.6)
MCHC RBC-ENTMCNC: 28 PG — SIGNIFICANT CHANGE UP (ref 27–34)
MCHC RBC-ENTMCNC: 32.2 GM/DL — SIGNIFICANT CHANGE UP (ref 32–36)
MCV RBC AUTO: 86.8 FL — SIGNIFICANT CHANGE UP (ref 80–100)
NRBC # BLD: 0 /100 WBCS — SIGNIFICANT CHANGE UP (ref 0–0)
PHOSPHATE SERPL-MCNC: 4 MG/DL — SIGNIFICANT CHANGE UP (ref 2.5–4.5)
PLATELET # BLD AUTO: 184 K/UL — SIGNIFICANT CHANGE UP (ref 150–400)
POTASSIUM SERPL-MCNC: 5.3 MMOL/L — SIGNIFICANT CHANGE UP (ref 3.5–5.3)
POTASSIUM SERPL-SCNC: 5.3 MMOL/L — SIGNIFICANT CHANGE UP (ref 3.5–5.3)
RBC # BLD: 4.47 M/UL — SIGNIFICANT CHANGE UP (ref 4.2–5.8)
RBC # FLD: 22.7 % — HIGH (ref 10.3–14.5)
RH IG SCN BLD-IMP: POSITIVE — SIGNIFICANT CHANGE UP
SODIUM SERPL-SCNC: 136 MMOL/L — SIGNIFICANT CHANGE UP (ref 135–145)
WBC # BLD: 15.59 K/UL — HIGH (ref 3.8–10.5)
WBC # FLD AUTO: 15.59 K/UL — HIGH (ref 3.8–10.5)

## 2024-03-23 PROCEDURE — 99232 SBSQ HOSP IP/OBS MODERATE 35: CPT

## 2024-03-23 RX ORDER — KETOROLAC TROMETHAMINE 30 MG/ML
10 SYRINGE (ML) INJECTION ONCE
Refills: 0 | Status: DISCONTINUED | OUTPATIENT
Start: 2024-03-23 | End: 2024-03-23

## 2024-03-23 RX ORDER — TRAMADOL HYDROCHLORIDE 50 MG/1
25 TABLET ORAL EVERY 8 HOURS
Refills: 0 | Status: DISCONTINUED | OUTPATIENT
Start: 2024-03-23 | End: 2024-03-23

## 2024-03-23 RX ORDER — KETOROLAC TROMETHAMINE 30 MG/ML
15 SYRINGE (ML) INJECTION ONCE
Refills: 0 | Status: DISCONTINUED | OUTPATIENT
Start: 2024-03-23 | End: 2024-03-23

## 2024-03-23 RX ORDER — TRAMADOL HYDROCHLORIDE 50 MG/1
25 TABLET ORAL EVERY 12 HOURS
Refills: 0 | Status: DISCONTINUED | OUTPATIENT
Start: 2024-03-23 | End: 2024-03-26

## 2024-03-23 RX ADMIN — OLANZAPINE 2.5 MILLIGRAM(S): 15 TABLET, FILM COATED ORAL at 03:29

## 2024-03-23 RX ADMIN — Medication 15 MILLIGRAM(S): at 21:05

## 2024-03-23 RX ADMIN — GABAPENTIN 100 MILLIGRAM(S): 400 CAPSULE ORAL at 15:29

## 2024-03-23 RX ADMIN — Medication 10 MILLIGRAM(S): at 04:00

## 2024-03-23 RX ADMIN — GABAPENTIN 600 MILLIGRAM(S): 400 CAPSULE ORAL at 21:08

## 2024-03-23 RX ADMIN — CHLORHEXIDINE GLUCONATE 1 APPLICATION(S): 213 SOLUTION TOPICAL at 07:30

## 2024-03-23 RX ADMIN — Medication 10 MILLIGRAM(S): at 07:29

## 2024-03-23 RX ADMIN — MIRTAZAPINE 30 MILLIGRAM(S): 45 TABLET, ORALLY DISINTEGRATING ORAL at 21:08

## 2024-03-23 RX ADMIN — Medication 200 GRAM(S): at 15:47

## 2024-03-23 RX ADMIN — Medication 10 MILLIGRAM(S): at 15:29

## 2024-03-23 RX ADMIN — OLANZAPINE 2.5 MILLIGRAM(S): 15 TABLET, FILM COATED ORAL at 07:29

## 2024-03-23 RX ADMIN — Medication 6: at 12:02

## 2024-03-23 RX ADMIN — Medication 10 MILLIGRAM(S): at 21:08

## 2024-03-23 RX ADMIN — Medication 10: at 16:19

## 2024-03-23 RX ADMIN — OLANZAPINE 5 MILLIGRAM(S): 15 TABLET, FILM COATED ORAL at 21:08

## 2024-03-23 RX ADMIN — Medication 15 MILLIGRAM(S): at 21:20

## 2024-03-23 RX ADMIN — SENNA PLUS 2 TABLET(S): 8.6 TABLET ORAL at 21:08

## 2024-03-23 RX ADMIN — Medication 2: at 07:30

## 2024-03-23 RX ADMIN — Medication 40 MILLIGRAM(S): at 07:29

## 2024-03-23 RX ADMIN — POLYETHYLENE GLYCOL 3350 17 GRAM(S): 17 POWDER, FOR SOLUTION ORAL at 19:08

## 2024-03-23 RX ADMIN — PANTOPRAZOLE SODIUM 40 MILLIGRAM(S): 20 TABLET, DELAYED RELEASE ORAL at 19:08

## 2024-03-23 RX ADMIN — Medication 50 MILLIGRAM(S): at 21:08

## 2024-03-23 RX ADMIN — Medication 15 MILLIGRAM(S): at 10:36

## 2024-03-23 RX ADMIN — GABAPENTIN 100 MILLIGRAM(S): 400 CAPSULE ORAL at 07:29

## 2024-03-23 RX ADMIN — OLANZAPINE 2.5 MILLIGRAM(S): 15 TABLET, FILM COATED ORAL at 13:16

## 2024-03-23 RX ADMIN — ENOXAPARIN SODIUM 40 MILLIGRAM(S): 100 INJECTION SUBCUTANEOUS at 21:10

## 2024-03-23 RX ADMIN — Medication 5 MILLIGRAM(S): at 07:29

## 2024-03-23 RX ADMIN — PANTOPRAZOLE SODIUM 40 MILLIGRAM(S): 20 TABLET, DELAYED RELEASE ORAL at 07:29

## 2024-03-23 RX ADMIN — Medication 15 MILLIGRAM(S): at 11:19

## 2024-03-23 RX ADMIN — Medication 10 MILLIGRAM(S): at 03:29

## 2024-03-23 NOTE — PROGRESS NOTE ADULT - ASSESSMENT
39y Male with PMHx of viral meningitis with sequela of HTN and intractable headaches (followed by Dr. Weston), recent admission 3/4/24 for autoimmune encephalitis management/treatment (had presented as worsening headaches and cognition - most notably e-mails that didn't make sense) s/p IV steroids and IVIG, presented again on 3/12 forfor bilateral hand weakness as well as wide based gait, UE tremors, UE cogwheeling, dysmetria, and UE weakness. Went for outpatient MRI brain and cervical spine. Admitted for further neurologic workup. Restarted on IV steroids, and plasmapheresis for 7 total sessions.     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  - s/p LP 3/4 with elevated opening pressure at 30 mmHg, remainder of CSF negative  - stop decadron, start PO prednisone 40mg daily on 3/23  - s/p LP 3/13. infectious panel so far negative, awaiting send out studies. autoimmune, and fungal studies, all herpes panels (HSV 1, HSV 2, HSV 6), CSF neopterin (write in), TB, RPR, VDRL, AFB, bacterial antigens, full viral PCR panel, oligoclonal bands, Igg index, myelin basic protein  - LP done 3/4 opening pressure was 30, LP done 3/13 with opening pressure of 13.5  - MRA head/neck done for vessel imaging - no stenosis or occlusion, no remarkable findings  - PET body - nonspecific right lung nodules  - testicular ultrasound neg  - Pulm consult - lung nodules likely inflammation, no contraindication to Rituximab  - plasma exchange started on 3/13, will plan now for 7 TOTAL sessions every other day as pt continues to improve with plasmapheresis. will need to send daily fibrinogen clauss levels   - HD catheter for the plasma exchange placed by IR on 3/13  - ID consult for rule out possible infectious causes, and prior to initiation of Rituximab - no contraindication to Rituximab  - Plan for Rituximab on Tuesday 3/26  - D dimer elevated to 502, LE dopplers negative, f/u repeat D dimer on 3/23  - Sed rate 15  - CRP <3  - repeat Neuropsych consult on 3/14 - improved from prior admission  - Rheum consulted 3/15 for elevated LAKSHMI level, f/u recs  - EEG negative for epileptiform discharges, discontinued on 3/17 AM  - dc'ed verapamil, increased Toprol from 25 mg QHS to 50 mg QHS  - hepatitis panel quantiferon, NMO. c-ANCA, p-ANCA negative  - ACE 42  - LAKSHMI 1: 160 , ds-DNA 14  - copper 56  - ceruloplasmin 16  - cryoglobulin indeterminate  - serum protein and immuno electrophoresis grossly negative (just with elevated gamma globulin)  - send out antibodies negative except slightly elevated HOLLIE.  - HIV neg  - urine heavy metal screen with elevated cadmium at 4.2 otherwise negative   - urine heptacarboxylporphyrins 25, hexacarboxylporphyrins < 4, pentacarboxylporphyrin 21, coproporphyrin 29  - urine porphobilinogen negative  - TIBC 211 otherwise remainder of iron panel normal  - c/w gabapentin 100mg at 7am and 4pm, 600mg QHS and baclofen 10mg three times a day  - continue home pantoprazole BID  - q8hr general neuro checks and vitals    #low copper  - f/u zinc and thiamine levels     #migraine  - sumatriptan 100mg PO PRN once (max 2 tabs)  - if needed, can given sumatriptan 6mg subcutaneously once on top of PO    #insomnia  - continue Remeron to 30 mg QHS, sodium levels stable     Cards  #HTN w/ LVH  #sinus tachy  - cards following, appreciate recs  - goal normotension  - hold amlodipine  - continue toprol 50mg daily     Pulm  - call provider if SPO2 < 94%  - uptake noted in right upper lobe of right lung on PET; CT C/A/P demonstrates clustered nodular and ground-glass opacities in bilateral upper lobes, more prominent over right; findings most consistent with infectious/inflammatory etiology  - pulm consulted during recent admission  - repeat CT in 6-12 weeks to ensure resolution    GI  #Nutrition/Fluids/Electrolytes   - replete K<4 and Mg <2  - Diet: Regular    #elevated LFTs   - AST/ALT 41/124 likely from steroids and Remeron   - would continue to trend for now    Infectious Disease  - afebrile on admission  - leukocytosis likely due to steroids    Endocrine  #hyperglycemia  - A1C results: 5.2  - continue FS monitoring while on steroids  - on low dose SSI  - defer lantus for now given decrease in steroid dose    - TSH results: 0.594    Renal  #hyponatremia as low as 129, slowly uptrending  - sodium now stable, continue Remeron 30mg  - monitor daily sodium levels    Heme  #low fibrinogen level, likely 2/2 plasmapheresis  - daily fibrinogen clauss levels  - fibrinogen prior to plasmapheresis 166 3/17, s/p 5u cryoprecipitate  - fibrinogen 160 pre-PLEX, s/p 5u cryoprecipitate     Psych  #anxiety  - Zyprexa standing (2.5 am, 2.5 pm, 5 at bedtime)  - psych consulted 3/15, agrees with current regimen   - can give additional zyprexa 2.5mg q6h PRN (no more than 15mg total per day max)  - EKG every other day to monitor qtc     Rheum  #+ ANCA  - proteinase 3 < 5.0   - anti myeloperoxidase ab < 5.0  - CCP negative   - f/u myositis panel, myomarker    DVT Prophylaxis  - SCDs for DVT prophylaxis     Dispo: home when cleared     Discussed daily hospital plans and goals with patient at bedside.    Discussed with Neurology Attending, Dr. Weston 39y Male with PMHx of viral meningitis with sequela of HTN and intractable headaches (followed by Dr. Weston), recent admission 3/4/24 for autoimmune encephalitis management/treatment (had presented as worsening headaches and cognition - most notably e-mails that didn't make sense) s/p IV steroids and IVIG, presented again on 3/12 forfor bilateral hand weakness as well as wide based gait, UE tremors, UE cogwheeling, dysmetria, and UE weakness. Went for outpatient MRI brain and cervical spine. Admitted for further neurologic workup. Restarted on IV steroids, and plasmapheresis for 7 total sessions.     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  - s/p LP 3/4 with elevated opening pressure at 30 mmHg, remainder of CSF negative  - stop decadron, start PO prednisone 40mg daily on 3/23  - s/p LP 3/13. infectious panel so far negative, awaiting send out studies. autoimmune, and fungal studies, all herpes panels (HSV 1, HSV 2, HSV 6), CSF neopterin (write in), TB, RPR, VDRL, AFB, bacterial antigens, full viral PCR panel, oligoclonal bands, Igg index, myelin basic protein  - LP done 3/4 opening pressure was 30, LP done 3/13 with opening pressure of 13.5  - MRA head/neck done for vessel imaging - no stenosis or occlusion, no remarkable findings  - PET body - nonspecific right lung nodules  - testicular ultrasound neg  - Pulm consult - lung nodules likely inflammation, no contraindication to Rituximab  - plasma exchange started on 3/13, will plan now for 7 TOTAL sessions every other day as pt continues to improve with plasmapheresis. will need to send daily fibrinogen clauss levels   - HD catheter for the plasma exchange placed by IR on 3/13  - ID consult for rule out possible infectious causes, and prior to initiation of Rituximab - no contraindication to Rituximab  - Plan for Rituximab on Tuesday 3/26  - D dimer elevated to 502, LE dopplers negative, repeat D dimer on 3/23 < 150  - Sed rate 15  - CRP <3  - repeat Neuropsych consult on 3/14 - improved from prior admission  - Rheum consulted 3/15 for elevated LAKSHMI level, f/u recs  - EEG negative for epileptiform discharges, discontinued on 3/17 AM  - dc'ed verapamil, increased Toprol from 25 mg QHS to 50 mg QHS  - hepatitis panel quantiferon, NMO. c-ANCA, p-ANCA negative  - ACE 42  - LAKSHMI 1: 160 , ds-DNA 14  - copper 56  - ceruloplasmin 16  - cryoglobulin indeterminate  - serum protein and immuno electrophoresis grossly negative (just with elevated gamma globulin)  - send out antibodies negative except slightly elevated HOLLIE.  - HIV neg  - urine heavy metal screen with elevated cadmium at 4.2 otherwise negative   - urine heptacarboxylporphyrins 25, hexacarboxylporphyrins < 4, pentacarboxylporphyrin 21, coproporphyrin 29  - urine porphobilinogen negative  - TIBC 211 otherwise remainder of iron panel normal  - c/w gabapentin 100mg at 7am and 4pm, 600mg QHS and baclofen 10mg three times a day  - continue home pantoprazole BID  - q8hr general neuro checks and vitals    #low copper  - f/u zinc and thiamine levels     #migraine  - sumatriptan 100mg PO PRN once (max 2 tabs)  - if needed, can given sumatriptan 6mg subcutaneously once on top of PO    #insomnia  - continue Remeron to 30 mg QHS, sodium levels stable     Cards  #HTN w/ LVH  #sinus tachy  - cards following, appreciate recs  - goal normotension  - hold amlodipine  - continue toprol 50mg daily     Pulm  - call provider if SPO2 < 94%  - uptake noted in right upper lobe of right lung on PET; CT C/A/P demonstrates clustered nodular and ground-glass opacities in bilateral upper lobes, more prominent over right; findings most consistent with infectious/inflammatory etiology  - pulm consulted during recent admission  - repeat CT in 6-12 weeks to ensure resolution    GI  #Nutrition/Fluids/Electrolytes   - replete K<4 and Mg <2  - Diet: Regular    #elevated LFTs   - AST/ALT 41/124 likely from steroids and Remeron   - now downtrending    Infectious Disease  - afebrile on admission  - leukocytosis likely due to steroids    Endocrine  #hyperglycemia  - A1C results: 5.2  - continue FS monitoring while on steroids  - on low dose SSI  - defer lantus for now given decrease in steroid dose    - TSH results: 0.594    Renal  #hyponatremia as low as 129, slowly uptrending  - sodium now stable, continue Remeron 30mg  - monitor daily sodium levels    Heme  #low fibrinogen level, likely 2/2 plasmapheresis  - daily fibrinogen clauss levels  - fibrinogen prior to plasmapheresis 166 3/17, s/p 5u cryoprecipitate  - fibrinogen 160 pre-PLEX, s/p 5u cryoprecipitate     MSK   #b/l knee pain   - likely 2/2 from steroids   - Toradol helps, Tramadol PRN also ordered    Psych  #anxiety  - Zyprexa standing (2.5 am, 2.5 pm, 5 at bedtime)  - psych consulted 3/15, agrees with current regimen   - can give additional zyprexa 2.5mg q6h PRN (no more than 15mg total per day max)  - EKG every other day to monitor qtc     Rheum  #+ ANCA  - proteinase 3 < 5.0   - anti myeloperoxidase ab < 5.0  - CCP negative   - f/u myositis panel, myomarker    DVT Prophylaxis  - SCDs for DVT prophylaxis     Dispo: home when cleared     Discussed daily hospital plans and goals with patient at bedside.    Discussed with Neurology Attending, Dr. Weston

## 2024-03-23 NOTE — PROGRESS NOTE ADULT - SUBJECTIVE AND OBJECTIVE BOX
Transfusion Medicine Progress Note      SUBJECTIVE: Patient seen and examined at bedside. Denies fever, headache, nausea, vomiting, chest pain, shortness of breath, abdominal pain, changes in bowel movements or urination.     OBJECTIVE:    VITAL SIGNS:  ICU Vital Signs Last 24 Hrs  T(C): 36.3 (24 Mar 2024 10:15), Max: 37.2 (23 Mar 2024 17:58)  T(F): 97.4 (24 Mar 2024 10:15), Max: 98.9 (23 Mar 2024 17:58)  HR: 95 (24 Mar 2024 10:14) (84 - 103)  BP: 125/70 (24 Mar 2024 10:14) (125/70 - 129/79)  BP(mean): 91 (24 Mar 2024 10:14) (91 - 95)  ABP: --  ABP(mean): --  RR: 16 (24 Mar 2024 10:14) (16 - 18)  SpO2: 98% (24 Mar 2024 10:14) (97% - 98%)    O2 Parameters below as of 24 Mar 2024 10:14  Patient On (Oxygen Delivery Method): room air              03-23 @ 07:01  -  03-24 @ 07:00  --------------------------------------------------------  IN: 850 mL / OUT: 0 mL / NET: 850 mL      CAPILLARY BLOOD GLUCOSE      POCT Blood Glucose.: 447 mg/dL (24 Mar 2024 16:21)      PHYSICAL EXAM:  General: NAD, answering questions, pleasantly conversant  HEENT: NC/AT; PERRL, clear conjunctiva  Neck: supple  Respiratory: CTA b/l  Cardiovascular: +S1/S2; RRR  Abdomen: soft, NT/ND; +BS x4  Extremities: WWP, 2+ peripheral pulses b/l; no LE edema  Skin: normal color and turgor; ecchymosis on left arm near old IV site  Neurological: AAOX3    MEDICATIONS:  MEDICATIONS  (STANDING):  albumin human  5% IVPB 4000 milliLiter(s) IV Intermittent once  baclofen 10 milliGRAM(s) Oral every 12 hours  bisacodyl 5 milliGRAM(s) Oral <User Schedule>  chlorhexidine 2% Cloths 1 Application(s) Topical daily  dextrose 5%. 1000 milliLiter(s) (100 mL/Hr) IV Continuous <Continuous>  dextrose 5%. 1000 milliLiter(s) (50 mL/Hr) IV Continuous <Continuous>  dextrose 50% Injectable 25 Gram(s) IV Push once  dextrose 50% Injectable 12.5 Gram(s) IV Push once  dextrose 50% Injectable 25 Gram(s) IV Push once  DULoxetine 30 milliGRAM(s) Oral every 24 hours  enoxaparin Injectable 40 milliGRAM(s) SubCutaneous every 24 hours  gabapentin 100 milliGRAM(s) Oral every 12 hours  glucagon  Injectable 1 milliGRAM(s) IntraMuscular once  heparin  Lock Flush 100 Units/mL Injectable 600 Unit(s) IV Push once  insulin lispro (ADMELOG) corrective regimen sliding scale   SubCutaneous Before meals and at bedtime  metoprolol succinate ER 50 milliGRAM(s) Oral every 24 hours  mirtazapine 30 milliGRAM(s) Oral at bedtime  OLANZapine 5 milliGRAM(s) Oral at bedtime  OLANZapine 2.5 milliGRAM(s) Oral <User Schedule>  pantoprazole    Tablet 40 milliGRAM(s) Oral every 12 hours  polyethylene glycol 3350 17 Gram(s) Oral every 12 hours  senna 2 Tablet(s) Oral at bedtime    MEDICATIONS  (PRN):  acetaminophen     Tablet .. 650 milliGRAM(s) Oral every 6 hours PRN Mild Pain (1 - 3), Moderate Pain (4 - 6)  bisacodyl Suppository 10 milliGRAM(s) Rectal daily PRN Constipation  dextrose Oral Gel 15 Gram(s) Oral once PRN Blood Glucose LESS THAN 70 milliGRAM(s)/deciliter  diphenhydrAMINE Injectable 25 milliGRAM(s) IV Push once PRN Allergic Reaction  diphenhydrAMINE IVPB 25 milliGRAM(s) IV Intermittent once PRN Allergic Reaction  OLANZapine 2.5 milliGRAM(s) Oral every 6 hours PRN anxiety/insomnia  SUMAtriptan 100 milliGRAM(s) Oral two times a day PRN Migraine  traMADol 25 milliGRAM(s) Oral every 12 hours PRN Severe Pain (7 - 10)      ALLERGIES:  Allergies    methylPREDNISolone (Rash; Urticaria; Flushing; Hives)    Intolerances        LABS:                        12.4   21.79 )-----------( 180      ( 24 Mar 2024 10:00 )             39.0     03-24    143  |  104  |  14  ----------------------------<  352<H>  4.9   |  27  |  0.91    Ca    9.7      24 Mar 2024 10:00  Phos  3.1     03-24  Mg     2.2     03-24        Urinalysis Basic - ( 24 Mar 2024 10:00 )    Color: x / Appearance: x / SG: x / pH: x  Gluc: 352 mg/dL / Ketone: x  / Bili: x / Urobili: x   Blood: x / Protein: x / Nitrite: x   Leuk Esterase: x / RBC: x / WBC x   Sq Epi: x / Non Sq Epi: x / Bacteria: x            RADIOLOGY, PATHOLOGY, & ADDITIONAL TESTS: Reviewed.

## 2024-03-23 NOTE — PROGRESS NOTE ADULT - SUBJECTIVE AND OBJECTIVE BOX
Neurology Stroke Progress Note    INTERVAL HPI/OVERNIGHT EVENTS:  Patient seen and examined. O/N pt with b/l knee pain, given PO Toradol with some relief. Feeling groggy this AM from the Remeron, endorses not getting much sleep last night     MEDICATIONS  (STANDING):  albumin human  5% IVPB 4000 milliLiter(s) IV Intermittent once  baclofen 10 milliGRAM(s) Oral <User Schedule>  bisacodyl 5 milliGRAM(s) Oral <User Schedule>  calcium gluconate IVPB 2 Gram(s) IV Intermittent once  chlorhexidine 2% Cloths 1 Application(s) Topical daily  dextrose 5%. 1000 milliLiter(s) (50 mL/Hr) IV Continuous <Continuous>  dextrose 5%. 1000 milliLiter(s) (100 mL/Hr) IV Continuous <Continuous>  dextrose 50% Injectable 25 Gram(s) IV Push once  dextrose 50% Injectable 12.5 Gram(s) IV Push once  dextrose 50% Injectable 25 Gram(s) IV Push once  enoxaparin Injectable 40 milliGRAM(s) SubCutaneous every 24 hours  gabapentin 600 milliGRAM(s) Oral at bedtime  gabapentin 100 milliGRAM(s) Oral <User Schedule>  glucagon  Injectable 1 milliGRAM(s) IntraMuscular once  heparin  Lock Flush 100 Units/mL Injectable 600 Unit(s) IV Push once  insulin lispro (ADMELOG) corrective regimen sliding scale   SubCutaneous Before meals and at bedtime  metoprolol succinate ER 50 milliGRAM(s) Oral every 24 hours  mirtazapine 30 milliGRAM(s) Oral at bedtime  OLANZapine 5 milliGRAM(s) Oral at bedtime  OLANZapine 2.5 milliGRAM(s) Oral <User Schedule>  pantoprazole    Tablet 40 milliGRAM(s) Oral every 12 hours  polyethylene glycol 3350 17 Gram(s) Oral every 12 hours  predniSONE   Tablet 40 milliGRAM(s) Oral every 24 hours  senna 2 Tablet(s) Oral at bedtime    MEDICATIONS  (PRN):  acetaminophen     Tablet .. 650 milliGRAM(s) Oral every 6 hours PRN Mild Pain (1 - 3), Moderate Pain (4 - 6)  bisacodyl Suppository 10 milliGRAM(s) Rectal daily PRN Constipation  dextrose Oral Gel 15 Gram(s) Oral once PRN Blood Glucose LESS THAN 70 milliGRAM(s)/deciliter  diphenhydrAMINE Injectable 25 milliGRAM(s) IV Push once PRN Allergic Reaction  diphenhydrAMINE IVPB 25 milliGRAM(s) IV Intermittent once PRN Allergic Reaction  OLANZapine 2.5 milliGRAM(s) Oral every 6 hours PRN anxiety/insomnia  SUMAtriptan 100 milliGRAM(s) Oral two times a day PRN Migraine      Allergies    methylPREDNISolone (Rash; Urticaria; Flushing; Hives)    Intolerances        Vital Signs Last 24 Hrs  T(C): 36.8 (23 Mar 2024 10:32), Max: 36.8 (23 Mar 2024 10:32)  T(F): 98.3 (23 Mar 2024 10:32), Max: 98.3 (23 Mar 2024 10:32)  HR: 75 (23 Mar 2024 10:20) (74 - 102)  BP: 131/81 (23 Mar 2024 10:20) (124/67 - 171/97)  BP(mean): 100 (23 Mar 2024 10:20) (88 - 127)  RR: 6 (23 Mar 2024 10:20) (6 - 18)  SpO2: 98% (23 Mar 2024 10:20) (95% - 98%)    Parameters below as of 23 Mar 2024 10:20  Patient On (Oxygen Delivery Method): room air        Physical exam:  General: No acute distress, awake and alert  Eyes: Anicteric sclerae, moist conjunctivae, see below for CNs  Neck: trachea midline,  Cardiovascular: Regular rate and rhythm    Neurologic:  -Mental status: Awake, alert, oriented to person, place, and time. Speech is fluent with intact naming, repetition, and comprehension, no dysarthria. Recent and remote memory intact. Follows commands. Attention/concentration intact. Fund of knowledge appropriate.  -Cranial nerves:   II: Visual fields are full to confrontation.  III, IV, VI: Extraocular movements are intact without nystagmus.  VII: Face is symmetric   Motor: Normal bulk and tone. No pronator drift. Strength bilateral upper extremity 5/5, bilateral lower extremities 5/5.  Rapid alternating movements intact and symmetric  Sensation: Intact to light touch bilaterally. No neglect or extinction on double simultaneous testing.  Coordination: No dysmetria on finger-to-nose   Gait: Narrow gait and steady, improved right arm swing      LABS:                        13.2   19.25 )-----------( 180      ( 22 Mar 2024 10:25 )             40.4     03-22    139  |  101  |  18  ----------------------------<  322<H>  4.5   |  27  |  0.92    Ca    10.0      22 Mar 2024 10:25  Phos  4.0     03-22  Mg     2.3     03-22    TPro  5.9<L>  /  Alb  4.9  /  TBili  0.6  /  DBili  0.2  /  AST  39  /  ALT  67<H>  /  AlkPhos  49  03-22      Urinalysis Basic - ( 22 Mar 2024 10:25 )    Color: x / Appearance: x / SG: x / pH: x  Gluc: 322 mg/dL / Ketone: x  / Bili: x / Urobili: x   Blood: x / Protein: x / Nitrite: x   Leuk Esterase: x / RBC: x / WBC x   Sq Epi: x / Non Sq Epi: x / Bacteria: x        RADIOLOGY & ADDITIONAL TESTS: Reviewed

## 2024-03-23 NOTE — PROGRESS NOTE ADULT - SUBJECTIVE AND OBJECTIVE BOX
Patient is a 39y old  Male who presents with a chief complaint of treated for Autoimmune encephalitis (18 Mar 2024 15:40)      SUBJECTIVE:  Patient seen and examined at bedside.  Sitting in bedside chair, had some b/l knee pain overnight that resolved with toradol.  Otherwise feels well.    ROS:  No new ROS    Vital Signs Last 12 Hrs  T(F): 98.1 (03-23-24 @ 13:18), Max: 98.3 (03-23-24 @ 10:32)  HR: 75 (03-23-24 @ 13:45) (74 - 75)  BP: 137/88 (03-23-24 @ 13:45) (126/77 - 137/88)  BP(mean): 108 (03-23-24 @ 13:45) (92 - 108)  RR: 17 (03-23-24 @ 13:45) (16 - 17)  SpO2: 98% (03-23-24 @ 13:45) (96% - 98%)  I&O's Summary    22 Mar 2024 07:01  -  23 Mar 2024 07:00  --------------------------------------------------------  IN: 350 mL / OUT: 0 mL / NET: 350 mL    23 Mar 2024 07:01  -  23 Mar 2024 16:09  --------------------------------------------------------  IN: 850 mL / OUT: 0 mL / NET: 850 mL        PHYSICAL EXAM:  Constitutional: NAD, sitting comfortably in chair  HEENT: No scleral icterus, MMM  Respiratory: Normal chest rise, no increased WOB   Gastrointestinal: nondistended   Neurological: AAOx3  Skin: R HD Cath        LABS:                        12.5   15.59 )-----------( 184      ( 23 Mar 2024 12:15 )             38.8     03-23    136  |  102  |  21  ----------------------------<  293<H>  5.3   |  24  |  0.89    Ca    9.8      23 Mar 2024 12:15  Phos  4.0     03-23  Mg     2.3     03-23    TPro  5.9<L>  /  Alb  4.9  /  TBili  0.6  /  DBili  0.2  /  AST  39  /  ALT  67<H>  /  AlkPhos  49  03-22      Urinalysis Basic - ( 23 Mar 2024 12:15 )    Color: x / Appearance: x / SG: x / pH: x  Gluc: 293 mg/dL / Ketone: x  / Bili: x / Urobili: x   Blood: x / Protein: x / Nitrite: x   Leuk Esterase: x / RBC: x / WBC x   Sq Epi: x / Non Sq Epi: x / Bacteria: x          RADIOLOGY & ADDITIONAL TESTS:  No new imaging    MEDICATIONS  (STANDING):  albumin human  5% IVPB 4000 milliLiter(s) IV Intermittent once  baclofen 10 milliGRAM(s) Oral <User Schedule>  bisacodyl 5 milliGRAM(s) Oral <User Schedule>  chlorhexidine 2% Cloths 1 Application(s) Topical daily  dextrose 5%. 1000 milliLiter(s) (50 mL/Hr) IV Continuous <Continuous>  dextrose 5%. 1000 milliLiter(s) (100 mL/Hr) IV Continuous <Continuous>  dextrose 50% Injectable 25 Gram(s) IV Push once  dextrose 50% Injectable 12.5 Gram(s) IV Push once  dextrose 50% Injectable 25 Gram(s) IV Push once  enoxaparin Injectable 40 milliGRAM(s) SubCutaneous every 24 hours  gabapentin 600 milliGRAM(s) Oral at bedtime  gabapentin 100 milliGRAM(s) Oral <User Schedule>  glucagon  Injectable 1 milliGRAM(s) IntraMuscular once  heparin  Lock Flush 100 Units/mL Injectable 600 Unit(s) IV Push once  insulin lispro (ADMELOG) corrective regimen sliding scale   SubCutaneous Before meals and at bedtime  metoprolol succinate ER 50 milliGRAM(s) Oral every 24 hours  mirtazapine 30 milliGRAM(s) Oral at bedtime  OLANZapine 5 milliGRAM(s) Oral at bedtime  OLANZapine 2.5 milliGRAM(s) Oral <User Schedule>  pantoprazole    Tablet 40 milliGRAM(s) Oral every 12 hours  polyethylene glycol 3350 17 Gram(s) Oral every 12 hours  predniSONE   Tablet 40 milliGRAM(s) Oral every 24 hours  senna 2 Tablet(s) Oral at bedtime    MEDICATIONS  (PRN):  acetaminophen     Tablet .. 650 milliGRAM(s) Oral every 6 hours PRN Mild Pain (1 - 3), Moderate Pain (4 - 6)  bisacodyl Suppository 10 milliGRAM(s) Rectal daily PRN Constipation  dextrose Oral Gel 15 Gram(s) Oral once PRN Blood Glucose LESS THAN 70 milliGRAM(s)/deciliter  diphenhydrAMINE Injectable 25 milliGRAM(s) IV Push once PRN Allergic Reaction  diphenhydrAMINE IVPB 25 milliGRAM(s) IV Intermittent once PRN Allergic Reaction  OLANZapine 2.5 milliGRAM(s) Oral every 6 hours PRN anxiety/insomnia  SUMAtriptan 100 milliGRAM(s) Oral two times a day PRN Migraine  traMADol 25 milliGRAM(s) Oral every 12 hours PRN Severe Pain (7 - 10)

## 2024-03-23 NOTE — PROGRESS NOTE ADULT - ASSESSMENT
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 Weston), recent admission 3/4/24 for autoimmune encephalitis management/treatment (had presented as worsening headaches and cognition - most notably e-mails that didn't make sense) s/p IV steroids and IVIG, presented again on 3/12 for bilateral hand weakness now restarted on IV steroids and plasmapheresis for 7 sessions and will receive Rituximab on Tuesday

## 2024-03-23 NOTE — PROGRESS NOTE ADULT - ASSESSMENT
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 Transfusion medicine is consulted for plasma exchange.     On previous admission, patient had PET scan performed 3/4 showing parietoocciptial and temporal region hypometabolism, and per neurology concern was for autoimmune encephalitis. LP on previous admission was notable of opening pressure of 30 mmHg but otherwise unremarkable. MRI brain notable only for degenerative changes, and no evidence of venous sinus thrombosis. Patient was given IVIG and steroids (allergic reaction to solumedrol and so decadron chosen) which per report, patient had been compliant with.     Transfusion medicine is consulted for plasma exchange. Patient is s/p 6 treatment of plasma exchange 3/13, 3/15, 3/17/24, 3/19, 3/21, 3/23. Total of 7 sessions planned, every other day.    Plan   - Autoimmune (evelyn-NMDAR) encephalitis is a category I indication for therapeutic plasma exchange. We will plan for 5 sessions every other day, possibly 7 cycles per neurology team/Dr. Najjar  - Risks and benefits of plasma exchange explained to patient. All of his questions were answered and he verbalized understanding and consented to therapy.   - please monitor daily calcium and fibrinogen clauss levels. With plasmapharesis, both of these are expected to decrease. 2g of calcium are included with plasmapharesis orders.   - please maintain active T&S   - If fibrinogen <200, transfusion medicine will advise on cryoprecipitate administration  - fibrinogen 201 on 3/23, will hold on cryoprecipitate  - Please check fibrinogen level in AM  - further management of autoimmune encephalitis per Neurology/primary team  - Last session planned 3/25/24 (#7)    Discussed with covering transfusion medicine attending Dr. Landaverde.

## 2024-03-24 LAB
ANION GAP SERPL CALC-SCNC: 12 MMOL/L — SIGNIFICANT CHANGE UP (ref 5–17)
BUN SERPL-MCNC: 14 MG/DL — SIGNIFICANT CHANGE UP (ref 7–23)
CALCIUM SERPL-MCNC: 9.7 MG/DL — SIGNIFICANT CHANGE UP (ref 8.4–10.5)
CHLORIDE SERPL-SCNC: 104 MMOL/L — SIGNIFICANT CHANGE UP (ref 96–108)
CO2 SERPL-SCNC: 27 MMOL/L — SIGNIFICANT CHANGE UP (ref 22–31)
CREAT SERPL-MCNC: 0.91 MG/DL — SIGNIFICANT CHANGE UP (ref 0.5–1.3)
EGFR: 111 ML/MIN/1.73M2 — SIGNIFICANT CHANGE UP
FIBRINOGEN PPP-MCNC: 157 MG/DL — LOW (ref 200–445)
GLUCOSE BLDC GLUCOMTR-MCNC: 183 MG/DL — HIGH (ref 70–99)
GLUCOSE BLDC GLUCOMTR-MCNC: 320 MG/DL — HIGH (ref 70–99)
GLUCOSE BLDC GLUCOMTR-MCNC: 447 MG/DL — HIGH (ref 70–99)
GLUCOSE SERPL-MCNC: 352 MG/DL — HIGH (ref 70–99)
HCT VFR BLD CALC: 39 % — SIGNIFICANT CHANGE UP (ref 39–50)
HGB BLD-MCNC: 12.4 G/DL — LOW (ref 13–17)
MAGNESIUM SERPL-MCNC: 2.2 MG/DL — SIGNIFICANT CHANGE UP (ref 1.6–2.6)
MCHC RBC-ENTMCNC: 28 PG — SIGNIFICANT CHANGE UP (ref 27–34)
MCHC RBC-ENTMCNC: 31.8 GM/DL — LOW (ref 32–36)
MCV RBC AUTO: 88 FL — SIGNIFICANT CHANGE UP (ref 80–100)
NRBC # BLD: 0 /100 WBCS — SIGNIFICANT CHANGE UP (ref 0–0)
PHOSPHATE SERPL-MCNC: 3.1 MG/DL — SIGNIFICANT CHANGE UP (ref 2.5–4.5)
PLATELET # BLD AUTO: 180 K/UL — SIGNIFICANT CHANGE UP (ref 150–400)
POTASSIUM SERPL-MCNC: 4.9 MMOL/L — SIGNIFICANT CHANGE UP (ref 3.5–5.3)
POTASSIUM SERPL-SCNC: 4.9 MMOL/L — SIGNIFICANT CHANGE UP (ref 3.5–5.3)
RBC # BLD: 4.43 M/UL — SIGNIFICANT CHANGE UP (ref 4.2–5.8)
RBC # FLD: 22.7 % — HIGH (ref 10.3–14.5)
SODIUM SERPL-SCNC: 143 MMOL/L — SIGNIFICANT CHANGE UP (ref 135–145)
WBC # BLD: 21.79 K/UL — HIGH (ref 3.8–10.5)
WBC # FLD AUTO: 21.79 K/UL — HIGH (ref 3.8–10.5)

## 2024-03-24 PROCEDURE — 99232 SBSQ HOSP IP/OBS MODERATE 35: CPT

## 2024-03-24 PROCEDURE — 99231 SBSQ HOSP IP/OBS SF/LOW 25: CPT

## 2024-03-24 RX ORDER — BACLOFEN 100 %
10 POWDER (GRAM) MISCELLANEOUS EVERY 12 HOURS
Refills: 0 | Status: DISCONTINUED | OUTPATIENT
Start: 2024-03-24 | End: 2024-03-26

## 2024-03-24 RX ORDER — INSULIN LISPRO 100/ML
4 VIAL (ML) SUBCUTANEOUS
Refills: 0 | Status: DISCONTINUED | OUTPATIENT
Start: 2024-03-24 | End: 2024-03-26

## 2024-03-24 RX ORDER — DEXAMETHASONE 0.5 MG/5ML
4 ELIXIR ORAL EVERY 24 HOURS
Refills: 0 | Status: DISCONTINUED | OUTPATIENT
Start: 2024-03-25 | End: 2024-03-26

## 2024-03-24 RX ORDER — ALBUMIN HUMAN 25 %
4000 VIAL (ML) INTRAVENOUS ONCE
Refills: 0 | Status: DISCONTINUED | OUTPATIENT
Start: 2024-03-25 | End: 2024-03-26

## 2024-03-24 RX ORDER — GABAPENTIN 400 MG/1
100 CAPSULE ORAL EVERY 12 HOURS
Refills: 0 | Status: DISCONTINUED | OUTPATIENT
Start: 2024-03-24 | End: 2024-03-26

## 2024-03-24 RX ORDER — DULOXETINE HYDROCHLORIDE 30 MG/1
30 CAPSULE, DELAYED RELEASE ORAL EVERY 24 HOURS
Refills: 0 | Status: DISCONTINUED | OUTPATIENT
Start: 2024-03-24 | End: 2024-03-26

## 2024-03-24 RX ORDER — CALCIUM GLUCONATE 100 MG/ML
2 VIAL (ML) INTRAVENOUS ONCE
Refills: 0 | Status: DISCONTINUED | OUTPATIENT
Start: 2024-03-25 | End: 2024-03-26

## 2024-03-24 RX ORDER — INSULIN GLARGINE 100 [IU]/ML
4 INJECTION, SOLUTION SUBCUTANEOUS EVERY MORNING
Refills: 0 | Status: DISCONTINUED | OUTPATIENT
Start: 2024-03-25 | End: 2024-03-26

## 2024-03-24 RX ADMIN — OLANZAPINE 2.5 MILLIGRAM(S): 15 TABLET, FILM COATED ORAL at 06:35

## 2024-03-24 RX ADMIN — DULOXETINE HYDROCHLORIDE 30 MILLIGRAM(S): 30 CAPSULE, DELAYED RELEASE ORAL at 11:21

## 2024-03-24 RX ADMIN — POLYETHYLENE GLYCOL 3350 17 GRAM(S): 17 POWDER, FOR SOLUTION ORAL at 06:35

## 2024-03-24 RX ADMIN — ENOXAPARIN SODIUM 40 MILLIGRAM(S): 100 INJECTION SUBCUTANEOUS at 22:13

## 2024-03-24 RX ADMIN — OLANZAPINE 5 MILLIGRAM(S): 15 TABLET, FILM COATED ORAL at 22:13

## 2024-03-24 RX ADMIN — GABAPENTIN 100 MILLIGRAM(S): 400 CAPSULE ORAL at 06:35

## 2024-03-24 RX ADMIN — Medication 40 MILLIGRAM(S): at 06:34

## 2024-03-24 RX ADMIN — Medication 5 MILLIGRAM(S): at 07:04

## 2024-03-24 RX ADMIN — SUMATRIPTAN SUCCINATE 100 MILLIGRAM(S): 4 INJECTION, SOLUTION SUBCUTANEOUS at 22:49

## 2024-03-24 RX ADMIN — Medication 8: at 11:54

## 2024-03-24 RX ADMIN — SUMATRIPTAN SUCCINATE 100 MILLIGRAM(S): 4 INJECTION, SOLUTION SUBCUTANEOUS at 07:07

## 2024-03-24 RX ADMIN — Medication 10 MILLIGRAM(S): at 19:36

## 2024-03-24 RX ADMIN — OLANZAPINE 2.5 MILLIGRAM(S): 15 TABLET, FILM COATED ORAL at 03:53

## 2024-03-24 RX ADMIN — TRAMADOL HYDROCHLORIDE 25 MILLIGRAM(S): 50 TABLET ORAL at 03:53

## 2024-03-24 RX ADMIN — Medication 50 MILLIGRAM(S): at 22:13

## 2024-03-24 RX ADMIN — Medication 2: at 06:37

## 2024-03-24 RX ADMIN — Medication 10 MILLIGRAM(S): at 06:35

## 2024-03-24 RX ADMIN — MIRTAZAPINE 30 MILLIGRAM(S): 45 TABLET, ORALLY DISINTEGRATING ORAL at 22:13

## 2024-03-24 RX ADMIN — SUMATRIPTAN SUCCINATE 100 MILLIGRAM(S): 4 INJECTION, SOLUTION SUBCUTANEOUS at 22:19

## 2024-03-24 RX ADMIN — CHLORHEXIDINE GLUCONATE 1 APPLICATION(S): 213 SOLUTION TOPICAL at 06:34

## 2024-03-24 RX ADMIN — POLYETHYLENE GLYCOL 3350 17 GRAM(S): 17 POWDER, FOR SOLUTION ORAL at 19:36

## 2024-03-24 RX ADMIN — OLANZAPINE 2.5 MILLIGRAM(S): 15 TABLET, FILM COATED ORAL at 14:43

## 2024-03-24 RX ADMIN — SENNA PLUS 2 TABLET(S): 8.6 TABLET ORAL at 22:13

## 2024-03-24 RX ADMIN — GABAPENTIN 100 MILLIGRAM(S): 400 CAPSULE ORAL at 19:36

## 2024-03-24 RX ADMIN — PANTOPRAZOLE SODIUM 40 MILLIGRAM(S): 20 TABLET, DELAYED RELEASE ORAL at 06:35

## 2024-03-24 RX ADMIN — Medication 12: at 16:25

## 2024-03-24 RX ADMIN — PANTOPRAZOLE SODIUM 40 MILLIGRAM(S): 20 TABLET, DELAYED RELEASE ORAL at 19:36

## 2024-03-24 NOTE — PROGRESS NOTE ADULT - SUBJECTIVE AND OBJECTIVE BOX
Patient is a 39y old  Male who presents with a chief complaint of treated for Autoimmune encephalitis (18 Mar 2024 15:40)      SUBJECTIVE:  Patient seen and examined at bedside.  Poor sleep last night, has had some worsening of steroid induced acne/rash since change to prednisone.  Still has b/l knee pain but no effusions    ROS:  Denies fevers, chills, chest pain, Abdominal pain, N/V.  All other ROS negative except as above    Vital Signs Last 12 Hrs  T(F): 97.4 (03-24-24 @ 10:15), Max: 97.8 (03-24-24 @ 06:33)  HR: 95 (03-24-24 @ 10:14) (84 - 95)  BP: 125/70 (03-24-24 @ 10:14) (125/70 - 126/78)  BP(mean): 91 (03-24-24 @ 10:14) (91 - 94)  RR: 16 (03-24-24 @ 10:14) (16 - 18)  SpO2: 98% (03-24-24 @ 10:14) (97% - 98%)  I&O's Summary    23 Mar 2024 07:01  -  24 Mar 2024 07:00  --------------------------------------------------------  IN: 850 mL / OUT: 0 mL / NET: 850 mL        PHYSICAL EXAM:  Constitutional: NAD, resting comfortably in bed  HEENT: No scleral icterus, MMM  Respiratory: Normal chest rise, no increased WOB   Gastrointestinal: nondistended   Neurological: AAOx3  Skin: R HD Cath, red papules on chest and back        LABS:                        12.4   21.79 )-----------( 180      ( 24 Mar 2024 10:00 )             39.0     03-24    143  |  104  |  14  ----------------------------<  352<H>  4.9   |  27  |  0.91    Ca    9.7      24 Mar 2024 10:00  Phos  3.1     03-24  Mg     2.2     03-24        Urinalysis Basic - ( 24 Mar 2024 10:00 )    Color: x / Appearance: x / SG: x / pH: x  Gluc: 352 mg/dL / Ketone: x  / Bili: x / Urobili: x   Blood: x / Protein: x / Nitrite: x   Leuk Esterase: x / RBC: x / WBC x   Sq Epi: x / Non Sq Epi: x / Bacteria: x          RADIOLOGY & ADDITIONAL TESTS:    MEDICATIONS  (STANDING):  albumin human  5% IVPB 4000 milliLiter(s) IV Intermittent once  baclofen 10 milliGRAM(s) Oral every 12 hours  bisacodyl 5 milliGRAM(s) Oral <User Schedule>  chlorhexidine 2% Cloths 1 Application(s) Topical daily  dextrose 5%. 1000 milliLiter(s) (100 mL/Hr) IV Continuous <Continuous>  dextrose 5%. 1000 milliLiter(s) (50 mL/Hr) IV Continuous <Continuous>  dextrose 50% Injectable 25 Gram(s) IV Push once  dextrose 50% Injectable 12.5 Gram(s) IV Push once  dextrose 50% Injectable 25 Gram(s) IV Push once  DULoxetine 30 milliGRAM(s) Oral every 24 hours  enoxaparin Injectable 40 milliGRAM(s) SubCutaneous every 24 hours  gabapentin 100 milliGRAM(s) Oral every 12 hours  glucagon  Injectable 1 milliGRAM(s) IntraMuscular once  heparin  Lock Flush 100 Units/mL Injectable 600 Unit(s) IV Push once  insulin lispro (ADMELOG) corrective regimen sliding scale   SubCutaneous Before meals and at bedtime  metoprolol succinate ER 50 milliGRAM(s) Oral every 24 hours  mirtazapine 30 milliGRAM(s) Oral at bedtime  OLANZapine 5 milliGRAM(s) Oral at bedtime  OLANZapine 2.5 milliGRAM(s) Oral <User Schedule>  pantoprazole    Tablet 40 milliGRAM(s) Oral every 12 hours  polyethylene glycol 3350 17 Gram(s) Oral every 12 hours  senna 2 Tablet(s) Oral at bedtime    MEDICATIONS  (PRN):  acetaminophen     Tablet .. 650 milliGRAM(s) Oral every 6 hours PRN Mild Pain (1 - 3), Moderate Pain (4 - 6)  bisacodyl Suppository 10 milliGRAM(s) Rectal daily PRN Constipation  dextrose Oral Gel 15 Gram(s) Oral once PRN Blood Glucose LESS THAN 70 milliGRAM(s)/deciliter  diphenhydrAMINE Injectable 25 milliGRAM(s) IV Push once PRN Allergic Reaction  diphenhydrAMINE IVPB 25 milliGRAM(s) IV Intermittent once PRN Allergic Reaction  OLANZapine 2.5 milliGRAM(s) Oral every 6 hours PRN anxiety/insomnia  SUMAtriptan 100 milliGRAM(s) Oral two times a day PRN Migraine  traMADol 25 milliGRAM(s) Oral every 12 hours PRN Severe Pain (7 - 10)

## 2024-03-24 NOTE — PROGRESS NOTE ADULT - ASSESSMENT
39y Male with PMHx of viral meningitis with sequela of HTN and intractable headaches (followed by Dr. Weston), recent admission 3/4/24 for autoimmune encephalitis management/treatment (had presented as worsening headaches and cognition - most notably e-mails that didn't make sense) s/p IV steroids and IVIG, presented again on 3/12 forfor bilateral hand weakness as well as wide based gait, UE tremors, UE cogwheeling, dysmetria, and UE weakness. Went for outpatient MRI brain and cervical spine. Admitted for further neurologic workup. Restarted on IV steroids, and plasmapheresis for 7 total sessions.     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  - s/p LP 3/4 with elevated opening pressure at 30 mmHg, remainder of CSF negative  - s/p decadron, started prednisone 40 on 3/23 but given worsening rash, switch back to Decadron 4mg daily (in the mornings)  - s/p LP 3/13. infectious panel so far negative, awaiting send out studies. autoimmune, and fungal studies, all herpes panels (HSV 1, HSV 2, HSV 6), CSF neopterin (write in), TB, RPR, VDRL, AFB, bacterial antigens, full viral PCR panel, oligoclonal bands, Igg index, myelin basic protein  - LP done 3/4 opening pressure was 30, LP done 3/13 with opening pressure of 13.5  - MRA head/neck done for vessel imaging - no stenosis or occlusion, no remarkable findings  - PET body - nonspecific right lung nodules  - testicular ultrasound neg  - Pulm consult - lung nodules likely inflammation, no contraindication to Rituximab  - plasma exchange started on 3/13, will plan now for 7 TOTAL sessions every other day as pt continues to improve with plasmapheresis. will need to send daily fibrinogen clauss levels   - HD catheter for the plasma exchange placed by IR on 3/13  - ID consult for rule out possible infectious causes, and prior to initiation of Rituximab - no contraindication to Rituximab  - Plan for Rituximab on Tuesday 3/26  - D dimer elevated to 502, LE dopplers negative, repeat D dimer on 3/23 < 150  - Sed rate 15  - CRP <3  - repeat Neuropsych consult on 3/14 - improved from prior admission  - Rheum consulted 3/15 for elevated LAKSHMI level, f/u recs  - EEG negative for epileptiform discharges, discontinued on 3/17 AM  - dc'ed verapamil, increased Toprol from 25 mg QHS to 50 mg QHS  - hepatitis panel quantiferon, NMO. c-ANCA, p-ANCA negative  - ACE 42  - LAKSHMI 1: 160 , ds-DNA 14  - copper 56  - ceruloplasmin 16  - cryoglobulin indeterminate  - serum protein and immuno electrophoresis grossly negative (just with elevated gamma globulin)  - send out antibodies negative except slightly elevated HOLLIE.  - HIV neg  - urine heavy metal screen with elevated cadmium at 4.2 otherwise negative   - urine heptacarboxylporphyrins 25, hexacarboxylporphyrins < 4, pentacarboxylporphyrin 21, coproporphyrin 29  - urine porphobilinogen negative  - TIBC 211 otherwise remainder of iron panel normal  - c/w gabapentin 100mg at 7am and 4pm, 600mg QHS and baclofen 10mg three times a day  - continue home pantoprazole BID  - q8hr general neuro checks and vitals    #low copper  - f/u zinc and thiamine levels     #migraine  - sumatriptan 100mg PO PRN once (max 2 tabs)  - if needed, can given sumatriptan 6mg subcutaneously once on top of PO    #insomnia  - continue Remeron to 30 mg QHS, sodium levels stable     Cards  #HTN w/ LVH  #sinus tachy  - cards following, appreciate recs  - goal normotension  - hold amlodipine  - continue toprol 50mg daily     Pulm  - call provider if SPO2 < 94%  - uptake noted in right upper lobe of right lung on PET; CT C/A/P demonstrates clustered nodular and ground-glass opacities in bilateral upper lobes, more prominent over right; findings most consistent with infectious/inflammatory etiology  - pulm consulted during recent admission  - repeat CT in 6-12 weeks to ensure resolution    GI  #Nutrition/Fluids/Electrolytes   - replete K<4 and Mg <2  - Diet: Regular    #elevated LFTs   - AST/ALT 41/124 likely from steroids and Remeron   - now downtrending    Infectious Disease  - afebrile on admission  - leukocytosis likely due to steroids    Endocrine  #hyperglycemia  - A1C results: 5.2  - continue FS monitoring while on steroids  - on low dose SSI  - defer lantus for now given decrease in steroid dose    - TSH results: 0.594    Renal  #hyponatremia as low as 129, slowly uptrending  - sodium now stable, continue Remeron 30mg  - monitor daily sodium levels    Heme  #low fibrinogen level, likely 2/2 plasmapheresis  - daily fibrinogen clauss levels  - fibrinogen prior to plasmapheresis 166 3/17, s/p 5u cryoprecipitate  - fibrinogen 160 pre-PLEX, s/p 5u cryoprecipitate     MSK   #b/l knee pain   - likely 2/2 from steroids   - Toradol helps, Tramadol PRN also ordered    Psych  #anxiety  - Zyprexa standing (2.5 am, 2.5 pm, 5 at bedtime)  - psych consulted 3/15, agrees with current regimen   - can give additional zyprexa 2.5mg q6h PRN (no more than 15mg total per day max)  - EKG every other day to monitor qtc     Rheum  #+ ANCA  - proteinase 3 < 5.0   - anti myeloperoxidase ab < 5.0  - CCP negative   - f/u myositis panel, myomarker    DVT Prophylaxis  - SCDs for DVT prophylaxis     Dispo: home when cleared     Discussed daily hospital plans and goals with patient at bedside.    Discussed with Neurology Attending, Dr. Weston 39y Male with PMHx of viral meningitis with sequela of HTN and intractable headaches (followed by Dr. Weston), recent admission 3/4/24 for autoimmune encephalitis management/treatment (had presented as worsening headaches and cognition - most notably e-mails that didn't make sense) s/p IV steroids and IVIG, presented again on 3/12 forfor bilateral hand weakness as well as wide based gait, UE tremors, UE cogwheeling, dysmetria, and UE weakness. Went for outpatient MRI brain and cervical spine. Admitted for further neurologic workup. Restarted on IV steroids, and plasmapheresis for 7 total sessions.     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  - s/p LP 3/4 with elevated opening pressure at 30 mmHg, remainder of CSF negative  - s/p decadron, started prednisone 40 on 3/23 but given worsening rash, switch back to Decadron 4mg daily (in the mornings)  - s/p LP 3/13. infectious panel so far negative, awaiting send out studies. autoimmune, and fungal studies, all herpes panels (HSV 1, HSV 2, HSV 6), CSF neopterin (write in), TB, RPR, VDRL, AFB, bacterial antigens, full viral PCR panel, oligoclonal bands, Igg index, myelin basic protein  - LP done 3/4 opening pressure was 30, LP done 3/13 with opening pressure of 13.5  - MRA head/neck done for vessel imaging - no stenosis or occlusion, no remarkable findings  - PET body - nonspecific right lung nodules  - testicular ultrasound neg  - Pulm consult - lung nodules likely inflammation, no contraindication to Rituximab  - plasma exchange started on 3/13, will plan now for 7 TOTAL sessions every other day as pt continues to improve with plasmapheresis. will need to send daily fibrinogen clauss levels   - HD catheter for the plasma exchange placed by IR on 3/13  - ID consult for rule out possible infectious causes, and prior to initiation of Rituximab - no contraindication to Rituximab  - Plan for Rituximab on Tuesday 3/26  - D dimer elevated to 502, LE dopplers negative, repeat D dimer on 3/23 < 150  - Sed rate 15  - CRP <3  - repeat Neuropsych consult on 3/14 - improved from prior admission  - Rheum consulted 3/15 for elevated LAKSHMI level, f/u recs  - EEG negative for epileptiform discharges, discontinued on 3/17 AM  - dc'ed verapamil, increased Toprol from 25 mg QHS to 50 mg QHS  - hepatitis panel quantiferon, NMO. c-ANCA, p-ANCA negative  - ACE 42  - LAKSHMI 1: 160 , ds-DNA 14  - copper 56  - ceruloplasmin 16  - cryoglobulin indeterminate  - serum protein and immuno electrophoresis grossly negative (just with elevated gamma globulin)  - send out antibodies negative except slightly elevated HOLLIE.  - HIV neg  - urine heavy metal screen with elevated cadmium at 4.2 otherwise negative   - urine heptacarboxylporphyrins 25, hexacarboxylporphyrins < 4, pentacarboxylporphyrin 21, coproporphyrin 29  - urine porphobilinogen negative  - TIBC 211 otherwise remainder of iron panel normal  - given lethargy, lowered Gabapentin to 100 mg q12 and Baclofen to 10mg q12  - continue home pantoprazole BID  - q8hr general neuro checks and vitals  -b/l knee pain - possibly from steroids? - start on Duloxetine 30mg daily    #low copper  - f/u zinc and thiamine levels     #migraine  - sumatriptan 100mg PO PRN once (max 2 tabs)  - if needed, can given sumatriptan 6mg subcutaneously once on top of PO    #insomnia  - continue Remeron to 30 mg QHS, sodium levels stable     Cards  #HTN w/ LVH  #sinus tachy  - cards following, appreciate recs  - goal normotension  - hold amlodipine  - continue toprol 50mg daily     Pulm  - call provider if SPO2 < 94%  - uptake noted in right upper lobe of right lung on PET; CT C/A/P demonstrates clustered nodular and ground-glass opacities in bilateral upper lobes, more prominent over right; findings most consistent with infectious/inflammatory etiology  - pulm consulted during recent admission  - repeat CT in 6-12 weeks to ensure resolution    GI  #Nutrition/Fluids/Electrolytes   - replete K<4 and Mg <2  - Diet: Regular    #elevated LFTs   - AST/ALT 41/124 likely from steroids and Remeron   - now downtrending    Infectious Disease  - afebrile on admission  - leukocytosis likely due to steroids    Endocrine  #hyperglycemia  - A1C results: 5.2  - continue FS monitoring while on steroids  - on low dose SSI  - defer lantus for now given decrease in steroid dose    - TSH results: 0.594    Renal  #hyponatremia as low as 129, slowly uptrending  - sodium now stable, continue Remeron 30mg  - monitor daily sodium levels    Heme  #low fibrinogen level, likely 2/2 plasmapheresis  - daily fibrinogen clauss levels  - fibrinogen prior to plasmapheresis 166 3/17, s/p 5u cryoprecipitate  - fibrinogen 160 pre-PLEX, s/p 5u cryoprecipitate     MSK   #b/l knee pain   - likely 2/2 from steroids   - Toradol helps, Tramadol PRN also ordered    Psych  #anxiety  - Zyprexa standing (2.5 am, 2.5 pm, 5 at bedtime)  - psych consulted 3/15, agrees with current regimen   - can give additional zyprexa 2.5mg q6h PRN (no more than 15mg total per day max)  - EKG every other day to monitor qtc     Rheum  #+ ANCA  - proteinase 3 < 5.0   - anti myeloperoxidase ab < 5.0  - CCP negative   - f/u myositis panel, myomarker    DVT Prophylaxis  - SCDs for DVT prophylaxis     Dispo: home when cleared     Discussed daily hospital plans and goals with patient at bedside.    Discussed with Neurology Attending, Dr. Weston

## 2024-03-24 NOTE — PROGRESS NOTE ADULT - SUBJECTIVE AND OBJECTIVE BOX
Neurology Stroke Progress Note    INTERVAL HPI/OVERNIGHT EVENTS:  Patient seen and examined. Pt states he is pretty groggy this morning. Also that he had bilateral thigh to knee pain again last night, was helped with Toradol. Also says that he has a diffuse maculopapular rash across his chest and back that has been there for the last few weeks but worsened in the last 24 hours. Denies itching.     MEDICATIONS  (STANDING):  albumin human  5% IVPB 4000 milliLiter(s) IV Intermittent once  baclofen 10 milliGRAM(s) Oral every 12 hours  bisacodyl 5 milliGRAM(s) Oral <User Schedule>  chlorhexidine 2% Cloths 1 Application(s) Topical daily  dextrose 5%. 1000 milliLiter(s) (50 mL/Hr) IV Continuous <Continuous>  dextrose 5%. 1000 milliLiter(s) (100 mL/Hr) IV Continuous <Continuous>  dextrose 50% Injectable 25 Gram(s) IV Push once  dextrose 50% Injectable 12.5 Gram(s) IV Push once  dextrose 50% Injectable 25 Gram(s) IV Push once  DULoxetine 30 milliGRAM(s) Oral every 24 hours  enoxaparin Injectable 40 milliGRAM(s) SubCutaneous every 24 hours  gabapentin 100 milliGRAM(s) Oral every 12 hours  glucagon  Injectable 1 milliGRAM(s) IntraMuscular once  heparin  Lock Flush 100 Units/mL Injectable 600 Unit(s) IV Push once  insulin lispro (ADMELOG) corrective regimen sliding scale   SubCutaneous Before meals and at bedtime  metoprolol succinate ER 50 milliGRAM(s) Oral every 24 hours  mirtazapine 30 milliGRAM(s) Oral at bedtime  OLANZapine 5 milliGRAM(s) Oral at bedtime  OLANZapine 2.5 milliGRAM(s) Oral <User Schedule>  pantoprazole    Tablet 40 milliGRAM(s) Oral every 12 hours  polyethylene glycol 3350 17 Gram(s) Oral every 12 hours  senna 2 Tablet(s) Oral at bedtime    MEDICATIONS  (PRN):  acetaminophen     Tablet .. 650 milliGRAM(s) Oral every 6 hours PRN Mild Pain (1 - 3), Moderate Pain (4 - 6)  bisacodyl Suppository 10 milliGRAM(s) Rectal daily PRN Constipation  dextrose Oral Gel 15 Gram(s) Oral once PRN Blood Glucose LESS THAN 70 milliGRAM(s)/deciliter  diphenhydrAMINE Injectable 25 milliGRAM(s) IV Push once PRN Allergic Reaction  diphenhydrAMINE IVPB 25 milliGRAM(s) IV Intermittent once PRN Allergic Reaction  OLANZapine 2.5 milliGRAM(s) Oral every 6 hours PRN anxiety/insomnia  SUMAtriptan 100 milliGRAM(s) Oral two times a day PRN Migraine  traMADol 25 milliGRAM(s) Oral every 12 hours PRN Severe Pain (7 - 10)      Allergies    methylPREDNISolone (Rash; Urticaria; Flushing; Hives)    Intolerances        Vital Signs Last 24 Hrs  T(C): 36.3 (24 Mar 2024 10:15), Max: 37.2 (23 Mar 2024 17:58)  T(F): 97.4 (24 Mar 2024 10:15), Max: 98.9 (23 Mar 2024 17:58)  HR: 95 (24 Mar 2024 10:14) (75 - 103)  BP: 125/70 (24 Mar 2024 10:14) (125/70 - 137/88)  BP(mean): 91 (24 Mar 2024 10:14) (91 - 108)  RR: 16 (24 Mar 2024 10:14) (16 - 18)  SpO2: 98% (24 Mar 2024 10:14) (97% - 98%)    Parameters below as of 24 Mar 2024 10:14  Patient On (Oxygen Delivery Method): room air        Physical exam:  General: No acute distress, awake and alert  Eyes: Anicteric sclerae, moist conjunctivae, see below for CNs  Neck: trachea midline,  Cardiovascular: Regular rate and rhythm  Pulmonary: Anterior breath sounds clear bilaterally No use of accessory muscles  GI: Abdomen soft, non-distended, non-tender  Extremities: no edema  Skin: diffuse maculopapular rash across chest and back and slightly on upper arm.     Neurologic:  -Mental status: Awake, alert, oriented to person, place, and time. Speech is fluent with intact naming, repetition, and comprehension, no dysarthria. Recent and remote memory intact. Follows commands. Attention/concentration intact. Fund of knowledge appropriate.  -Cranial nerves:   II: Visual fields are full to confrontation.  III, IV, VI: Extraocular movements are intact without nystagmus. Pupils equally round and reactive to light  VII: Face is symmetric   Motor: Normal bulk and tone. No pronator drift. Strength bilateral upper extremity 5/5, bilateral lower extremities 5/5.  Rapid alternating movements intact and symmetric  Sensation: Intact to light touch bilaterally. No neglect or extinction on double simultaneous testing.  Coordination: No dysmetria on finger-to-nose   Gait: Narrow gait and steady    LABS:                        12.4   21.79 )-----------( 180      ( 24 Mar 2024 10:00 )             39.0     03-24    143  |  104  |  14  ----------------------------<  352<H>  4.9   |  27  |  0.91    Ca    9.7      24 Mar 2024 10:00  Phos  3.1     03-24  Mg     2.2     03-24        Urinalysis Basic - ( 24 Mar 2024 10:00 )    Color: x / Appearance: x / SG: x / pH: x  Gluc: 352 mg/dL / Ketone: x  / Bili: x / Urobili: x   Blood: x / Protein: x / Nitrite: x   Leuk Esterase: x / RBC: x / WBC x   Sq Epi: x / Non Sq Epi: x / Bacteria: x        RADIOLOGY & ADDITIONAL TESTS:

## 2024-03-25 ENCOUNTER — TRANSCRIPTION ENCOUNTER (OUTPATIENT)
Age: 39
End: 2024-03-25

## 2024-03-25 DIAGNOSIS — L70.8 OTHER ACNE: ICD-10-CM

## 2024-03-25 LAB
ANION GAP SERPL CALC-SCNC: 6 MMOL/L — SIGNIFICANT CHANGE UP (ref 5–17)
BUN SERPL-MCNC: 18 MG/DL — SIGNIFICANT CHANGE UP (ref 7–23)
CALCIUM SERPL-MCNC: 10.4 MG/DL — SIGNIFICANT CHANGE UP (ref 8.4–10.5)
CHLORIDE SERPL-SCNC: 102 MMOL/L — SIGNIFICANT CHANGE UP (ref 96–108)
CO2 SERPL-SCNC: 30 MMOL/L — SIGNIFICANT CHANGE UP (ref 22–31)
CREAT SERPL-MCNC: 0.83 MG/DL — SIGNIFICANT CHANGE UP (ref 0.5–1.3)
EGFR: 116 ML/MIN/1.73M2 — SIGNIFICANT CHANGE UP
FIBRINOGEN PPP-MCNC: 274 MG/DL — SIGNIFICANT CHANGE UP (ref 200–445)
GLUCOSE BLDC GLUCOMTR-MCNC: 229 MG/DL — HIGH (ref 70–99)
GLUCOSE BLDC GLUCOMTR-MCNC: 230 MG/DL — HIGH (ref 70–99)
GLUCOSE BLDC GLUCOMTR-MCNC: 330 MG/DL — HIGH (ref 70–99)
GLUCOSE SERPL-MCNC: 192 MG/DL — HIGH (ref 70–99)
HCT VFR BLD CALC: 40.8 % — SIGNIFICANT CHANGE UP (ref 39–50)
HGB BLD-MCNC: 13 G/DL — SIGNIFICANT CHANGE UP (ref 13–17)
MAGNESIUM SERPL-MCNC: 2.2 MG/DL — SIGNIFICANT CHANGE UP (ref 1.6–2.6)
MCHC RBC-ENTMCNC: 28.2 PG — SIGNIFICANT CHANGE UP (ref 27–34)
MCHC RBC-ENTMCNC: 31.9 GM/DL — LOW (ref 32–36)
MCV RBC AUTO: 88.5 FL — SIGNIFICANT CHANGE UP (ref 80–100)
NRBC # BLD: 0 /100 WBCS — SIGNIFICANT CHANGE UP (ref 0–0)
PHOSPHATE SERPL-MCNC: 3.2 MG/DL — SIGNIFICANT CHANGE UP (ref 2.5–4.5)
PLATELET # BLD AUTO: 236 K/UL — SIGNIFICANT CHANGE UP (ref 150–400)
POTASSIUM SERPL-MCNC: 4.5 MMOL/L — SIGNIFICANT CHANGE UP (ref 3.5–5.3)
POTASSIUM SERPL-SCNC: 4.5 MMOL/L — SIGNIFICANT CHANGE UP (ref 3.5–5.3)
RBC # BLD: 4.61 M/UL — SIGNIFICANT CHANGE UP (ref 4.2–5.8)
RBC # FLD: 23.3 % — HIGH (ref 10.3–14.5)
SODIUM SERPL-SCNC: 138 MMOL/L — SIGNIFICANT CHANGE UP (ref 135–145)
WBC # BLD: 18.21 K/UL — HIGH (ref 3.8–10.5)
WBC # FLD AUTO: 18.21 K/UL — HIGH (ref 3.8–10.5)
ZINC SERPL-MCNC: 87 UG/DL — SIGNIFICANT CHANGE UP (ref 44–115)

## 2024-03-25 PROCEDURE — 99233 SBSQ HOSP IP/OBS HIGH 50: CPT

## 2024-03-25 PROCEDURE — 99231 SBSQ HOSP IP/OBS SF/LOW 25: CPT

## 2024-03-25 RX ORDER — BENZOYL PEROXIDE 50 MG/ML
1 GEL TOPICAL EVERY 24 HOURS
Refills: 0 | Status: DISCONTINUED | OUTPATIENT
Start: 2024-03-25 | End: 2024-03-26

## 2024-03-25 RX ORDER — RITUXIMAB 10 MG/ML
1000 INJECTION, SOLUTION INTRAVENOUS ONCE
Refills: 0 | Status: DISCONTINUED | OUTPATIENT
Start: 2024-03-26 | End: 2024-03-26

## 2024-03-25 RX ORDER — ACETAMINOPHEN 500 MG
650 TABLET ORAL ONCE
Refills: 0 | Status: COMPLETED | OUTPATIENT
Start: 2024-03-26 | End: 2024-03-26

## 2024-03-25 RX ADMIN — SENNA PLUS 2 TABLET(S): 8.6 TABLET ORAL at 20:39

## 2024-03-25 RX ADMIN — GABAPENTIN 100 MILLIGRAM(S): 400 CAPSULE ORAL at 06:50

## 2024-03-25 RX ADMIN — DULOXETINE HYDROCHLORIDE 30 MILLIGRAM(S): 30 CAPSULE, DELAYED RELEASE ORAL at 09:55

## 2024-03-25 RX ADMIN — SUMATRIPTAN SUCCINATE 100 MILLIGRAM(S): 4 INJECTION, SOLUTION SUBCUTANEOUS at 22:50

## 2024-03-25 RX ADMIN — Medication 8: at 17:03

## 2024-03-25 RX ADMIN — Medication 4 UNIT(S): at 12:00

## 2024-03-25 RX ADMIN — MIRTAZAPINE 30 MILLIGRAM(S): 45 TABLET, ORALLY DISINTEGRATING ORAL at 20:38

## 2024-03-25 RX ADMIN — Medication 10 MILLIGRAM(S): at 18:55

## 2024-03-25 RX ADMIN — OLANZAPINE 2.5 MILLIGRAM(S): 15 TABLET, FILM COATED ORAL at 12:27

## 2024-03-25 RX ADMIN — Medication 5 MILLIGRAM(S): at 06:45

## 2024-03-25 RX ADMIN — POLYETHYLENE GLYCOL 3350 17 GRAM(S): 17 POWDER, FOR SOLUTION ORAL at 18:55

## 2024-03-25 RX ADMIN — Medication 10 MILLIGRAM(S): at 06:45

## 2024-03-25 RX ADMIN — POLYETHYLENE GLYCOL 3350 17 GRAM(S): 17 POWDER, FOR SOLUTION ORAL at 06:44

## 2024-03-25 RX ADMIN — SUMATRIPTAN SUCCINATE 100 MILLIGRAM(S): 4 INJECTION, SOLUTION SUBCUTANEOUS at 22:20

## 2024-03-25 RX ADMIN — Medication 4: at 06:49

## 2024-03-25 RX ADMIN — PANTOPRAZOLE SODIUM 40 MILLIGRAM(S): 20 TABLET, DELAYED RELEASE ORAL at 06:45

## 2024-03-25 RX ADMIN — ENOXAPARIN SODIUM 40 MILLIGRAM(S): 100 INJECTION SUBCUTANEOUS at 20:38

## 2024-03-25 RX ADMIN — Medication 4 UNIT(S): at 07:03

## 2024-03-25 RX ADMIN — Medication 50 MILLIGRAM(S): at 20:37

## 2024-03-25 RX ADMIN — OLANZAPINE 2.5 MILLIGRAM(S): 15 TABLET, FILM COATED ORAL at 06:44

## 2024-03-25 RX ADMIN — BENZOYL PEROXIDE 1 APPLICATION(S): 50 GEL TOPICAL at 21:23

## 2024-03-25 RX ADMIN — GABAPENTIN 100 MILLIGRAM(S): 400 CAPSULE ORAL at 18:54

## 2024-03-25 RX ADMIN — Medication 4: at 11:59

## 2024-03-25 RX ADMIN — CHLORHEXIDINE GLUCONATE 1 APPLICATION(S): 213 SOLUTION TOPICAL at 06:45

## 2024-03-25 RX ADMIN — Medication 4 UNIT(S): at 17:04

## 2024-03-25 RX ADMIN — OLANZAPINE 5 MILLIGRAM(S): 15 TABLET, FILM COATED ORAL at 20:37

## 2024-03-25 RX ADMIN — PANTOPRAZOLE SODIUM 40 MILLIGRAM(S): 20 TABLET, DELAYED RELEASE ORAL at 18:54

## 2024-03-25 RX ADMIN — Medication 4 MILLIGRAM(S): at 06:44

## 2024-03-25 RX ADMIN — INSULIN GLARGINE 4 UNIT(S): 100 INJECTION, SOLUTION SUBCUTANEOUS at 07:02

## 2024-03-25 NOTE — PROGRESS NOTE ADULT - ASSESSMENT
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 Transfusion medicine is consulted for plasma exchange.     On previous admission, patient had PET scan performed 3/4 showing parietoocciptial and temporal region hypometabolism, and per neurology concern was for autoimmune encephalitis. LP on previous admission was notable of opening pressure of 30 mmHg but otherwise unremarkable. MRI brain notable only for degenerative changes, and no evidence of venous sinus thrombosis. Patient was given IVIG and steroids (allergic reaction to solumedrol and so decadron chosen) which per report, patient had been compliant with.     Transfusion medicine is consulted for plasma exchange. Patient is s/p 6 treatment of plasma exchange 3/13, 3/15, 3/17/24, 3/19, 3/21, 3/23. Total of 7 sessions planned, every other day.    Plan   - Autoimmune (evelyn-NMDAR) encephalitis is a category I indication for therapeutic plasma exchange. We will plan for 5 sessions every other day, possibly 7 cycles per neurology team/Dr. Najjar  - Risks and benefits of plasma exchange explained to patient. All of his questions were answered and he verbalized understanding and consented to therapy.   - please monitor daily calcium and fibrinogen clauss levels. With plasmapharesis, both of these are expected to decrease. 2g of calcium are included with plasmapharesis orders.   - please maintain active T&S   - fibrinogen 274 on 3/25, will hold on cryoprecipitate today.   - Please check fibrinogen level in AM prior to removal of HD catheter  - If fibrinogen <200, transfusion medicine will advise on cryoprecipitate administration  - further management of autoimmune encephalitis per Neurology/primary team  - Last session today 3/25/24 (#7)    Discussed with covering transfusion medicine attending Dr. Landaverde.

## 2024-03-25 NOTE — PROGRESS NOTE ADULT - SUBJECTIVE AND OBJECTIVE BOX
Patient is a 39y old  Male who presents with a chief complaint of treated for Autoimmune encephalitis (18 Mar 2024 15:40)      INTERVAL HPI/OVERNIGHT EVENTS:    Review of Systems: 12 point review of systems otherwise negative    MEDICATIONS  (STANDING):  albumin human  5% IVPB 4000 milliLiter(s) IV Intermittent once  baclofen 10 milliGRAM(s) Oral every 12 hours  bisacodyl 5 milliGRAM(s) Oral <User Schedule>  calcium gluconate IVPB 2 Gram(s) IV Intermittent once  chlorhexidine 2% Cloths 1 Application(s) Topical daily  dexAMETHasone  Injectable 4 milliGRAM(s) IV Push every 24 hours  dextrose 5%. 1000 milliLiter(s) (100 mL/Hr) IV Continuous <Continuous>  dextrose 5%. 1000 milliLiter(s) (50 mL/Hr) IV Continuous <Continuous>  dextrose 50% Injectable 25 Gram(s) IV Push once  dextrose 50% Injectable 12.5 Gram(s) IV Push once  dextrose 50% Injectable 25 Gram(s) IV Push once  DULoxetine 30 milliGRAM(s) Oral every 24 hours  enoxaparin Injectable 40 milliGRAM(s) SubCutaneous every 24 hours  gabapentin 100 milliGRAM(s) Oral every 12 hours  glucagon  Injectable 1 milliGRAM(s) IntraMuscular once  heparin  Lock Flush 100 Units/mL Injectable 600 Unit(s) IV Push once  insulin glargine Injectable (LANTUS) 4 Unit(s) SubCutaneous every morning  insulin lispro (ADMELOG) corrective regimen sliding scale   SubCutaneous Before meals and at bedtime  insulin lispro Injectable (ADMELOG) 4 Unit(s) SubCutaneous three times a day before meals  metoprolol succinate ER 50 milliGRAM(s) Oral every 24 hours  mirtazapine 30 milliGRAM(s) Oral at bedtime  OLANZapine 5 milliGRAM(s) Oral at bedtime  OLANZapine 2.5 milliGRAM(s) Oral <User Schedule>  pantoprazole    Tablet 40 milliGRAM(s) Oral every 12 hours  polyethylene glycol 3350 17 Gram(s) Oral every 12 hours  senna 2 Tablet(s) Oral at bedtime    MEDICATIONS  (PRN):  acetaminophen     Tablet .. 650 milliGRAM(s) Oral every 6 hours PRN Mild Pain (1 - 3), Moderate Pain (4 - 6)  bisacodyl Suppository 10 milliGRAM(s) Rectal daily PRN Constipation  dextrose Oral Gel 15 Gram(s) Oral once PRN Blood Glucose LESS THAN 70 milliGRAM(s)/deciliter  diphenhydrAMINE Injectable 25 milliGRAM(s) IV Push once PRN Allergic Reaction  diphenhydrAMINE IVPB 25 milliGRAM(s) IV Intermittent once PRN Allergic Reaction  OLANZapine 2.5 milliGRAM(s) Oral every 6 hours PRN anxiety/insomnia  SUMAtriptan 100 milliGRAM(s) Oral two times a day PRN Migraine  traMADol 25 milliGRAM(s) Oral every 12 hours PRN Severe Pain (7 - 10)      Allergies    methylPREDNISolone (Rash; Urticaria; Flushing; Hives)    Intolerances          Vital Signs Last 24 Hrs  T(C): 36.3 (25 Mar 2024 10:33), Max: 36.7 (24 Mar 2024 22:21)  T(F): 97.3 (25 Mar 2024 10:33), Max: 98 (24 Mar 2024 22:21)  HR: 92 (25 Mar 2024 08:29) (66 - 96)  BP: 120/76 (25 Mar 2024 08:29) (103/56 - 129/84)  BP(mean): 92 (25 Mar 2024 08:29) (75 - 100)  RR: 16 (25 Mar 2024 08:29) (16 - 16)  SpO2: 99% (25 Mar 2024 08:29) (96% - 99%)    Parameters below as of 25 Mar 2024 08:29  Patient On (Oxygen Delivery Method): room air      CAPILLARY BLOOD GLUCOSE      POCT Blood Glucose.: 229 mg/dL (25 Mar 2024 11:40)  POCT Blood Glucose.: 230 mg/dL (25 Mar 2024 06:42)  POCT Blood Glucose.: 447 mg/dL (24 Mar 2024 16:21)        Physical Exam:  (earlier today)  Daily     Daily   General:  well-appearing in NAD, sitting in a chair  HEENT:  MMM  +R HD cath  Skin:  WWP, morbiliform rash across chest, subcentimeter pustules  Neuro:  AAOx3  LABS:                        13.0   18.21 )-----------( 236      ( 25 Mar 2024 10:00 )             40.8     03-25    138  |  102  |  18  ----------------------------<  192<H>  4.5   |  30  |  0.83    Ca    10.4      25 Mar 2024 10:00  Phos  3.2     03-25  Mg     2.2     03-25        Urinalysis Basic - ( 25 Mar 2024 10:00 )    Color: x / Appearance: x / SG: x / pH: x  Gluc: 192 mg/dL / Ketone: x  / Bili: x / Urobili: x   Blood: x / Protein: x / Nitrite: x   Leuk Esterase: x / RBC: x / WBC x   Sq Epi: x / Non Sq Epi: x / Bacteria: x

## 2024-03-25 NOTE — PROGRESS NOTE ADULT - SUBJECTIVE AND OBJECTIVE BOX
Transfusion Medicine Progress Note      SUBJECTIVE: Patient seen and examined at bedside. Arm bruises improving. Denies fever, headache, nausea, vomiting, chest pain, shortness of breath, abdominal pain, changes in bowel movements or urination.     OBJECTIVE:    VITAL SIGNS:  ICU Vital Signs Last 24 Hrs  T(C): 36.3 (25 Mar 2024 10:33), Max: 36.7 (24 Mar 2024 22:21)  T(F): 97.3 (25 Mar 2024 10:33), Max: 98 (24 Mar 2024 22:21)  HR: 92 (25 Mar 2024 08:29) (66 - 96)  BP: 120/76 (25 Mar 2024 08:29) (103/56 - 129/84)  BP(mean): 92 (25 Mar 2024 08:29) (75 - 100)  ABP: --  ABP(mean): --  RR: 16 (25 Mar 2024 08:29) (16 - 16)  SpO2: 99% (25 Mar 2024 08:29) (96% - 99%)    O2 Parameters below as of 25 Mar 2024 08:29  Patient On (Oxygen Delivery Method): room air              CAPILLARY BLOOD GLUCOSE      POCT Blood Glucose.: 229 mg/dL (25 Mar 2024 11:40)      PHYSICAL EXAM:  General: NAD, answering questions, pleasantly conversant  HEENT: NC/AT; PERRL, clear conjunctiva  Neck: supple  Respiratory: CTA b/l  Cardiovascular: +S1/S2; RRR  Abdomen: soft, NT/ND; +BS x4  Extremities: WWP, 2+ peripheral pulses b/l; no LE edema  Skin: normal color and turgor; ecchymosis on left arm near old IV site  Neurological: AAOX3    MEDICATIONS:  MEDICATIONS  (STANDING):  albumin human  5% IVPB 4000 milliLiter(s) IV Intermittent once  baclofen 10 milliGRAM(s) Oral every 12 hours  bisacodyl 5 milliGRAM(s) Oral <User Schedule>  calcium gluconate IVPB 2 Gram(s) IV Intermittent once  chlorhexidine 2% Cloths 1 Application(s) Topical daily  dexAMETHasone  Injectable 4 milliGRAM(s) IV Push every 24 hours  dextrose 5%. 1000 milliLiter(s) (100 mL/Hr) IV Continuous <Continuous>  dextrose 5%. 1000 milliLiter(s) (50 mL/Hr) IV Continuous <Continuous>  dextrose 50% Injectable 25 Gram(s) IV Push once  dextrose 50% Injectable 12.5 Gram(s) IV Push once  dextrose 50% Injectable 25 Gram(s) IV Push once  DULoxetine 30 milliGRAM(s) Oral every 24 hours  enoxaparin Injectable 40 milliGRAM(s) SubCutaneous every 24 hours  gabapentin 100 milliGRAM(s) Oral every 12 hours  glucagon  Injectable 1 milliGRAM(s) IntraMuscular once  heparin  Lock Flush 100 Units/mL Injectable 600 Unit(s) IV Push once  insulin glargine Injectable (LANTUS) 4 Unit(s) SubCutaneous every morning  insulin lispro (ADMELOG) corrective regimen sliding scale   SubCutaneous Before meals and at bedtime  insulin lispro Injectable (ADMELOG) 4 Unit(s) SubCutaneous three times a day before meals  metoprolol succinate ER 50 milliGRAM(s) Oral every 24 hours  mirtazapine 30 milliGRAM(s) Oral at bedtime  OLANZapine 5 milliGRAM(s) Oral at bedtime  OLANZapine 2.5 milliGRAM(s) Oral <User Schedule>  pantoprazole    Tablet 40 milliGRAM(s) Oral every 12 hours  polyethylene glycol 3350 17 Gram(s) Oral every 12 hours  senna 2 Tablet(s) Oral at bedtime    MEDICATIONS  (PRN):  acetaminophen     Tablet .. 650 milliGRAM(s) Oral every 6 hours PRN Mild Pain (1 - 3), Moderate Pain (4 - 6)  bisacodyl Suppository 10 milliGRAM(s) Rectal daily PRN Constipation  dextrose Oral Gel 15 Gram(s) Oral once PRN Blood Glucose LESS THAN 70 milliGRAM(s)/deciliter  diphenhydrAMINE Injectable 25 milliGRAM(s) IV Push once PRN Allergic Reaction  diphenhydrAMINE IVPB 25 milliGRAM(s) IV Intermittent once PRN Allergic Reaction  OLANZapine 2.5 milliGRAM(s) Oral every 6 hours PRN anxiety/insomnia  SUMAtriptan 100 milliGRAM(s) Oral two times a day PRN Migraine  traMADol 25 milliGRAM(s) Oral every 12 hours PRN Severe Pain (7 - 10)      ALLERGIES:  Allergies    methylPREDNISolone (Rash; Urticaria; Flushing; Hives)    Intolerances        LABS:                        13.0   18.21 )-----------( 236      ( 25 Mar 2024 10:00 )             40.8     03-25    138  |  102  |  18  ----------------------------<  192<H>  4.5   |  30  |  0.83    Ca    10.4      25 Mar 2024 10:00  Phos  3.2     03-25  Mg     2.2     03-25        Urinalysis Basic - ( 25 Mar 2024 10:00 )    Color: x / Appearance: x / SG: x / pH: x  Gluc: 192 mg/dL / Ketone: x  / Bili: x / Urobili: x   Blood: x / Protein: x / Nitrite: x   Leuk Esterase: x / RBC: x / WBC x   Sq Epi: x / Non Sq Epi: x / Bacteria: x            RADIOLOGY, PATHOLOGY, & ADDITIONAL TESTS: Reviewed.

## 2024-03-25 NOTE — CHART NOTE - NSCHARTNOTEFT_GEN_A_CORE
Writer visited pt. Pt declined meeting at this time d/t upcoming procedure and requested that writer return at a later time. Plan was made to visit pt.

## 2024-03-26 ENCOUNTER — TRANSCRIPTION ENCOUNTER (OUTPATIENT)
Age: 39
End: 2024-03-26

## 2024-03-26 VITALS — SYSTOLIC BLOOD PRESSURE: 128 MMHG | HEART RATE: 78 BPM | RESPIRATION RATE: 19 BRPM | DIASTOLIC BLOOD PRESSURE: 72 MMHG

## 2024-03-26 LAB
AMPA-RECEPTOR ANTIBODY BY CBA, SERUM: NEGATIVE — SIGNIFICANT CHANGE UP
AMPHIPHYSIN AB TITR SER: NEGATIVE — SIGNIFICANT CHANGE UP
ANION GAP SERPL CALC-SCNC: 10 MMOL/L — SIGNIFICANT CHANGE UP (ref 5–17)
BUN SERPL-MCNC: 20 MG/DL — SIGNIFICANT CHANGE UP (ref 7–23)
CALCIUM SERPL-MCNC: 8.9 MG/DL — SIGNIFICANT CHANGE UP (ref 8.4–10.5)
CASPR2-IGG CBA, SERUM: NEGATIVE — SIGNIFICANT CHANGE UP
CHLORIDE SERPL-SCNC: 106 MMOL/L — SIGNIFICANT CHANGE UP (ref 96–108)
CO2 SERPL-SCNC: 24 MMOL/L — SIGNIFICANT CHANGE UP (ref 22–31)
CREAT SERPL-MCNC: 0.81 MG/DL — SIGNIFICANT CHANGE UP (ref 0.5–1.3)
CRMP-5-IGG WESTERN BLOT: NEGATIVE — SIGNIFICANT CHANGE UP
DPPX IGG SERPL QL IF: NEGATIVE — SIGNIFICANT CHANGE UP
EGFR: 116 ML/MIN/1.73M2 — SIGNIFICANT CHANGE UP
FIBRINOGEN PPP-MCNC: 122 MG/DL — LOW (ref 200–445)
GABA-B-RECEPTOR ANTIBODY BY CBA, SERUM: NEGATIVE — SIGNIFICANT CHANGE UP
GAD65 AB SER-MCNC: 0 NMOL/L — SIGNIFICANT CHANGE UP
GFAP ALPHA IGG SER QL IF: NEGATIVE — SIGNIFICANT CHANGE UP
GLIAL NUC TYPE 1 AB TITR SER: NEGATIVE — SIGNIFICANT CHANGE UP
GLUCOSE BLDC GLUCOMTR-MCNC: 324 MG/DL — HIGH (ref 70–99)
GLUCOSE BLDC GLUCOMTR-MCNC: 393 MG/DL — HIGH (ref 70–99)
GLUCOSE SERPL-MCNC: 408 MG/DL — HIGH (ref 70–99)
HCT VFR BLD CALC: 36.5 % — LOW (ref 39–50)
HGB BLD-MCNC: 11.7 G/DL — LOW (ref 13–17)
HU1 AB TITR SER: NEGATIVE — SIGNIFICANT CHANGE UP
HU2 AB TITR SER IF: NEGATIVE — SIGNIFICANT CHANGE UP
HU3 AB TITR SER: NEGATIVE — SIGNIFICANT CHANGE UP
IFA NOTES: SIGNIFICANT CHANGE UP
IMMUNOLOGIST REVIEW: SIGNIFICANT CHANGE UP
LGI1-IGG CBA, SERUM: NEGATIVE — SIGNIFICANT CHANGE UP
MAGNESIUM SERPL-MCNC: 2 MG/DL — SIGNIFICANT CHANGE UP (ref 1.6–2.6)
MCHC RBC-ENTMCNC: 28.5 PG — SIGNIFICANT CHANGE UP (ref 27–34)
MCHC RBC-ENTMCNC: 32.1 GM/DL — SIGNIFICANT CHANGE UP (ref 32–36)
MCV RBC AUTO: 88.8 FL — SIGNIFICANT CHANGE UP (ref 80–100)
MGLUR1 IGG SER QL IF: NEGATIVE — SIGNIFICANT CHANGE UP
NEUROCHONDRIN AB SERPL QL IF: NEGATIVE — SIGNIFICANT CHANGE UP
NMDAR1 IGG SER QL CBA IFA: NEGATIVE — SIGNIFICANT CHANGE UP
NRBC # BLD: 0 /100 WBCS — SIGNIFICANT CHANGE UP (ref 0–0)
PCA-1 AB TITR SER: NEGATIVE — SIGNIFICANT CHANGE UP
PCA-2 AB TITR SER: NEGATIVE — SIGNIFICANT CHANGE UP
PCA-TR AB TITR SER: NEGATIVE — SIGNIFICANT CHANGE UP
PHOSPHATE SERPL-MCNC: 3.7 MG/DL — SIGNIFICANT CHANGE UP (ref 2.5–4.5)
PLATELET # BLD AUTO: 194 K/UL — SIGNIFICANT CHANGE UP (ref 150–400)
POTASSIUM SERPL-MCNC: 4.2 MMOL/L — SIGNIFICANT CHANGE UP (ref 3.5–5.3)
POTASSIUM SERPL-SCNC: 4.2 MMOL/L — SIGNIFICANT CHANGE UP (ref 3.5–5.3)
RBC # BLD: 4.11 M/UL — LOW (ref 4.2–5.8)
RBC # FLD: 23.2 % — HIGH (ref 10.3–14.5)
SODIUM SERPL-SCNC: 140 MMOL/L — SIGNIFICANT CHANGE UP (ref 135–145)
VIT B1 SERPL-MCNC: 222.2 NMOL/L — HIGH (ref 66.5–200)
WBC # BLD: 14.34 K/UL — HIGH (ref 3.8–10.5)
WBC # FLD AUTO: 14.34 K/UL — HIGH (ref 3.8–10.5)

## 2024-03-26 PROCEDURE — 36430 TRANSFUSION BLD/BLD COMPNT: CPT

## 2024-03-26 PROCEDURE — 85027 COMPLETE CBC AUTOMATED: CPT

## 2024-03-26 PROCEDURE — 87102 FUNGUS ISOLATION CULTURE: CPT

## 2024-03-26 PROCEDURE — 86334 IMMUNOFIX E-PHORESIS SERUM: CPT

## 2024-03-26 PROCEDURE — 82175 ASSAY OF ARSENIC: CPT

## 2024-03-26 PROCEDURE — 70547 MR ANGIOGRAPHY NECK W/O DYE: CPT

## 2024-03-26 PROCEDURE — 36556 INSERT NON-TUNNEL CV CATH: CPT

## 2024-03-26 PROCEDURE — 87389 HIV-1 AG W/HIV-1&-2 AB AG IA: CPT

## 2024-03-26 PROCEDURE — 83540 ASSAY OF IRON: CPT

## 2024-03-26 PROCEDURE — 84425 ASSAY OF VITAMIN B-1: CPT

## 2024-03-26 PROCEDURE — 84157 ASSAY OF PROTEIN OTHER: CPT

## 2024-03-26 PROCEDURE — 83935 ASSAY OF URINE OSMOLALITY: CPT

## 2024-03-26 PROCEDURE — 86850 RBC ANTIBODY SCREEN: CPT

## 2024-03-26 PROCEDURE — 87483 CNS DNA AMP PROBE TYPE 12-25: CPT

## 2024-03-26 PROCEDURE — 76937 US GUIDE VASCULAR ACCESS: CPT

## 2024-03-26 PROCEDURE — 85730 THROMBOPLASTIN TIME PARTIAL: CPT

## 2024-03-26 PROCEDURE — 86341 ISLET CELL ANTIBODY: CPT

## 2024-03-26 PROCEDURE — 87116 MYCOBACTERIA CULTURE: CPT

## 2024-03-26 PROCEDURE — 82390 ASSAY OF CERULOPLASMIN: CPT

## 2024-03-26 PROCEDURE — 82962 GLUCOSE BLOOD TEST: CPT

## 2024-03-26 PROCEDURE — 83825 ASSAY OF MERCURY: CPT

## 2024-03-26 PROCEDURE — P9045: CPT

## 2024-03-26 PROCEDURE — 95708 EEG WO VID EA 12-26HR UNMNTR: CPT

## 2024-03-26 PROCEDURE — 85385 FIBRINOGEN ANTIGEN: CPT

## 2024-03-26 PROCEDURE — 62328 DX LMBR SPI PNXR W/FLUOR/CT: CPT

## 2024-03-26 PROCEDURE — 83519 RIA NONANTIBODY: CPT

## 2024-03-26 PROCEDURE — 84300 ASSAY OF URINE SODIUM: CPT

## 2024-03-26 PROCEDURE — 76870 US EXAM SCROTUM: CPT

## 2024-03-26 PROCEDURE — 83521 IG LIGHT CHAINS FREE EACH: CPT

## 2024-03-26 PROCEDURE — 84165 PROTEIN E-PHORESIS SERUM: CPT

## 2024-03-26 PROCEDURE — 85384 FIBRINOGEN ACTIVITY: CPT

## 2024-03-26 PROCEDURE — 88108 CYTOPATH CONCENTRATE TECH: CPT

## 2024-03-26 PROCEDURE — 82728 ASSAY OF FERRITIN: CPT

## 2024-03-26 PROCEDURE — 83916 OLIGOCLONAL BANDS: CPT

## 2024-03-26 PROCEDURE — 80076 HEPATIC FUNCTION PANEL: CPT

## 2024-03-26 PROCEDURE — 83550 IRON BINDING TEST: CPT

## 2024-03-26 PROCEDURE — 86901 BLOOD TYPING SEROLOGIC RH(D): CPT

## 2024-03-26 PROCEDURE — 36514 APHERESIS PLASMA: CPT

## 2024-03-26 PROCEDURE — 83735 ASSAY OF MAGNESIUM: CPT

## 2024-03-26 PROCEDURE — 84443 ASSAY THYROID STIM HORMONE: CPT

## 2024-03-26 PROCEDURE — 82595 ASSAY OF CRYOGLOBULIN: CPT

## 2024-03-26 PROCEDURE — 86592 SYPHILIS TEST NON-TREP QUAL: CPT

## 2024-03-26 PROCEDURE — 83873 ASSAY OF CSF PROTEIN: CPT

## 2024-03-26 PROCEDURE — 87532 HHV-6 DNA AMP PROBE: CPT

## 2024-03-26 PROCEDURE — 83516 IMMUNOASSAY NONANTIBODY: CPT

## 2024-03-26 PROCEDURE — 85652 RBC SED RATE AUTOMATED: CPT

## 2024-03-26 PROCEDURE — 86780 TREPONEMA PALLIDUM: CPT

## 2024-03-26 PROCEDURE — 82784 ASSAY IGA/IGD/IGG/IGM EACH: CPT

## 2024-03-26 PROCEDURE — 93306 TTE W/DOPPLER COMPLETE: CPT

## 2024-03-26 PROCEDURE — 86255 FLUORESCENT ANTIBODY SCREEN: CPT

## 2024-03-26 PROCEDURE — 87799 DETECT AGENT NOS DNA QUANT: CPT

## 2024-03-26 PROCEDURE — 86900 BLOOD TYPING SEROLOGIC ABO: CPT

## 2024-03-26 PROCEDURE — P9012: CPT

## 2024-03-26 PROCEDURE — 82945 GLUCOSE OTHER FLUID: CPT

## 2024-03-26 PROCEDURE — 87070 CULTURE OTHR SPECIMN AEROBIC: CPT

## 2024-03-26 PROCEDURE — 84110 ASSAY OF PORPHOBILINOGEN: CPT

## 2024-03-26 PROCEDURE — C1752: CPT

## 2024-03-26 PROCEDURE — 84100 ASSAY OF PHOSPHORUS: CPT

## 2024-03-26 PROCEDURE — 81003 URINALYSIS AUTO W/O SCOPE: CPT

## 2024-03-26 PROCEDURE — 88305 TISSUE EXAM BY PATHOLOGIST: CPT

## 2024-03-26 PROCEDURE — 93970 EXTREMITY STUDY: CPT

## 2024-03-26 PROCEDURE — 82525 ASSAY OF COPPER: CPT

## 2024-03-26 PROCEDURE — 86053 AQAPRN-4 ANTB FLO CYTMTRY EA: CPT

## 2024-03-26 PROCEDURE — 36415 COLL VENOUS BLD VENIPUNCTURE: CPT

## 2024-03-26 PROCEDURE — 86235 NUCLEAR ANTIGEN ANTIBODY: CPT

## 2024-03-26 PROCEDURE — 89051 BODY FLUID CELL COUNT: CPT

## 2024-03-26 PROCEDURE — 83520 IMMUNOASSAY QUANT NOS NONAB: CPT

## 2024-03-26 PROCEDURE — 80074 ACUTE HEPATITIS PANEL: CPT

## 2024-03-26 PROCEDURE — 80053 COMPREHEN METABOLIC PANEL: CPT

## 2024-03-26 PROCEDURE — C1769: CPT

## 2024-03-26 PROCEDURE — 86225 DNA ANTIBODY NATIVE: CPT

## 2024-03-26 PROCEDURE — 84630 ASSAY OF ZINC: CPT

## 2024-03-26 PROCEDURE — 87635 SARS-COV-2 COVID-19 AMP PRB: CPT

## 2024-03-26 PROCEDURE — 97161 PT EVAL LOW COMPLEX 20 MIN: CPT

## 2024-03-26 PROCEDURE — 82164 ANGIOTENSIN I ENZYME TEST: CPT

## 2024-03-26 PROCEDURE — 86790 VIRUS ANTIBODY NOS: CPT

## 2024-03-26 PROCEDURE — 95700 EEG CONT REC W/VID EEG TECH: CPT

## 2024-03-26 PROCEDURE — 93005 ELECTROCARDIOGRAM TRACING: CPT

## 2024-03-26 PROCEDURE — 85379 FIBRIN DEGRADATION QUANT: CPT

## 2024-03-26 PROCEDURE — 86140 C-REACTIVE PROTEIN: CPT

## 2024-03-26 PROCEDURE — 99233 SBSQ HOSP IP/OBS HIGH 50: CPT

## 2024-03-26 PROCEDURE — 86200 CCP ANTIBODY: CPT

## 2024-03-26 PROCEDURE — 86480 TB TEST CELL IMMUN MEASURE: CPT

## 2024-03-26 PROCEDURE — 82570 ASSAY OF URINE CREATININE: CPT

## 2024-03-26 PROCEDURE — 97165 OT EVAL LOW COMPLEX 30 MIN: CPT

## 2024-03-26 PROCEDURE — 70544 MR ANGIOGRAPHY HEAD W/O DYE: CPT

## 2024-03-26 PROCEDURE — 77001 FLUOROGUIDE FOR VEIN DEVICE: CPT

## 2024-03-26 PROCEDURE — 86788 WEST NILE VIRUS AB IGM: CPT

## 2024-03-26 PROCEDURE — 84155 ASSAY OF PROTEIN SERUM: CPT

## 2024-03-26 PROCEDURE — 87205 SMEAR GRAM STAIN: CPT

## 2024-03-26 PROCEDURE — 86965 POOLING BLOOD PLATELETS: CPT

## 2024-03-26 PROCEDURE — 86617 LYME DISEASE ANTIBODY: CPT

## 2024-03-26 PROCEDURE — 86036 ANCA SCREEN EACH ANTIBODY: CPT

## 2024-03-26 PROCEDURE — 82042 OTHER SOURCE ALBUMIN QUAN EA: CPT

## 2024-03-26 PROCEDURE — 86038 ANTINUCLEAR ANTIBODIES: CPT

## 2024-03-26 PROCEDURE — 82300 ASSAY OF CADMIUM: CPT

## 2024-03-26 PROCEDURE — 85610 PROTHROMBIN TIME: CPT

## 2024-03-26 PROCEDURE — 82040 ASSAY OF SERUM ALBUMIN: CPT

## 2024-03-26 PROCEDURE — 80048 BASIC METABOLIC PNL TOTAL CA: CPT

## 2024-03-26 PROCEDURE — 86789 WEST NILE VIRUS ANTIBODY: CPT

## 2024-03-26 RX ORDER — DIPHENHYDRAMINE HCL 50 MG
25 CAPSULE ORAL ONCE
Refills: 0 | Status: COMPLETED | OUTPATIENT
Start: 2024-03-26 | End: 2024-03-26

## 2024-03-26 RX ORDER — MIRTAZAPINE 45 MG/1
1 TABLET, ORALLY DISINTEGRATING ORAL
Qty: 30 | Refills: 1
Start: 2024-03-26 | End: 2024-05-24

## 2024-03-26 RX ORDER — DULOXETINE HYDROCHLORIDE 30 MG/1
1 CAPSULE, DELAYED RELEASE ORAL
Qty: 30 | Refills: 2
Start: 2024-03-26 | End: 2024-06-23

## 2024-03-26 RX ORDER — METOPROLOL TARTRATE 50 MG
1 TABLET ORAL
Qty: 30 | Refills: 2
Start: 2024-03-26 | End: 2024-06-23

## 2024-03-26 RX ORDER — ZOLMITRIPTAN 5 MG/1
1 TABLET ORAL
Qty: 30 | Refills: 2
Start: 2024-03-26 | End: 2024-06-23

## 2024-03-26 RX ORDER — ISOPROPYL ALCOHOL, BENZOCAINE .7; .06 ML/ML; ML/ML
0 SWAB TOPICAL
Qty: 100 | Refills: 1
Start: 2024-03-26

## 2024-03-26 RX ORDER — SALICYLIC ACID 0.5 %
1 CLEANSER (GRAM) TOPICAL
Qty: 1 | Refills: 2
Start: 2024-03-26 | End: 2024-06-23

## 2024-03-26 RX ORDER — BENZOYL PEROXIDE 50 MG/ML
1 GEL TOPICAL
Qty: 1 | Refills: 5
Start: 2024-03-26 | End: 2024-09-21

## 2024-03-26 RX ORDER — CLONAZEPAM 0.5 MG/1
0.5 TABLET ORAL
Qty: 30 | Refills: 0 | Status: ACTIVE | COMMUNITY
Start: 1900-01-01 | End: 1900-01-01

## 2024-03-26 RX ORDER — CLINDAMYCIN PHOSPHATE GEL USP, 1% 10 MG/G
1 GEL TOPICAL
Qty: 1 | Refills: 2
Start: 2024-03-26 | End: 2024-06-23

## 2024-03-26 RX ORDER — ZOLMITRIPTAN 5 MG/1
1 TABLET ORAL
Refills: 0 | DISCHARGE

## 2024-03-26 RX ORDER — OLANZAPINE 15 MG/1
1 TABLET, FILM COATED ORAL
Qty: 28 | Refills: 0
Start: 2024-03-26 | End: 2024-04-08

## 2024-03-26 RX ORDER — BACLOFEN 100 %
1 POWDER (GRAM) MISCELLANEOUS
Qty: 14 | Refills: 0
Start: 2024-03-26 | End: 2024-04-01

## 2024-03-26 RX ORDER — GABAPENTIN 400 MG/1
1 CAPSULE ORAL
Qty: 14 | Refills: 0
Start: 2024-03-26 | End: 2024-04-01

## 2024-03-26 RX ORDER — DEXAMETHASONE 0.5 MG/5ML
1 ELIXIR ORAL
Qty: 30 | Refills: 0
Start: 2024-03-26 | End: 2024-04-24

## 2024-03-26 RX ORDER — RITUXIMAB 10 MG/ML
1000 INJECTION, SOLUTION INTRAVENOUS ONCE
Refills: 0 | Status: COMPLETED | OUTPATIENT
Start: 2024-03-26 | End: 2024-03-26

## 2024-03-26 RX ORDER — ACETAMINOPHEN 500 MG
650 TABLET ORAL ONCE
Refills: 0 | Status: COMPLETED | OUTPATIENT
Start: 2024-03-26 | End: 2024-03-26

## 2024-03-26 RX ADMIN — OLANZAPINE 2.5 MILLIGRAM(S): 15 TABLET, FILM COATED ORAL at 14:35

## 2024-03-26 RX ADMIN — INSULIN GLARGINE 4 UNIT(S): 100 INJECTION, SOLUTION SUBCUTANEOUS at 06:42

## 2024-03-26 RX ADMIN — Medication 10: at 11:53

## 2024-03-26 RX ADMIN — POLYETHYLENE GLYCOL 3350 17 GRAM(S): 17 POWDER, FOR SOLUTION ORAL at 06:43

## 2024-03-26 RX ADMIN — Medication 5 MILLIGRAM(S): at 06:43

## 2024-03-26 RX ADMIN — Medication 650 MILLIGRAM(S): at 09:48

## 2024-03-26 RX ADMIN — RITUXIMAB 1000 MILLIGRAM(S): 10 INJECTION, SOLUTION INTRAVENOUS at 10:15

## 2024-03-26 RX ADMIN — Medication 10 MILLIGRAM(S): at 06:43

## 2024-03-26 RX ADMIN — Medication 25 MILLIGRAM(S): at 09:47

## 2024-03-26 RX ADMIN — CHLORHEXIDINE GLUCONATE 1 APPLICATION(S): 213 SOLUTION TOPICAL at 06:40

## 2024-03-26 RX ADMIN — DULOXETINE HYDROCHLORIDE 30 MILLIGRAM(S): 30 CAPSULE, DELAYED RELEASE ORAL at 11:11

## 2024-03-26 RX ADMIN — Medication 650 MILLIGRAM(S): at 10:48

## 2024-03-26 RX ADMIN — Medication 4 UNIT(S): at 11:53

## 2024-03-26 RX ADMIN — OLANZAPINE 2.5 MILLIGRAM(S): 15 TABLET, FILM COATED ORAL at 06:42

## 2024-03-26 RX ADMIN — PANTOPRAZOLE SODIUM 40 MILLIGRAM(S): 20 TABLET, DELAYED RELEASE ORAL at 06:43

## 2024-03-26 RX ADMIN — Medication 4 UNIT(S): at 06:41

## 2024-03-26 RX ADMIN — GABAPENTIN 100 MILLIGRAM(S): 400 CAPSULE ORAL at 06:43

## 2024-03-26 RX ADMIN — Medication 8: at 06:41

## 2024-03-26 RX ADMIN — Medication 4 MILLIGRAM(S): at 06:40

## 2024-03-26 NOTE — DISCHARGE NOTE PROVIDER - CARE PROVIDER_API CALL
Kyle Weston  Neurology  [address] [address]  Phone: [phone]  Fax: [phone]  Follow Up Time:

## 2024-03-26 NOTE — CHART NOTE - NSCHARTNOTEFT_GEN_A_CORE
Pt s/p temporary catheter placement 3/13/24 for plex. Catheter removed bedside with gentle traction, pt tolerated procedure well. Pressure held x5 minutes, occlusive dressing placed. Verbal wound care instructions given, pt verbalizes understanding.

## 2024-03-26 NOTE — PROGRESS NOTE ADULT - PROBLEM SELECTOR PROBLEM 6
Anxiety
Ground glass opacity present on imaging of lung
Anxiety
Ground glass opacity present on imaging of lung

## 2024-03-26 NOTE — PROGRESS NOTE ADULT - PROBLEM SELECTOR PROBLEM 1
Autoimmune encephalitis

## 2024-03-26 NOTE — PROGRESS NOTE ADULT - PROBLEM SELECTOR PLAN 1
- plan per neuro primary team  - c/w decadron 2 q12  - c/w 7 session of plasma exchange, cryoprecipitate as needed for fibrinogen  - f/u Rheum recs for elevated LAKSHMI, labs received pending results  - plan for Rituximab next week
- plan per neuro primary team  - decadron 2 q12 transitioned to Prednisone 40 daily 3/23  - c/w 7 session of plasma exchange, cryoprecipitate as needed for fibrinogen  - f/u Rheum recs for elevated LAKSHMI, labs received pending results  - plan for Rituximab Tuesday
cont. work-up and mgmt per Neuro
- plan per neuro primary team  - c/w decadron 2 q12  - c/w 7 session of plasma exchange  - f/u Rheum recs for elevated LAKSHMI
cont. work-up and mgmt per Neuro
- plan per neuro primary team  - steroid dosing/taper per Neuro  - c/w 7 session of plasma exchange, cryoprecipitate as needed for fibrinogen (Transfusion Medicine following)  - f/u Rheum recs for elevated LAKSHMI, labs received pending results  - plan for Rituximab today
- plan per neuro primary team  - Prednisone switched to decadron in AM due to worsening of rash  - c/w 7 session of plasma exchange, cryoprecipitate as needed for fibrinogen  - f/u Rheum recs for elevated LAKSHMI, labs received pending results  - plan for Rituximab Tuesday    #Steroid induced acne  - rash on chest and back most consistent with steroid induced acne  - can start topical Benzoyl peroxide
- plan per neuro primary team  - decadron 2 q12 to transition to Prednisone 40 daily 3/23  - c/w 7 session of plasma exchange, cryoprecipitate as needed for fibrinogen  - f/u Rheum recs for elevated LAKSHMI, labs received pending results  - plan for Rituximab next Tuesday
cont. work-up and mgmt per Neuro
- plan per neuro primary team  - c/w decadron 2 q12  - c/w 7 session of plasma exchange, cryoprecipitate today due to low fibrinogen  - f/u Rheum recs for elevated LAKSHMI, labs ordered
- plan per neuro primary team  - Prednisone switched to decadron in AM due to worsening of rash  - c/w 7 session of plasma exchange, cryoprecipitate as needed for fibrinogen  - f/u Rheum recs for elevated LAKSHMI, labs received pending results  - plan for Rituximab Tuesday
- plan per neuro primary team  - c/w decadron 2 q12  - c/w 7 session of plasma exchange, cryoprecipitate today due to low fibrinogen  - f/u Rheum recs for elevated LAKSHMI, labs received pending results

## 2024-03-26 NOTE — PROGRESS NOTE ADULT - PROBLEM SELECTOR PROBLEM 8
Steroid-induced acne
Transaminitis
Steroid-induced acne
Transaminitis
no loss of consciousness, no gait abnormality, no headache, no sensory deficits, and no weakness.

## 2024-03-26 NOTE — DISCHARGE NOTE NURSING/CASE MANAGEMENT/SOCIAL WORK - PATIENT PORTAL LINK FT
You can access the FollowMyHealth Patient Portal offered by [facility] by registering at the following website: [email or web address]. By joining [employer]’s FollowMyHealth portal, you will also be able to view your health information using other applications (apps) compatible with our system.

## 2024-03-26 NOTE — PROGRESS NOTE ADULT - PROBLEM SELECTOR PLAN 3
likely due to steroids; HbA1c 5.2%, Pt. does not have diabetes  -- monitor blood glucose  -- cont. low-dose SSI; may need long-acting insulin, based on 24-hour coverage  --will hold on any long acting insulin, per patient discussion.   As steroids are being downtitrated, expected that glycemic control will improve
likely due to steroids; HbA1c 5.2%, Pt. does not have diabetes  - monitor blood glucose  - now agreeable to basal bolus insulin.  Returned to long acting decadron in AM so would start Lantus 4 units in AM, lispro 4 units premeal and continue with MISS.  higher percent of daily requirements as premeal given worsening of prandial sugars with steroid
likely due to steroids; HbA1c 5.2%, Pt. does not have diabetes  - monitor blood glucose  - cont. low-dose SSI; would ideally start basal bolus insulin but discussed with patient and he wants to defer and continue only with sliding scale.  Steroids will taper down and should see improvement in glycemic control  - consider increasing to moderate dose
likely due to steroids; HbA1c 5.2%, Pt. does not have diabetes  - monitor blood glucose  - Pt. refused basal-bolus insulin during most of hospital course; based on 24-hr SSI coverage, would increase Lantus to 12U qAM and lispro to 6U TID w/ meals  - hyperglycemia likely to improve/resolve as steroids are tapered; will need outpatient f/u to monitor and manage hyperglycemia
likely due to steroids; HbA1c 5.2%, Pt. does not have diabetes  - monitor blood glucose  -  increased to mod-dose SSI given expected prandial sugars, fasting sugar ok; would ideally start basal bolus insulin but discussed with patient and he wants to defer and continue only with sliding scale.  Steroids will taper down and should see improvement in glycemic control
likely due to steroids; HbA1c 5.2%, Pt. does not have diabetes  -- monitor blood glucose  -- cont. low-dose SSI; may need long-acting insulin, based on 24-hour coverage  --will hold on any long acting insulin, per patient discussion.   As steroids are being downtitrated, expected that glycemic control will improve
likely due to steroids; HbA1c 5.2%, Pt. does not have diabetes  - monitor blood glucose  - now agreeable to basal bolus insulin.  Returned to long acting decadron in AM so would start Lantus 4 units in AM, lispro 4 units premeal and continue with MISS.  higher percent of daily requirements as premeal given worsening of prandial sugars with steroid
likely due to steroids; HbA1c 5.2%, Pt. does not have diabetes  -- monitor blood glucose  -- cont. low-dose SSI; may need long-acting insulin, based on 24-hour coverage
likely due to steroids; HbA1c 5.2%, Pt. does not have diabetes  - monitor blood glucose  -  increased to mod-dose SSI given expected prandial sugars, fasting sugar ok;.  Steroid dose decreasing as above and should see improvement in glycemic control
likely due to steroids; HbA1c 5.2%, Pt. does not have diabetes  - monitor blood glucose  -  would increase to mod-dose SSI given expected parandial sugars, fasting sugar ok; would ideally start basal bolus insulin but discussed with patient and he wants to defer and continue only with sliding scale.  Steroids will taper down and should see improvement in glycemic control
likely due to steroids; HbA1c 5.2%, Pt. does not have diabetes  - monitor blood glucose  - cont. low-dose SSI; would ideally start basal bolus insulin but discussed with patient and he wants to defer and continue only with sliding scale.  Steroids will taper down and should see improvement in glycemic control  - consider increasing to moderate dose sliding scale
likely due to steroids; HbA1c 5.2%, Pt. does not have diabetes  - monitor blood glucose  -  increased to mod-dose SSI given expected prandial sugars, fasting sugar ok;.  Steroid dose decreasing as above and should see improvement in glycemic control

## 2024-03-26 NOTE — PROGRESS NOTE ADULT - PROBLEM SELECTOR PLAN 5
BH following  - c/w Zyprexa and Remeron
likely due to hyperglycemia and possibly medication-induced SIADH (euvolemic; Uosm >100; TSH WNL)  - Resolved  - treat hyperglycemia, as above  - monitor BMP
BH following  - c/w Zyprexa and Remeron
likely due to hyperglycemia and possibly medication-induced SIADH (euvolemic; Uosm >100; TSH WNL)  -- check SOsm, uric acid, and AM cortisol to better categorize  -- treat hyperglycemia, as above  -- holding Remeron (new medication; possible culprit)  -- monitor BMP  --sodium WNL this morning
likely due to hyperglycemia and possibly medication-induced SIADH (euvolemic; Uosm >100; TSH WNL)  - Resolved  - treat hyperglycemia, as above  - monitor BMP
likely due to hyperglycemia and possibly medication-induced SIADH (euvolemic; Uosm >100; TSH WNL)  - Resolved  - treat hyperglycemia, as above  - monitor BMP
likely due to hyperglycemia and possibly medication-induced SIADH (euvolemic; Uosm >100; TSH WNL)  - mild Na 134, asymptomatic  - treat hyperglycemia, as above  - monitor BMP
likely due to hyperglycemia and possibly medication-induced SIADH (euvolemic; Uosm >100; TSH WNL)  - Resolved  - treat hyperglycemia, as above  - monitor BMP
likely due to hyperglycemia and possibly medication-induced SIADH (euvolemic; Uosm >100; TSH WNL)  - Resolved  - treat hyperglycemia, as above  - monitor BMP
likely due to hyperglycemia and possibly medication-induced SIADH (euvolemic; Uosm >100; TSH WNL)  -- check SOsm, uric acid, and AM cortisol to better categorize  -- treat hyperglycemia, as above  -- holding Remeron (new medication; possible culprit)  -- monitor BMP  --sodium WNL this morning
likely due to hyperglycemia and possibly medication-induced SIADH (euvolemic; Uosm >100; TSH WNL)  -- check SOsm, uric acid, and AM cortisol to better categorize  -- treat hyperglycemia, as above  -- holding Remeron (new medication; possible culprit)  -- monitor BMP
likely due to hyperglycemia and possibly medication-induced SIADH (euvolemic; Uosm >100; TSH WNL)  - Resolved  - treat hyperglycemia, as above  - monitor BMP

## 2024-03-26 NOTE — CHART NOTE - NSCHARTNOTESELECT_GEN_ALL_CORE
Event Note
Nutrition Services
Psychology Intern
Psychology Intern
Transfusion Medicine/Off Service Note
IR/Off Service Note
Psychology Intern

## 2024-03-26 NOTE — DISCHARGE NOTE PROVIDER - NSDCCPCAREPLAN_GEN_ALL_CORE_FT
PRINCIPAL DISCHARGE DIAGNOSIS  Diagnosis: Autoimmune encephalitis  Assessment and Plan of Treatment: Autoimmune encephalitis is a collection of related conditions in which the body’s immune system attacks the brain, causing inflammation. The immune system produces substances called antibodies that mistakenly attack brain cells.  The disease can be progressive (worsening over time) or relapsing-remitting (with alternating flare-ups and periods of recovery). Autoimmune encephalitis has many subtypes that depends on the antibodies present.  You were treated with 7 sessions of plasma exchange with cryoprecipitate infusions as needed. Your last session of PLEX was 3/25. You were given cycle #1 of rituximab on 3/26. Plan is to do cycle #2 of rituximab 2 weeks after discharge. After second rituximab cycle, you should STOP your daily decadron. 2 weeks after rituximab you will get IVIG cycle #2. You will be given decadron 4mg at time of IVIG infusion. You will then have IVIG cycle #3 and #4 monthly.

## 2024-03-26 NOTE — PROGRESS NOTE ADULT - PROBLEM SELECTOR PROBLEM 7
The patient is a 12y Male complaining of vomiting.
Ground glass opacity present on imaging of lung
Transaminitis
Ground glass opacity present on imaging of lung
Transaminitis

## 2024-03-26 NOTE — PROGRESS NOTE ADULT - SUBJECTIVE AND OBJECTIVE BOX
Transfusion Medicine Progress Note      SUBJECTIVE: Patient seen and examined at bedside. Denies fever, headache, nausea, vomiting, chest pain, shortness of breath, abdominal pain, changes in bowel movements or urination.     OBJECTIVE:    VITAL SIGNS:  ICU Vital Signs Last 24 Hrs  T(C): 36.9 (25 Mar 2024 22:12), Max: 36.9 (25 Mar 2024 22:12)  T(F): 98.4 (25 Mar 2024 22:12), Max: 98.4 (25 Mar 2024 22:12)  HR: 81 (26 Mar 2024 09:08) (74 - 94)  BP: 122/79 (26 Mar 2024 09:08) (117/69 - 126/81)  BP(mean): 97 (26 Mar 2024 09:08) (87 - 97)  ABP: --  ABP(mean): --  RR: 19 (26 Mar 2024 09:08) (16 - 19)  SpO2: 95% (26 Mar 2024 09:08) (95% - 97%)    O2 Parameters below as of 26 Mar 2024 09:08  Patient On (Oxygen Delivery Method): room air              CAPILLARY BLOOD GLUCOSE      POCT Blood Glucose.: 324 mg/dL (26 Mar 2024 06:31)      PHYSICAL EXAM:  General: NAD, answering questions, pleasantly conversant  HEENT: NC/AT; PERRL, clear conjunctiva  Neck: supple  Respiratory: CTA b/l  Cardiovascular: +S1/S2; RRR  Abdomen: soft, NT/ND; +BS x4  Extremities: WWP, 2+ peripheral pulses b/l; no LE edema  Skin: normal color and turgor; ecchymosis on left arm near old IV site  Neurological: AAOX3  Vascular: Right chest wall HD cath, no ecchymosis, no active oozing     MEDICATIONS:  MEDICATIONS  (STANDING):  albumin human  5% IVPB 4000 milliLiter(s) IV Intermittent once  baclofen 10 milliGRAM(s) Oral every 12 hours  benzoyl peroxide 5% Gel 1 Application(s) Topical every 24 hours  bisacodyl 5 milliGRAM(s) Oral <User Schedule>  calcium gluconate IVPB 2 Gram(s) IV Intermittent once  chlorhexidine 2% Cloths 1 Application(s) Topical daily  dexAMETHasone  Injectable 4 milliGRAM(s) IV Push every 24 hours  dextrose 5%. 1000 milliLiter(s) (50 mL/Hr) IV Continuous <Continuous>  dextrose 5%. 1000 milliLiter(s) (100 mL/Hr) IV Continuous <Continuous>  dextrose 50% Injectable 25 Gram(s) IV Push once  dextrose 50% Injectable 12.5 Gram(s) IV Push once  dextrose 50% Injectable 25 Gram(s) IV Push once  DULoxetine 30 milliGRAM(s) Oral every 24 hours  enoxaparin Injectable 40 milliGRAM(s) SubCutaneous every 24 hours  gabapentin 100 milliGRAM(s) Oral every 12 hours  glucagon  Injectable 1 milliGRAM(s) IntraMuscular once  heparin  Lock Flush 100 Units/mL Injectable 600 Unit(s) IV Push once  insulin glargine Injectable (LANTUS) 4 Unit(s) SubCutaneous every morning  insulin lispro (ADMELOG) corrective regimen sliding scale   SubCutaneous Before meals and at bedtime  insulin lispro Injectable (ADMELOG) 4 Unit(s) SubCutaneous three times a day before meals  metoprolol succinate ER 50 milliGRAM(s) Oral every 24 hours  mirtazapine 30 milliGRAM(s) Oral at bedtime  OLANZapine 5 milliGRAM(s) Oral at bedtime  OLANZapine 2.5 milliGRAM(s) Oral <User Schedule>  pantoprazole    Tablet 40 milliGRAM(s) Oral every 12 hours  polyethylene glycol 3350 17 Gram(s) Oral every 12 hours  riTUXimab IVPB (Non - oncologic) 1000 milliGRAM(s) IV Intermittent once  senna 2 Tablet(s) Oral at bedtime    MEDICATIONS  (PRN):  bisacodyl Suppository 10 milliGRAM(s) Rectal daily PRN Constipation  dextrose Oral Gel 15 Gram(s) Oral once PRN Blood Glucose LESS THAN 70 milliGRAM(s)/deciliter  OLANZapine 2.5 milliGRAM(s) Oral every 6 hours PRN anxiety/insomnia  SUMAtriptan 100 milliGRAM(s) Oral two times a day PRN Migraine  traMADol 25 milliGRAM(s) Oral every 12 hours PRN Severe Pain (7 - 10)      ALLERGIES:  Allergies    methylPREDNISolone (Rash; Urticaria; Flushing; Hives)    Intolerances        LABS:                        11.7   14.34 )-----------( 194      ( 26 Mar 2024 05:30 )             36.5     03-26    140  |  106  |  20  ----------------------------<  408<H>  4.2   |  24  |  0.81    Ca    8.9      26 Mar 2024 05:30  Phos  3.7     03-26  Mg     2.0     03-26        Urinalysis Basic - ( 26 Mar 2024 05:30 )    Color: x / Appearance: x / SG: x / pH: x  Gluc: 408 mg/dL / Ketone: x  / Bili: x / Urobili: x   Blood: x / Protein: x / Nitrite: x   Leuk Esterase: x / RBC: x / WBC x   Sq Epi: x / Non Sq Epi: x / Bacteria: x            RADIOLOGY, PATHOLOGY, & ADDITIONAL TESTS: Reviewed.

## 2024-03-26 NOTE — PROGRESS NOTE ADULT - PROBLEM SELECTOR PLAN 8
Mild LFT abnormality with AST 41,  from normal on admit  R factor 5.6 - favors hepatocellular  - downtrending  - Most likely due to medication with Remeron or decadron as possible drivers, Liver and gallbladder normal on CT abdomen this admission, no bili elevation  - would trend LFTs for now and continue decadron and remeron as benefit outweighs minor transaminitis unless progresses
Derm consulted, f/u recs
Derm consulted, f/u recs - topical Clinda recommended
Mild LFT abnormality with AST 41,  from normal on admit  R factor 5.6 - favors hepatocellular  - downtrending  - Most likely due to medication with Remeron or decadron as possible drivers, Liver and gallbladder normal on CT abdomen this admission, no bili elevation  - would trend LFTs for now and continue decadron and remeron as benefit outweighs minor transaminitis unless progresses
Mild LFT abnormality with AST 41,  from normal on admit  R factor 5.6 - favors hepatocellular  - downtrending  - Most likely due to medication with Remeron or decadron as possible drivers, Liver and gallbladder normal on CT abdomen this admission, no bili elevation  - would trend LFTs for now and continue decadron and remeron as benefit outweighs minor transaminitis unless progresses
Mild LFT abnormality with AST 41,  from normal on admit  R factor 5.6 - favors hepatocellular  - Most likely due to medication with Remeron or decadron as possible drivers, Liver and gallbladder normal on CT abdomen this admission, no bili elevation  - would trend LFTs for now and continue decadron and remeron as benefit outweighs minor transaminitis unless progresses

## 2024-03-26 NOTE — PROGRESS NOTE ADULT - PROBLEM SELECTOR PROBLEM 3
Hyperglycemia

## 2024-03-26 NOTE — PROGRESS NOTE ADULT - PROBLEM SELECTOR PROBLEM 5
Hyponatremia
Hyponatremia
Anxiety
Hyponatremia
Anxiety
Hyponatremia
Hyponatremia

## 2024-03-26 NOTE — PROGRESS NOTE ADULT - PROBLEM SELECTOR PLAN 2
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 steroid-induced  - stopped verapamil and changed to metoprolol for HR and BP control, possible help with anxiety.  BP control improved, dose increased to 50mg to help with anxiety by primary team  - f/u Cardiology recs  - echo normal with mild LVH
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 steroid-induced  - stopped verapamil and changed to metoprolol for HR and BP control, possible help with anxiety  - consider DASH diet  - f/u Cardiology recs  - steroid dose will be tapered down as plasma exchange continues, anticipate will have improvement in pressures. BP drops during plasmapheresis  - echo normal with mild LVH
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 steroid-induced  - stopped verapamil and changed to metoprolol for HR and BP control, possible help with anxiety.  Bp control improved today  - consider DASH diet  - f/u Cardiology recs  - steroid dose will be tapered down as plasma exchange continues, anticipate will have improvement in pressures. BP drops during plasmapheresis  - echo normal with mild LVH
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 steroid-induced  - stopped verapamil and changed to metoprolol for HR and BP control, possible help with anxiety.  BP control improved, dose increased to 50mg to help with anxiety by primary team  - f/u Cardiology recs  - echo normal with mild LVH
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 steroid-induced  - stopped verapamil and changed to metoprolol for HR and BP control, possible help with anxiety.  Bp control improving, dose increased to 50mg to help with anxiety by primary team  - consider DASH diet  - f/u Cardiology recs  - steroid dose will be tapered down as above, anticipate will have improvement in pressures. BP drops during plasmapheresis  - echo normal with mild LVH
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 steroid-induced  -- cont. verapamil (will also help w/ migraines and sinus tachycardia)  -- consider DASH diet  -- f/u Cardiology recs  --steroid dose is being tapered down today, so anticipate will have improvement in pressures.  Patient expressed wishes about not starting Cozaar yet.
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 steroid-induced  - stopped verapamil and changed to metoprolol for HR and BP control, possible help with anxiety.  Bp control improving, dose increased to 50mg to help with anxiety by primary team  - consider DASH diet  - f/u Cardiology recs  - steroid dose will be tapered down as plasma exchange continues, anticipate will have improvement in pressures. BP drops during plasmapheresis  - echo normal with mild LVH
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 steroid-induced  - cont. verapamil (will also help w/ migraines and sinus tachycardia)  - consider DASH diet  - f/u Cardiology recs  - steroid dose will be tapered down as plasma exchange continues, anticipate will have improvement in pressures. BP drops during plasmapheresis  - echo normal with mild LVH
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 steroid-induced  -- cont. verapamil (will also help w/ migraines and sinus tachycardia)  -- consider DASH diet  -- f/u Cardiology recs  --steroid dose is being tapered down today, so anticipate will have improvement in pressures.  Patient expressed wishes about not starting Cozaar yet.
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 steroid-induced  -- cont. verapamil (will also help w/ migraines and sinus tachycardia)  -- consider DASH diet  -- f/u Cardiology recs
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 steroid-induced  - stopped verapamil and changed to metoprolol for HR and BP control, possible help with anxiety.  BP control improved, dose increased to 50mg to help with anxiety by primary team  - f/u Cardiology recs  - echo normal with mild LVH
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 steroid-induced  - stopped verapamil and changed to metoprolol for HR and BP control, possible help with anxiety.  Bp control improving, dose increased to 50mg to help with anxiety by primary team  - consider DASH diet  - f/u Cardiology recs  - steroid dose will be tapered down as above, anticipate will have improvement in pressures. BP drops during plasmapheresis  - echo normal with mild LVH

## 2024-03-26 NOTE — PROGRESS NOTE ADULT - PROBLEM SELECTOR PROBLEM 4
Leukocytosis

## 2024-03-26 NOTE — DISCHARGE NOTE PROVIDER - NSDCMRMEDTOKEN_GEN_ALL_CORE_FT
baclofen 10 mg oral tablet: 1 tab(s) orally every 12 hours  Benzac AC 5% topical gel: Apply topically to affected area once a day (at bedtime)  Botox 100 units injection: 100 unit(s) injectable every 3 months 100 units into each axilla  clindamycin 1% topical swab: Apply topically to affected area once a day (in the morning)  dexAMETHasone 2 mg oral tablet: 1 tab(s) orally once a day continue until 2nd rituximab infusion is administered  DULoxetine 30 mg oral delayed release capsule: 1 cap(s) orally every 24 hours  methocarbamol 500 mg oral tablet: 2 tab(s) orally every 8 hours  metoprolol succinate 50 mg oral tablet, extended release: 1 tab(s) orally once a day (at bedtime)  mirtazapine 30 mg oral tablet: 1 tab(s) orally once a day (at bedtime) as needed for  insomnia  Neurontin 100 mg oral capsule: 1 cap(s) orally every 12 hours  OLANZapine 2.5 mg oral tablet: 1 tab(s) orally 2 times a day  pantoprazole 40 mg oral delayed release tablet: 1 tab(s) orally 2 times a day  salicylic acid 2% topical soap: Apply topically to affected area 2 times a day  ZOLMitriptan 5 mg oral tablet, disintegratin tab(s) orally once a day as needed for  headache

## 2024-03-26 NOTE — PROGRESS NOTE ADULT - ASSESSMENT
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 Transfusion medicine is consulted for plasma exchange.     On previous admission, patient had PET scan performed 3/4 showing parietoocciptial and temporal region hypometabolism, and per neurology concern was for autoimmune encephalitis. LP on previous admission was notable of opening pressure of 30 mmHg but otherwise unremarkable. MRI brain notable only for degenerative changes, and no evidence of venous sinus thrombosis. Patient was given IVIG and steroids (allergic reaction to solumedrol and so decadron chosen) which per report, patient had been compliant with.     Transfusion medicine is consulted for plasma exchange. Patient is s/p 6 treatment of plasma exchange 3/13, 3/15, 3/17/24, 3/19, 3/21, 3/23, 3/25. Total of 7 sessions planned, every other day. 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 We will plan for 5 sessions every other day, possibly 7 cycles per neurology team/Dr. Najjar  - Risks and benefits of plasma exchange explained to patient. All of his questions were answered and he verbalized understanding and consented to therapy.   - please monitor daily calcium and fibrinogen clauss levels. With plasmapharesis, both of these are expected to decrease. 2g of calcium are included with plasmapharesis orders.   - please maintain active T&S   - fibrinogen 122 on 3/26, if pending discharge today, can transfuse 10 units cryoprecipitate for line removal. If staying overnight, consider checking fibrinogen in AM for self-correction.   - If fibrinogen <200, transfusion medicine will advise on cryoprecipitate administration  - No further sessions of PLEX planned.  - further management of autoimmune encephalitis per Neurology/primary team    Will sign off. Please reconsult if questions. Discussed with transfusion medicine attending Dr. Nehemias Yancey.

## 2024-03-26 NOTE — DISCHARGE NOTE PROVIDER - NSDCFUSCHEDAPPT_GEN_ALL_CORE_FT
Una Guidry  [facility]  [facility] [address]  Scheduled Appointment: 04/11/2024

## 2024-03-26 NOTE — PROGRESS NOTE ADULT - PROVIDER SPECIALTY LIST ADULT
Cardiology
Infectious Disease
Infectious Disease
Neurology
Rehab Medicine
Transfusion Medicine
Neurology
Infectious Disease
Neurology
Transfusion Medicine
Heme/Onc
Transfusion Medicine
Hospitalist
Internal Medicine
Hospitalist
Internal Medicine
Internal Medicine
Hospitalist
Hospitalist

## 2024-03-26 NOTE — PROGRESS NOTE ADULT - SUBJECTIVE AND OBJECTIVE BOX
Neurology Stroke Progress Note    INTERVAL HPI/OVERNIGHT EVENTS:  Patient seen and examined.     MEDICATIONS  (STANDING):  acetaminophen     Tablet .. 650 milliGRAM(s) Oral once  albumin human  5% IVPB 4000 milliLiter(s) IV Intermittent once  baclofen 10 milliGRAM(s) Oral every 12 hours  benzoyl peroxide 5% Gel 1 Application(s) Topical every 24 hours  bisacodyl 5 milliGRAM(s) Oral <User Schedule>  calcium gluconate IVPB 2 Gram(s) IV Intermittent once  chlorhexidine 2% Cloths 1 Application(s) Topical daily  dexAMETHasone  Injectable 4 milliGRAM(s) IV Push every 24 hours  dextrose 5%. 1000 milliLiter(s) (50 mL/Hr) IV Continuous <Continuous>  dextrose 5%. 1000 milliLiter(s) (100 mL/Hr) IV Continuous <Continuous>  dextrose 50% Injectable 25 Gram(s) IV Push once  dextrose 50% Injectable 12.5 Gram(s) IV Push once  dextrose 50% Injectable 25 Gram(s) IV Push once  diphenhydrAMINE Injectable 25 milliGRAM(s) IV Push once  DULoxetine 30 milliGRAM(s) Oral every 24 hours  enoxaparin Injectable 40 milliGRAM(s) SubCutaneous every 24 hours  gabapentin 100 milliGRAM(s) Oral every 12 hours  glucagon  Injectable 1 milliGRAM(s) IntraMuscular once  heparin  Lock Flush 100 Units/mL Injectable 600 Unit(s) IV Push once  insulin glargine Injectable (LANTUS) 4 Unit(s) SubCutaneous every morning  insulin lispro (ADMELOG) corrective regimen sliding scale   SubCutaneous Before meals and at bedtime  insulin lispro Injectable (ADMELOG) 4 Unit(s) SubCutaneous three times a day before meals  metoprolol succinate ER 50 milliGRAM(s) Oral every 24 hours  mirtazapine 30 milliGRAM(s) Oral at bedtime  OLANZapine 5 milliGRAM(s) Oral at bedtime  OLANZapine 2.5 milliGRAM(s) Oral <User Schedule>  pantoprazole    Tablet 40 milliGRAM(s) Oral every 12 hours  polyethylene glycol 3350 17 Gram(s) Oral every 12 hours  riTUXimab IVPB (Non - oncologic) 1000 milliGRAM(s) IV Intermittent once  senna 2 Tablet(s) Oral at bedtime    MEDICATIONS  (PRN):  bisacodyl Suppository 10 milliGRAM(s) Rectal daily PRN Constipation  dextrose Oral Gel 15 Gram(s) Oral once PRN Blood Glucose LESS THAN 70 milliGRAM(s)/deciliter  OLANZapine 2.5 milliGRAM(s) Oral every 6 hours PRN anxiety/insomnia  SUMAtriptan 100 milliGRAM(s) Oral two times a day PRN Migraine  traMADol 25 milliGRAM(s) Oral every 12 hours PRN Severe Pain (7 - 10)      Allergies    methylPREDNISolone (Rash; Urticaria; Flushing; Hives)    Intolerances        Vital Signs Last 24 Hrs  T(C): 36.9 (25 Mar 2024 22:12), Max: 36.9 (25 Mar 2024 22:12)  T(F): 98.4 (25 Mar 2024 22:12), Max: 98.4 (25 Mar 2024 22:12)  HR: 74 (26 Mar 2024 06:40) (74 - 94)  BP: 117/69 (26 Mar 2024 06:40) (117/69 - 126/81)  BP(mean): 87 (26 Mar 2024 06:40) (87 - 96)  RR: 18 (26 Mar 2024 06:40) (16 - 19)  SpO2: 97% (26 Mar 2024 06:40) (96% - 99%)    Parameters below as of 26 Mar 2024 06:40  Patient On (Oxygen Delivery Method): room air      Neurologic:  -Mental status: Awake, alert, oriented to person, place, and time. Speech is fluent with intact naming, repetition, and comprehension, no dysarthria. Recent and remote memory intact. Follows commands. Attention/concentration intact. Fund of knowledge appropriate.  -Cranial nerves:   II: Visual fields are full to confrontation.  III, IV, VI: Extraocular movements are intact without nystagmus. Pupils equally round and reactive to light  VII: Face is symmetric   Motor: Normal bulk and tone. No pronator drift. Strength bilateral upper extremity 5/5, bilateral lower extremities 5/5.  Rapid alternating movements intact and symmetric  Sensation: Intact to light touch bilaterally. No neglect or extinction on double simultaneous testing.  Coordination: No dysmetria on finger-to-nose   Gait: Narrow gait and steady    LABS:                        11.7   14.34 )-----------( 194      ( 26 Mar 2024 05:30 )             36.5     03-26    140  |  106  |  20  ----------------------------<  408<H>  4.2   |  24  |  0.81    Ca    8.9      26 Mar 2024 05:30  Phos  3.7     03-26  Mg     2.0     03-26        Urinalysis Basic - ( 26 Mar 2024 05:30 )    Color: x / Appearance: x / SG: x / pH: x  Gluc: 408 mg/dL / Ketone: x  / Bili: x / Urobili: x   Blood: x / Protein: x / Nitrite: x   Leuk Esterase: x / RBC: x / WBC x   Sq Epi: x / Non Sq Epi: x / Bacteria: x        RADIOLOGY & ADDITIONAL TESTS:  reviewed

## 2024-03-26 NOTE — PROGRESS NOTE ADULT - ASSESSMENT
39y Male with PMHx of viral meningitis with sequela of HTN and intractable headaches (followed by Dr. Weston), recent admission 3/4/24 for autoimmune encephalitis management/treatment (had presented as worsening headaches and cognition - most notably e-mails that didn't make sense) s/p IV steroids and IVIG, presented again on 3/12 forfor bilateral hand weakness as well as wide based gait, UE tremors, UE cogwheeling, dysmetria, and UE weakness. Went for outpatient MRI brain and cervical spine. Admitted for further neurologic workup. Restarted on IV steroids, and plasmapheresis for 7 total sessions.     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  - s/p LP 3/4 with elevated opening pressure at 30 mmHg, remainder of CSF negative  - s/p decadron, started prednisone 40 on 3/23 but given worsening rash, switch back to Decadron 4mg daily (in the mornings)  - s/p LP 3/13. infectious panel so far negative, awaiting send out studies. autoimmune, and fungal studies, all herpes panels (HSV 1, HSV 2, HSV 6), CSF neopterin (write in), TB, RPR, VDRL, AFB, bacterial antigens, full viral PCR panel, oligoclonal bands, Igg index, myelin basic protein  - LP done 3/4 opening pressure was 30, LP done 3/13 with opening pressure of 13.5  - MRA head/neck done for vessel imaging - no stenosis or occlusion, no remarkable findings  - PET body - nonspecific right lung nodules  - testicular ultrasound neg  - Pulm consult - lung nodules likely inflammation, no contraindication to Rituximab  - plasma exchange started on 3/13, will plan now for 7 TOTAL sessions every other day as pt continues to improve with plasmapheresis. will need to send daily fibrinogen clauss levels - finished course. Last session today 3/25/24 (#7)  - HD catheter for the plasma exchange placed by IR on 3/13  - ID consult for rule out possible infectious causes, and prior to initiation of Rituximab - no contraindication to Rituximab  - Plan for Rituximab on Tuesday 3/26  - D dimer elevated to 502, LE dopplers negative, repeat D dimer on 3/23 < 150  - Sed rate 15  - CRP <3  - repeat Neuropsych consult on 3/14 - improved from prior admission  - Rheum consulted 3/15 for elevated LAKSHMI level, f/u recs  - EEG negative for epileptiform discharges, discontinued on 3/17 AM  - dc'ed verapamil, increased Toprol from 25 mg QHS to 50 mg QHS  - hepatitis panel quantiferon, NMO. c-ANCA, p-ANCA negative  - ACE 42  - LAKSHMI 1: 160 , ds-DNA 14  - copper 56  - ceruloplasmin 16  - cryoglobulin indeterminate  - serum protein and immuno electrophoresis grossly negative (just with elevated gamma globulin)  - send out antibodies negative except slightly elevated HOLLIE.  - HIV neg  - urine heavy metal screen with elevated cadmium at 4.2 otherwise negative   - urine heptacarboxylporphyrins 25, hexacarboxylporphyrins < 4, pentacarboxylporphyrin 21, coproporphyrin 29  - urine porphobilinogen negative  - TIBC 211 otherwise remainder of iron panel normal  - given lethargy, lowered Gabapentin to 100 mg q12 and Baclofen to 10mg q12  - continue home pantoprazole BID  - q8hr general neuro checks and vitals  - b/l knee pain - possibly from steroids? - start on Duloxetine 30mg daily    #low copper  - zinc 87 and f/u thiamine levels     #migraine  - sumatriptan 100mg PO PRN once (max 2 tabs)  - if needed, can given sumatriptan 6mg subcutaneously once on top of PO    #insomnia  - continue Remeron to 30 mg QHS, sodium levels stable     Cards  #HTN w/ LVH  #sinus tachy  - cards following, appreciate recs  - goal normotension  - hold amlodipine  - continue toprol 50mg daily     Pulm  - call provider if SPO2 < 94%  - uptake noted in right upper lobe of right lung on PET; CT C/A/P demonstrates clustered nodular and ground-glass opacities in bilateral upper lobes, more prominent over right; findings most consistent with infectious/inflammatory etiology  - pulm consulted during recent admission  - repeat CT in 6-12 weeks to ensure resolution    GI  #Nutrition/Fluids/Electrolytes   - replete K<4 and Mg <2  - Diet: Regular    #elevated LFTs   - AST/ALT 41/124 likely from steroids and Remeron   - now downtrending    Infectious Disease  - afebrile on admission  - leukocytosis likely due to steroids    Endocrine  #hyperglycemia  - A1C results: 5.2  - continue FS monitoring while on steroids  - Lantus 4 units in AM, lispro 4 units premeal and continue with MISS as now on long acting decadron    - TSH results: 0.594    Renal  #hyponatremia as low as 129, slowly uptrending  - sodium now stable, continue Remeron 30mg  - monitor daily sodium levels    Heme  #low fibrinogen level, likely 2/2 plasmapheresis  - daily fibrinogen clauss levels  - fibrinogen prior to plasmapheresis 166 3/17, s/p 5u cryoprecipitate  - fibrinogen 160 pre-PLEX, s/p 5u cryoprecipitate     MSK   #b/l knee pain   - likely 2/2 from steroids   - Toradol helps, Tramadol PRN also ordered    Psych  #anxiety  - Zyprexa standing (2.5 am, 2.5 pm, 5 at bedtime)  - psych consulted 3/15, agrees with current regimen   - can give additional zyprexa 2.5mg q6h PRN (no more than 15mg total per day max)  - EKG every other day to monitor qtc     Rheum  #+ ANCA  - proteinase 3 < 5.0   - anti myeloperoxidase ab < 5.0  - CCP negative   - f/u myositis panel, myomarker    DVT Prophylaxis  - SCDs for DVT prophylaxis     Dispo: home when cleared     Discussed daily hospital plans and goals with patient at bedside.    Discussed with Neurology Attending, Dr. Weston

## 2024-03-26 NOTE — PROGRESS NOTE ADULT - PROBLEM SELECTOR PLAN 6
- pulm previously consulted, inflammatory vs infectious, asymptomatic  - monitor off abx given no respiratory symptoms  - repeat CT scan 4-6 weeks to ensure resolution
BH following  - c/w Zyprexa and Remeron
- pulm previously consulted, inflammatory vs infectious, asymptomatic  - monitor off abx given no respiratory symptoms  - repeat CT scan 4-6 weeks to ensure resolution
BH following  - c/w Zyprexa and Remeron

## 2024-03-26 NOTE — PROGRESS NOTE ADULT - PROBLEM SELECTOR PLAN 4
likely due to steroids; no e/o acute bacterial infection  -- monitor CBC off abx  -- check differential  --no signs or symptoms of infection at this point, continue to monitor
likely due to steroids; no e/o acute bacterial infection  - monitor CBC off abx  -no signs or symptoms of infection at this point, continue to monitor off abx.  ID was consulted prior to starting Plasmapheresis and consideration of Rituximab, no contraindication
likely due to steroids; no e/o acute bacterial infection  -- monitor CBC off abx  -- check differential  --no signs or symptoms of infection at this point, continue to monitor
likely due to steroids; no e/o acute bacterial infection  - monitor CBC off abx  -no signs or symptoms of infection at this point, continue to monitor off abx.  ID was consulted prior to starting Plasmapheresis and consideration of Rituximab, no contraindication
likely due to steroids; no e/o acute bacterial infection  - monitor CBC off abx  -no signs or symptoms of infection at this point, continue to monitor off abx.  ID was consulted prior to starting Plasmapheresis and consideration of Rituximab, no contraindication
likely due to steroids; no e/o acute bacterial infection  -- monitor CBC off abx  -- check differential
likely due to steroids; no e/o acute bacterial infection  - monitor CBC off abx  -no signs or symptoms of infection at this point, continue to monitor off abx.  ID was consulted prior to starting Plasmapheresis and consideration of Rituximab, no contraindication

## 2024-03-26 NOTE — DISCHARGE NOTE PROVIDER - HOSPITAL COURSE
Hospital course:  39y Male with PMHx of viral meningitis with sequela of HTN and intractable headaches (followed by Dr. Weston), recent admission 3/4/24 for autoimmune encephalitis management/treatment (had presented as worsening headaches and cognition - most notably e-mails that didn't make sense) s/p IV steroids and IVIG, presented again on 3/12 forfor bilateral hand weakness as well as wide based gait, UE tremors, UE cogwheeling, dysmetria, and UE weakness. Went for outpatient MRI brain and cervical spine. Admitted for further neurologic workup. Restarted on IV steroids, and plasmapheresis for 7 total sessions. Received rituximab.       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  - s/p LP 3/4 with elevated opening pressure at 30 mmHg, remainder of CSF negative  - s/p decadron, started prednisone 40 on 3/23 but given worsening rash, switch back to Decadron 4mg daily (in the mornings)  - s/p LP 3/13. infectious panel so far negative, awaiting send out studies. autoimmune, and fungal studies, all herpes panels (HSV 1, HSV 2, HSV 6), CSF neopterin (write in), TB, RPR, VDRL, AFB, bacterial antigens, full viral PCR panel, oligoclonal bands, Igg index, myelin basic protein  - LP done 3/4 opening pressure was 30, LP done 3/13 with opening pressure of 13.5  - MRA head/neck done for vessel imaging - no stenosis or occlusion, no remarkable findings  - PET body - nonspecific right lung nodules  - testicular ultrasound neg  - Pulm consult - lung nodules likely inflammation, no contraindication to Rituximab  - plasma exchange started on 3/13, will plan now for 7 TOTAL sessions every other day as pt continues to improve with plasmapheresis. will need to send daily fibrinogen clauss levels - finished course. Last session today 3/25/24 (#7)  - HD catheter for the plasma exchange placed by IR on 3/13  - ID consult for rule out possible infectious causes, and prior to initiation of Rituximab - no contraindication to Rituximab  - Plan for Rituximab on Tuesday 3/26  - D dimer elevated to 502, LE dopplers negative, repeat D dimer on 3/23 < 150  - Sed rate 15  - CRP <3  - repeat Neuropsych consult on 3/14 - improved from prior admission  - Rheum consulted 3/15 for elevated LAKSHMI level, f/u recs  - EEG negative for epileptiform discharges, discontinued on 3/17 AM  - dc'ed verapamil, increased Toprol from 25 mg QHS to 50 mg QHS  - hepatitis panel quantiferon, NMO. c-ANCA, p-ANCA negative  - ACE 42  - LAKSHMI 1: 160 , ds-DNA 14  - copper 56  - ceruloplasmin 16  - cryoglobulin indeterminate  - serum protein and immuno electrophoresis grossly negative (just with elevated gamma globulin)  - send out antibodies negative except slightly elevated HOLLIE.  - HIV neg  - urine heavy metal screen with elevated cadmium at 4.2 otherwise negative   - urine heptacarboxylporphyrins 25, hexacarboxylporphyrins < 4, pentacarboxylporphyrin 21, coproporphyrin 29  - urine porphobilinogen negative  - TIBC 211 otherwise remainder of iron panel normal  - given lethargy, lowered Gabapentin to 100 mg q12 and Baclofen to 10mg q12  - continue home pantoprazole BID  - q8hr general neuro checks and vitals  - b/l knee pain - possibly from steroids? - start on Duloxetine 30mg daily    #low copper  - zinc 87 and f/u thiamine levels     #migraine  - sumatriptan 100mg PO PRN once (max 2 tabs)    #insomnia  - continue Remeron to 30 mg QHS, sodium levels stable     Cards  #HTN w/ LVH  #sinus tachy  - goal normotension  - hold amlodipine  - continue toprol 50mg daily     Pulm  - uptake noted in right upper lobe of right lung on PET; CT C/A/P demonstrates clustered nodular and ground-glass opacities in bilateral upper lobes, more prominent over right; findings most consistent with infectious/inflammatory etiology  - pulm consulted during recent admission  - repeat CT in 6-12 weeks to ensure resolution    #elevated LFTs   - AST/ALT 41/124 likely from steroids and Remeron   - now downtrending    Infectious Disease  - leukocytosis likely due to steroids    Psych  #anxiety  - Zyprexa standing (2.5 am, 2.5 pm, 5 at bedtime)    Rheum  #+ ANCA  - proteinase 3 < 5.0   - anti myeloperoxidase ab < 5.0  - CCP negative   - f/u myositis panel, myomarker    Physical exam at discharge:  nonfocal    New medications on discharge: decadron, toprol, duloxetine, remeron  Further outpatient workup: Pulmonology recommended repeat CT chest in 4 weeks from discharge.

## 2024-03-26 NOTE — PROGRESS NOTE ADULT - ASSESSMENT
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 Weston), recent admission 3/4/24 for autoimmune encephalitis management/treatment (had presented as worsening headaches and cognition - most notably e-mails that didn't make sense) s/p IV steroids and IVIG, presented again on 3/12 for bilateral hand weakness now restarted on IV steroids and plasmapheresis for 7 sessions and will receive Rituximab today

## 2024-03-26 NOTE — PROGRESS NOTE ADULT - PROBLEM SELECTOR PLAN 7
- pulm previously consulted, inflammatory vs infectious, asymptomatic  - monitor off abx given no respiratory symptoms  - repeat CT scan 4-6 weeks to ensure resolution
Mild LFT abnormality with AST 41,  from normal on admit  R factor 5.6 - favors hepatocellular  - downtrending  - Most likely due to medication with Remeron or decadron as possible drivers, Liver and gallbladder normal on CT abdomen this admission, no bili elevation  - would trend LFTs for now and continue decadron and remeron as benefit outweighs minor transaminitis unless progresses
- pulm previously consulted, inflammatory vs infectious, asymptomatic  - monitor off abx given no respiratory symptoms  - repeat CT scan 4-6 weeks to ensure resolution
Mild LFT abnormality with AST 41,  from normal on admit  R factor 5.6 - favors hepatocellular  - downtrending  - Most likely due to medication with Remeron or decadron as possible drivers, Liver and gallbladder normal on CT abdomen this admission, no bili elevation  - would trend LFTs for now and continue decadron and remeron as benefit outweighs minor transaminitis unless progresses

## 2024-03-26 NOTE — PROGRESS NOTE ADULT - SUBJECTIVE AND OBJECTIVE BOX
Patient is a 39y old  Male who presents with a chief complaint of treated for Autoimmune encephalitis (18 Mar 2024 15:40)      INTERVAL HPI/OVERNIGHT EVENTS:    Pt. seen and examined earlier today  Pt. getting Rituxan, feels well, hoping to go home later today  Rash on chest/back mostly unchanged  No fevers    Review of Systems: 12 point review of systems otherwise negative    MEDICATIONS  (STANDING):  albumin human  5% IVPB 4000 milliLiter(s) IV Intermittent once  baclofen 10 milliGRAM(s) Oral every 12 hours  benzoyl peroxide 5% Gel 1 Application(s) Topical every 24 hours  bisacodyl 5 milliGRAM(s) Oral <User Schedule>  calcium gluconate IVPB 2 Gram(s) IV Intermittent once  chlorhexidine 2% Cloths 1 Application(s) Topical daily  dexAMETHasone  Injectable 4 milliGRAM(s) IV Push every 24 hours  dextrose 5%. 1000 milliLiter(s) (50 mL/Hr) IV Continuous <Continuous>  dextrose 5%. 1000 milliLiter(s) (100 mL/Hr) IV Continuous <Continuous>  dextrose 50% Injectable 25 Gram(s) IV Push once  dextrose 50% Injectable 12.5 Gram(s) IV Push once  dextrose 50% Injectable 25 Gram(s) IV Push once  DULoxetine 30 milliGRAM(s) Oral every 24 hours  enoxaparin Injectable 40 milliGRAM(s) SubCutaneous every 24 hours  gabapentin 100 milliGRAM(s) Oral every 12 hours  glucagon  Injectable 1 milliGRAM(s) IntraMuscular once  heparin  Lock Flush 100 Units/mL Injectable 600 Unit(s) IV Push once  insulin glargine Injectable (LANTUS) 4 Unit(s) SubCutaneous every morning  insulin lispro (ADMELOG) corrective regimen sliding scale   SubCutaneous Before meals and at bedtime  insulin lispro Injectable (ADMELOG) 4 Unit(s) SubCutaneous three times a day before meals  metoprolol succinate ER 50 milliGRAM(s) Oral every 24 hours  mirtazapine 30 milliGRAM(s) Oral at bedtime  OLANZapine 5 milliGRAM(s) Oral at bedtime  OLANZapine 2.5 milliGRAM(s) Oral <User Schedule>  pantoprazole    Tablet 40 milliGRAM(s) Oral every 12 hours  polyethylene glycol 3350 17 Gram(s) Oral every 12 hours  senna 2 Tablet(s) Oral at bedtime    MEDICATIONS  (PRN):  bisacodyl Suppository 10 milliGRAM(s) Rectal daily PRN Constipation  dextrose Oral Gel 15 Gram(s) Oral once PRN Blood Glucose LESS THAN 70 milliGRAM(s)/deciliter  OLANZapine 2.5 milliGRAM(s) Oral every 6 hours PRN anxiety/insomnia  SUMAtriptan 100 milliGRAM(s) Oral two times a day PRN Migraine  traMADol 25 milliGRAM(s) Oral every 12 hours PRN Severe Pain (7 - 10)      Allergies    methylPREDNISolone (Rash; Urticaria; Flushing; Hives)    Intolerances          Vital Signs Last 24 Hrs  T(C): 36.9 (25 Mar 2024 22:12), Max: 36.9 (25 Mar 2024 22:12)  T(F): 98.4 (25 Mar 2024 22:12), Max: 98.4 (25 Mar 2024 22:12)  HR: 78 (26 Mar 2024 11:46) (74 - 94)  BP: 128/72 (26 Mar 2024 11:46) (117/69 - 128/72)  BP(mean): 95 (26 Mar 2024 11:46) (87 - 97)  RR: 19 (26 Mar 2024 11:46) (16 - 19)  SpO2: 95% (26 Mar 2024 09:08) (95% - 97%)    Parameters below as of 26 Mar 2024 11:46  Patient On (Oxygen Delivery Method): room air      CAPILLARY BLOOD GLUCOSE      POCT Blood Glucose.: 393 mg/dL (26 Mar 2024 11:49)  POCT Blood Glucose.: 324 mg/dL (26 Mar 2024 06:31)  POCT Blood Glucose.: 330 mg/dL (25 Mar 2024 16:31)        Physical Exam:  (earlier today)  Daily     Daily   General:  well-appearing in NAD, sitting in a chair  HEENT:  MMM  +R HD cath  Skin:  WWP, morbiliform rash across chest, subcentimeter pustules (stable)  Neuro:  AAOx3    LABS:                        11.7   14.34 )-----------( 194      ( 26 Mar 2024 05:30 )             36.5     03-26    140  |  106  |  20  ----------------------------<  408<H>  4.2   |  24  |  0.81    Ca    8.9      26 Mar 2024 05:30  Phos  3.7     03-26  Mg     2.0     03-26        Urinalysis Basic - ( 26 Mar 2024 05:30 )    Color: x / Appearance: x / SG: x / pH: x  Gluc: 408 mg/dL / Ketone: x  / Bili: x / Urobili: x   Blood: x / Protein: x / Nitrite: x   Leuk Esterase: x / RBC: x / WBC x   Sq Epi: x / Non Sq Epi: x / Bacteria: x

## 2024-03-27 ENCOUNTER — TRANSCRIPTION ENCOUNTER (OUTPATIENT)
Age: 39
End: 2024-03-27

## 2024-03-27 LAB
CULTURE RESULTS: NO GROWTH — SIGNIFICANT CHANGE UP
MISCELLANEOUS TEST NAME: SIGNIFICANT CHANGE UP
SPECIMEN SOURCE: SIGNIFICANT CHANGE UP

## 2024-03-28 LAB
LGI1 AB IGG SCREEN BY IFA: SIGNIFICANT CHANGE UP
NMDAR IGG TITR CSF IF: SIGNIFICANT CHANGE UP
VOLTAGE-GATED K CHANNEL AB RESULT: 0 PMOL/L — SIGNIFICANT CHANGE UP (ref 0–31)

## 2024-03-29 DIAGNOSIS — F90.9 ATTENTION-DEFICIT HYPERACTIVITY DISORDER, UNSPECIFIED TYPE: ICD-10-CM

## 2024-03-29 DIAGNOSIS — I10 ESSENTIAL (PRIMARY) HYPERTENSION: ICD-10-CM

## 2024-03-29 DIAGNOSIS — R91.8 OTHER NONSPECIFIC ABNORMAL FINDING OF LUNG FIELD: ICD-10-CM

## 2024-03-29 DIAGNOSIS — F43.21 ADJUSTMENT DISORDER WITH DEPRESSED MOOD: ICD-10-CM

## 2024-03-29 DIAGNOSIS — F41.9 ANXIETY DISORDER, UNSPECIFIED: ICD-10-CM

## 2024-03-29 DIAGNOSIS — I49.1 ATRIAL PREMATURE DEPOLARIZATION: ICD-10-CM

## 2024-03-29 DIAGNOSIS — R73.9 HYPERGLYCEMIA, UNSPECIFIED: ICD-10-CM

## 2024-03-29 DIAGNOSIS — G04.81 OTHER ENCEPHALITIS AND ENCEPHALOMYELITIS: ICD-10-CM

## 2024-03-29 DIAGNOSIS — F41.0 PANIC DISORDER [EPISODIC PAROXYSMAL ANXIETY]: ICD-10-CM

## 2024-03-29 DIAGNOSIS — R00.0 TACHYCARDIA, UNSPECIFIED: ICD-10-CM

## 2024-03-29 DIAGNOSIS — G43.909 MIGRAINE, UNSPECIFIED, NOT INTRACTABLE, WITHOUT STATUS MIGRAINOSUS: ICD-10-CM

## 2024-03-29 DIAGNOSIS — T38.0X5A ADVERSE EFFECT OF GLUCOCORTICOIDS AND SYNTHETIC ANALOGUES, INITIAL ENCOUNTER: ICD-10-CM

## 2024-03-29 DIAGNOSIS — D50.9 IRON DEFICIENCY ANEMIA, UNSPECIFIED: ICD-10-CM

## 2024-03-29 DIAGNOSIS — E61.0 COPPER DEFICIENCY: ICD-10-CM

## 2024-03-29 DIAGNOSIS — K22.10 ULCER OF ESOPHAGUS WITHOUT BLEEDING: ICD-10-CM

## 2024-03-29 DIAGNOSIS — D72.828 OTHER ELEVATED WHITE BLOOD CELL COUNT: ICD-10-CM

## 2024-03-29 DIAGNOSIS — F32.5 MAJOR DEPRESSIVE DISORDER, SINGLE EPISODE, IN FULL REMISSION: ICD-10-CM

## 2024-03-29 DIAGNOSIS — E87.1 HYPO-OSMOLALITY AND HYPONATREMIA: ICD-10-CM

## 2024-04-01 ENCOUNTER — TRANSCRIPTION ENCOUNTER (OUTPATIENT)
Age: 39
End: 2024-04-01

## 2024-04-01 RX ORDER — ONABOTULINUMTOXINA 200 [USP'U]/1
200 INJECTION, POWDER, LYOPHILIZED, FOR SOLUTION INTRADERMAL; INTRAMUSCULAR
Qty: 1 | Refills: 2 | Status: ACTIVE | COMMUNITY
Start: 2020-10-29

## 2024-04-02 ENCOUNTER — APPOINTMENT (OUTPATIENT)
Dept: DERMATOLOGY | Facility: CLINIC | Age: 39
End: 2024-04-02

## 2024-04-02 LAB — MISCELLANEOUS TEST NAME: SIGNIFICANT CHANGE UP

## 2024-04-03 ENCOUNTER — TRANSCRIPTION ENCOUNTER (OUTPATIENT)
Age: 39
End: 2024-04-03

## 2024-04-04 LAB
ANTI PM-SCL-100 PLUS: <20 UNITS — SIGNIFICANT CHANGE UP
ANTI-SAE 1 IGG: <20 UNITS — SIGNIFICANT CHANGE UP
ANTI-SS-A 52 KD AB, IGG PLUS: <20 UNITS — SIGNIFICANT CHANGE UP
ANTI-U1-RNP AB PLUS: <20 UNITS — SIGNIFICANT CHANGE UP
EJ MYOMARKER3 PLUS: NEGATIVE — SIGNIFICANT CHANGE UP
ENA JO1 AB SER IA-ACNC: <20 UNITS — SIGNIFICANT CHANGE UP
FIBRILLARIN (U3 RNP) PLUS: NEGATIVE — SIGNIFICANT CHANGE UP
KU MYOMARKER3 PLUS: NEGATIVE — SIGNIFICANT CHANGE UP
MDA5 (P140)(CADM-140) PLUS: <20 UNITS — SIGNIFICANT CHANGE UP
MI-2 PLUS: NEGATIVE — SIGNIFICANT CHANGE UP
NXP-2 (P140) MYOPLUS: <20 UNITS — SIGNIFICANT CHANGE UP
OJ MYOMARKER3 PLUS: NEGATIVE — SIGNIFICANT CHANGE UP
PL-12 PLUS: NEGATIVE — SIGNIFICANT CHANGE UP
PL-7 PLUS: NEGATIVE — SIGNIFICANT CHANGE UP
SRP MYOMARKER3 PLUS: NEGATIVE — SIGNIFICANT CHANGE UP
TIF GAMMA (P155/140) PLUS: <20 UNITS — SIGNIFICANT CHANGE UP
U2 SNRNP PLUS: NEGATIVE — SIGNIFICANT CHANGE UP

## 2024-04-09 ENCOUNTER — APPOINTMENT (OUTPATIENT)
Dept: INFUSION THERAPY | Facility: CLINIC | Age: 39
End: 2024-04-09

## 2024-04-09 ENCOUNTER — OUTPATIENT (OUTPATIENT)
Dept: OUTPATIENT SERVICES | Facility: HOSPITAL | Age: 39
LOS: 1 days | End: 2024-04-09
Payer: COMMERCIAL

## 2024-04-09 ENCOUNTER — APPOINTMENT (OUTPATIENT)
Dept: NEUROLOGY | Facility: CLINIC | Age: 39
End: 2024-04-09
Payer: COMMERCIAL

## 2024-04-09 VITALS
SYSTOLIC BLOOD PRESSURE: 131 MMHG | HEART RATE: 84 BPM | DIASTOLIC BLOOD PRESSURE: 84 MMHG | OXYGEN SATURATION: 97 % | RESPIRATION RATE: 18 BRPM | TEMPERATURE: 97 F

## 2024-04-09 VITALS
SYSTOLIC BLOOD PRESSURE: 155 MMHG | HEIGHT: 78 IN | RESPIRATION RATE: 18 BRPM | HEART RATE: 81 BPM | WEIGHT: 220.9 LBS | DIASTOLIC BLOOD PRESSURE: 111 MMHG | OXYGEN SATURATION: 98 % | TEMPERATURE: 98 F

## 2024-04-09 VITALS
DIASTOLIC BLOOD PRESSURE: 91 MMHG | HEIGHT: 72 IN | SYSTOLIC BLOOD PRESSURE: 133 MMHG | HEART RATE: 97 BPM | OXYGEN SATURATION: 96 %

## 2024-04-09 DIAGNOSIS — G04.81 OTHER ENCEPHALITIS AND ENCEPHALOMYELITIS: ICD-10-CM

## 2024-04-09 LAB
ALBUMIN SERPL ELPH-MCNC: 3.7 G/DL — SIGNIFICANT CHANGE UP (ref 3.3–5)
ALP SERPL-CCNC: 89 U/L — SIGNIFICANT CHANGE UP (ref 40–120)
ALT FLD-CCNC: 35 U/L — SIGNIFICANT CHANGE UP (ref 10–45)
ANION GAP SERPL CALC-SCNC: 12 MMOL/L — SIGNIFICANT CHANGE UP (ref 5–17)
ANISOCYTOSIS BLD QL: SLIGHT — SIGNIFICANT CHANGE UP
AST SERPL-CCNC: 19 U/L — SIGNIFICANT CHANGE UP (ref 10–40)
BASOPHILS # BLD AUTO: 0 K/UL — SIGNIFICANT CHANGE UP (ref 0–0.2)
BASOPHILS NFR BLD AUTO: 0 % — SIGNIFICANT CHANGE UP (ref 0–2)
BILIRUB SERPL-MCNC: 0.7 MG/DL — SIGNIFICANT CHANGE UP (ref 0.2–1.2)
BUN SERPL-MCNC: 17 MG/DL — SIGNIFICANT CHANGE UP (ref 7–23)
CALCIUM SERPL-MCNC: 9.7 MG/DL — SIGNIFICANT CHANGE UP (ref 8.4–10.5)
CHLORIDE SERPL-SCNC: 104 MMOL/L — SIGNIFICANT CHANGE UP (ref 96–108)
CO2 SERPL-SCNC: 25 MMOL/L — SIGNIFICANT CHANGE UP (ref 22–31)
CREAT SERPL-MCNC: 0.7 MG/DL — SIGNIFICANT CHANGE UP (ref 0.5–1.3)
EGFR: 122 ML/MIN/1.73M2 — SIGNIFICANT CHANGE UP
EOSINOPHIL # BLD AUTO: 0.05 K/UL — SIGNIFICANT CHANGE UP (ref 0–0.5)
EOSINOPHIL NFR BLD AUTO: 0.6 % — SIGNIFICANT CHANGE UP (ref 0–6)
GLUCOSE SERPL-MCNC: 417 MG/DL — HIGH (ref 70–99)
HBV CORE AB SER-ACNC: SIGNIFICANT CHANGE UP
HBV SURFACE AB SER-ACNC: REACTIVE
HBV SURFACE AG SER-ACNC: SIGNIFICANT CHANGE UP
HCT VFR BLD CALC: 40.6 % — SIGNIFICANT CHANGE UP (ref 39–50)
HGB BLD-MCNC: 13.6 G/DL — SIGNIFICANT CHANGE UP (ref 13–17)
LYMPHOCYTES # BLD AUTO: 1.14 K/UL — SIGNIFICANT CHANGE UP (ref 1–3.3)
LYMPHOCYTES # BLD AUTO: 14.1 % — SIGNIFICANT CHANGE UP (ref 13–44)
MACROCYTES BLD QL: SLIGHT — SIGNIFICANT CHANGE UP
MANUAL SMEAR VERIFICATION: SIGNIFICANT CHANGE UP
MCHC RBC-ENTMCNC: 28.8 PG — SIGNIFICANT CHANGE UP (ref 27–34)
MCHC RBC-ENTMCNC: 33.5 GM/DL — SIGNIFICANT CHANGE UP (ref 32–36)
MCV RBC AUTO: 85.8 FL — SIGNIFICANT CHANGE UP (ref 80–100)
MICROCYTES BLD QL: SLIGHT — SIGNIFICANT CHANGE UP
MONOCYTES # BLD AUTO: 0.05 K/UL — SIGNIFICANT CHANGE UP (ref 0–0.9)
MONOCYTES NFR BLD AUTO: 0.6 % — LOW (ref 2–14)
NEUTROPHILS # BLD AUTO: 6.86 K/UL — SIGNIFICANT CHANGE UP (ref 1.8–7.4)
NEUTROPHILS NFR BLD AUTO: 84.7 % — HIGH (ref 43–77)
OVALOCYTES BLD QL SMEAR: SLIGHT — SIGNIFICANT CHANGE UP
PLAT MORPH BLD: NORMAL — SIGNIFICANT CHANGE UP
PLATELET # BLD AUTO: 220 K/UL — SIGNIFICANT CHANGE UP (ref 150–400)
POIKILOCYTOSIS BLD QL AUTO: SLIGHT — SIGNIFICANT CHANGE UP
POLYCHROMASIA BLD QL SMEAR: SLIGHT — SIGNIFICANT CHANGE UP
POTASSIUM SERPL-MCNC: 4.5 MMOL/L — SIGNIFICANT CHANGE UP (ref 3.5–5.3)
POTASSIUM SERPL-SCNC: 4.5 MMOL/L — SIGNIFICANT CHANGE UP (ref 3.5–5.3)
PROT SERPL-MCNC: 6.8 G/DL — SIGNIFICANT CHANGE UP (ref 6–8.3)
RBC # BLD: 4.73 M/UL — SIGNIFICANT CHANGE UP (ref 4.2–5.8)
RBC # FLD: 20.3 % — HIGH (ref 10.3–14.5)
RBC BLD AUTO: ABNORMAL
SODIUM SERPL-SCNC: 141 MMOL/L — SIGNIFICANT CHANGE UP (ref 135–145)
WBC # BLD: 8.1 K/UL — SIGNIFICANT CHANGE UP (ref 3.8–10.5)
WBC # FLD AUTO: 8.1 K/UL — SIGNIFICANT CHANGE UP (ref 3.8–10.5)

## 2024-04-09 PROCEDURE — 96415 CHEMO IV INFUSION ADDL HR: CPT

## 2024-04-09 PROCEDURE — 86704 HEP B CORE ANTIBODY TOTAL: CPT

## 2024-04-09 PROCEDURE — 87340 HEPATITIS B SURFACE AG IA: CPT

## 2024-04-09 PROCEDURE — 36415 COLL VENOUS BLD VENIPUNCTURE: CPT

## 2024-04-09 PROCEDURE — 96375 TX/PRO/DX INJ NEW DRUG ADDON: CPT

## 2024-04-09 PROCEDURE — 96413 CHEMO IV INFUSION 1 HR: CPT

## 2024-04-09 PROCEDURE — 86706 HEP B SURFACE ANTIBODY: CPT

## 2024-04-09 PROCEDURE — 99215 OFFICE O/P EST HI 40 MIN: CPT

## 2024-04-09 PROCEDURE — 85025 COMPLETE CBC W/AUTO DIFF WBC: CPT

## 2024-04-09 PROCEDURE — 80053 COMPREHEN METABOLIC PANEL: CPT

## 2024-04-09 RX ORDER — SODIUM CHLORIDE 9 MG/ML
500 INJECTION INTRAMUSCULAR; INTRAVENOUS; SUBCUTANEOUS ONCE
Refills: 0 | Status: COMPLETED | OUTPATIENT
Start: 2024-04-09 | End: 2024-04-09

## 2024-04-09 RX ORDER — DEXAMETHASONE 0.5 MG/5ML
20 ELIXIR ORAL ONCE
Refills: 0 | Status: COMPLETED | OUTPATIENT
Start: 2024-04-09 | End: 2024-04-09

## 2024-04-09 RX ORDER — METOPROLOL SUCCINATE 25 MG/1
25 TABLET, EXTENDED RELEASE ORAL
Qty: 60 | Refills: 5 | Status: ACTIVE | COMMUNITY
Start: 2024-04-09 | End: 1900-01-01

## 2024-04-09 RX ORDER — FAMOTIDINE 10 MG/ML
20 INJECTION INTRAVENOUS ONCE
Refills: 0 | Status: COMPLETED | OUTPATIENT
Start: 2024-04-09 | End: 2024-04-09

## 2024-04-09 RX ORDER — DIPHENHYDRAMINE HCL 50 MG
50 CAPSULE ORAL ONCE
Refills: 0 | Status: COMPLETED | OUTPATIENT
Start: 2024-04-09 | End: 2024-04-09

## 2024-04-09 RX ORDER — RITUXIMAB 10 MG/ML
1000 INJECTION, SOLUTION INTRAVENOUS ONCE
Refills: 0 | Status: COMPLETED | OUTPATIENT
Start: 2024-04-09 | End: 2024-04-09

## 2024-04-09 RX ORDER — ACETAMINOPHEN 500 MG
650 TABLET ORAL ONCE
Refills: 0 | Status: COMPLETED | OUTPATIENT
Start: 2024-04-09 | End: 2024-04-09

## 2024-04-09 RX ADMIN — SODIUM CHLORIDE 500 MILLILITER(S): 9 INJECTION INTRAMUSCULAR; INTRAVENOUS; SUBCUTANEOUS at 11:30

## 2024-04-09 RX ADMIN — Medication 650 MILLIGRAM(S): at 09:38

## 2024-04-09 RX ADMIN — RITUXIMAB 1000 MILLIGRAM(S): 10 INJECTION, SOLUTION INTRAVENOUS at 15:00

## 2024-04-09 RX ADMIN — RITUXIMAB 1000 MILLIGRAM(S): 10 INJECTION, SOLUTION INTRAVENOUS at 11:34

## 2024-04-09 RX ADMIN — FAMOTIDINE 20 MILLIGRAM(S): 10 INJECTION INTRAVENOUS at 09:38

## 2024-04-09 RX ADMIN — Medication 20 MILLIGRAM(S): at 10:15

## 2024-04-09 RX ADMIN — Medication 220 MILLIGRAM(S): at 09:53

## 2024-04-09 RX ADMIN — SODIUM CHLORIDE 1000 MILLILITER(S): 9 INJECTION INTRAMUSCULAR; INTRAVENOUS; SUBCUTANEOUS at 11:00

## 2024-04-09 RX ADMIN — Medication 650 MILLIGRAM(S): at 10:39

## 2024-04-09 RX ADMIN — Medication 50 MILLIGRAM(S): at 09:38

## 2024-04-11 ENCOUNTER — APPOINTMENT (OUTPATIENT)
Dept: OPHTHALMOLOGY | Facility: CLINIC | Age: 39
End: 2024-04-11
Payer: COMMERCIAL

## 2024-04-11 ENCOUNTER — NON-APPOINTMENT (OUTPATIENT)
Age: 39
End: 2024-04-11

## 2024-04-11 PROCEDURE — 92014 COMPRE OPH EXAM EST PT 1/>: CPT

## 2024-04-11 PROCEDURE — 92083 EXTENDED VISUAL FIELD XM: CPT

## 2024-04-12 ENCOUNTER — APPOINTMENT (OUTPATIENT)
Dept: DERMATOLOGY | Facility: CLINIC | Age: 39
End: 2024-04-12
Payer: COMMERCIAL

## 2024-04-12 DIAGNOSIS — T38.0X5A OTHER ACNE: ICD-10-CM

## 2024-04-12 DIAGNOSIS — L70.8 OTHER ACNE: ICD-10-CM

## 2024-04-12 DIAGNOSIS — R61 GENERALIZED HYPERHIDROSIS: ICD-10-CM

## 2024-04-12 PROCEDURE — 99213 OFFICE O/P EST LOW 20 MIN: CPT | Mod: 25

## 2024-04-12 PROCEDURE — 64650 CHEMODENERV ECCRINE GLANDS: CPT

## 2024-04-12 RX ORDER — ONABOTULINUMTOXINA 200 [USP'U]/1
200 INJECTION, POWDER, LYOPHILIZED, FOR SOLUTION INTRADERMAL; INTRAMUSCULAR
Qty: 1 | Refills: 3 | Status: COMPLETED | COMMUNITY
Start: 2024-02-29 | End: 2024-04-12

## 2024-04-12 NOTE — HISTORY OF PRESENT ILLNESS
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 Received DDRT on 5/19/21 \par \par Donor was a 44 yr old woman, KDPI 69%, history of diabetes on insulin, terminal creatinine 3.3. \par Post transplant course was complicated by DGF. Last hemodialysis was on 6/4/21. Transplant ureteric stent was removed on 8/2/21. \par \par Other PMH includes :History of COVID-19 infection, hospitalized in March 2020 for only 1 day. \par \par - Hospitalized 10/2021 with severe symptomatic anemia (Hgb 5g/dL). Transfused 2 units PRBC. Anemia was due to hemolysis caused by dapsone (despite normal G6PD levels prior to initiation). \par - Nov 2021 Noted to have YANELY creatinine 1.2-> 1.65mg/dL.  US showed increased velocities concerning for TRAS but no tardus parvus waves. DSA negative,  and cFDNA  0.5%. Conservatively managed. \par - Hospitalized Jan 2022 with worsening anemia, hyperkalemia, tested +ve for COVID but had no symptoms. \par - Sept 2022 - Worsening YANELY with cr to 2.2mg/dL. DSA was negative. cFDNA 0.68%. \par \par Transplant kidney biopsy done on 9/29/22  showed mild chronic-active antibody-mediated rejection\par (There is minimal glomerulitis, mild peritubular capillaritis, no vasculitis, and very focal signs of remodeling of the glomerular capillary walls by electron microscopy) C4d was focally positive in peritubular capillaries 20-30%. Mild interstitial inflammation, no tubulitis or endothelialitis) \par Diffuse and nodular diabetic glomerulosclerosis, donor related\par IFTA 30%\par \par Hospitalized 10/3/22-10/7/22, received pulse dose steroids, plasmapheresis and IVIG. \par \par Transplant renal artery angiogram done by Dr. Stafford 12/28/22 showed no hemodynamically significant stenosis. \par \par Last seen in office 6 weeks ago. No major events since last visit, no ER visits or hospitalizations. \par BP at home 140-160 systolic at home. \par Bilateral LE edema is bothersome. Takes Lasix 20mg as needed. Last took it 3 days ago. \par Urinating without difficulty. No dysuria, no hematuria. No fever. \par No NVD.  No abdominal pain. No nausea, no vomiting. Appetite good. Energy is good.  \par No chest pain or shortness of breath.  \par Taking all medications as prescribed \par Weight 130lbs stable.\par Physical activity: Walking for 1 hour outside the house.\par Lives with . Children live nearby and help out. \par

## 2024-04-12 NOTE — PHYSICAL EXAM
[Alert] : alert [Oriented x 3] : ~L oriented x 3 [Declined] : declined [FreeTextEntry3] : Focused exam: -axillae clear without rash -numerous monomorphic erythematous papules and pustules on the chest, abdomen, back, bl upper arms

## 2024-04-12 NOTE — ASSESSMENT
[FreeTextEntry1] : #Axillary hyperhidrosis -botox injected today per procedure note  Procedure note:  There was a detailed discussion about the benefit and risks which included but are not limited to pain, infection, bruising, and the potential need for a series of treatments to achieve the desired effect.  We also discussed the temporary nature of the results of treatment and the need for retreatment for maintenance. Informed consent was obtained and the procedure was performed as follows:  Patient was placed on the operating table in the seated position. Area cleansed with ETOH.  Botox was injected.  200 units per 4cc saline with preservative.     100 units to the each axilla.  Lot: [id number] Exp date: 07/2026  Patient tolerated the procedure well and was discharged in excellent condition. Patient knows to contact me immediately with any concerns.  #Steroid induced acne -reassurance - will resolve as he just recently finished steroids -can continue BP and clindamycin daily  RTC 3 months repeat botox Pt to call [facility] for refills to be sent to [facility] office

## 2024-04-13 LAB
CULTURE RESULTS: SIGNIFICANT CHANGE UP
SPECIMEN SOURCE: SIGNIFICANT CHANGE UP

## 2024-04-15 ENCOUNTER — APPOINTMENT (OUTPATIENT)
Dept: PSYCHIATRY | Facility: CLINIC | Age: 39
End: 2024-04-15

## 2024-04-17 ENCOUNTER — APPOINTMENT (OUTPATIENT)
Dept: HEART AND VASCULAR | Facility: CLINIC | Age: 39
End: 2024-04-17

## 2024-04-22 ENCOUNTER — APPOINTMENT (OUTPATIENT)
Dept: INFUSION THERAPY | Facility: CLINIC | Age: 39
End: 2024-04-22

## 2024-04-22 ENCOUNTER — OUTPATIENT (OUTPATIENT)
Dept: OUTPATIENT SERVICES | Facility: HOSPITAL | Age: 39
LOS: 1 days | End: 2024-04-22
Payer: COMMERCIAL

## 2024-04-22 VITALS
TEMPERATURE: 98 F | HEART RATE: 82 BPM | WEIGHT: 315 LBS | DIASTOLIC BLOOD PRESSURE: 92 MMHG | RESPIRATION RATE: 15 BRPM | HEIGHT: 72 IN | OXYGEN SATURATION: 98 % | SYSTOLIC BLOOD PRESSURE: 132 MMHG

## 2024-04-22 VITALS
HEART RATE: 80 BPM | OXYGEN SATURATION: 98 % | DIASTOLIC BLOOD PRESSURE: 88 MMHG | TEMPERATURE: 98 F | SYSTOLIC BLOOD PRESSURE: 130 MMHG | RESPIRATION RATE: 17 BRPM

## 2024-04-22 DIAGNOSIS — G04.81 OTHER ENCEPHALITIS AND ENCEPHALOMYELITIS: ICD-10-CM

## 2024-04-22 PROCEDURE — 96365 THER/PROPH/DIAG IV INF INIT: CPT

## 2024-04-22 PROCEDURE — 96366 THER/PROPH/DIAG IV INF ADDON: CPT

## 2024-04-22 RX ORDER — SODIUM CHLORIDE 9 MG/ML
500 INJECTION INTRAMUSCULAR; INTRAVENOUS; SUBCUTANEOUS ONCE
Refills: 0 | Status: COMPLETED | OUTPATIENT
Start: 2024-04-22 | End: 2024-04-22

## 2024-04-22 RX ORDER — SODIUM CHLORIDE 9 MG/ML
500 INJECTION INTRAMUSCULAR; INTRAVENOUS; SUBCUTANEOUS ONCE
Refills: 0 | Status: COMPLETED | OUTPATIENT
Start: 2024-04-23 | End: 2024-04-23

## 2024-04-22 RX ORDER — ACETAMINOPHEN 500 MG
650 TABLET ORAL ONCE
Refills: 0 | Status: COMPLETED | OUTPATIENT
Start: 2024-04-22 | End: 2024-04-22

## 2024-04-22 RX ORDER — SODIUM CHLORIDE 9 MG/ML
500 INJECTION INTRAMUSCULAR; INTRAVENOUS; SUBCUTANEOUS ONCE
Refills: 0 | Status: COMPLETED | OUTPATIENT
Start: 2024-05-22 | End: 2024-05-22

## 2024-04-22 RX ORDER — DIPHENHYDRAMINE HCL 50 MG
25 CAPSULE ORAL ONCE
Refills: 0 | Status: COMPLETED | OUTPATIENT
Start: 2024-04-22 | End: 2024-04-22

## 2024-04-22 RX ORDER — IMMUNE GLOBULIN (HUMAN) 10 G/100ML
50 INJECTION INTRAVENOUS; SUBCUTANEOUS ONCE
Refills: 0 | Status: COMPLETED | OUTPATIENT
Start: 2024-04-22 | End: 2024-04-22

## 2024-04-22 RX ADMIN — SODIUM CHLORIDE 500 MILLILITER(S): 9 INJECTION INTRAMUSCULAR; INTRAVENOUS; SUBCUTANEOUS at 09:44

## 2024-04-22 RX ADMIN — Medication 650 MILLIGRAM(S): at 09:43

## 2024-04-22 RX ADMIN — IMMUNE GLOBULIN (HUMAN) 50 GRAM(S): 10 INJECTION INTRAVENOUS; SUBCUTANEOUS at 15:40

## 2024-04-22 RX ADMIN — Medication 650 MILLIGRAM(S): at 09:44

## 2024-04-22 RX ADMIN — SODIUM CHLORIDE 500 MILLILITER(S): 9 INJECTION INTRAMUSCULAR; INTRAVENOUS; SUBCUTANEOUS at 16:10

## 2024-04-22 RX ADMIN — Medication 25 MILLIGRAM(S): at 09:10

## 2024-04-22 RX ADMIN — SODIUM CHLORIDE 1000 MILLILITER(S): 9 INJECTION INTRAMUSCULAR; INTRAVENOUS; SUBCUTANEOUS at 08:50

## 2024-04-22 RX ADMIN — IMMUNE GLOBULIN (HUMAN) 83.33 GRAM(S): 10 INJECTION INTRAVENOUS; SUBCUTANEOUS at 09:45

## 2024-04-22 RX ADMIN — SODIUM CHLORIDE 1000 MILLILITER(S): 9 INJECTION INTRAMUSCULAR; INTRAVENOUS; SUBCUTANEOUS at 15:41

## 2024-04-23 ENCOUNTER — OUTPATIENT (OUTPATIENT)
Dept: OUTPATIENT SERVICES | Facility: HOSPITAL | Age: 39
LOS: 1 days | End: 2024-04-23
Payer: COMMERCIAL

## 2024-04-23 ENCOUNTER — APPOINTMENT (OUTPATIENT)
Dept: INFUSION THERAPY | Facility: CLINIC | Age: 39
End: 2024-04-23

## 2024-04-23 VITALS
OXYGEN SATURATION: 97 % | HEART RATE: 98 BPM | TEMPERATURE: 98 F | SYSTOLIC BLOOD PRESSURE: 153 MMHG | WEIGHT: 205.03 LBS | HEIGHT: 72 IN | DIASTOLIC BLOOD PRESSURE: 97 MMHG | RESPIRATION RATE: 18 BRPM

## 2024-04-23 VITALS
TEMPERATURE: 98 F | HEART RATE: 71 BPM | DIASTOLIC BLOOD PRESSURE: 104 MMHG | OXYGEN SATURATION: 97 % | SYSTOLIC BLOOD PRESSURE: 158 MMHG | RESPIRATION RATE: 17 BRPM

## 2024-04-23 DIAGNOSIS — G04.81 OTHER ENCEPHALITIS AND ENCEPHALOMYELITIS: ICD-10-CM

## 2024-04-23 PROCEDURE — 96366 THER/PROPH/DIAG IV INF ADDON: CPT

## 2024-04-23 PROCEDURE — 96365 THER/PROPH/DIAG IV INF INIT: CPT

## 2024-04-23 RX ORDER — SODIUM CHLORIDE 9 MG/ML
500 INJECTION INTRAMUSCULAR; INTRAVENOUS; SUBCUTANEOUS ONCE
Refills: 0 | Status: COMPLETED | OUTPATIENT
Start: 2024-04-23 | End: 2024-04-23

## 2024-04-23 RX ORDER — ACETAMINOPHEN 500 MG
650 TABLET ORAL ONCE
Refills: 0 | Status: COMPLETED | OUTPATIENT
Start: 2024-04-23 | End: 2024-04-23

## 2024-04-23 RX ORDER — IMMUNE GLOBULIN (HUMAN) 10 G/100ML
50 INJECTION INTRAVENOUS; SUBCUTANEOUS ONCE
Refills: 0 | Status: COMPLETED | OUTPATIENT
Start: 2024-04-23 | End: 2024-04-23

## 2024-04-23 RX ORDER — DIPHENHYDRAMINE HCL 50 MG
25 CAPSULE ORAL ONCE
Refills: 0 | Status: COMPLETED | OUTPATIENT
Start: 2024-04-23 | End: 2024-04-23

## 2024-04-23 RX ADMIN — Medication 25 MILLIGRAM(S): at 10:44

## 2024-04-23 RX ADMIN — SODIUM CHLORIDE 500 MILLILITER(S): 9 INJECTION INTRAMUSCULAR; INTRAVENOUS; SUBCUTANEOUS at 11:00

## 2024-04-23 RX ADMIN — IMMUNE GLOBULIN (HUMAN) 83.33 GRAM(S): 10 INJECTION INTRAVENOUS; SUBCUTANEOUS at 11:07

## 2024-04-23 RX ADMIN — IMMUNE GLOBULIN (HUMAN) 50 GRAM(S): 10 INJECTION INTRAVENOUS; SUBCUTANEOUS at 14:15

## 2024-04-23 RX ADMIN — Medication 650 MILLIGRAM(S): at 11:30

## 2024-04-23 RX ADMIN — SODIUM CHLORIDE 1000 MILLILITER(S): 9 INJECTION INTRAMUSCULAR; INTRAVENOUS; SUBCUTANEOUS at 14:15

## 2024-04-23 RX ADMIN — SODIUM CHLORIDE 500 MILLILITER(S): 9 INJECTION INTRAMUSCULAR; INTRAVENOUS; SUBCUTANEOUS at 14:45

## 2024-04-23 RX ADMIN — SODIUM CHLORIDE 1000 MILLILITER(S): 9 INJECTION INTRAMUSCULAR; INTRAVENOUS; SUBCUTANEOUS at 10:30

## 2024-04-23 RX ADMIN — Medication 650 MILLIGRAM(S): at 10:44

## 2024-04-24 ENCOUNTER — APPOINTMENT (OUTPATIENT)
Dept: HEART AND VASCULAR | Facility: CLINIC | Age: 39
End: 2024-04-24

## 2024-04-24 NOTE — PHARMACY COMMUNICATION NOTE - COMMENTS
I emailed Dr. Gilliam about the dosing for this patient. He weighed in at 93kg, so the dose was 93kg x 2gm/kg over 4 days. This came to m186gm of IVIG. Since we have a policy of rounding up or down to the nearest 5 gms, I emailed Dr. Gilliam. His response was as follows:  Please round up to 190gms  Thank you        Morenita Haile MSN, FNP-C     Dr. Souhel Najjar   Department of Neurology  [address] [place] [address]  Phone: [phone]  [facility] [place] [address] [phone]

## 2024-04-27 LAB
CULTURE RESULTS: SIGNIFICANT CHANGE UP
SPECIMEN SOURCE: SIGNIFICANT CHANGE UP

## 2024-04-29 ENCOUNTER — APPOINTMENT (OUTPATIENT)
Dept: INFUSION THERAPY | Facility: CLINIC | Age: 39
End: 2024-04-29

## 2024-04-29 ENCOUNTER — OUTPATIENT (OUTPATIENT)
Dept: OUTPATIENT SERVICES | Facility: HOSPITAL | Age: 39
LOS: 1 days | End: 2024-04-29
Payer: COMMERCIAL

## 2024-04-29 VITALS
TEMPERATURE: 98 F | RESPIRATION RATE: 15 BRPM | OXYGEN SATURATION: 99 % | HEART RATE: 84 BPM | SYSTOLIC BLOOD PRESSURE: 142 MMHG | DIASTOLIC BLOOD PRESSURE: 87 MMHG

## 2024-04-29 VITALS
HEART RATE: 87 BPM | DIASTOLIC BLOOD PRESSURE: 95 MMHG | RESPIRATION RATE: 16 BRPM | WEIGHT: 205.03 LBS | TEMPERATURE: 98 F | SYSTOLIC BLOOD PRESSURE: 140 MMHG | HEIGHT: 72 IN | OXYGEN SATURATION: 100 %

## 2024-04-29 DIAGNOSIS — K50.00 CROHN'S DISEASE OF SMALL INTESTINE WITHOUT COMPLICATIONS: ICD-10-CM

## 2024-04-29 PROCEDURE — 96366 THER/PROPH/DIAG IV INF ADDON: CPT

## 2024-04-29 PROCEDURE — 96361 HYDRATE IV INFUSION ADD-ON: CPT

## 2024-04-29 PROCEDURE — 96365 THER/PROPH/DIAG IV INF INIT: CPT

## 2024-04-29 RX ORDER — SODIUM CHLORIDE 9 MG/ML
500 INJECTION INTRAMUSCULAR; INTRAVENOUS; SUBCUTANEOUS ONCE
Refills: 0 | Status: DISCONTINUED | OUTPATIENT
Start: 2024-04-29 | End: 2024-04-29

## 2024-04-29 RX ORDER — IMMUNE GLOBULIN (HUMAN) 10 G/100ML
50 INJECTION INTRAVENOUS; SUBCUTANEOUS ONCE
Refills: 0 | Status: COMPLETED | OUTPATIENT
Start: 2024-04-29 | End: 2024-04-29

## 2024-04-29 RX ORDER — DIPHENHYDRAMINE HCL 50 MG
25 CAPSULE ORAL ONCE
Refills: 0 | Status: COMPLETED | OUTPATIENT
Start: 2024-04-29 | End: 2024-04-29

## 2024-04-29 RX ORDER — SODIUM CHLORIDE 9 MG/ML
1000 INJECTION INTRAMUSCULAR; INTRAVENOUS; SUBCUTANEOUS ONCE
Refills: 0 | Status: COMPLETED | OUTPATIENT
Start: 2024-04-29 | End: 2024-04-29

## 2024-04-29 RX ORDER — ACETAMINOPHEN 500 MG
650 TABLET ORAL ONCE
Refills: 0 | Status: COMPLETED | OUTPATIENT
Start: 2024-04-29 | End: 2024-04-29

## 2024-04-29 RX ADMIN — SODIUM CHLORIDE 1000 MILLILITER(S): 9 INJECTION INTRAMUSCULAR; INTRAVENOUS; SUBCUTANEOUS at 16:39

## 2024-04-29 RX ADMIN — Medication 650 MILLIGRAM(S): at 09:14

## 2024-04-29 RX ADMIN — Medication 25 MILLIGRAM(S): at 09:14

## 2024-04-29 RX ADMIN — IMMUNE GLOBULIN (HUMAN) 50 GRAM(S): 10 INJECTION INTRAVENOUS; SUBCUTANEOUS at 16:28

## 2024-04-29 RX ADMIN — SODIUM CHLORIDE 1000 MILLILITER(S): 9 INJECTION INTRAMUSCULAR; INTRAVENOUS; SUBCUTANEOUS at 09:25

## 2024-04-29 RX ADMIN — SODIUM CHLORIDE 1000 MILLILITER(S): 9 INJECTION INTRAMUSCULAR; INTRAVENOUS; SUBCUTANEOUS at 08:25

## 2024-04-29 RX ADMIN — IMMUNE GLOBULIN (HUMAN) 50 GRAM(S): 10 INJECTION INTRAVENOUS; SUBCUTANEOUS at 16:39

## 2024-04-29 RX ADMIN — SODIUM CHLORIDE 1000 MILLILITER(S): 9 INJECTION INTRAMUSCULAR; INTRAVENOUS; SUBCUTANEOUS at 17:32

## 2024-04-29 RX ADMIN — IMMUNE GLOBULIN (HUMAN) 50 GRAM(S): 10 INJECTION INTRAVENOUS; SUBCUTANEOUS at 09:55

## 2024-04-29 RX ADMIN — Medication 650 MILLIGRAM(S): at 09:16

## 2024-04-29 NOTE — DISCHARGE INSTRUCTIONS: GENERAL THERAPY - DC SYMPTOM 2
LOV 01/17/24 for chronic persistent low back pain despite physical therapy, interventional pain management, and medication management over the years without meaningful relief. Seeking a second surgical opinion. Updated imaging completed.    PSR - pls contact pt to schedule a surgical consultation with Dr. Bradford.     Savanah hodge   Episodes of uncontrolled nausea or vomiting not relieved by anti-nausea medication

## 2024-04-30 PROBLEM — M54.81 BILATERAL OCCIPITAL NEURALGIA: Status: ACTIVE | Noted: 2023-11-21

## 2024-04-30 NOTE — PROCEDURE
[FreeTextEntry1] : occipital nerve block [FreeTextEntry2] : Recurrent severe headaches  [FreeTextEntry3] : Occipital Nerve Block  Indication: Occipital Neuralgia                  Cervicogenic Headache                                                                         Patient consented for nerve block to greater / lesser occipital nerve bilateral  Risks and benefits and alternative treatments discussed.   Skin prepped with alcohol.    Ethyl Chloride spray used as topical anesthetic  Medications: LIDOCAINE 2%    3 cc  KENALOG 10 MG/ML  2 cc  Kenalog and lidocaine mixed in single 5 cc syringe and using a 30 g needle     greater and lesser OCCIPITAL NERVE injected.    Patient tolerated procedure well.

## 2024-05-01 NOTE — ASSESSMENT
[FreeTextEntry1] : patient with recent treatment with IVIG and IV decadron (allergic to solumedrol) with improvement of autoimmune encephalitis has been home for one day on oral steroids before he developed acute weakness of both hands with preserved to hyper reflexes.   Exacerbation of autoimmune encephalitis -  MRI Brain with and without contrast MR C spine with and without contrast consider repeat LP Restart decadron 4 mg q6 IV Consider plasma exchange admit to tele Tachycardia - secondary to anxiety and dehydration -  Check d _dimer IVF ECHO

## 2024-05-01 NOTE — HISTORY OF PRESENT ILLNESS
[FreeTextEntry1] : CC: autoimmune encephalitis  improved memory but still not back fully. loses part of days but not full days.  no hiccups. no panic attacks. sleeps well.   had second dose of rituxin today 1000 mg

## 2024-05-01 NOTE — HISTORY OF PRESENT ILLNESS
[FreeTextEntry1] : CC: Acute bilateral hand weakness  Patient comes in for acute checkup for bilateral hand weakness. Patient started developing distal UE weakness last night that got worse today with difficulty holding his phone. Also experiencing numbness in his forehands down. Also c/o being SOB and feeling anxious. No cognitive impairment or hiccups which was a big contributor to his symptoms in last week's admission. He has been complaint with his prednisone 60 mg daily. So far no additional CSF labs back.  IL 10 elevation noted.

## 2024-05-01 NOTE — PHYSICAL EXAM
[FreeTextEntry1] : The patient is alert and oriented x3, naming intact x3, repetition normal, follows three-step commands, and is able to participate fully in the history taking. Speech is normal with no evidence of dysarthria. Memory is intact: Immediate recall 3 out of 3, short-term 3 out of 3, remote memory intact Cranial nerves II through XII intact Motor exam: Upper and lower extremities 5 out of 5 power, normal tone. No abnormal movements noted. No cogwheeling Sensory exam: Intact to light touch and pinprick. Romberg negative. Coordination and vestibular exam: Finger to nose intact, except for mild dysmetria on the left.  no evidence of truncal or appendicular ataxia. No evidence of nystagmus. no vestibular symptoms elicited with head turning during ambulation.  Gait: Normal stance and gait.- good arm swing Reflexes: One to 2+ in upper and lower extremities. No pathological reflexes. Downgoing toes.

## 2024-05-01 NOTE — ASSESSMENT
[FreeTextEntry1] : Autoimmune encephalitis - IVIG 2gm/kg divided din 2 doses over two days in 2 weeks and then monthly No further rirtxin for now.   Headaches  - increase metoprolol to 25 bid.

## 2024-05-02 ENCOUNTER — APPOINTMENT (OUTPATIENT)
Dept: INFUSION THERAPY | Facility: CLINIC | Age: 39
End: 2024-05-02

## 2024-05-06 ENCOUNTER — APPOINTMENT (OUTPATIENT)
Dept: PSYCHIATRY | Facility: CLINIC | Age: 39
End: 2024-05-06

## 2024-05-10 ENCOUNTER — APPOINTMENT (OUTPATIENT)
Dept: NEUROLOGY | Facility: CLINIC | Age: 39
End: 2024-05-10
Payer: COMMERCIAL

## 2024-05-10 ENCOUNTER — APPOINTMENT (OUTPATIENT)
Dept: NEUROLOGY | Facility: CLINIC | Age: 39
End: 2024-05-10

## 2024-05-10 PROCEDURE — 96133 NRPSYC TST EVAL PHYS/QHP EA: CPT

## 2024-05-10 PROCEDURE — 96136 PSYCL/NRPSYC TST PHY/QHP 1ST: CPT

## 2024-05-10 PROCEDURE — 96137 PSYCL/NRPSYC TST PHY/QHP EA: CPT

## 2024-05-10 PROCEDURE — 96132 NRPSYC TST EVAL PHYS/QHP 1ST: CPT

## 2024-05-10 PROCEDURE — 96116 NUBHVL XM PHYS/QHP 1ST HR: CPT

## 2024-05-13 DIAGNOSIS — R06.6 HICCOUGH: ICD-10-CM

## 2024-05-13 RX ORDER — DULOXETINE HYDROCHLORIDE 30 MG/1
30 CAPSULE, DELAYED RELEASE PELLETS ORAL
Qty: 30 | Refills: 2 | Status: ACTIVE | COMMUNITY
Start: 2023-08-17

## 2024-05-13 RX ORDER — GABAPENTIN 100 MG/1
100 CAPSULE ORAL
Qty: 30 | Refills: 5 | Status: ACTIVE | COMMUNITY
Start: 2024-05-13 | End: 1900-01-01

## 2024-05-13 RX ORDER — OLANZAPINE 2.5 MG/1
2.5 TABLET, FILM COATED ORAL TWICE DAILY
Qty: 60 | Refills: 0 | Status: DISCONTINUED | COMMUNITY
Start: 2024-05-13 | End: 2024-05-13

## 2024-05-13 RX ORDER — MIRTAZAPINE 30 MG/1
30 TABLET, FILM COATED ORAL
Qty: 30 | Refills: 5 | Status: ACTIVE | COMMUNITY
Start: 2024-05-13 | End: 1900-01-01

## 2024-05-13 RX ORDER — METHOCARBAMOL 500 MG/1
500 TABLET, FILM COATED ORAL 3 TIMES DAILY
Qty: 90 | Refills: 2 | Status: DISCONTINUED | COMMUNITY
Start: 2024-02-23 | End: 2024-05-13

## 2024-05-13 RX ORDER — VERAPAMIL HYDROCHLORIDE 120 MG/1
120 CAPSULE, EXTENDED RELEASE ORAL DAILY
Qty: 30 | Refills: 5 | Status: DISCONTINUED | COMMUNITY
Start: 2024-02-21 | End: 2024-05-13

## 2024-05-13 RX ORDER — BACLOFEN 10 MG/1
10 TABLET ORAL
Qty: 90 | Refills: 3 | Status: DISCONTINUED | COMMUNITY
Start: 2024-05-13 | End: 2024-05-13

## 2024-05-13 RX ORDER — CYCLOBENZAPRINE HYDROCHLORIDE 10 MG/1
10 TABLET, FILM COATED ORAL
Qty: 20 | Refills: 2 | Status: DISCONTINUED | COMMUNITY
Start: 2024-02-23 | End: 2024-05-13

## 2024-05-13 RX ORDER — SUCRALFATE 1 G/10ML
1 SUSPENSION ORAL 4 TIMES DAILY
Qty: 2 | Refills: 0 | Status: DISCONTINUED | COMMUNITY
Start: 2023-06-06 | End: 2024-05-13

## 2024-05-22 ENCOUNTER — OUTPATIENT (OUTPATIENT)
Dept: OUTPATIENT SERVICES | Facility: HOSPITAL | Age: 39
LOS: 1 days | End: 2024-05-22
Payer: COMMERCIAL

## 2024-05-22 ENCOUNTER — APPOINTMENT (OUTPATIENT)
Dept: INFUSION THERAPY | Facility: CLINIC | Age: 39
End: 2024-05-22

## 2024-05-22 DIAGNOSIS — G04.81 OTHER ENCEPHALITIS AND ENCEPHALOMYELITIS: ICD-10-CM

## 2024-05-22 PROCEDURE — 96361 HYDRATE IV INFUSION ADD-ON: CPT

## 2024-05-22 PROCEDURE — 96366 THER/PROPH/DIAG IV INF ADDON: CPT

## 2024-05-22 PROCEDURE — 96365 THER/PROPH/DIAG IV INF INIT: CPT

## 2024-05-22 RX ORDER — DIPHENHYDRAMINE HCL 50 MG
25 CAPSULE ORAL ONCE
Refills: 0 | Status: COMPLETED | OUTPATIENT
Start: 2024-05-22 | End: 2024-05-22

## 2024-05-22 RX ORDER — ACETAMINOPHEN 500 MG
650 TABLET ORAL ONCE
Refills: 0 | Status: COMPLETED | OUTPATIENT
Start: 2024-05-22 | End: 2024-05-22

## 2024-05-22 RX ORDER — IMMUNE GLOBULIN (HUMAN) 10 G/100ML
45 INJECTION INTRAVENOUS; SUBCUTANEOUS ONCE
Refills: 0 | Status: COMPLETED | OUTPATIENT
Start: 2024-05-22 | End: 2024-05-22

## 2024-05-22 RX ORDER — SODIUM CHLORIDE 9 MG/ML
500 INJECTION INTRAMUSCULAR; INTRAVENOUS; SUBCUTANEOUS ONCE
Refills: 0 | Status: COMPLETED | OUTPATIENT
Start: 2024-05-22 | End: 2024-05-22

## 2024-05-22 RX ADMIN — Medication 650 MILLIGRAM(S): at 10:10

## 2024-05-22 RX ADMIN — Medication 650 MILLIGRAM(S): at 10:11

## 2024-05-22 RX ADMIN — SODIUM CHLORIDE 1000 MILLILITER(S): 9 INJECTION INTRAMUSCULAR; INTRAVENOUS; SUBCUTANEOUS at 10:06

## 2024-05-22 RX ADMIN — SODIUM CHLORIDE 1000 MILLILITER(S): 9 INJECTION INTRAMUSCULAR; INTRAVENOUS; SUBCUTANEOUS at 18:37

## 2024-05-22 RX ADMIN — SODIUM CHLORIDE 500 MILLILITER(S): 9 INJECTION INTRAMUSCULAR; INTRAVENOUS; SUBCUTANEOUS at 10:40

## 2024-05-22 RX ADMIN — IMMUNE GLOBULIN (HUMAN) 45 GRAM(S): 10 INJECTION INTRAVENOUS; SUBCUTANEOUS at 18:00

## 2024-05-22 RX ADMIN — SODIUM CHLORIDE 500 MILLILITER(S): 9 INJECTION INTRAMUSCULAR; INTRAVENOUS; SUBCUTANEOUS at 19:00

## 2024-05-22 RX ADMIN — IMMUNE GLOBULIN (HUMAN) 45 GRAM(S): 10 INJECTION INTRAVENOUS; SUBCUTANEOUS at 10:17

## 2024-05-22 RX ADMIN — Medication 25 MILLIGRAM(S): at 10:11

## 2024-05-28 RX ORDER — SODIUM CHLORIDE 9 MG/ML
500 INJECTION INTRAMUSCULAR; INTRAVENOUS; SUBCUTANEOUS ONCE
Refills: 0 | Status: COMPLETED | OUTPATIENT
Start: 2024-05-30 | End: 2024-05-30

## 2024-05-28 RX ORDER — IMMUNE GLOBULIN (HUMAN) 10 G/100ML
45 INJECTION INTRAVENOUS; SUBCUTANEOUS ONCE
Refills: 0 | Status: COMPLETED | OUTPATIENT
Start: 2024-05-30 | End: 2024-05-30

## 2024-05-30 ENCOUNTER — APPOINTMENT (OUTPATIENT)
Dept: INFUSION THERAPY | Facility: CLINIC | Age: 39
End: 2024-05-30

## 2024-05-30 ENCOUNTER — OUTPATIENT (OUTPATIENT)
Dept: OUTPATIENT SERVICES | Facility: HOSPITAL | Age: 39
LOS: 1 days | End: 2024-05-30
Payer: COMMERCIAL

## 2024-05-30 DIAGNOSIS — G04.81 OTHER ENCEPHALITIS AND ENCEPHALOMYELITIS: ICD-10-CM

## 2024-05-30 PROCEDURE — 96366 THER/PROPH/DIAG IV INF ADDON: CPT

## 2024-05-30 PROCEDURE — 96365 THER/PROPH/DIAG IV INF INIT: CPT

## 2024-05-30 RX ORDER — ACETAMINOPHEN 500 MG
650 TABLET ORAL ONCE
Refills: 0 | Status: COMPLETED | OUTPATIENT
Start: 2024-05-30 | End: 2024-05-30

## 2024-05-30 RX ORDER — DIPHENHYDRAMINE HCL 50 MG
25 CAPSULE ORAL ONCE
Refills: 0 | Status: COMPLETED | OUTPATIENT
Start: 2024-05-30 | End: 2024-05-30

## 2024-05-30 RX ADMIN — SODIUM CHLORIDE 1000 MILLILITER(S): 9 INJECTION INTRAMUSCULAR; INTRAVENOUS; SUBCUTANEOUS at 17:41

## 2024-05-30 RX ADMIN — IMMUNE GLOBULIN (HUMAN) 45 GRAM(S): 10 INJECTION INTRAVENOUS; SUBCUTANEOUS at 10:50

## 2024-05-30 RX ADMIN — Medication 650 MILLIGRAM(S): at 10:32

## 2024-05-30 RX ADMIN — SODIUM CHLORIDE 500 MILLILITER(S): 9 INJECTION INTRAMUSCULAR; INTRAVENOUS; SUBCUTANEOUS at 10:50

## 2024-05-30 RX ADMIN — IMMUNE GLOBULIN (HUMAN) 45 GRAM(S): 10 INJECTION INTRAVENOUS; SUBCUTANEOUS at 17:41

## 2024-05-30 RX ADMIN — Medication 25 MILLIGRAM(S): at 10:32

## 2024-05-30 RX ADMIN — Medication 650 MILLIGRAM(S): at 11:45

## 2024-05-30 RX ADMIN — SODIUM CHLORIDE 1000 MILLILITER(S): 9 INJECTION INTRAMUSCULAR; INTRAVENOUS; SUBCUTANEOUS at 10:20

## 2024-05-30 RX ADMIN — SODIUM CHLORIDE 500 MILLILITER(S): 9 INJECTION INTRAMUSCULAR; INTRAVENOUS; SUBCUTANEOUS at 18:22

## 2024-06-05 RX ORDER — SODIUM CHLORIDE 9 MG/ML
500 INJECTION INTRAMUSCULAR; INTRAVENOUS; SUBCUTANEOUS ONCE
Refills: 0 | Status: COMPLETED | OUTPATIENT
Start: 2024-06-06 | End: 2024-06-06

## 2024-06-06 ENCOUNTER — APPOINTMENT (OUTPATIENT)
Dept: INFUSION THERAPY | Facility: CLINIC | Age: 39
End: 2024-06-06

## 2024-06-06 ENCOUNTER — OUTPATIENT (OUTPATIENT)
Dept: OUTPATIENT SERVICES | Facility: HOSPITAL | Age: 39
LOS: 1 days | End: 2024-06-06
Payer: COMMERCIAL

## 2024-06-06 VITALS
HEART RATE: 83 BPM | WEIGHT: 220.02 LBS | SYSTOLIC BLOOD PRESSURE: 142 MMHG | TEMPERATURE: 98 F | RESPIRATION RATE: 17 BRPM | OXYGEN SATURATION: 97 % | HEIGHT: 72 IN | DIASTOLIC BLOOD PRESSURE: 96 MMHG

## 2024-06-06 DIAGNOSIS — G04.81 OTHER ENCEPHALITIS AND ENCEPHALOMYELITIS: ICD-10-CM

## 2024-06-06 PROCEDURE — 96366 THER/PROPH/DIAG IV INF ADDON: CPT

## 2024-06-06 PROCEDURE — 96365 THER/PROPH/DIAG IV INF INIT: CPT

## 2024-06-06 PROCEDURE — 96361 HYDRATE IV INFUSION ADD-ON: CPT

## 2024-06-06 RX ORDER — DIPHENHYDRAMINE HCL 50 MG
25 CAPSULE ORAL ONCE
Refills: 0 | Status: COMPLETED | OUTPATIENT
Start: 2024-06-06 | End: 2024-06-06

## 2024-06-06 RX ORDER — IMMUNE GLOBULIN (HUMAN) 10 G/100ML
45 INJECTION INTRAVENOUS; SUBCUTANEOUS ONCE
Refills: 0 | Status: COMPLETED | OUTPATIENT
Start: 2024-06-06 | End: 2024-06-06

## 2024-06-06 RX ORDER — ACETAMINOPHEN 500 MG
650 TABLET ORAL ONCE
Refills: 0 | Status: COMPLETED | OUTPATIENT
Start: 2024-06-06 | End: 2024-06-06

## 2024-06-06 RX ORDER — SODIUM CHLORIDE 9 MG/ML
500 INJECTION INTRAMUSCULAR; INTRAVENOUS; SUBCUTANEOUS ONCE
Refills: 0 | Status: COMPLETED | OUTPATIENT
Start: 2024-06-06 | End: 2024-06-06

## 2024-06-06 RX ADMIN — Medication 25 MILLIGRAM(S): at 09:35

## 2024-06-06 RX ADMIN — IMMUNE GLOBULIN (HUMAN) 45 GRAM(S): 10 INJECTION INTRAVENOUS; SUBCUTANEOUS at 10:00

## 2024-06-06 RX ADMIN — IMMUNE GLOBULIN (HUMAN) 45 GRAM(S): 10 INJECTION INTRAVENOUS; SUBCUTANEOUS at 14:00

## 2024-06-06 RX ADMIN — SODIUM CHLORIDE 1000 MILLILITER(S): 9 INJECTION INTRAMUSCULAR; INTRAVENOUS; SUBCUTANEOUS at 09:32

## 2024-06-06 RX ADMIN — SODIUM CHLORIDE 500 MILLILITER(S): 9 INJECTION INTRAMUSCULAR; INTRAVENOUS; SUBCUTANEOUS at 10:32

## 2024-06-06 RX ADMIN — Medication 650 MILLIGRAM(S): at 09:30

## 2024-06-06 RX ADMIN — SODIUM CHLORIDE 500 MILLILITER(S): 9 INJECTION INTRAMUSCULAR; INTRAVENOUS; SUBCUTANEOUS at 14:35

## 2024-06-06 RX ADMIN — Medication 650 MILLIGRAM(S): at 11:00

## 2024-06-06 RX ADMIN — SODIUM CHLORIDE 1000 MILLILITER(S): 9 INJECTION INTRAMUSCULAR; INTRAVENOUS; SUBCUTANEOUS at 14:10

## 2024-06-14 ENCOUNTER — APPOINTMENT (OUTPATIENT)
Dept: INFUSION THERAPY | Facility: CLINIC | Age: 39
End: 2024-06-14

## 2024-06-18 ENCOUNTER — APPOINTMENT (OUTPATIENT)
Dept: INFUSION THERAPY | Facility: CLINIC | Age: 39
End: 2024-06-18

## 2024-06-18 ENCOUNTER — OUTPATIENT (OUTPATIENT)
Dept: OUTPATIENT SERVICES | Facility: HOSPITAL | Age: 39
LOS: 1 days | End: 2024-06-18
Payer: COMMERCIAL

## 2024-06-18 VITALS
OXYGEN SATURATION: 98 % | DIASTOLIC BLOOD PRESSURE: 98 MMHG | HEART RATE: 76 BPM | TEMPERATURE: 98 F | SYSTOLIC BLOOD PRESSURE: 138 MMHG | RESPIRATION RATE: 16 BRPM

## 2024-06-18 VITALS
HEIGHT: 72 IN | DIASTOLIC BLOOD PRESSURE: 95 MMHG | WEIGHT: 210.1 LBS | SYSTOLIC BLOOD PRESSURE: 141 MMHG | HEART RATE: 79 BPM | TEMPERATURE: 98 F | RESPIRATION RATE: 16 BRPM | OXYGEN SATURATION: 99 %

## 2024-06-18 DIAGNOSIS — G04.81 OTHER ENCEPHALITIS AND ENCEPHALOMYELITIS: ICD-10-CM

## 2024-06-18 PROCEDURE — 96365 THER/PROPH/DIAG IV INF INIT: CPT

## 2024-06-18 PROCEDURE — 96366 THER/PROPH/DIAG IV INF ADDON: CPT

## 2024-06-18 RX ORDER — DIPHENHYDRAMINE HCL 12.5MG/5ML
25 ELIXIR ORAL ONCE
Refills: 0 | Status: COMPLETED | OUTPATIENT
Start: 2024-06-18 | End: 2024-06-18

## 2024-06-18 RX ORDER — SODIUM CHLORIDE 0.9 % (FLUSH) 0.9 %
500 SYRINGE (ML) INJECTION ONCE
Refills: 0 | Status: COMPLETED | OUTPATIENT
Start: 2024-06-18 | End: 2024-06-18

## 2024-06-18 RX ORDER — ACETAMINOPHEN 325 MG
650 TABLET ORAL ONCE
Refills: 0 | Status: COMPLETED | OUTPATIENT
Start: 2024-06-18 | End: 2024-06-18

## 2024-06-18 RX ORDER — IMMUNE GLOBULIN (HUMAN) 10 G/100ML
45 INJECTION INTRAVENOUS; SUBCUTANEOUS ONCE
Refills: 0 | Status: COMPLETED | OUTPATIENT
Start: 2024-06-18 | End: 2024-06-18

## 2024-06-18 RX ADMIN — Medication 650 MILLIGRAM(S): at 10:32

## 2024-06-18 RX ADMIN — Medication 1000 MILLILITER(S): at 10:00

## 2024-06-18 RX ADMIN — Medication 1000 MILLILITER(S): at 13:27

## 2024-06-18 RX ADMIN — Medication 500 MILLILITER(S): at 10:30

## 2024-06-18 RX ADMIN — Medication 500 MILLILITER(S): at 13:57

## 2024-06-18 RX ADMIN — IMMUNE GLOBULIN (HUMAN) 64.29 GRAM(S): 10 INJECTION INTRAVENOUS; SUBCUTANEOUS at 10:31

## 2024-06-18 RX ADMIN — Medication 25 MILLIGRAM(S): at 10:00

## 2024-06-18 RX ADMIN — IMMUNE GLOBULIN (HUMAN) 45 GRAM(S): 10 INJECTION INTRAVENOUS; SUBCUTANEOUS at 13:27

## 2024-06-18 RX ADMIN — Medication 650 MILLIGRAM(S): at 10:00

## 2024-06-21 RX ORDER — SODIUM CHLORIDE 0.9 % (FLUSH) 0.9 %
500 SYRINGE (ML) INJECTION ONCE
Refills: 0 | Status: DISCONTINUED | OUTPATIENT
Start: 2024-06-27 | End: 2024-07-05

## 2024-06-24 ENCOUNTER — APPOINTMENT (OUTPATIENT)
Dept: INFUSION THERAPY | Facility: CLINIC | Age: 39
End: 2024-06-24

## 2024-06-27 ENCOUNTER — OUTPATIENT (OUTPATIENT)
Dept: OUTPATIENT SERVICES | Facility: HOSPITAL | Age: 39
LOS: 1 days | End: 2024-06-27
Payer: COMMERCIAL

## 2024-06-27 ENCOUNTER — APPOINTMENT (OUTPATIENT)
Dept: INFUSION THERAPY | Facility: CLINIC | Age: 39
End: 2024-06-27

## 2024-06-27 VITALS
SYSTOLIC BLOOD PRESSURE: 123 MMHG | TEMPERATURE: 98 F | HEART RATE: 70 BPM | DIASTOLIC BLOOD PRESSURE: 82 MMHG | OXYGEN SATURATION: 99 % | RESPIRATION RATE: 18 BRPM

## 2024-06-27 VITALS
HEART RATE: 80 BPM | OXYGEN SATURATION: 100 % | WEIGHT: 210.1 LBS | DIASTOLIC BLOOD PRESSURE: 92 MMHG | TEMPERATURE: 98 F | RESPIRATION RATE: 20 BRPM | SYSTOLIC BLOOD PRESSURE: 140 MMHG | HEIGHT: 72 IN

## 2024-06-27 DIAGNOSIS — G04.81 OTHER ENCEPHALITIS AND ENCEPHALOMYELITIS: ICD-10-CM

## 2024-06-27 PROCEDURE — 96365 THER/PROPH/DIAG IV INF INIT: CPT

## 2024-06-27 PROCEDURE — 96366 THER/PROPH/DIAG IV INF ADDON: CPT

## 2024-06-27 RX ORDER — IMMUNE GLOBULIN (HUMAN) 10 G/100ML
45 INJECTION INTRAVENOUS; SUBCUTANEOUS ONCE
Refills: 0 | Status: COMPLETED | OUTPATIENT
Start: 2024-06-27 | End: 2024-06-27

## 2024-06-27 RX ORDER — DIPHENHYDRAMINE HCL 12.5MG/5ML
25 ELIXIR ORAL ONCE
Refills: 0 | Status: COMPLETED | OUTPATIENT
Start: 2024-06-27 | End: 2024-06-27

## 2024-06-27 RX ORDER — ACETAMINOPHEN 325 MG
650 TABLET ORAL ONCE
Refills: 0 | Status: COMPLETED | OUTPATIENT
Start: 2024-06-27 | End: 2024-06-27

## 2024-06-27 RX ORDER — SODIUM CHLORIDE 0.9 % (FLUSH) 0.9 %
500 SYRINGE (ML) INJECTION ONCE
Refills: 0 | Status: COMPLETED | OUTPATIENT
Start: 2024-06-27 | End: 2024-06-27

## 2024-06-27 RX ADMIN — Medication 500 MILLILITER(S): at 10:52

## 2024-06-27 RX ADMIN — Medication 650 MILLIGRAM(S): at 10:21

## 2024-06-27 RX ADMIN — IMMUNE GLOBULIN (HUMAN) 64.29 GRAM(S): 10 INJECTION INTRAVENOUS; SUBCUTANEOUS at 10:34

## 2024-06-27 RX ADMIN — Medication 1000 MILLILITER(S): at 10:22

## 2024-06-27 RX ADMIN — Medication 650 MILLIGRAM(S): at 10:22

## 2024-06-27 RX ADMIN — Medication 25 MILLIGRAM(S): at 10:21

## 2024-07-03 RX ORDER — SODIUM CHLORIDE 0.9 % (FLUSH) 0.9 %
500 SYRINGE (ML) INJECTION ONCE
Refills: 0 | Status: COMPLETED | OUTPATIENT
Start: 2024-07-05 | End: 2024-07-05

## 2024-07-05 ENCOUNTER — OUTPATIENT (OUTPATIENT)
Dept: OUTPATIENT SERVICES | Facility: HOSPITAL | Age: 39
LOS: 1 days | End: 2024-07-05
Payer: COMMERCIAL

## 2024-07-05 ENCOUNTER — APPOINTMENT (OUTPATIENT)
Dept: INFUSION THERAPY | Facility: CLINIC | Age: 39
End: 2024-07-05

## 2024-07-05 VITALS
SYSTOLIC BLOOD PRESSURE: 139 MMHG | DIASTOLIC BLOOD PRESSURE: 91 MMHG | RESPIRATION RATE: 16 BRPM | TEMPERATURE: 98 F | OXYGEN SATURATION: 100 % | HEART RATE: 67 BPM

## 2024-07-05 VITALS
OXYGEN SATURATION: 100 % | HEART RATE: 87 BPM | HEIGHT: 72 IN | RESPIRATION RATE: 18 BRPM | SYSTOLIC BLOOD PRESSURE: 156 MMHG | DIASTOLIC BLOOD PRESSURE: 120 MMHG | WEIGHT: 210.1 LBS | TEMPERATURE: 98 F

## 2024-07-05 DIAGNOSIS — G04.81 OTHER ENCEPHALITIS AND ENCEPHALOMYELITIS: ICD-10-CM

## 2024-07-05 PROCEDURE — 96366 THER/PROPH/DIAG IV INF ADDON: CPT

## 2024-07-05 PROCEDURE — 96365 THER/PROPH/DIAG IV INF INIT: CPT

## 2024-07-05 RX ORDER — SODIUM CHLORIDE 0.9 % (FLUSH) 0.9 %
500 SYRINGE (ML) INJECTION ONCE
Refills: 0 | Status: COMPLETED | OUTPATIENT
Start: 2024-07-05 | End: 2024-07-05

## 2024-07-05 RX ORDER — METOPROLOL TARTRATE 50 MG
25 TABLET ORAL ONCE
Refills: 0 | Status: COMPLETED | OUTPATIENT
Start: 2024-07-05 | End: 2024-07-05

## 2024-07-05 RX ORDER — ACETAMINOPHEN 325 MG
650 TABLET ORAL ONCE
Refills: 0 | Status: COMPLETED | OUTPATIENT
Start: 2024-07-05 | End: 2024-07-05

## 2024-07-05 RX ORDER — DIPHENHYDRAMINE HCL 12.5MG/5ML
25 ELIXIR ORAL ONCE
Refills: 0 | Status: COMPLETED | OUTPATIENT
Start: 2024-07-05 | End: 2024-07-05

## 2024-07-05 RX ORDER — IMMUNE GLOBULIN (HUMAN) 10 G/100ML
45 INJECTION INTRAVENOUS; SUBCUTANEOUS ONCE
Refills: 0 | Status: COMPLETED | OUTPATIENT
Start: 2024-07-05 | End: 2024-07-05

## 2024-07-05 RX ADMIN — Medication 650 MILLIGRAM(S): at 11:37

## 2024-07-05 RX ADMIN — Medication 1000 MILLILITER(S): at 15:10

## 2024-07-05 RX ADMIN — Medication 1000 MILLILITER(S): at 11:15

## 2024-07-05 RX ADMIN — Medication 650 MILLIGRAM(S): at 12:02

## 2024-07-05 RX ADMIN — Medication 500 MILLILITER(S): at 12:02

## 2024-07-05 RX ADMIN — IMMUNE GLOBULIN (HUMAN) 45 GRAM(S): 10 INJECTION INTRAVENOUS; SUBCUTANEOUS at 12:07

## 2024-07-05 RX ADMIN — Medication 25 MILLIGRAM(S): at 12:45

## 2024-07-05 RX ADMIN — Medication 25 MILLIGRAM(S): at 11:37

## 2024-07-05 RX ADMIN — IMMUNE GLOBULIN (HUMAN) 45 GRAM(S): 10 INJECTION INTRAVENOUS; SUBCUTANEOUS at 15:05

## 2024-07-12 ENCOUNTER — OUTPATIENT (OUTPATIENT)
Dept: OUTPATIENT SERVICES | Facility: HOSPITAL | Age: 39
LOS: 1 days | End: 2024-07-12
Payer: COMMERCIAL

## 2024-07-12 ENCOUNTER — APPOINTMENT (OUTPATIENT)
Dept: INFUSION THERAPY | Facility: CLINIC | Age: 39
End: 2024-07-12

## 2024-07-12 VITALS
HEIGHT: 72 IN | OXYGEN SATURATION: 99 % | SYSTOLIC BLOOD PRESSURE: 143 MMHG | RESPIRATION RATE: 18 BRPM | HEART RATE: 73 BPM | DIASTOLIC BLOOD PRESSURE: 98 MMHG | TEMPERATURE: 99 F | WEIGHT: 199.96 LBS

## 2024-07-12 DIAGNOSIS — G04.81 OTHER ENCEPHALITIS AND ENCEPHALOMYELITIS: ICD-10-CM

## 2024-07-12 PROCEDURE — 96413 CHEMO IV INFUSION 1 HR: CPT

## 2024-07-12 PROCEDURE — 96415 CHEMO IV INFUSION ADDL HR: CPT

## 2024-07-12 PROCEDURE — 96361 HYDRATE IV INFUSION ADD-ON: CPT

## 2024-07-12 RX ORDER — IMMUNE GLOBULIN (HUMAN) 10 G/100ML
45 INJECTION INTRAVENOUS; SUBCUTANEOUS ONCE
Refills: 0 | Status: COMPLETED | OUTPATIENT
Start: 2024-07-12 | End: 2024-07-12

## 2024-07-12 RX ORDER — DIPHENHYDRAMINE HCL 12.5MG/5ML
25 ELIXIR ORAL ONCE
Refills: 0 | Status: COMPLETED | OUTPATIENT
Start: 2024-07-12 | End: 2024-07-12

## 2024-07-12 RX ORDER — SODIUM CHLORIDE 0.9 % (FLUSH) 0.9 %
500 SYRINGE (ML) INJECTION ONCE
Refills: 0 | Status: COMPLETED | OUTPATIENT
Start: 2024-07-12 | End: 2024-07-12

## 2024-07-12 RX ORDER — ACETAMINOPHEN 325 MG
650 TABLET ORAL ONCE
Refills: 0 | Status: COMPLETED | OUTPATIENT
Start: 2024-07-12 | End: 2024-07-12

## 2024-07-12 RX ADMIN — Medication 500 MILLILITER(S): at 09:50

## 2024-07-12 RX ADMIN — Medication 650 MILLIGRAM(S): at 10:10

## 2024-07-12 RX ADMIN — Medication 650 MILLIGRAM(S): at 09:50

## 2024-07-12 RX ADMIN — Medication 500 MILLILITER(S): at 15:00

## 2024-07-12 RX ADMIN — Medication 1000 MILLILITER(S): at 09:20

## 2024-07-12 RX ADMIN — IMMUNE GLOBULIN (HUMAN) 45 GRAM(S): 10 INJECTION INTRAVENOUS; SUBCUTANEOUS at 14:30

## 2024-07-12 RX ADMIN — Medication 25 MILLIGRAM(S): at 10:20

## 2024-07-12 RX ADMIN — Medication 1000 MILLILITER(S): at 14:30

## 2024-07-12 RX ADMIN — IMMUNE GLOBULIN (HUMAN) 45 GRAM(S): 10 INJECTION INTRAVENOUS; SUBCUTANEOUS at 10:30

## 2024-07-18 RX ORDER — ACETAMINOPHEN 325 MG
650 TABLET ORAL ONCE
Refills: 0 | Status: COMPLETED | OUTPATIENT
Start: 2024-07-19 | End: 2024-07-19

## 2024-07-18 RX ORDER — DIPHENHYDRAMINE HCL 12.5MG/5ML
25 ELIXIR ORAL ONCE
Refills: 0 | Status: COMPLETED | OUTPATIENT
Start: 2024-07-19 | End: 2024-07-19

## 2024-07-18 RX ORDER — SODIUM CHLORIDE 0.9 % (FLUSH) 0.9 %
500 SYRINGE (ML) INJECTION ONCE
Refills: 0 | Status: COMPLETED | OUTPATIENT
Start: 2024-07-19 | End: 2024-07-19

## 2024-07-18 RX ORDER — IMMUNE GLOBULIN (HUMAN) 10 G/100ML
45 INJECTION INTRAVENOUS; SUBCUTANEOUS ONCE
Refills: 0 | Status: COMPLETED | OUTPATIENT
Start: 2024-07-19 | End: 2024-07-19

## 2024-07-19 ENCOUNTER — APPOINTMENT (OUTPATIENT)
Dept: INFUSION THERAPY | Facility: CLINIC | Age: 39
End: 2024-07-19

## 2024-07-19 ENCOUNTER — OUTPATIENT (OUTPATIENT)
Dept: OUTPATIENT SERVICES | Facility: HOSPITAL | Age: 39
LOS: 1 days | End: 2024-07-19
Payer: COMMERCIAL

## 2024-07-19 DIAGNOSIS — G04.81 OTHER ENCEPHALITIS AND ENCEPHALOMYELITIS: ICD-10-CM

## 2024-07-19 PROCEDURE — 96365 THER/PROPH/DIAG IV INF INIT: CPT

## 2024-07-19 PROCEDURE — 96366 THER/PROPH/DIAG IV INF ADDON: CPT

## 2024-07-19 RX ADMIN — Medication 500 MILLILITER(S): at 15:50

## 2024-07-19 RX ADMIN — Medication 650 MILLIGRAM(S): at 11:20

## 2024-07-19 RX ADMIN — Medication 500 MILLILITER(S): at 11:10

## 2024-07-19 RX ADMIN — Medication 1000 MILLILITER(S): at 15:20

## 2024-07-19 RX ADMIN — Medication 25 MILLIGRAM(S): at 11:40

## 2024-07-19 RX ADMIN — Medication 1000 MILLILITER(S): at 10:40

## 2024-07-19 RX ADMIN — IMMUNE GLOBULIN (HUMAN) 45 GRAM(S): 10 INJECTION INTRAVENOUS; SUBCUTANEOUS at 15:20

## 2024-07-19 RX ADMIN — IMMUNE GLOBULIN (HUMAN) 112.5 GRAM(S): 10 INJECTION INTRAVENOUS; SUBCUTANEOUS at 11:40

## 2024-07-19 RX ADMIN — Medication 650 MILLIGRAM(S): at 11:40

## 2024-07-25 ENCOUNTER — APPOINTMENT (OUTPATIENT)
Dept: INFUSION THERAPY | Facility: CLINIC | Age: 39
End: 2024-07-25

## 2024-08-02 ENCOUNTER — APPOINTMENT (OUTPATIENT)
Dept: INFUSION THERAPY | Facility: CLINIC | Age: 39
End: 2024-08-02

## 2024-08-09 ENCOUNTER — APPOINTMENT (OUTPATIENT)
Dept: INFUSION THERAPY | Facility: CLINIC | Age: 39
End: 2024-08-09

## 2024-08-16 ENCOUNTER — APPOINTMENT (OUTPATIENT)
Dept: INFUSION THERAPY | Facility: CLINIC | Age: 39
End: 2024-08-16

## 2024-08-19 ENCOUNTER — APPOINTMENT (OUTPATIENT)
Dept: FAMILY MEDICINE | Facility: CLINIC | Age: 39
End: 2024-08-19

## 2024-08-20 ENCOUNTER — APPOINTMENT (OUTPATIENT)
Dept: NEUROLOGY | Facility: CLINIC | Age: 39
End: 2024-08-20

## 2024-08-20 VITALS — DIASTOLIC BLOOD PRESSURE: 71 MMHG | HEART RATE: 6 BPM | SYSTOLIC BLOOD PRESSURE: 178 MMHG

## 2024-08-20 DIAGNOSIS — G04.81 OTHER ENCEPHALITIS AND ENCEPHALOMYELITIS: ICD-10-CM

## 2024-08-20 DIAGNOSIS — G47.00 INSOMNIA, UNSPECIFIED: ICD-10-CM

## 2024-08-20 DIAGNOSIS — R51.9 HEADACHE, UNSPECIFIED: ICD-10-CM

## 2024-08-20 DIAGNOSIS — F41.1 GENERALIZED ANXIETY DISORDER: ICD-10-CM

## 2024-08-20 DIAGNOSIS — G43.709 CHRONIC MIGRAINE W/OUT AURA, NOT INTRACTABLE, W/OUT STATUS MIGRAINOSUS: ICD-10-CM

## 2024-08-20 PROCEDURE — 99214 OFFICE O/P EST MOD 30 MIN: CPT

## 2024-08-20 NOTE — HISTORY OF PRESENT ILLNESS
[FreeTextEntry1] : CC: Autoimmune encephalitis follow up  Patient is doing better but still feels fatigued. He is back at work and is back to perdiem work as well. Cognitively intact.  has not needed to take a daytime nap over the last two weeks.  Finished total of 12 weeks of weekly IVIG because he could not tolerate full dose IVIG on a monthly basis secondary to headaches. Now no headaches x 2 weeks.  Remeron 30 mg 1/2 tab qhs helps him sleep.

## 2024-09-17 PROBLEM — K21.9 GASTRO-ESOPHAGEAL REFLUX DISEASE WITHOUT ESOPHAGITIS: Chronic | Status: ACTIVE | Noted: 2024-08-25

## 2024-09-17 PROBLEM — G93.9 DISORDER OF BRAIN, UNSPECIFIED: Chronic | Status: ACTIVE | Noted: 2024-08-25

## 2024-09-17 PROBLEM — I10 ESSENTIAL (PRIMARY) HYPERTENSION: Chronic | Status: ACTIVE | Noted: 2024-08-25

## 2024-09-26 ENCOUNTER — APPOINTMENT (OUTPATIENT)
Dept: NUCLEAR MEDICINE | Facility: HOSPITAL | Age: 39
End: 2024-09-26

## 2024-09-26 ENCOUNTER — APPOINTMENT (OUTPATIENT)
Dept: FAMILY MEDICINE | Facility: CLINIC | Age: 39
End: 2024-09-26

## 2024-09-26 ENCOUNTER — RESULT REVIEW (OUTPATIENT)
Age: 39
End: 2024-09-26

## 2024-09-26 ENCOUNTER — OUTPATIENT (OUTPATIENT)
Dept: OUTPATIENT SERVICES | Facility: HOSPITAL | Age: 39
LOS: 1 days | End: 2024-09-26
Payer: COMMERCIAL

## 2024-09-26 LAB — GLUCOSE BLDC GLUCOMTR-MCNC: 115 MG/DL — HIGH (ref 70–99)

## 2024-09-26 PROCEDURE — 78999 UNLISTED MISC PX DX NUC MED: CPT

## 2024-09-26 PROCEDURE — 78999 UNLISTED MISC PX DX NUC MED: CPT | Mod: 26

## 2024-09-26 PROCEDURE — 78608 BRAIN IMAGING (PET): CPT | Mod: 26

## 2024-09-26 PROCEDURE — 82962 GLUCOSE BLOOD TEST: CPT

## 2024-09-26 PROCEDURE — A9552: CPT

## 2024-09-26 PROCEDURE — 78608 BRAIN IMAGING (PET): CPT

## 2024-10-22 NOTE — ED PROVIDER NOTE - CONDITION AT DISCHARGE:
[Alert] : alert [Normocephalic] : normocephalic [Flat Open Anterior [place]] : flat open anterior fontanelle [Icteric sclera] : icteric sclera [PERRL] : PERRL [Red Reflex Bilateral] : red reflex bilateral [Normally Placed Ears] : normally placed ears [Auricles Well Formed] : auricles well formed [Clear Tympanic membranes] : clear tympanic membranes [Light reflex present] : light reflex present [Bony structures visible] : bony structures visible [Patent Auditory Canal] : patent auditory canal [Nares Patent] : nares patent [Palate Intact] : palate intact [Uvula Midline] : uvula midline [Supple, full passive range of motion] : supple, full passive range of motion [Symmetric Chest Rise] : symmetric chest rise [Clear to Auscultation Bilaterally] : clear to auscultation bilaterally [Regular Rate and Rhythm] : regular rate and rhythm [S1, S2 present] : S1, S2 present [+2 Femoral Pulses] : +2 femoral pulses [Soft] : soft [Bowel Sounds] : bowel sounds present [Umbilical Stump Dry, Clean, Intact] : umbilical stump dry, clean, intact [Normal external genitalia] : normal external genitalia [Patent Vagina] : patent vagina [Patent] : patent [Normally Placed] : normally placed [No Abnormal Lymph Nodes Palpated] : no abnormal lymph nodes palpated [Symmetric Flexed Extremities] : symmetric flexed extremities [Startle Reflex] : startle reflex present [Suck Reflex] : suck reflex present [Rooting] : rooting reflex present [Palmar Grasp] : palmar grasp present [Plantar Grasp] : plantar reflex present [Symmetric [place]] : symmetric [place] [Dermal Melanocytosis] : Dermal Melanocytosis [Acute Distress] : no acute distress [Discharge] : no discharge [Palpable Masses] : no palpable masses Satisfactory [Murmurs] : no murmurs [Tender] : nontender [Distended] : not distended [Hepatomegaly] : no hepatomegaly [Splenomegaly] : no splenomegaly [Clitoromegaly] : no clitoromegaly [Turk-Ortolani] : negative Turk-Ortolani [Spinal Dimple] : no spinal dimple [Tuft of Hair] : no tuft of hair [Jaundice] : not jaundice

## 2024-10-26 NOTE — END OF VISIT
[Time Spent: ___ minutes] : I have spent [unfilled] minutes of time on the encounter.
close supervision/contact guard

## 2024-11-05 ENCOUNTER — RESULT REVIEW (OUTPATIENT)
Age: 39
End: 2024-11-05

## 2024-11-05 ENCOUNTER — OUTPATIENT (OUTPATIENT)
Dept: OUTPATIENT SERVICES | Facility: HOSPITAL | Age: 39
LOS: 1 days | End: 2024-11-05
Payer: COMMERCIAL

## 2024-11-05 ENCOUNTER — APPOINTMENT (OUTPATIENT)
Dept: NUCLEAR MEDICINE | Facility: HOSPITAL | Age: 39
End: 2024-11-05

## 2024-11-05 ENCOUNTER — LABORATORY RESULT (OUTPATIENT)
Age: 39
End: 2024-11-05

## 2024-11-05 PROCEDURE — 82962 GLUCOSE BLOOD TEST: CPT

## 2024-11-05 PROCEDURE — 78999 UNLISTED MISC PX DX NUC MED: CPT

## 2024-11-05 PROCEDURE — A9552: CPT

## 2024-11-05 PROCEDURE — 85610 PROTHROMBIN TIME: CPT

## 2024-11-05 PROCEDURE — 85730 THROMBOPLASTIN TIME PARTIAL: CPT

## 2024-11-05 PROCEDURE — 36415 COLL VENOUS BLD VENIPUNCTURE: CPT

## 2024-11-05 PROCEDURE — 78999 UNLISTED MISC PX DX NUC MED: CPT | Mod: 26

## 2024-11-05 PROCEDURE — 78608 BRAIN IMAGING (PET): CPT | Mod: 26

## 2024-11-05 PROCEDURE — 78608 BRAIN IMAGING (PET): CPT

## 2024-11-06 DIAGNOSIS — G04.81 OTHER ENCEPHALITIS AND ENCEPHALOMYELITIS: ICD-10-CM

## 2024-11-06 LAB
C3 SERPL-MCNC: 152 MG/DL
C4 SERPL-MCNC: 39 MG/DL
CD16+CD56+ CELLS # BLD: 231 CELLS/UL
CD16+CD56+ CELLS NFR BLD: 18 %
CD19 CELLS NFR BLD: 80 CELLS/UL
CD3 CELLS # BLD: 914 CELLS/UL
CD3 CELLS NFR BLD: 74 %
CD3+CD4+ CELLS # BLD: 547 CELLS/UL
CD3+CD4+ CELLS NFR BLD: 46 %
CD3+CD4+ CELLS/CD3+CD8+ CLL SPEC: 1.68 RATIO
CD3+CD8+ CELLS # SPEC: 325 CELLS/UL
CD3+CD8+ CELLS NFR BLD: 27 %
CELLS.CD3-CD19+/CELLS IN BLOOD: 6 %
COVID-19 NUCLEOCAPSID  GAM ANTIBODY INTERPRETATION: POSITIVE
COVID-19 SPIKE DOMAIN ANTIBODY INTERPRETATION: POSITIVE
CRP SERPL-MCNC: 35 MG/L
ERYTHROCYTE [SEDIMENTATION RATE] IN BLOOD BY WESTERGREN METHOD: 43 MM/HR
HCT VFR BLD CALC: 45.2 %
HGB BLD-MCNC: 13.9 G/DL
INR PPP: 1.05 RATIO
MCHC RBC-ENTMCNC: 24 PG
MCHC RBC-ENTMCNC: 30.8 G/DL
MCV RBC AUTO: 78.2 FL
PLATELET # BLD AUTO: 337 K/UL
PT BLD: 12.4 SEC
RAPID RVP RESULT: NOT DETECTED
RBC # BLD: 5.78 M/UL
RBC # FLD: 19.9 %
SARS-COV-2 AB SERPL IA-ACNC: >250 U/ML
SARS-COV-2 AB SERPL QL IA: 63.1 INDEX
SARS-COV-2 RNA NPH QL NAA+NON-PROBE: NOT DETECTED
THYROGLOB AB SERPL-ACNC: <15 IU/ML
THYROPEROXIDASE AB SERPL IA-ACNC: 9.5 IU/ML
WBC # FLD AUTO: 11.19 K/UL

## 2024-11-07 ENCOUNTER — OUTPATIENT (OUTPATIENT)
Dept: OUTPATIENT SERVICES | Facility: HOSPITAL | Age: 39
LOS: 1 days | End: 2024-11-07

## 2024-11-07 LAB
ANA SER IF-ACNC: NEGATIVE
B2 GLYCOPROT1 IGA SERPL IA-ACNC: <2 U/ML
B2 GLYCOPROT1 IGG SER-ACNC: <1.4 U/ML
B2 GLYCOPROT1 IGM SER-ACNC: <1.5 U/ML
ENA SS-A AB SER IA-ACNC: <0.2 AL
ENA SS-B AB SER IA-ACNC: <0.2 AL

## 2024-11-08 ENCOUNTER — RESULT REVIEW (OUTPATIENT)
Age: 39
End: 2024-11-08

## 2024-11-08 ENCOUNTER — APPOINTMENT (OUTPATIENT)
Dept: INTERVENTIONAL RADIOLOGY/VASCULAR | Facility: HOSPITAL | Age: 39
End: 2024-11-08

## 2024-11-08 ENCOUNTER — OUTPATIENT (OUTPATIENT)
Dept: OUTPATIENT SERVICES | Facility: HOSPITAL | Age: 39
LOS: 1 days | End: 2024-11-08
Payer: COMMERCIAL

## 2024-11-08 LAB
A-TUMOR NECROSIS FACT SERPL-MCNC: <1.7 PG/ML
IGNF SERPL-MCNC: <4.2 PG/ML
IL10 SERPL-MCNC: 7.5 PG/ML
IL12 SERPL-MCNC: <1.9 PG/ML
IL13 SERPL-MCNC: <1.7 PG/ML
IL17A SERPL-MCNC: <1.4 PG/ML
IL2 SERPL-MCNC: 295.6 PG/ML
IL2 SERPL-MCNC: <2.1 PG/ML
IL4 SERPL-MCNC: <2.2 PG/ML
IL6 SERPL-MCNC: 2 PG/ML
IL8 SERPL-MCNC: <3 PG/ML
INTERLEUKIN 1 BETA: <6.5 PG/ML
INTERLEUKIN 5: <2.1 PG/ML

## 2024-11-08 PROCEDURE — 99152 MOD SED SAME PHYS/QHP 5/>YRS: CPT

## 2024-11-08 PROCEDURE — 86255 FLUORESCENT ANTIBODY SCREEN: CPT

## 2024-11-08 PROCEDURE — 89051 BODY FLUID CELL COUNT: CPT

## 2024-11-08 PROCEDURE — 62328 DX LMBR SPI PNXR W/FLUOR/CT: CPT

## 2024-11-08 PROCEDURE — 82784 ASSAY IGA/IGD/IGG/IGM EACH: CPT

## 2024-11-08 PROCEDURE — 84157 ASSAY OF PROTEIN OTHER: CPT

## 2024-11-08 PROCEDURE — 83520 IMMUNOASSAY QUANT NOS NONAB: CPT

## 2024-11-08 PROCEDURE — 99153 MOD SED SAME PHYS/QHP EA: CPT

## 2024-11-08 PROCEDURE — 83916 OLIGOCLONAL BANDS: CPT

## 2024-11-08 PROCEDURE — 86341 ISLET CELL ANTIBODY: CPT

## 2024-11-08 PROCEDURE — 83873 ASSAY OF CSF PROTEIN: CPT

## 2024-11-08 PROCEDURE — 82945 GLUCOSE OTHER FLUID: CPT

## 2024-11-08 PROCEDURE — 36415 COLL VENOUS BLD VENIPUNCTURE: CPT

## 2024-11-08 PROCEDURE — 82042 OTHER SOURCE ALBUMIN QUAN EA: CPT

## 2024-11-08 PROCEDURE — 82040 ASSAY OF SERUM ALBUMIN: CPT

## 2024-11-11 ENCOUNTER — EMERGENCY (EMERGENCY)
Facility: HOSPITAL | Age: 39
LOS: 1 days | Discharge: HOME | End: 2024-11-11
Attending: EMERGENCY MEDICINE | Admitting: EMERGENCY MEDICINE
Payer: COMMERCIAL

## 2024-11-11 VITALS
OXYGEN SATURATION: 98 % | HEART RATE: 67 BPM | RESPIRATION RATE: 16 BRPM | SYSTOLIC BLOOD PRESSURE: 147 MMHG | TEMPERATURE: 98 F | DIASTOLIC BLOOD PRESSURE: 88 MMHG

## 2024-11-11 VITALS
DIASTOLIC BLOOD PRESSURE: 76 MMHG | TEMPERATURE: 98 F | RESPIRATION RATE: 16 BRPM | SYSTOLIC BLOOD PRESSURE: 131 MMHG | HEART RATE: 74 BPM | OXYGEN SATURATION: 99 %

## 2024-11-11 DIAGNOSIS — R70.0 ELEVATED ERYTHROCYTE SEDIMENTATION RATE: ICD-10-CM

## 2024-11-11 DIAGNOSIS — G43.909 MIGRAINE, UNSPECIFIED, NOT INTRACTABLE, WITHOUT STATUS MIGRAINOSUS: ICD-10-CM

## 2024-11-11 DIAGNOSIS — Z88.6 ALLERGY STATUS TO ANALGESIC AGENT: ICD-10-CM

## 2024-11-11 DIAGNOSIS — M54.2 CERVICALGIA: ICD-10-CM

## 2024-11-11 DIAGNOSIS — R79.82 ELEVATED C-REACTIVE PROTEIN (CRP): ICD-10-CM

## 2024-11-11 DIAGNOSIS — G04.81 OTHER ENCEPHALITIS AND ENCEPHALOMYELITIS: ICD-10-CM

## 2024-11-11 DIAGNOSIS — R51.9 HEADACHE, UNSPECIFIED: ICD-10-CM

## 2024-11-11 LAB
ALBUMIN MFR SERPL ELPH: 57.6 %
ALBUMIN SERPL ELPH-MCNC: 4.6 G/DL — SIGNIFICANT CHANGE UP (ref 3.3–5)
ALBUMIN SERPL-MCNC: 4.1 G/DL
ALBUMIN/GLOB SERPL: 1.3 RATIO
ALP SERPL-CCNC: 85 U/L — SIGNIFICANT CHANGE UP (ref 40–120)
ALPHA1 GLOB MFR SERPL ELPH: 5.1 %
ALPHA1 GLOB SERPL ELPH-MCNC: 0.4 G/DL
ALPHA2 GLOB MFR SERPL ELPH: 11.6 %
ALPHA2 GLOB SERPL ELPH-MCNC: 0.8 G/DL
ALT FLD-CCNC: 33 U/L — SIGNIFICANT CHANGE UP (ref 10–45)
ANION GAP SERPL CALC-SCNC: 14 MMOL/L — SIGNIFICANT CHANGE UP (ref 5–17)
AST SERPL-CCNC: 26 U/L — SIGNIFICANT CHANGE UP (ref 10–40)
B-GLOBULIN MFR SERPL ELPH: 14.6 %
B-GLOBULIN SERPL ELPH-MCNC: 1.1 G/DL
BASOPHILS # BLD AUTO: 0.06 K/UL — SIGNIFICANT CHANGE UP (ref 0–0.2)
BASOPHILS NFR BLD AUTO: 0.9 % — SIGNIFICANT CHANGE UP (ref 0–2)
BILIRUB SERPL-MCNC: 0.3 MG/DL — SIGNIFICANT CHANGE UP (ref 0.2–1.2)
BUN SERPL-MCNC: 16 MG/DL — SIGNIFICANT CHANGE UP (ref 7–23)
CALCIUM SERPL-MCNC: 9.5 MG/DL — SIGNIFICANT CHANGE UP (ref 8.4–10.5)
CHLORIDE SERPL-SCNC: 101 MMOL/L — SIGNIFICANT CHANGE UP (ref 96–108)
CO2 SERPL-SCNC: 26 MMOL/L — SIGNIFICANT CHANGE UP (ref 22–31)
CREAT SERPL-MCNC: 0.92 MG/DL — SIGNIFICANT CHANGE UP (ref 0.5–1.3)
CRP SERPL-MCNC: 8 MG/L — HIGH (ref 0–4)
EGFR: 110 ML/MIN/1.73M2 — SIGNIFICANT CHANGE UP
EOSINOPHIL # BLD AUTO: 0.14 K/UL — SIGNIFICANT CHANGE UP (ref 0–0.5)
EOSINOPHIL NFR BLD AUTO: 2.2 % — SIGNIFICANT CHANGE UP (ref 0–6)
ERYTHROCYTE [SEDIMENTATION RATE] IN BLOOD: 16 MM/HR — HIGH
GAMMA GLOB FLD ELPH-MCNC: 0.8 G/DL
GAMMA GLOB MFR SERPL ELPH: 11.1 %
GLUCOSE SERPL-MCNC: 103 MG/DL — HIGH (ref 70–99)
HCT VFR BLD CALC: 42.8 % — SIGNIFICANT CHANGE UP (ref 39–50)
HGB BLD-MCNC: 13 G/DL — SIGNIFICANT CHANGE UP (ref 13–17)
IMM GRANULOCYTES NFR BLD AUTO: 0.3 % — SIGNIFICANT CHANGE UP (ref 0–0.9)
INTERPRETATION SERPL IEP-IMP: NORMAL
LYMPHOCYTES # BLD AUTO: 1.44 K/UL — SIGNIFICANT CHANGE UP (ref 1–3.3)
LYMPHOCYTES # BLD AUTO: 22.6 % — SIGNIFICANT CHANGE UP (ref 13–44)
M PROTEIN SPEC IFE-MCNC: NORMAL
MCHC RBC-ENTMCNC: 23.1 PG — LOW (ref 27–34)
MCHC RBC-ENTMCNC: 30.4 G/DL — LOW (ref 32–36)
MCV RBC AUTO: 76 FL — LOW (ref 80–100)
MONOCYTES # BLD AUTO: 0.75 K/UL — SIGNIFICANT CHANGE UP (ref 0–0.9)
MONOCYTES NFR BLD AUTO: 11.8 % — SIGNIFICANT CHANGE UP (ref 2–14)
NEUTROPHILS # BLD AUTO: 3.95 K/UL — SIGNIFICANT CHANGE UP (ref 1.8–7.4)
NEUTROPHILS NFR BLD AUTO: 62.2 % — SIGNIFICANT CHANGE UP (ref 43–77)
NRBC # BLD: 0 /100 WBCS — SIGNIFICANT CHANGE UP (ref 0–0)
PLATELET # BLD AUTO: 357 K/UL — SIGNIFICANT CHANGE UP (ref 150–400)
POTASSIUM SERPL-MCNC: 4.2 MMOL/L — SIGNIFICANT CHANGE UP (ref 3.5–5.3)
POTASSIUM SERPL-SCNC: 4.2 MMOL/L — SIGNIFICANT CHANGE UP (ref 3.5–5.3)
PROT SERPL-MCNC: 7.2 G/DL
PROT SERPL-MCNC: 7.2 G/DL
PROT SERPL-MCNC: 7.7 G/DL — SIGNIFICANT CHANGE UP (ref 6–8.3)
RBC # BLD: 5.63 M/UL — SIGNIFICANT CHANGE UP (ref 4.2–5.8)
RBC # FLD: 17.3 % — HIGH (ref 10.3–14.5)
SODIUM SERPL-SCNC: 141 MMOL/L — SIGNIFICANT CHANGE UP (ref 135–145)
VGKC AB SER-SCNC: 0 PMOL/L
WBC # BLD: 6.36 K/UL — SIGNIFICANT CHANGE UP (ref 3.8–10.5)
WBC # FLD AUTO: 6.36 K/UL — SIGNIFICANT CHANGE UP (ref 3.8–10.5)

## 2024-11-11 PROCEDURE — 96375 TX/PRO/DX INJ NEW DRUG ADDON: CPT

## 2024-11-11 PROCEDURE — 85025 COMPLETE CBC W/AUTO DIFF WBC: CPT

## 2024-11-11 PROCEDURE — 80053 COMPREHEN METABOLIC PANEL: CPT

## 2024-11-11 PROCEDURE — 36415 COLL VENOUS BLD VENIPUNCTURE: CPT

## 2024-11-11 PROCEDURE — 99284 EMERGENCY DEPT VISIT MOD MDM: CPT | Mod: 25

## 2024-11-11 PROCEDURE — 86140 C-REACTIVE PROTEIN: CPT

## 2024-11-11 PROCEDURE — 96366 THER/PROPH/DIAG IV INF ADDON: CPT

## 2024-11-11 PROCEDURE — 99285 EMERGENCY DEPT VISIT HI MDM: CPT

## 2024-11-11 PROCEDURE — 85652 RBC SED RATE AUTOMATED: CPT

## 2024-11-11 PROCEDURE — 96365 THER/PROPH/DIAG IV INF INIT: CPT

## 2024-11-11 RX ORDER — MAGNESIUM SULFATE IN 0.9% NACL 2 G/50 ML
2 INTRAVENOUS SOLUTION, PIGGYBACK (ML) INTRAVENOUS ONCE
Refills: 0 | Status: COMPLETED | OUTPATIENT
Start: 2024-11-11 | End: 2024-11-11

## 2024-11-11 RX ORDER — MAGNESIUM SULFATE IN 0.9% NACL 2 G/50 ML
2 INTRAVENOUS SOLUTION, PIGGYBACK (ML) INTRAVENOUS ONCE
Refills: 0 | Status: DISCONTINUED | OUTPATIENT
Start: 2024-11-11 | End: 2024-11-11

## 2024-11-11 RX ORDER — BUTALBITAL, ACETAMINOPHEN AND CAFFEINE 300; 50; 40 MG/1; MG/1; MG/1
50-300-40 CAPSULE ORAL EVERY 6 HOURS
Qty: 30 | Refills: 0 | Status: ACTIVE | COMMUNITY
Start: 2024-11-11 | End: 1900-01-01

## 2024-11-11 RX ORDER — DIPHENHYDRAMINE HCL 12.5MG/5ML
50 ELIXIR ORAL ONCE
Refills: 0 | Status: COMPLETED | OUTPATIENT
Start: 2024-11-11 | End: 2024-11-11

## 2024-11-11 RX ORDER — CAFFEINE/SODIUM BENZOATE 250 MG/ML
500 AMPUL (ML) INJECTION ONCE
Refills: 0 | Status: COMPLETED | OUTPATIENT
Start: 2024-11-11 | End: 2024-11-11

## 2024-11-11 RX ORDER — SODIUM CHLORIDE 9 MG/ML
1000 INJECTION, SOLUTION INTRAMUSCULAR; INTRAVENOUS; SUBCUTANEOUS ONCE
Refills: 0 | Status: COMPLETED | OUTPATIENT
Start: 2024-11-11 | End: 2024-11-11

## 2024-11-11 RX ORDER — METOCLOPRAMIDE HCL 10 MG
10 TABLET ORAL ONCE
Refills: 0 | Status: COMPLETED | OUTPATIENT
Start: 2024-11-11 | End: 2024-11-11

## 2024-11-11 RX ORDER — KETOROLAC TROMETHAMINE 30 MG/ML
15 INJECTION INTRAMUSCULAR; INTRAVENOUS ONCE
Refills: 0 | Status: DISCONTINUED | OUTPATIENT
Start: 2024-11-11 | End: 2024-11-11

## 2024-11-11 RX ADMIN — Medication 50 MILLIGRAM(S): at 15:29

## 2024-11-11 RX ADMIN — Medication 25 GRAM(S): at 17:03

## 2024-11-11 RX ADMIN — Medication 10 MILLIGRAM(S): at 15:29

## 2024-11-11 RX ADMIN — KETOROLAC TROMETHAMINE 15 MILLIGRAM(S): 30 INJECTION INTRAMUSCULAR; INTRAVENOUS at 15:29

## 2024-11-11 RX ADMIN — SODIUM CHLORIDE 2000 MILLILITER(S): 9 INJECTION, SOLUTION INTRAMUSCULAR; INTRAVENOUS; SUBCUTANEOUS at 15:29

## 2024-11-11 RX ADMIN — SODIUM CHLORIDE 1000 MILLILITER(S): 9 INJECTION, SOLUTION INTRAMUSCULAR; INTRAVENOUS; SUBCUTANEOUS at 17:55

## 2024-11-11 RX ADMIN — Medication 500 MILLIGRAM(S): at 17:55

## 2024-11-11 RX ADMIN — KETOROLAC TROMETHAMINE 15 MILLIGRAM(S): 30 INJECTION INTRAMUSCULAR; INTRAVENOUS at 17:55

## 2024-11-11 RX ADMIN — Medication 1002 MILLIGRAM(S): at 15:58

## 2024-11-11 NOTE — ED PROVIDER NOTE - CLINICAL SUMMARY MEDICAL DECISION MAKING FREE TEXT BOX
38 yo M with PMH of autoimmune encephalitis (post viral meningitis) s/p IVIG therapy - follows with Dr. Weston- s/p LP 3 days ago for continued w/up for severe headaches p/w worsening headaches X 2 weeks which have worsened since his LP and are associated with photo[phobia and neck pain. .No fevers. Patient contacted Dr. Weston and was asked to present to ED for further evaluation. Has been taking pain meds including Fioricet at home without any relief.     ED course: VS noted and unremarkable. Pt afebrile. Exam is grossly normal. Given migraine cocktail in ED including IVF/Toradol/Reglan and Benadryl. Dr. Weston in ED and evaluated patient. Patient also given IV Caffeine. Labs noted and unremarkable. Patient feeling better post medications and tolerating po. To f/up outpt. Bed rest recommended. Non-toxic appearing and stable for discharge. To follow up outpatient. Strict return precautions given. 40 yo M with PMH of autoimmune encephalitis (post viral meningitis) s/p IVIG therapy - follows with Dr. Weston- s/p LP 3 days ago for continued w/up for severe headaches p/w worsening headaches X 2 weeks which have worsened since his LP and are associated with photo[phobia and neck pain. .No fevers. Patient contacted Dr. Weston and was asked to present to ED for further evaluation. Has been taking pain meds including Fioricet at home without any relief.     ED course: VS noted and unremarkable. Pt afebrile. Exam is grossly normal. Given migraine cocktail in ED including IVF/Toradol/Reglan/Magnesium and Benadryl. Dr. Weston in ED and evaluated patient. Patient also given IV Caffeine. Labs noted and unremarkable except for mildly elevated ESR/CRP. Patient feeling better post medications and tolerating po. To f/up outpt. Bed rest recommended. Non-toxic appearing and stable for discharge. To follow up outpatient. Strict return precautions given.

## 2024-11-11 NOTE — ED PROVIDER NOTE - PATIENT PORTAL LINK FT
You can access the FollowMyHealth Patient Portal offered by [facility] by registering at the following website: [email or web address]. By joining [facility]’s FollowMyHealth portal, you will also be able to view your health information using other applications (apps) compatible with our system.

## 2024-11-11 NOTE — ED ADULT NURSE NOTE - OBJECTIVE STATEMENT
pt received into spot 14 A&Ox4 ambulatory appears comfortable arrives via walk in triage for eval of head ache x 2 weeks reports recent hx of viral meningitis and encephalopathy being worked up on outpt basis with MD Gilliam  has had IVIG spinal epidural and multiple other treatments still with persistent HA no blurry vision change sin vision numbness tingling PERRLA noted reports nausea and weakness especially while standing and changing positions. IV place dlabs drawn and sent

## 2024-11-11 NOTE — ED PROVIDER NOTE - NSFOLLOWUPINSTRUCTIONS_ED_ALL_ED_FT
Please follow-up with Trista as advised.    Dr. Weston has recommended bed rest.    Return to the emergency department if you develop any concerning symptoms.  Continue taking your migraine medications as prescribed.    Headache    A headache is pain or discomfort felt around the head or neck area. The specific cause of a headache may not be found as there are many types including tension headaches, migraine headaches, and cluster headaches. Watch your condition for any changes. Things you can do to manage your pain include taking over the counter and prescription medications as instructed by your health care provider, lying down in a dark quiet room, limiting stress, getting regular sleep, and refraining from alcohol and tobacco products.    SEEK IMMEDIATE MEDICAL CARE IF YOU HAVE ANY OF THE FOLLOWING SYMPTOMS: fever, vomiting, stiff neck, loss of vision, problems with speech, muscle weakness, loss of balance, trouble walking, passing out, or confusion.

## 2024-11-11 NOTE — ED ADULT TRIAGE NOTE - CHIEF COMPLAINT QUOTE
pt c/o worsening headaches x 2 weeks. endorsed pmh autoimmune encephalitis after a viral meningitis infection.

## 2024-11-11 NOTE — ED PROVIDER NOTE - OBJECTIVE STATEMENT
38 yo M with PMH of autoimmune encephalitis (post viral meningitis) s/p IVIG therapy - follows with Dr. Weston- s/p LP 3 days ago for continued w/up for severe headaches p/w worsening headaches X 2 weeks which have worsened since his LP 3 days ago and are associated with photo[phobia, N/V and neck pain. .No fevers. Patient contacted Dr. Weston and was asked to present to ED for further evaluation. Has been taking pain meds including Fioricet at home without any relief. No CP/SOB. No other complaints.

## 2024-11-12 LAB — NEOPTERIN CSF-SCNC: 4.2 NG/ML

## 2024-11-13 ENCOUNTER — APPOINTMENT (OUTPATIENT)
Dept: NEUROLOGY | Facility: CLINIC | Age: 39
End: 2024-11-13

## 2024-11-13 ENCOUNTER — EMERGENCY (EMERGENCY)
Facility: HOSPITAL | Age: 39
LOS: 1 days | Discharge: HOME | End: 2024-11-13
Attending: EMERGENCY MEDICINE | Admitting: EMERGENCY MEDICINE
Payer: COMMERCIAL

## 2024-11-13 VITALS
RESPIRATION RATE: 18 BRPM | DIASTOLIC BLOOD PRESSURE: 111 MMHG | HEIGHT: 71 IN | WEIGHT: 220.02 LBS | TEMPERATURE: 97 F | HEART RATE: 78 BPM | OXYGEN SATURATION: 97 % | SYSTOLIC BLOOD PRESSURE: 157 MMHG

## 2024-11-13 VITALS
DIASTOLIC BLOOD PRESSURE: 68 MMHG | OXYGEN SATURATION: 98 % | RESPIRATION RATE: 16 BRPM | SYSTOLIC BLOOD PRESSURE: 125 MMHG | HEART RATE: 64 BPM

## 2024-11-13 DIAGNOSIS — G04.81 OTHER ENCEPHALITIS AND ENCEPHALOMYELITIS: ICD-10-CM

## 2024-11-13 DIAGNOSIS — R51.9 HEADACHE, UNSPECIFIED: ICD-10-CM

## 2024-11-13 DIAGNOSIS — Z88.8 ALLERGY STATUS TO OTHER DRUGS, MEDICAMENTS AND BIOLOGICAL SUBSTANCES: ICD-10-CM

## 2024-11-13 DIAGNOSIS — G97.1 OTHER REACTION TO SPINAL AND LUMBAR PUNCTURE: ICD-10-CM

## 2024-11-13 LAB
AMPA-R ABCBA: NEGATIVE
AMPHIPHYSIN IGG TITR SER IF: NEGATIVE
ANION GAP SERPL CALC-SCNC: 11 MMOL/L — SIGNIFICANT CHANGE UP (ref 5–17)
ANNOTATION COMMENT IMP: NORMAL
APTT BLD: 30.3 SEC — SIGNIFICANT CHANGE UP (ref 24.5–35.6)
BASOPHILS # BLD AUTO: 0.05 K/UL — SIGNIFICANT CHANGE UP (ref 0–0.2)
BASOPHILS NFR BLD AUTO: 0.7 % — SIGNIFICANT CHANGE UP (ref 0–2)
BUN SERPL-MCNC: 16 MG/DL — SIGNIFICANT CHANGE UP (ref 7–23)
CALCIUM SERPL-MCNC: 9.5 MG/DL — SIGNIFICANT CHANGE UP (ref 8.4–10.5)
CASPR2-IGG CBA, S: NEGATIVE
CHLORIDE SERPL-SCNC: 104 MMOL/L — SIGNIFICANT CHANGE UP (ref 96–108)
CO2 SERPL-SCNC: 25 MMOL/L — SIGNIFICANT CHANGE UP (ref 22–31)
CREAT SERPL-MCNC: 0.88 MG/DL — SIGNIFICANT CHANGE UP (ref 0.5–1.3)
CV2 IGG TITR SER: NEGATIVE
EGFR: 114 ML/MIN/1.73M2 — SIGNIFICANT CHANGE UP
EOSINOPHIL # BLD AUTO: 0.2 K/UL — SIGNIFICANT CHANGE UP (ref 0–0.5)
EOSINOPHIL NFR BLD AUTO: 2.6 % — SIGNIFICANT CHANGE UP (ref 0–6)
EV RNA SPEC QL NAA+PROBE: NEGATIVE
GABA-B ABCBA: NEGATIVE
GAD65 AB SER-MCNC: 0.02 NMOL/L
GFAP IFA, S: NEGATIVE
GLIAL NUC TYPE 1 AB TITR SER: NEGATIVE
GLUCOSE SERPL-MCNC: 116 MG/DL — HIGH (ref 70–99)
HCT VFR BLD CALC: 41.1 % — SIGNIFICANT CHANGE UP (ref 39–50)
HGB BLD-MCNC: 13.2 G/DL — SIGNIFICANT CHANGE UP (ref 13–17)
HU1 AB TITR SER: NEGATIVE
HU2 AB TITR SER IF: NEGATIVE
HU3 AB TITR SER: NEGATIVE
IMM GRANULOCYTES NFR BLD AUTO: 0.4 % — SIGNIFICANT CHANGE UP (ref 0–0.9)
IMMUNOLOGIST REVIEW: NORMAL
INR BLD: 1.04 — SIGNIFICANT CHANGE UP (ref 0.85–1.16)
LGI1-IGG CBA, S: NEGATIVE
LYMPHOCYTES # BLD AUTO: 1.12 K/UL — SIGNIFICANT CHANGE UP (ref 1–3.3)
LYMPHOCYTES # BLD AUTO: 14.7 % — SIGNIFICANT CHANGE UP (ref 13–44)
MCHC RBC-ENTMCNC: 24.3 PG — LOW (ref 27–34)
MCHC RBC-ENTMCNC: 32.1 G/DL — SIGNIFICANT CHANGE UP (ref 32–36)
MCV RBC AUTO: 75.7 FL — LOW (ref 80–100)
MGLUR1 AB IFA, S: NEGATIVE
MONOCYTES # BLD AUTO: 0.75 K/UL — SIGNIFICANT CHANGE UP (ref 0–0.9)
MONOCYTES NFR BLD AUTO: 9.9 % — SIGNIFICANT CHANGE UP (ref 2–14)
NEUROCHONDRIN-IFA, SERUM: NEGATIVE
NEUTROPHILS # BLD AUTO: 5.46 K/UL — SIGNIFICANT CHANGE UP (ref 1.8–7.4)
NEUTROPHILS NFR BLD AUTO: 71.7 % — SIGNIFICANT CHANGE UP (ref 43–77)
NFL CHAIN SERPL IA-MCNC: 4 PG/ML
NIF IFA, S: NEGATIVE
NMDA-R ABCBA: NEGATIVE
NRBC # BLD: 0 /100 WBCS — SIGNIFICANT CHANGE UP (ref 0–0)
PCA-1 AB TITR SER: NEGATIVE
PCA-2 AB TITR SER: NEGATIVE
PCA-TR AB TITR SER: NEGATIVE
PLATELET # BLD AUTO: 330 K/UL — SIGNIFICANT CHANGE UP (ref 150–400)
POTASSIUM SERPL-MCNC: 4.2 MMOL/L — SIGNIFICANT CHANGE UP (ref 3.5–5.3)
POTASSIUM SERPL-SCNC: 4.2 MMOL/L — SIGNIFICANT CHANGE UP (ref 3.5–5.3)
PROTHROM AB SERPL-ACNC: 11.9 SEC — SIGNIFICANT CHANGE UP (ref 9.9–13.4)
RBC # BLD: 5.43 M/UL — SIGNIFICANT CHANGE UP (ref 4.2–5.8)
RBC # FLD: 17.4 % — HIGH (ref 10.3–14.5)
SODIUM SERPL-SCNC: 140 MMOL/L — SIGNIFICANT CHANGE UP (ref 135–145)
WBC # BLD: 7.61 K/UL — SIGNIFICANT CHANGE UP (ref 3.8–10.5)
WBC # FLD AUTO: 7.61 K/UL — SIGNIFICANT CHANGE UP (ref 3.8–10.5)

## 2024-11-13 PROCEDURE — 85025 COMPLETE CBC W/AUTO DIFF WBC: CPT

## 2024-11-13 PROCEDURE — 96361 HYDRATE IV INFUSION ADD-ON: CPT

## 2024-11-13 PROCEDURE — 36415 COLL VENOUS BLD VENIPUNCTURE: CPT

## 2024-11-13 PROCEDURE — 96375 TX/PRO/DX INJ NEW DRUG ADDON: CPT

## 2024-11-13 PROCEDURE — 96374 THER/PROPH/DIAG INJ IV PUSH: CPT

## 2024-11-13 PROCEDURE — 80048 BASIC METABOLIC PNL TOTAL CA: CPT

## 2024-11-13 PROCEDURE — 99284 EMERGENCY DEPT VISIT MOD MDM: CPT | Mod: 25

## 2024-11-13 PROCEDURE — 85610 PROTHROMBIN TIME: CPT

## 2024-11-13 PROCEDURE — 99284 EMERGENCY DEPT VISIT MOD MDM: CPT

## 2024-11-13 PROCEDURE — 85730 THROMBOPLASTIN TIME PARTIAL: CPT

## 2024-11-13 RX ORDER — MIDAZOLAM IN 5 % DEXTROSE/PF 15 MG/30ML
2 SYRINGE (ML) INTRAVENOUS ONCE
Refills: 0 | Status: DISCONTINUED | OUTPATIENT
Start: 2024-11-13 | End: 2024-11-13

## 2024-11-13 RX ORDER — OLANZAPINE 20 MG/1
5 TABLET ORAL ONCE
Refills: 0 | Status: COMPLETED | OUTPATIENT
Start: 2024-11-13 | End: 2024-11-13

## 2024-11-13 RX ORDER — SODIUM CHLORIDE 9 MG/ML
1000 INJECTION, SOLUTION INTRAMUSCULAR; INTRAVENOUS; SUBCUTANEOUS ONCE
Refills: 0 | Status: COMPLETED | OUTPATIENT
Start: 2024-11-13 | End: 2024-11-13

## 2024-11-13 RX ORDER — ACETAMINOPHEN 500 MG
1000 TABLET ORAL ONCE
Refills: 0 | Status: COMPLETED | OUTPATIENT
Start: 2024-11-13 | End: 2024-11-13

## 2024-11-13 RX ADMIN — Medication 400 MILLIGRAM(S): at 12:06

## 2024-11-13 RX ADMIN — Medication 1000 MILLIGRAM(S): at 12:30

## 2024-11-13 RX ADMIN — SODIUM CHLORIDE 1000 MILLILITER(S): 9 INJECTION, SOLUTION INTRAMUSCULAR; INTRAVENOUS; SUBCUTANEOUS at 12:19

## 2024-11-13 RX ADMIN — Medication 2 MILLIGRAM(S): at 12:28

## 2024-11-13 RX ADMIN — SODIUM CHLORIDE 1000 MILLILITER(S): 9 INJECTION, SOLUTION INTRAMUSCULAR; INTRAVENOUS; SUBCUTANEOUS at 13:15

## 2024-11-13 RX ADMIN — OLANZAPINE 5 MILLIGRAM(S): 20 TABLET ORAL at 13:14

## 2024-11-13 NOTE — ED PROVIDER NOTE - PHYSICAL EXAMINATION
VITAL SIGNS: I have reviewed nursing notes and confirm.  CONSTITUTIONAL: Well-developed; well-nourished; in no acute distress.  SKIN: Agree with RN documentation regarding decubitus evaluation. Remainder of skin exam is warm and dry, no acute rash.  HEAD: Normocephalic; atraumatic.  EYES: PERRL, EOM intact; conjunctiva and sclera clear.  ENT: No nasal discharge; airway clear.  NECK: Supple; non tender.  CARD: S1, S2 normal; no murmurs, gallops, or rubs. Regular rate and rhythm.  RESP: No wheezes, rales or rhonchi.  ABD: Normal bowel sounds; soft; non-distended; non-tender; no hepatosplenomegaly.  EXT: Normal ROM. No clubbing, cyanosis or edema.  LYMPH: No acute cervical adenopathy.  NEURO: Alert & Oriented x 3. CN II-XII intact. No facial droop. Clear speech. CHEW w/ 5/5 strength x 4 ext. Normal sensation. No pronator drift. No dysdidokinesia nor dysmetria. Normal heel-to-shin.  PSYCH: Cooperative, appropriate.

## 2024-11-13 NOTE — ED PROVIDER NOTE - CLINICAL SUMMARY MEDICAL DECISION MAKING FREE TEXT BOX
40 yo m with pmh of autoimmune encephalitis s/p IVIG therapy - follows with Dr. Weston s/p LP several days prior for further workup of ongoing headache presents to ED with positional headache.  Seen in ED two days prior and received caffeine cocktail, IVFs, other meds with minimal relief.  Pt instructed to return to ED for anesthesia intervention for blood patch.  Pt denies fever, chills, neck stiffness, neuro deficits.    VSS  neuro intact  Pt received blood patch from anesthesia and observed for several hours in ED with good relief of headache and ambulating in ED without difficulty.  Pt understands dc and follow up.

## 2024-11-13 NOTE — PRE-ANESTHESIA EVALUATION ADULT - NSANTHADDINFOFT_GEN_ALL_CORE
I merle patietn at length I dw patient at length the pathophys of pdph, normal course and treatment options. Since he has not had improvement with conservative tx and the pdph is interfering with daily functioning, blood patch is reccomended. Patient agreees to procedure after full description of risks including epidural hematoma, infection, failure of blood patch, worsening ha and arachnoiditis. All questions answered

## 2024-11-13 NOTE — ED PROVIDER NOTE - NSFOLLOWUPINSTRUCTIONS_ED_ALL_ED_FT
Continue to take your daily medications as prescribed.  Follow up with Dr. Weston as scheduled.  Return to ED with worsening symptoms or other concerns.  Stay well and feel better.    Spinal Headache  A spinal headache is a severe headache that can happen after a person has had a lumbar puncture or epidural anesthesia. During these procedures, a needle is passed between the bones of the spine. The headache usually starts from a few hours to 1–2 days after the procedure. The headache can last for a few days. In rare cases, it can last for more than a week.    What are the causes?  This condition is caused by a leak of spinal fluid from the spine through the hole that is left by the needle.    What are the signs or symptoms?  Symptoms of this condition include:  A severe headache.  A headache that is worse when you sit or stand and better when you lie down.  Neck pain and stiffness, especially when tilting your chin toward your chest.  Nausea and vomiting.  How is this diagnosed?  This condition is usually diagnosed based on:  Your medical history.  Your symptoms.  A CT scan or MRI of the brain to help rule out other conditions.  How is this treated?  Treatment for this condition may include:  Replacing fluids that leaked through the needle hole. Fluids may be replaced by:  Drinking more fluids.  Getting fluids through an IV.  Caffeine to help reduce your headache. Your health care provider may recommend drinking caffeinated beverages such as soda, coffee, or tea.  Having an epidural blood patch procedure. In this procedure, a small amount of your blood is injected into the area of the leak to seal it.  Pain medicines.  Resting and lying flat for a few days.  Follow these instructions at home:  Cups of hot coffee and tea.   A comparison of three sample cups showing dark yellow, yellow, and pale yellow urine.  Take over-the-counter and prescription medicines only as told by your health care provider.  Drink enough fluid to keep your urine pale yellow.  Drink caffeinated beverages as told by your health care provider.  Lie down to relieve pain if your pain gets worse when you sit or stand.  Return to your normal activities as told by your health care provider. Ask your health care provider what activities are safe for you.  Keep all follow-up visits. This is important.  Contact a health care provider if:  You are nauseous and vomit.  Get help right away if:  Your pain becomes very severe.  Your pain cannot be controlled.  You develop a fever.  You have a stiff neck.  You have vision problems.  You lose control of your bowel or bladder (have incontinence).  You have trouble walking, you feel weak, or you lose feeling in part of your body.  Summary  A spinal headache is a severe headache that can happen after a person has had a lumbar puncture or epidural anesthesia.  This condition is caused by a leak of spinal fluid from the spine through the hole that is left by the needle. The headache can last for a few days. In rare cases, it lasts for more than a week.  Supportive measures, such as drinking more fluid and taking pain medicines, are usually recommended. In some cases, it may be necessary to inject a small amount of your blood into the area of the leak to seal it.  This information is not intended to replace advice given to you by your health care provider. Make sure you discuss any questions you have with your health care provider.

## 2024-11-13 NOTE — PRE-ANESTHESIA EVALUATION ADULT - NSANTHPMHFT_GEN_ALL_CORE
pt with autoimmune encephalitis followed by neuro. Had LP done on friday and next day developed severe positional headaches associated with photophobia. Admitted monday for conservative tx of pdph - caffine fluids and bedrest all with minimal improvedment- dcd home yesterday and symptoms worsen. Currently with 9/10 ha with standing, neck stiffness, photophobia. no fevers no other neurosymptoms- pt with autoimmune encephalitis followed by neuro. Had LP done on friday and next day developed severe positional headaches associated with photophobia. Admitted monday for conservative tx of pdph - caffine fluids and bedrest all with minimal improvement- dcd home yesterday and symptoms worsen. Currently with 9/10 ha with standing, neck stiffness, photophobia. no fevers no other neuro symptoms- wbc and coags normal

## 2024-11-13 NOTE — ED PROVIDER NOTE - OBJECTIVE STATEMENT
38 yo m with pmh of autoimmune encephalitis s/p IVIG therapy - follows with Dr. Weston s/p LP several days prior for further workup of ongoing headache presents to ED with positional headache.  Seen in ED two days prior and received caffeine cocktail, IVFs, other meds with minimal relief.  Pt instructed to return to ED for anesthesia intervention for blood patch.  Pt denies fever, chills, neck stiffness, neuro deficits.

## 2024-11-13 NOTE — ED ADULT NURSE NOTE - NS_SISCREENINGSR_GEN_ALL_ED

## 2024-11-13 NOTE — ED ADULT TRIAGE NOTE - MODE OF ARRIVAL
Wear splint full time for 1 more week.   Then begin weaning out of the splint for short periods throughout the day.  Always wear splint with any strenuous activity   Next week begin gentle exercises (up and down, washing the table)  
Walk in

## 2024-11-13 NOTE — ED ADULT NURSE NOTE - OBJECTIVE STATEMENT
39 M / presents to ED c/o intractable headache . Patient had LP done last Friday . Patient reports he is here for blood patch . Denies numbness/ tingling / sob /cp/fever.

## 2024-11-13 NOTE — ED ADULT TRIAGE NOTE - CHIEF COMPLAINT QUOTE
Pt c/o "intractable headache", pt recently had LP friday endorsing "I came to get a blood patch done". Hx autoimmune encephalitis and HTN.

## 2024-11-13 NOTE — ED ADULT NURSE REASSESSMENT NOTE - NS ED NURSE REASSESS COMMENT FT1
Patient is fully awake with headache is improving . Patient is fully awake still maintaining  flat supine position. Patient in NAD.

## 2024-11-13 NOTE — ED ADULT NURSE NOTE - NSICDXPASTMEDICALHX_GEN_ALL_CORE_FT
PAST MEDICAL HISTORY:  Encephalomyopathy     GERD (gastroesophageal reflux disease)     HTN (hypertension)     Hypertension     Meningitis     Migraine

## 2024-11-15 ENCOUNTER — APPOINTMENT (OUTPATIENT)
Dept: BARIATRICS | Facility: CLINIC | Age: 39
End: 2024-11-15
Payer: COMMERCIAL

## 2024-11-15 VITALS — HEIGHT: 71 IN | BODY MASS INDEX: 30.8 KG/M2 | WEIGHT: 220 LBS

## 2024-11-15 DIAGNOSIS — R73.03 PREDIABETES.: ICD-10-CM

## 2024-11-15 DIAGNOSIS — Z76.89 PERSONS ENCOUNTERING HEALTH SERVICES IN OTHER SPECIFIED CIRCUMSTANCES: ICD-10-CM

## 2024-11-15 DIAGNOSIS — R63.5 ABNORMAL WEIGHT GAIN: ICD-10-CM

## 2024-11-15 DIAGNOSIS — E66.9 OBESITY, UNSPECIFIED: ICD-10-CM

## 2024-11-15 DIAGNOSIS — Z86.39 PERSONAL HISTORY OF OTHER ENDOCRINE, NUTRITIONAL AND METABOLIC DISEASE: ICD-10-CM

## 2024-11-15 DIAGNOSIS — R63.8 OTHER SYMPTOMS AND SIGNS CONCERNING FOOD AND FLUID INTAKE: ICD-10-CM

## 2024-11-15 DIAGNOSIS — R63.2 POLYPHAGIA: ICD-10-CM

## 2024-11-15 DIAGNOSIS — Z86.79 PERSONAL HISTORY OF OTHER DISEASES OF THE CIRCULATORY SYSTEM: ICD-10-CM

## 2024-11-15 PROCEDURE — 99204 OFFICE O/P NEW MOD 45 MIN: CPT

## 2024-11-18 ENCOUNTER — TRANSCRIPTION ENCOUNTER (OUTPATIENT)
Age: 39
End: 2024-11-18

## 2024-11-20 ENCOUNTER — TRANSCRIPTION ENCOUNTER (OUTPATIENT)
Age: 39
End: 2024-11-20

## 2024-11-20 ENCOUNTER — APPOINTMENT (OUTPATIENT)
Dept: NEUROLOGY | Facility: CLINIC | Age: 39
End: 2024-11-20

## 2024-11-21 RX ORDER — TIRZEPATIDE 5 MG/.5ML
5 INJECTION, SOLUTION SUBCUTANEOUS
Qty: 1 | Refills: 0 | Status: ACTIVE | COMMUNITY
Start: 2024-11-15

## 2024-11-26 DIAGNOSIS — R51.9 HEADACHE, UNSPECIFIED: ICD-10-CM

## 2024-11-26 DIAGNOSIS — G04.81 OTHER ENCEPHALITIS AND ENCEPHALOMYELITIS: ICD-10-CM

## 2024-11-26 RX ORDER — PREDNISONE 20 MG/1
20 TABLET ORAL
Qty: 45 | Refills: 0 | Status: ACTIVE | COMMUNITY
Start: 2024-11-26 | End: 1900-01-01

## 2024-11-26 RX ORDER — SUMATRIPTAN SUCCINATE 6 MG/.5ML
6 INJECTION SUBCUTANEOUS
Qty: 2 | Refills: 3 | Status: ACTIVE | COMMUNITY
Start: 2024-11-26 | End: 1900-01-01

## 2024-12-05 RX ORDER — IMMUNE GLOBULIN INFUSION (HUMAN) 100 MG/ML
20 INJECTION, SOLUTION INTRAVENOUS; SUBCUTANEOUS
Qty: 5 | Refills: 2 | Status: ACTIVE | OUTPATIENT
Start: 2024-12-05

## 2024-12-18 ENCOUNTER — APPOINTMENT (OUTPATIENT)
Dept: NEUROLOGY | Facility: CLINIC | Age: 39
End: 2024-12-18

## 2024-12-18 ENCOUNTER — TRANSCRIPTION ENCOUNTER (OUTPATIENT)
Age: 39
End: 2024-12-18

## 2024-12-18 PROCEDURE — 96139 PSYCL/NRPSYC TST TECH EA: CPT

## 2024-12-18 PROCEDURE — 96133 NRPSYC TST EVAL PHYS/QHP EA: CPT

## 2024-12-18 PROCEDURE — 96116 NUBHVL XM PHYS/QHP 1ST HR: CPT

## 2024-12-18 PROCEDURE — 96138 PSYCL/NRPSYC TECH 1ST: CPT

## 2024-12-18 PROCEDURE — 96132 NRPSYC TST EVAL PHYS/QHP 1ST: CPT

## 2024-12-20 RX ORDER — OLANZAPINE 2.5 MG/1
2.5 TABLET, FILM COATED ORAL
Qty: 60 | Refills: 2 | Status: ACTIVE | COMMUNITY
Start: 2024-12-20 | End: 1900-01-01

## 2024-12-23 ENCOUNTER — APPOINTMENT (OUTPATIENT)
Dept: DERMATOLOGY | Facility: CLINIC | Age: 39
End: 2024-12-23

## 2024-12-27 ENCOUNTER — LABORATORY RESULT (OUTPATIENT)
Age: 39
End: 2024-12-27

## 2025-01-02 NOTE — BH CONSULTATION LIAISON ASSESSMENT NOTE - NSBHATTESTTYPESTAFF_PSY_A_CORE
Went over test results and provider recommendations with pt, he prefers to speak to Fátima at his next appointment regarding CPAP therapy prior to ordering a machine.   
Resident/Student

## 2025-01-03 ENCOUNTER — APPOINTMENT (OUTPATIENT)
Dept: BARIATRICS | Facility: CLINIC | Age: 40
End: 2025-01-03

## 2025-01-03 ENCOUNTER — OUTPATIENT (OUTPATIENT)
Dept: OUTPATIENT SERVICES | Facility: HOSPITAL | Age: 40
LOS: 1 days | End: 2025-01-03
Payer: COMMERCIAL

## 2025-01-03 ENCOUNTER — APPOINTMENT (OUTPATIENT)
Dept: INFUSION THERAPY | Facility: CLINIC | Age: 40
End: 2025-01-03

## 2025-01-03 VITALS
HEART RATE: 97 BPM | OXYGEN SATURATION: 99 % | TEMPERATURE: 98 F | HEIGHT: 72 IN | RESPIRATION RATE: 18 BRPM | SYSTOLIC BLOOD PRESSURE: 149 MMHG | DIASTOLIC BLOOD PRESSURE: 98 MMHG | WEIGHT: 220.02 LBS

## 2025-01-03 LAB
HCT VFR BLD CALC: 43.1 % — SIGNIFICANT CHANGE UP (ref 39–50)
HGB BLD-MCNC: 13.6 G/DL — SIGNIFICANT CHANGE UP (ref 13–17)
MCHC RBC-ENTMCNC: 24.1 PG — LOW (ref 27–34)
MCHC RBC-ENTMCNC: 31.6 G/DL — LOW (ref 32–36)
MCV RBC AUTO: 76.4 FL — LOW (ref 80–100)
PLATELET # BLD AUTO: 328 K/UL — SIGNIFICANT CHANGE UP (ref 150–400)
RBC # BLD: 5.64 M/UL — SIGNIFICANT CHANGE UP (ref 4.2–5.8)
RBC # FLD: 19.7 % — HIGH (ref 10.3–14.5)
WBC # BLD: 11.2 K/UL — HIGH (ref 3.8–10.5)
WBC # FLD AUTO: 11.2 K/UL — HIGH (ref 3.8–10.5)

## 2025-01-03 PROCEDURE — 96413 CHEMO IV INFUSION 1 HR: CPT

## 2025-01-03 PROCEDURE — 96375 TX/PRO/DX INJ NEW DRUG ADDON: CPT

## 2025-01-03 PROCEDURE — 85027 COMPLETE CBC AUTOMATED: CPT

## 2025-01-03 PROCEDURE — 36415 COLL VENOUS BLD VENIPUNCTURE: CPT

## 2025-01-03 PROCEDURE — 96415 CHEMO IV INFUSION ADDL HR: CPT

## 2025-01-03 RX ORDER — DIPHENHYDRAMINE HCL 25 MG
50 TABLET ORAL ONCE
Refills: 0 | Status: COMPLETED | OUTPATIENT
Start: 2025-01-03 | End: 2025-01-03

## 2025-01-03 RX ORDER — RITUXIMAB 10 MG/ML
1000 INJECTION, SOLUTION INTRAVENOUS ONCE
Refills: 0 | Status: COMPLETED | OUTPATIENT
Start: 2025-01-03 | End: 2025-01-03

## 2025-01-03 RX ORDER — METHYLPREDNISOLONE 4 MG/1
125 TABLET ORAL ONCE
Refills: 0 | Status: COMPLETED | OUTPATIENT
Start: 2025-01-03 | End: 2025-01-03

## 2025-01-03 RX ORDER — ACETAMINOPHEN 80 MG/.8ML
650 SOLUTION/ DROPS ORAL ONCE
Refills: 0 | Status: COMPLETED | OUTPATIENT
Start: 2025-01-03 | End: 2025-01-03

## 2025-01-03 RX ADMIN — RITUXIMAB 1000 MILLIGRAM(S): 10 INJECTION, SOLUTION INTRAVENOUS at 16:00

## 2025-01-03 RX ADMIN — Medication 50 MILLIGRAM(S): at 11:45

## 2025-01-03 RX ADMIN — METHYLPREDNISOLONE 125 MILLIGRAM(S): 4 TABLET ORAL at 11:50

## 2025-01-03 RX ADMIN — ACETAMINOPHEN 650 MILLIGRAM(S): 80 SOLUTION/ DROPS ORAL at 11:40

## 2025-01-03 RX ADMIN — ACETAMINOPHEN 650 MILLIGRAM(S): 80 SOLUTION/ DROPS ORAL at 11:10

## 2025-01-03 RX ADMIN — RITUXIMAB 1000 MILLIGRAM(S): 10 INJECTION, SOLUTION INTRAVENOUS at 11:48

## 2025-01-06 DIAGNOSIS — G04.81 OTHER ENCEPHALITIS AND ENCEPHALOMYELITIS: ICD-10-CM

## 2025-01-10 ENCOUNTER — APPOINTMENT (OUTPATIENT)
Dept: FAMILY MEDICINE | Facility: CLINIC | Age: 40
End: 2025-01-10

## 2025-01-10 ENCOUNTER — APPOINTMENT (OUTPATIENT)
Dept: BARIATRICS | Facility: CLINIC | Age: 40
End: 2025-01-10
Payer: COMMERCIAL

## 2025-01-10 VITALS — WEIGHT: 218 LBS | BODY MASS INDEX: 30.52 KG/M2 | HEIGHT: 71 IN

## 2025-01-10 DIAGNOSIS — R63.4 ABNORMAL WEIGHT LOSS: ICD-10-CM

## 2025-01-10 DIAGNOSIS — Z79.899 OTHER LONG TERM (CURRENT) DRUG THERAPY: ICD-10-CM

## 2025-01-10 DIAGNOSIS — E66.9 OBESITY, UNSPECIFIED: ICD-10-CM

## 2025-01-10 PROCEDURE — 99214 OFFICE O/P EST MOD 30 MIN: CPT | Mod: 95

## 2025-02-06 ENCOUNTER — APPOINTMENT (OUTPATIENT)
Dept: NEUROLOGY | Facility: CLINIC | Age: 40
End: 2025-02-06

## 2025-02-06 ENCOUNTER — APPOINTMENT (OUTPATIENT)
Dept: DERMATOLOGY | Facility: CLINIC | Age: 40
End: 2025-02-06

## 2025-02-06 ENCOUNTER — NON-APPOINTMENT (OUTPATIENT)
Age: 40
End: 2025-02-06

## 2025-02-06 VITALS
HEART RATE: 106 BPM | BODY MASS INDEX: 30.52 KG/M2 | OXYGEN SATURATION: 96 % | SYSTOLIC BLOOD PRESSURE: 135 MMHG | HEIGHT: 71 IN | TEMPERATURE: 97.2 F | WEIGHT: 218 LBS | DIASTOLIC BLOOD PRESSURE: 95 MMHG

## 2025-02-06 DIAGNOSIS — G04.81 OTHER ENCEPHALITIS AND ENCEPHALOMYELITIS: ICD-10-CM

## 2025-02-06 DIAGNOSIS — R51.9 HEADACHE, UNSPECIFIED: ICD-10-CM

## 2025-02-06 PROCEDURE — 99214 OFFICE O/P EST MOD 30 MIN: CPT

## 2025-02-06 RX ORDER — PREDNISONE 10 MG/1
10 TABLET ORAL DAILY
Qty: 21 | Refills: 0 | Status: ACTIVE | COMMUNITY
Start: 2025-02-06 | End: 1900-01-01

## 2025-02-06 RX ORDER — TELMISARTAN 40 MG/1
40 TABLET ORAL DAILY
Qty: 30 | Refills: 5 | Status: ACTIVE | COMMUNITY
Start: 2025-02-06 | End: 1900-01-01

## 2025-02-14 ENCOUNTER — APPOINTMENT (OUTPATIENT)
Dept: DERMATOLOGY | Facility: CLINIC | Age: 40
End: 2025-02-14
Payer: COMMERCIAL

## 2025-02-14 ENCOUNTER — APPOINTMENT (OUTPATIENT)
Dept: DERMATOLOGY | Facility: CLINIC | Age: 40
End: 2025-02-14

## 2025-02-14 DIAGNOSIS — L74.510 PRIMARY FOCAL HYPERHIDROSIS, AXILLA: ICD-10-CM

## 2025-02-14 DIAGNOSIS — L72.0 EPIDERMAL CYST: ICD-10-CM

## 2025-02-14 PROCEDURE — 64650 CHEMODENERV ECCRINE GLANDS: CPT

## 2025-02-17 NOTE — BH CONSULTATION LIAISON ASSESSMENT NOTE - NSBHATTESTTYPEVISIT_PSY_A_CORE
February 17, 2025      Migue Encarnacion  [address] [facility] [address]        Dear Mr. Encarnacion,    The results of your endoscopy performed on 02/10/2025 have returned and indicate the following:       Lower esophagus had eosinophils but upper esophagus did not.    Eosinophils in the lower esophagus only may be more indicative of GERD rather than active EOE,.  She would like for you not to add in the next allergen yet.               It has been a pleasure caring for you. If you have questions or concerns regarding these test results or your plan of care, please feel free to contact us. You may either contact us by phone, Robin Labshart or schedule a follow-up office visit to go over these results.            Sincerely,    Leeann Cooley DO  [address] [place] [id number] [phone]      Attending with Resident/Fellow/Student

## 2025-02-19 ENCOUNTER — RX RENEWAL (OUTPATIENT)
Age: 40
End: 2025-02-19

## 2025-02-21 ENCOUNTER — RX RENEWAL (OUTPATIENT)
Age: 40
End: 2025-02-21

## 2025-02-24 ENCOUNTER — TRANSCRIPTION ENCOUNTER (OUTPATIENT)
Age: 40
End: 2025-02-24

## 2025-02-25 ENCOUNTER — OUTPATIENT (OUTPATIENT)
Dept: OUTPATIENT SERVICES | Facility: HOSPITAL | Age: 40
LOS: 1 days | End: 2025-02-25
Payer: COMMERCIAL

## 2025-02-25 ENCOUNTER — APPOINTMENT (OUTPATIENT)
Dept: INFUSION THERAPY | Facility: CLINIC | Age: 40
End: 2025-02-25

## 2025-02-25 VITALS
HEART RATE: 98 BPM | TEMPERATURE: 99 F | DIASTOLIC BLOOD PRESSURE: 84 MMHG | SYSTOLIC BLOOD PRESSURE: 146 MMHG | OXYGEN SATURATION: 97 % | HEIGHT: 72 IN | WEIGHT: 218.04 LBS | RESPIRATION RATE: 18 BRPM

## 2025-02-25 VITALS
HEART RATE: 96 BPM | DIASTOLIC BLOOD PRESSURE: 97 MMHG | OXYGEN SATURATION: 99 % | TEMPERATURE: 97 F | RESPIRATION RATE: 17 BRPM | SYSTOLIC BLOOD PRESSURE: 152 MMHG

## 2025-02-25 DIAGNOSIS — G04.81 OTHER ENCEPHALITIS AND ENCEPHALOMYELITIS: ICD-10-CM

## 2025-02-25 LAB
ALBUMIN SERPL ELPH-MCNC: 3.8 G/DL — SIGNIFICANT CHANGE UP (ref 3.3–5)
ALP SERPL-CCNC: 60 U/L — SIGNIFICANT CHANGE UP (ref 40–120)
ALT FLD-CCNC: 37 U/L — SIGNIFICANT CHANGE UP (ref 10–45)
ANION GAP SERPL CALC-SCNC: 2 MMOL/L — LOW (ref 5–17)
AST SERPL-CCNC: 30 U/L — SIGNIFICANT CHANGE UP (ref 10–40)
BILIRUB SERPL-MCNC: 0.6 MG/DL — SIGNIFICANT CHANGE UP (ref 0.2–1.2)
BUN SERPL-MCNC: 9 MG/DL — SIGNIFICANT CHANGE UP (ref 7–23)
CALCIUM SERPL-MCNC: 9.6 MG/DL — SIGNIFICANT CHANGE UP (ref 8.4–10.5)
CHLORIDE SERPL-SCNC: 112 MMOL/L — HIGH (ref 96–108)
CO2 SERPL-SCNC: 27 MMOL/L — SIGNIFICANT CHANGE UP (ref 22–31)
CREAT SERPL-MCNC: 1.1 MG/DL — SIGNIFICANT CHANGE UP (ref 0.5–1.3)
EGFR: 88 ML/MIN/1.73M2 — SIGNIFICANT CHANGE UP
GLUCOSE SERPL-MCNC: 99 MG/DL — SIGNIFICANT CHANGE UP (ref 70–99)
HCT VFR BLD CALC: 42.7 % — SIGNIFICANT CHANGE UP (ref 39–50)
HGB BLD-MCNC: 13.8 G/DL — SIGNIFICANT CHANGE UP (ref 13–17)
MCHC RBC-ENTMCNC: 25.3 PG — LOW (ref 27–34)
MCHC RBC-ENTMCNC: 32.3 G/DL — SIGNIFICANT CHANGE UP (ref 32–36)
MCV RBC AUTO: 78.2 FL — LOW (ref 80–100)
PLATELET # BLD AUTO: 260 K/UL — SIGNIFICANT CHANGE UP (ref 150–400)
POTASSIUM SERPL-MCNC: 4.3 MMOL/L — SIGNIFICANT CHANGE UP (ref 3.5–5.3)
POTASSIUM SERPL-SCNC: 4.3 MMOL/L — SIGNIFICANT CHANGE UP (ref 3.5–5.3)
PROT SERPL-MCNC: 7.3 G/DL — SIGNIFICANT CHANGE UP (ref 6–8.3)
RBC # BLD: 5.46 M/UL — SIGNIFICANT CHANGE UP (ref 4.2–5.8)
RBC # FLD: 19.7 % — HIGH (ref 10.3–14.5)
SODIUM SERPL-SCNC: 141 MMOL/L — SIGNIFICANT CHANGE UP (ref 135–145)
WBC # BLD: 4.8 K/UL — SIGNIFICANT CHANGE UP (ref 3.8–10.5)
WBC # FLD AUTO: 4.8 K/UL — SIGNIFICANT CHANGE UP (ref 3.8–10.5)

## 2025-02-25 PROCEDURE — 96413 CHEMO IV INFUSION 1 HR: CPT

## 2025-02-25 PROCEDURE — 80053 COMPREHEN METABOLIC PANEL: CPT

## 2025-02-25 PROCEDURE — 85027 COMPLETE CBC AUTOMATED: CPT

## 2025-02-25 PROCEDURE — 96375 TX/PRO/DX INJ NEW DRUG ADDON: CPT

## 2025-02-25 PROCEDURE — 96415 CHEMO IV INFUSION ADDL HR: CPT

## 2025-02-25 PROCEDURE — 36415 COLL VENOUS BLD VENIPUNCTURE: CPT

## 2025-02-25 RX ORDER — ACETAMINOPHEN 500 MG/5ML
650 LIQUID (ML) ORAL ONCE
Refills: 0 | Status: COMPLETED | OUTPATIENT
Start: 2025-02-25 | End: 2025-02-25

## 2025-02-25 RX ORDER — DIPHENHYDRAMINE HCL 12.5MG/5ML
50 ELIXIR ORAL ONCE
Refills: 0 | Status: COMPLETED | OUTPATIENT
Start: 2025-02-25 | End: 2025-02-25

## 2025-02-25 RX ORDER — DEXAMETHASONE 0.5 MG/1
4 TABLET ORAL ONCE
Refills: 0 | Status: COMPLETED | OUTPATIENT
Start: 2025-02-25 | End: 2025-02-25

## 2025-02-25 RX ORDER — RITUXIMAB-PVVR 100 MG/10ML
1000 INJECTION, SOLUTION INTRAVENOUS ONCE
Refills: 0 | Status: COMPLETED | OUTPATIENT
Start: 2025-02-25 | End: 2025-02-25

## 2025-02-25 RX ADMIN — DEXAMETHASONE 4 MILLIGRAM(S): 0.5 TABLET ORAL at 11:09

## 2025-02-25 RX ADMIN — Medication 650 MILLIGRAM(S): at 11:09

## 2025-02-25 RX ADMIN — Medication 50 MILLIGRAM(S): at 11:08

## 2025-02-25 RX ADMIN — RITUXIMAB-PVVR 1000 MILLIGRAM(S): 100 INJECTION, SOLUTION INTRAVENOUS at 11:28

## 2025-02-25 RX ADMIN — RITUXIMAB-PVVR 1000 MILLIGRAM(S): 100 INJECTION, SOLUTION INTRAVENOUS at 15:21

## 2025-02-26 ENCOUNTER — EMERGENCY (EMERGENCY)
Facility: HOSPITAL | Age: 40
LOS: 1 days | Discharge: HOME | End: 2025-02-26
Attending: EMERGENCY MEDICINE | Admitting: EMERGENCY MEDICINE
Payer: COMMERCIAL

## 2025-02-26 VITALS
TEMPERATURE: 98 F | DIASTOLIC BLOOD PRESSURE: 95 MMHG | HEART RATE: 87 BPM | SYSTOLIC BLOOD PRESSURE: 133 MMHG | WEIGHT: 218.04 LBS | RESPIRATION RATE: 16 BRPM | OXYGEN SATURATION: 97 % | HEIGHT: 72 IN

## 2025-02-26 VITALS
DIASTOLIC BLOOD PRESSURE: 73 MMHG | RESPIRATION RATE: 17 BRPM | SYSTOLIC BLOOD PRESSURE: 120 MMHG | TEMPERATURE: 98 F | OXYGEN SATURATION: 98 % | HEART RATE: 105 BPM

## 2025-02-26 LAB
ANION GAP SERPL CALC-SCNC: 14 MMOL/L — SIGNIFICANT CHANGE UP (ref 5–17)
BASOPHILS # BLD AUTO: 0.03 K/UL — SIGNIFICANT CHANGE UP (ref 0–0.2)
BASOPHILS NFR BLD AUTO: 0.3 % — SIGNIFICANT CHANGE UP (ref 0–2)
BUN SERPL-MCNC: 8 MG/DL — SIGNIFICANT CHANGE UP (ref 7–23)
CALCIUM SERPL-MCNC: 9.3 MG/DL — SIGNIFICANT CHANGE UP (ref 8.4–10.5)
CHLORIDE SERPL-SCNC: 103 MMOL/L — SIGNIFICANT CHANGE UP (ref 96–108)
CO2 SERPL-SCNC: 26 MMOL/L — SIGNIFICANT CHANGE UP (ref 22–31)
CREAT SERPL-MCNC: 0.98 MG/DL — SIGNIFICANT CHANGE UP (ref 0.5–1.3)
EGFR: 101 ML/MIN/1.73M2 — SIGNIFICANT CHANGE UP
EOSINOPHIL # BLD AUTO: 0.04 K/UL — SIGNIFICANT CHANGE UP (ref 0–0.5)
EOSINOPHIL NFR BLD AUTO: 0.4 % — SIGNIFICANT CHANGE UP (ref 0–6)
GLUCOSE SERPL-MCNC: 110 MG/DL — HIGH (ref 70–99)
HCT VFR BLD CALC: 43.9 % — SIGNIFICANT CHANGE UP (ref 39–50)
HGB BLD-MCNC: 14.1 G/DL — SIGNIFICANT CHANGE UP (ref 13–17)
IMM GRANULOCYTES NFR BLD AUTO: 0.3 % — SIGNIFICANT CHANGE UP (ref 0–0.9)
LIDOCAIN IGE QN: 29 U/L — SIGNIFICANT CHANGE UP (ref 7–60)
LYMPHOCYTES # BLD AUTO: 1.36 K/UL — SIGNIFICANT CHANGE UP (ref 1–3.3)
LYMPHOCYTES # BLD AUTO: 14.2 % — SIGNIFICANT CHANGE UP (ref 13–44)
MCHC RBC-ENTMCNC: 25.5 PG — LOW (ref 27–34)
MCHC RBC-ENTMCNC: 32.1 G/DL — SIGNIFICANT CHANGE UP (ref 32–36)
MCV RBC AUTO: 79.4 FL — LOW (ref 80–100)
MONOCYTES # BLD AUTO: 1.05 K/UL — HIGH (ref 0–0.9)
MONOCYTES NFR BLD AUTO: 10.9 % — SIGNIFICANT CHANGE UP (ref 2–14)
NEUTROPHILS # BLD AUTO: 7.09 K/UL — SIGNIFICANT CHANGE UP (ref 1.8–7.4)
NEUTROPHILS NFR BLD AUTO: 73.9 % — SIGNIFICANT CHANGE UP (ref 43–77)
NRBC BLD AUTO-RTO: 0 /100 WBCS — SIGNIFICANT CHANGE UP (ref 0–0)
PLATELET # BLD AUTO: 305 K/UL — SIGNIFICANT CHANGE UP (ref 150–400)
POTASSIUM SERPL-MCNC: 3.6 MMOL/L — SIGNIFICANT CHANGE UP (ref 3.5–5.3)
POTASSIUM SERPL-SCNC: 3.6 MMOL/L — SIGNIFICANT CHANGE UP (ref 3.5–5.3)
RBC # BLD: 5.53 M/UL — SIGNIFICANT CHANGE UP (ref 4.2–5.8)
RBC # FLD: 19.9 % — HIGH (ref 10.3–14.5)
SODIUM SERPL-SCNC: 143 MMOL/L — SIGNIFICANT CHANGE UP (ref 135–145)
WBC # BLD: 9.6 K/UL — SIGNIFICANT CHANGE UP (ref 3.8–10.5)
WBC # FLD AUTO: 9.6 K/UL — SIGNIFICANT CHANGE UP (ref 3.8–10.5)

## 2025-02-26 PROCEDURE — 99284 EMERGENCY DEPT VISIT MOD MDM: CPT

## 2025-02-26 PROCEDURE — 96376 TX/PRO/DX INJ SAME DRUG ADON: CPT

## 2025-02-26 PROCEDURE — 96361 HYDRATE IV INFUSION ADD-ON: CPT

## 2025-02-26 PROCEDURE — 80048 BASIC METABOLIC PNL TOTAL CA: CPT

## 2025-02-26 PROCEDURE — 85025 COMPLETE CBC W/AUTO DIFF WBC: CPT

## 2025-02-26 PROCEDURE — 83690 ASSAY OF LIPASE: CPT

## 2025-02-26 PROCEDURE — 36415 COLL VENOUS BLD VENIPUNCTURE: CPT

## 2025-02-26 PROCEDURE — 99284 EMERGENCY DEPT VISIT MOD MDM: CPT | Mod: 25

## 2025-02-26 PROCEDURE — 99053 MED SERV 10PM-8AM 24 HR FAC: CPT

## 2025-02-26 PROCEDURE — 96375 TX/PRO/DX INJ NEW DRUG ADDON: CPT

## 2025-02-26 PROCEDURE — 96365 THER/PROPH/DIAG IV INF INIT: CPT

## 2025-02-26 RX ORDER — FAMOTIDINE 10 MG/ML
20 INJECTION INTRAVENOUS ONCE
Refills: 0 | Status: COMPLETED | OUTPATIENT
Start: 2025-02-26 | End: 2025-02-26

## 2025-02-26 RX ORDER — BACTERIOSTATIC SODIUM CHLORIDE 0.9 %
1000 VIAL (ML) INJECTION ONCE
Refills: 0 | Status: COMPLETED | OUTPATIENT
Start: 2025-02-26 | End: 2025-02-26

## 2025-02-26 RX ORDER — METOCLOPRAMIDE 10 MG/1
10 TABLET ORAL ONCE
Refills: 0 | Status: COMPLETED | OUTPATIENT
Start: 2025-02-26 | End: 2025-02-26

## 2025-02-26 RX ORDER — ONDANSETRON 4 MG/1
4 TABLET, ORALLY DISINTEGRATING ORAL ONCE
Refills: 0 | Status: COMPLETED | OUTPATIENT
Start: 2025-02-26 | End: 2025-02-26

## 2025-02-26 RX ORDER — KETOROLAC TROMETHAMINE 10 MG
15 TABLET ORAL ONCE
Refills: 0 | Status: DISCONTINUED | OUTPATIENT
Start: 2025-02-26 | End: 2025-02-26

## 2025-02-26 RX ORDER — PANTOPRAZOLE 20 MG/1
40 TABLET, DELAYED RELEASE ORAL ONCE
Refills: 0 | Status: COMPLETED | OUTPATIENT
Start: 2025-02-26 | End: 2025-02-26

## 2025-02-26 RX ORDER — ONDANSETRON 4 MG/1
1 TABLET, ORALLY DISINTEGRATING ORAL
Qty: 12 | Refills: 0
Start: 2025-02-26

## 2025-02-26 RX ADMIN — Medication 1000 MILLILITER(S): at 07:43

## 2025-02-26 RX ADMIN — FAMOTIDINE 20 MILLIGRAM(S): 10 INJECTION INTRAVENOUS at 07:44

## 2025-02-26 RX ADMIN — PANTOPRAZOLE 40 MILLIGRAM(S): 20 TABLET, DELAYED RELEASE ORAL at 09:02

## 2025-02-26 RX ADMIN — Medication 15 MILLIGRAM(S): at 07:46

## 2025-02-26 RX ADMIN — Medication 15 MILLIGRAM(S): at 08:36

## 2025-02-26 RX ADMIN — ONDANSETRON 4 MILLIGRAM(S): 4 TABLET, ORALLY DISINTEGRATING ORAL at 10:22

## 2025-02-26 RX ADMIN — METOCLOPRAMIDE 10 MILLIGRAM(S): 10 TABLET ORAL at 08:36

## 2025-02-26 RX ADMIN — METOCLOPRAMIDE 104 MILLIGRAM(S): 10 TABLET ORAL at 07:46

## 2025-02-26 RX ADMIN — Medication 1000 MILLILITER(S): at 08:36

## 2025-02-26 RX ADMIN — Medication 1000 MILLILITER(S): at 09:02

## 2025-02-26 RX ADMIN — ONDANSETRON 4 MILLIGRAM(S): 4 TABLET, ORALLY DISINTEGRATING ORAL at 09:02

## 2025-02-26 NOTE — ED PROVIDER NOTE - PATIENT PORTAL LINK FT
You can access the FollowMyHealth Patient Portal offered by [facility] by registering at the following website: [email or web address]. By joining [employer]’s FollowMyHealth portal, you will also be able to view your health information using other applications (apps) compatible with our system.

## 2025-02-26 NOTE — ED ADULT TRIAGE NOTE - CHIEF COMPLAINT QUOTE
"I had Rituximab infusion yesterday for my autoimmune encephalitis and I've been up all night vomiting, fever, headache."

## 2025-02-26 NOTE — ED ADULT NURSE REASSESSMENT NOTE - NS ED NURSE REASSESS COMMENT FT1
patient alert and oriented x4 vomited once after attempting to drink gatoraide. Patient denies pain. States he feels "ok", but not better. Notified provider. No s/s of distress noted.

## 2025-02-26 NOTE — ED PROVIDER NOTE - NSICDXPASTMEDICALHX_GEN_ALL_CORE_FT
PAST MEDICAL HISTORY:  Encephalomyopathy     GERD (gastroesophageal reflux disease)     HTN (hypertension)     Hypertension     Meningitis     Migraine     
Normal

## 2025-02-26 NOTE — ED PROVIDER NOTE - OBJECTIVE STATEMENT
41 yo m with PMH of autoimmune encepahlitis on Rituximab last infusion day prior presents to ED with nausea, vomiting since night prior associated with headaches.  Dr. Weston states these could be side effects of treatment.  Las infusion several months prior.  Denies abd pain, diarrhea - subjective fever, no chills.  Feels baseline cognitively.

## 2025-02-26 NOTE — ED ADULT NURSE NOTE - OBJECTIVE STATEMENT
Pt AOx4, respirations even and unlabored.   Pt p/w nausea and acute onset vomiting (8-9 episodes), which began this morning at 2:00am s/p infusion to manage autoimmune encephalitis. Pt endorses HA.   Pt denies SOB, chest pain, dizziness, visual changes, tingling.

## 2025-02-26 NOTE — ED PROVIDER NOTE - CLINICAL SUMMARY MEDICAL DECISION MAKING FREE TEXT BOX
41 yo m with PMH of autoimmune encepahlitis on Rituximab last infusion day prior presents to ED with nausea, vomiting since night prior associated with headaches.  Dr. Weston states these could be side effects of treatment.  Las infusion several months prior.  Denies abd pain, diarrhea - subjective fever, no chills.  Feels baseline cognitively.      VSS  PE - no focal findings  Labs wnl   Received meds, fluids in ED with mod relief of symptoms  Observe 41 yo m with PMH of autoimmune encepahlitis on Rituximab last infusion day prior presents to ED with nausea, vomiting since night prior associated with headaches.  Dr. Weston states these could be side effects of treatment.  Las infusion several months prior.  Denies abd pain, diarrhea - subjective fever, no chills.  Feels baseline cognitively.      VSS  PE - no focal findings  Labs wnl   Received meds, fluids in ED with mod relief of symptoms  Observe  Tolerating po in ED feeling better

## 2025-02-26 NOTE — ED PROVIDER NOTE - NSFOLLOWUPINSTRUCTIONS_ED_ALL_ED_FT
Take zofran as prescribed prior to eating/drinking.  Follow up with Dr. Weston as needed.  Continue omeprazole and pepcid over the counter.  Feel better soon.    Nausea and Vomiting, Adult  Nausea is the feeling that you have an upset stomach or that you are about to vomit. As nausea gets worse, it can lead to vomiting. Vomiting is when stomach contents forcefully come out of your mouth as a result of nausea. Vomiting can make you feel weak and cause you to become dehydrated.    Dehydration can make you feel tired and thirsty, cause you to have a dry mouth, and decrease how often you urinate. Older adults and people with other diseases or a weak disease-fighting system (immune system) are at higher risk for dehydration. It is important to treat your nausea and vomiting as told by your health care provider.    Follow these instructions at home:  Watch your symptoms for any changes. Tell your health care provider about them.    Eating and drinking    A bottle of clear fruit juice and glass of water.  A sign showing that a person should not drink alcohol.  Take an oral rehydration solution (ORS). This is a drink that is sold at pharmacies and retail stores.  Drink clear fluids slowly and in small amounts as you are able. Clear fluids include water, ice chips, low-calorie sports drinks, and fruit juice that has water added (diluted fruit juice).  Eat bland, easy-to-digest foods in small amounts as you are able. These foods include bananas, applesauce, rice, lean meats, toast, and crackers.  Avoid fluids that contain a lot of sugar or caffeine, such as energy drinks, sports drinks, and soda.  Avoid alcohol.  Avoid spicy or fatty foods.  General instructions    Take over-the-counter and prescription medicines only as told by your health care provider.  Drink enough fluid to keep your urine pale yellow.  Wash your hands often using soap and water for at least 20 seconds. If soap and water are not available, use hand .  Make sure that everyone in your household washes their hands well and often.  Rest at home while you recover.  Watch your condition for any changes.  Take slow and deep breaths when you feel nauseous.  Keep all follow-up visits. This is important.  Contact a health care provider if:  Your symptoms get worse.  You have new symptoms.  You have a fever.  You cannot drink fluids without vomiting.  Your nausea does not go away after 2 days.  You feel light-headed or dizzy.  You have a headache.  You have muscle cramps.  You have a rash.  You have pain while urinating.  Get help right away if:  You have pain in your chest, neck, arm, or jaw.  You feel extremely weak or you faint.  You have persistent vomiting.  You have vomit that is bright red or looks like black coffee grounds.  You have bloody or black stools (feces) or stools that look like tar.  You have a severe headache, a stiff neck, or both.  You have severe pain, cramping, or bloating in your abdomen.  You have difficulty breathing, or you are breathing very quickly.  Your heart is beating very quickly.  Your skin feels cold and clammy.  You feel confused.  You have signs of dehydration, such as:  Dark urine, very little urine, or no urine.  Cracked lips.  Dry mouth.  Sunken eyes.  Sleepiness.  Weakness.  These symptoms may be an emergency. Get help right away. Call 911.  Do not wait to see if the symptoms will go away.  Do not drive yourself to the hospital.  Summary  Nausea is the feeling that you have an upset stomach or that you are about to vomit. As nausea gets worse, it can lead to vomiting. Vomiting can make you feel weak and cause you to become dehydrated.  Follow instructions from your health care provider about eating and drinking to prevent dehydration.  Take over-the-counter and prescription medicines only as told by your health care provider.  Contact your health care provider if your symptoms get worse, or you have new symptoms.  Keep all follow-up visits. This is important.  This information is not intended to replace advice given to you by your health care provider. Make sure you discuss any questions you have with your health care provider.

## 2025-02-28 ENCOUNTER — APPOINTMENT (OUTPATIENT)
Dept: BARIATRICS | Facility: CLINIC | Age: 40
End: 2025-02-28
Payer: COMMERCIAL

## 2025-02-28 VITALS
OXYGEN SATURATION: 98 % | DIASTOLIC BLOOD PRESSURE: 92 MMHG | HEIGHT: 71 IN | WEIGHT: 222.66 LBS | TEMPERATURE: 97.4 F | SYSTOLIC BLOOD PRESSURE: 129 MMHG | HEART RATE: 101 BPM | BODY MASS INDEX: 31.17 KG/M2

## 2025-02-28 DIAGNOSIS — R51.9 HEADACHE, UNSPECIFIED: ICD-10-CM

## 2025-02-28 DIAGNOSIS — R11.2 NAUSEA WITH VOMITING, UNSPECIFIED: ICD-10-CM

## 2025-02-28 DIAGNOSIS — G04.81 OTHER ENCEPHALITIS AND ENCEPHALOMYELITIS: ICD-10-CM

## 2025-02-28 DIAGNOSIS — E66.9 OBESITY, UNSPECIFIED: ICD-10-CM

## 2025-02-28 PROCEDURE — 99214 OFFICE O/P EST MOD 30 MIN: CPT

## 2025-03-10 ENCOUNTER — NON-APPOINTMENT (OUTPATIENT)
Age: 40
End: 2025-03-10

## 2025-03-10 DIAGNOSIS — G04.81 OTHER ENCEPHALITIS AND ENCEPHALOMYELITIS: ICD-10-CM

## 2025-03-11 LAB
ALBUMIN SERPL ELPH-MCNC: 4.7 G/DL
ALP BLD-CCNC: 84 U/L
ALT SERPL-CCNC: 31 U/L
ANION GAP SERPL CALC-SCNC: 10 MMOL/L
AST SERPL-CCNC: 25 U/L
BILIRUB SERPL-MCNC: 0.3 MG/DL
BUN SERPL-MCNC: 15 MG/DL
CALCIUM SERPL-MCNC: 9.4 MG/DL
CHLORIDE SERPL-SCNC: 103 MMOL/L
CO2 SERPL-SCNC: 25 MMOL/L
CREAT SERPL-MCNC: 1.12 MG/DL
DEPRECATED D DIMER PPP IA-ACNC: <150 NG/ML DDU
EGFRCR SERPLBLD CKD-EPI 2021: 86 ML/MIN/1.73M2
ERYTHROCYTE [SEDIMENTATION RATE] IN BLOOD BY WESTERGREN METHOD: 12 MM/HR
GLUCOSE SERPL-MCNC: 104 MG/DL
POTASSIUM SERPL-SCNC: 4.7 MMOL/L
PROT SERPL-MCNC: 7.4 G/DL
SODIUM SERPL-SCNC: 138 MMOL/L

## 2025-03-12 ENCOUNTER — APPOINTMENT (OUTPATIENT)
Dept: NEUROLOGY | Facility: CLINIC | Age: 40
End: 2025-03-12

## 2025-03-12 PROCEDURE — 96132 NRPSYC TST EVAL PHYS/QHP 1ST: CPT

## 2025-03-12 PROCEDURE — 96137 PSYCL/NRPSYC TST PHY/QHP EA: CPT

## 2025-03-12 PROCEDURE — 96133 NRPSYC TST EVAL PHYS/QHP EA: CPT

## 2025-03-12 PROCEDURE — 96116 NUBHVL XM PHYS/QHP 1ST HR: CPT

## 2025-03-12 PROCEDURE — 96136 PSYCL/NRPSYC TST PHY/QHP 1ST: CPT

## 2025-03-12 RX ORDER — QUETIAPINE FUMARATE 25 MG/1
25 TABLET ORAL
Qty: 30 | Refills: 5 | Status: ACTIVE | COMMUNITY
Start: 2025-03-12 | End: 1900-01-01

## 2025-03-12 RX ORDER — OLANZAPINE 2.5 MG/1
2.5 TABLET, FILM COATED ORAL DAILY
Qty: 10 | Refills: 0 | Status: ACTIVE | COMMUNITY
Start: 2025-03-12 | End: 1900-01-01

## 2025-03-13 ENCOUNTER — OUTPATIENT (OUTPATIENT)
Dept: OUTPATIENT SERVICES | Facility: HOSPITAL | Age: 40
LOS: 1 days | End: 2025-03-13
Payer: COMMERCIAL

## 2025-03-13 ENCOUNTER — APPOINTMENT (OUTPATIENT)
Dept: INFUSION THERAPY | Facility: CLINIC | Age: 40
End: 2025-03-13

## 2025-03-13 VITALS
OXYGEN SATURATION: 99 % | HEART RATE: 79 BPM | TEMPERATURE: 97 F | DIASTOLIC BLOOD PRESSURE: 88 MMHG | HEIGHT: 72 IN | RESPIRATION RATE: 18 BRPM | WEIGHT: 218.04 LBS | SYSTOLIC BLOOD PRESSURE: 142 MMHG

## 2025-03-13 DIAGNOSIS — G04.81 OTHER ENCEPHALITIS AND ENCEPHALOMYELITIS: ICD-10-CM

## 2025-03-13 PROCEDURE — 96367 TX/PROPH/DG ADDL SEQ IV INF: CPT

## 2025-03-13 PROCEDURE — 96365 THER/PROPH/DIAG IV INF INIT: CPT

## 2025-03-13 PROCEDURE — 96366 THER/PROPH/DIAG IV INF ADDON: CPT

## 2025-03-13 RX ORDER — IMMUNE GLOBULIN,GAMMA(IGG) 10 %
50 VIAL (ML) INTRAVENOUS ONCE
Refills: 0 | Status: COMPLETED | OUTPATIENT
Start: 2025-03-18 | End: 2025-03-18

## 2025-03-13 RX ORDER — DIPHENHYDRAMINE HCL 12.5MG/5ML
50 ELIXIR ORAL ONCE
Refills: 0 | Status: COMPLETED | OUTPATIENT
Start: 2025-03-14 | End: 2025-03-14

## 2025-03-13 RX ORDER — METHYLPREDNISOLONE ACETATE 80 MG/ML
1000 INJECTION, SUSPENSION INTRA-ARTICULAR; INTRALESIONAL; INTRAMUSCULAR; SOFT TISSUE ONCE
Refills: 0 | Status: COMPLETED | OUTPATIENT
Start: 2025-03-14 | End: 2025-03-14

## 2025-03-13 RX ORDER — IMMUNE GLOBULIN,GAMMA(IGG) 10 %
50 VIAL (ML) INTRAVENOUS ONCE
Refills: 0 | Status: COMPLETED | OUTPATIENT
Start: 2025-03-13 | End: 2025-03-13

## 2025-03-13 RX ORDER — DIPHENHYDRAMINE HCL 12.5MG/5ML
50 ELIXIR ORAL ONCE
Refills: 0 | Status: COMPLETED | OUTPATIENT
Start: 2025-03-13 | End: 2025-03-13

## 2025-03-13 RX ORDER — IMMUNE GLOBULIN,GAMMA(IGG) 10 %
50 VIAL (ML) INTRAVENOUS ONCE
Refills: 0 | Status: COMPLETED | OUTPATIENT
Start: 2025-03-14 | End: 2025-03-14

## 2025-03-13 RX ORDER — IMMUNE GLOBULIN,GAMMA(IGG) 10 %
50 VIAL (ML) INTRAVENOUS ONCE
Refills: 0 | Status: COMPLETED | OUTPATIENT
Start: 2025-03-17 | End: 2025-03-17

## 2025-03-13 RX ORDER — ACETAMINOPHEN 500 MG/5ML
650 LIQUID (ML) ORAL ONCE
Refills: 0 | Status: COMPLETED | OUTPATIENT
Start: 2025-03-17 | End: 2025-03-17

## 2025-03-13 RX ORDER — ACETAMINOPHEN 500 MG/5ML
650 LIQUID (ML) ORAL ONCE
Refills: 0 | Status: COMPLETED | OUTPATIENT
Start: 2025-03-14 | End: 2025-03-14

## 2025-03-13 RX ORDER — DIPHENHYDRAMINE HCL 12.5MG/5ML
50 ELIXIR ORAL ONCE
Refills: 0 | Status: COMPLETED | OUTPATIENT
Start: 2025-03-17 | End: 2025-03-17

## 2025-03-13 RX ORDER — ACETAMINOPHEN 500 MG/5ML
650 LIQUID (ML) ORAL ONCE
Refills: 0 | Status: COMPLETED | OUTPATIENT
Start: 2025-03-13 | End: 2025-03-13

## 2025-03-13 RX ORDER — METHYLPREDNISOLONE ACETATE 80 MG/ML
1000 INJECTION, SUSPENSION INTRA-ARTICULAR; INTRALESIONAL; INTRAMUSCULAR; SOFT TISSUE ONCE
Refills: 0 | Status: COMPLETED | OUTPATIENT
Start: 2025-03-13 | End: 2025-03-13

## 2025-03-13 RX ORDER — METHYLPREDNISOLONE ACETATE 80 MG/ML
1000 INJECTION, SUSPENSION INTRA-ARTICULAR; INTRALESIONAL; INTRAMUSCULAR; SOFT TISSUE ONCE
Refills: 0 | Status: COMPLETED | OUTPATIENT
Start: 2025-03-17 | End: 2025-03-17

## 2025-03-13 RX ADMIN — Medication 500 MILLILITER(S): at 11:40

## 2025-03-13 RX ADMIN — Medication 50 GRAM(S): at 16:20

## 2025-03-13 RX ADMIN — METHYLPREDNISOLONE ACETATE 1000 MILLIGRAM(S): 80 INJECTION, SUSPENSION INTRA-ARTICULAR; INTRALESIONAL; INTRAMUSCULAR; SOFT TISSUE at 11:00

## 2025-03-13 RX ADMIN — Medication 50 MILLIGRAM(S): at 11:25

## 2025-03-13 RX ADMIN — Medication 125 GRAM(S): at 12:20

## 2025-03-13 RX ADMIN — Medication 650 MILLIGRAM(S): at 11:25

## 2025-03-13 RX ADMIN — Medication 1000 MILLILITER(S): at 16:20

## 2025-03-13 RX ADMIN — Medication 1000 MILLILITER(S): at 11:10

## 2025-03-13 RX ADMIN — Medication 500 MILLILITER(S): at 16:50

## 2025-03-13 RX ADMIN — Medication 650 MILLIGRAM(S): at 12:10

## 2025-03-13 RX ADMIN — METHYLPREDNISOLONE ACETATE 500 MILLIGRAM(S): 80 INJECTION, SUSPENSION INTRA-ARTICULAR; INTRALESIONAL; INTRAMUSCULAR; SOFT TISSUE at 10:30

## 2025-03-14 ENCOUNTER — APPOINTMENT (OUTPATIENT)
Dept: INFUSION THERAPY | Facility: CLINIC | Age: 40
End: 2025-03-14

## 2025-03-14 ENCOUNTER — OUTPATIENT (OUTPATIENT)
Dept: OUTPATIENT SERVICES | Facility: HOSPITAL | Age: 40
LOS: 1 days | End: 2025-03-14
Payer: COMMERCIAL

## 2025-03-14 VITALS
WEIGHT: 218.04 LBS | SYSTOLIC BLOOD PRESSURE: 124 MMHG | DIASTOLIC BLOOD PRESSURE: 74 MMHG | HEART RATE: 78 BPM | TEMPERATURE: 98 F | RESPIRATION RATE: 18 BRPM | OXYGEN SATURATION: 99 % | HEIGHT: 72 IN

## 2025-03-14 DIAGNOSIS — G04.81 OTHER ENCEPHALITIS AND ENCEPHALOMYELITIS: ICD-10-CM

## 2025-03-14 PROCEDURE — 96366 THER/PROPH/DIAG IV INF ADDON: CPT

## 2025-03-14 PROCEDURE — 96365 THER/PROPH/DIAG IV INF INIT: CPT

## 2025-03-14 RX ADMIN — METHYLPREDNISOLONE ACETATE 500 MILLIGRAM(S): 80 INJECTION, SUSPENSION INTRA-ARTICULAR; INTRALESIONAL; INTRAMUSCULAR; SOFT TISSUE at 09:40

## 2025-03-14 RX ADMIN — Medication 125 GRAM(S): at 10:10

## 2025-03-14 RX ADMIN — Medication 500 MILLILITER(S): at 09:40

## 2025-03-14 RX ADMIN — Medication 50 MILLIGRAM(S): at 09:10

## 2025-03-14 RX ADMIN — Medication 1000 MILLILITER(S): at 15:10

## 2025-03-14 RX ADMIN — Medication 650 MILLIGRAM(S): at 09:10

## 2025-03-14 RX ADMIN — Medication 650 MILLIGRAM(S): at 09:40

## 2025-03-14 RX ADMIN — METHYLPREDNISOLONE ACETATE 1000 MILLIGRAM(S): 80 INJECTION, SUSPENSION INTRA-ARTICULAR; INTRALESIONAL; INTRAMUSCULAR; SOFT TISSUE at 10:10

## 2025-03-14 RX ADMIN — Medication 50 GRAM(S): at 15:10

## 2025-03-14 RX ADMIN — Medication 500 MILLILITER(S): at 15:40

## 2025-03-14 RX ADMIN — Medication 1000 MILLILITER(S): at 09:10

## 2025-03-15 LAB — FIBRINOGEN AG PPP IA-MCNC: 408 MG/DL

## 2025-03-17 ENCOUNTER — APPOINTMENT (OUTPATIENT)
Dept: INFUSION THERAPY | Facility: CLINIC | Age: 40
End: 2025-03-17

## 2025-03-17 ENCOUNTER — OUTPATIENT (OUTPATIENT)
Dept: OUTPATIENT SERVICES | Facility: HOSPITAL | Age: 40
LOS: 1 days | End: 2025-03-17
Payer: COMMERCIAL

## 2025-03-17 VITALS
RESPIRATION RATE: 18 BRPM | WEIGHT: 214.95 LBS | HEIGHT: 72 IN | OXYGEN SATURATION: 99 % | TEMPERATURE: 98 F | DIASTOLIC BLOOD PRESSURE: 89 MMHG | SYSTOLIC BLOOD PRESSURE: 143 MMHG | HEART RATE: 78 BPM

## 2025-03-17 DIAGNOSIS — G04.81 OTHER ENCEPHALITIS AND ENCEPHALOMYELITIS: ICD-10-CM

## 2025-03-17 PROCEDURE — 96366 THER/PROPH/DIAG IV INF ADDON: CPT

## 2025-03-17 PROCEDURE — 96365 THER/PROPH/DIAG IV INF INIT: CPT

## 2025-03-17 PROCEDURE — 96375 TX/PRO/DX INJ NEW DRUG ADDON: CPT

## 2025-03-17 RX ADMIN — Medication 500 MILLILITER(S): at 17:30

## 2025-03-17 RX ADMIN — METHYLPREDNISOLONE ACETATE 1000 MILLIGRAM(S): 80 INJECTION, SUSPENSION INTRA-ARTICULAR; INTRALESIONAL; INTRAMUSCULAR; SOFT TISSUE at 12:20

## 2025-03-17 RX ADMIN — Medication 1000 MILLILITER(S): at 10:45

## 2025-03-17 RX ADMIN — Medication 50 GRAM(S): at 17:00

## 2025-03-17 RX ADMIN — Medication 1000 MILLILITER(S): at 17:00

## 2025-03-17 RX ADMIN — Medication 50 GRAM(S): at 12:20

## 2025-03-17 RX ADMIN — Medication 650 MILLIGRAM(S): at 11:50

## 2025-03-17 RX ADMIN — Medication 50 MILLIGRAM(S): at 11:40

## 2025-03-17 RX ADMIN — Medication 500 MILLILITER(S): at 11:15

## 2025-03-17 RX ADMIN — Medication 650 MILLIGRAM(S): at 11:15

## 2025-03-17 RX ADMIN — METHYLPREDNISOLONE ACETATE 500 MILLIGRAM(S): 80 INJECTION, SUSPENSION INTRA-ARTICULAR; INTRALESIONAL; INTRAMUSCULAR; SOFT TISSUE at 11:50

## 2025-03-18 ENCOUNTER — OUTPATIENT (OUTPATIENT)
Dept: OUTPATIENT SERVICES | Facility: HOSPITAL | Age: 40
LOS: 1 days | End: 2025-03-18
Payer: COMMERCIAL

## 2025-03-18 ENCOUNTER — APPOINTMENT (OUTPATIENT)
Dept: INFUSION THERAPY | Facility: CLINIC | Age: 40
End: 2025-03-18

## 2025-03-18 VITALS
HEIGHT: 72 IN | RESPIRATION RATE: 18 BRPM | DIASTOLIC BLOOD PRESSURE: 92 MMHG | OXYGEN SATURATION: 97 % | WEIGHT: 214.95 LBS | SYSTOLIC BLOOD PRESSURE: 143 MMHG | TEMPERATURE: 98 F | HEART RATE: 7 BPM

## 2025-03-18 DIAGNOSIS — G04.81 OTHER ENCEPHALITIS AND ENCEPHALOMYELITIS: ICD-10-CM

## 2025-03-18 LAB — NEOPTERIN CSF-SCNC: 2 NG/ML

## 2025-03-18 PROCEDURE — 96365 THER/PROPH/DIAG IV INF INIT: CPT

## 2025-03-18 PROCEDURE — 96367 TX/PROPH/DG ADDL SEQ IV INF: CPT

## 2025-03-18 PROCEDURE — 96366 THER/PROPH/DIAG IV INF ADDON: CPT

## 2025-03-18 RX ORDER — DIPHENHYDRAMINE HCL 12.5MG/5ML
50 ELIXIR ORAL ONCE
Refills: 0 | Status: COMPLETED | OUTPATIENT
Start: 2025-03-18 | End: 2025-03-18

## 2025-03-18 RX ORDER — METHYLPREDNISOLONE ACETATE 80 MG/ML
1000 INJECTION, SUSPENSION INTRA-ARTICULAR; INTRALESIONAL; INTRAMUSCULAR; SOFT TISSUE ONCE
Refills: 0 | Status: COMPLETED | OUTPATIENT
Start: 2025-03-18 | End: 2025-03-18

## 2025-03-18 RX ORDER — ACETAMINOPHEN 500 MG/5ML
650 LIQUID (ML) ORAL ONCE
Refills: 0 | Status: COMPLETED | OUTPATIENT
Start: 2025-03-18 | End: 2025-03-18

## 2025-03-18 RX ADMIN — Medication 650 MILLIGRAM(S): at 10:24

## 2025-03-18 RX ADMIN — Medication 500 MILLILITER(S): at 15:30

## 2025-03-18 RX ADMIN — Medication 1000 MILLILITER(S): at 15:00

## 2025-03-18 RX ADMIN — METHYLPREDNISOLONE ACETATE 500 MILLIGRAM(S): 80 INJECTION, SUSPENSION INTRA-ARTICULAR; INTRALESIONAL; INTRAMUSCULAR; SOFT TISSUE at 09:16

## 2025-03-18 RX ADMIN — Medication 50 GRAM(S): at 15:00

## 2025-03-18 RX ADMIN — Medication 1000 MILLILITER(S): at 08:45

## 2025-03-18 RX ADMIN — Medication 650 MILLIGRAM(S): at 08:54

## 2025-03-18 RX ADMIN — Medication 50 MILLIGRAM(S): at 08:54

## 2025-03-18 RX ADMIN — Medication 50 GRAM(S): at 09:50

## 2025-03-18 RX ADMIN — METHYLPREDNISOLONE ACETATE 1000 MILLIGRAM(S): 80 INJECTION, SUSPENSION INTRA-ARTICULAR; INTRALESIONAL; INTRAMUSCULAR; SOFT TISSUE at 09:46

## 2025-03-18 RX ADMIN — Medication 500 MILLILITER(S): at 09:15

## 2025-03-19 ENCOUNTER — OUTPATIENT (OUTPATIENT)
Dept: OUTPATIENT SERVICES | Facility: HOSPITAL | Age: 40
LOS: 1 days | End: 2025-03-19
Payer: COMMERCIAL

## 2025-03-19 ENCOUNTER — APPOINTMENT (OUTPATIENT)
Dept: INFUSION THERAPY | Facility: CLINIC | Age: 40
End: 2025-03-19

## 2025-03-19 VITALS
TEMPERATURE: 99 F | WEIGHT: 214.95 LBS | DIASTOLIC BLOOD PRESSURE: 94 MMHG | HEART RATE: 95 BPM | OXYGEN SATURATION: 97 % | RESPIRATION RATE: 18 BRPM | HEIGHT: 72 IN | SYSTOLIC BLOOD PRESSURE: 136 MMHG

## 2025-03-19 VITALS
SYSTOLIC BLOOD PRESSURE: 128 MMHG | OXYGEN SATURATION: 98 % | HEART RATE: 85 BPM | DIASTOLIC BLOOD PRESSURE: 74 MMHG | RESPIRATION RATE: 18 BRPM | TEMPERATURE: 98 F

## 2025-03-19 DIAGNOSIS — G04.81 OTHER ENCEPHALITIS AND ENCEPHALOMYELITIS: ICD-10-CM

## 2025-03-19 PROCEDURE — 96365 THER/PROPH/DIAG IV INF INIT: CPT

## 2025-03-19 RX ORDER — METHYLPREDNISOLONE ACETATE 80 MG/ML
1000 INJECTION, SUSPENSION INTRA-ARTICULAR; INTRALESIONAL; INTRAMUSCULAR; SOFT TISSUE ONCE
Refills: 0 | Status: COMPLETED | OUTPATIENT
Start: 2025-03-19 | End: 2025-03-19

## 2025-03-19 RX ADMIN — METHYLPREDNISOLONE ACETATE 1000 MILLIGRAM(S): 80 INJECTION, SUSPENSION INTRA-ARTICULAR; INTRALESIONAL; INTRAMUSCULAR; SOFT TISSUE at 14:48

## 2025-03-19 RX ADMIN — METHYLPREDNISOLONE ACETATE 500 MILLIGRAM(S): 80 INJECTION, SUSPENSION INTRA-ARTICULAR; INTRALESIONAL; INTRAMUSCULAR; SOFT TISSUE at 14:16

## 2025-04-25 ENCOUNTER — APPOINTMENT (OUTPATIENT)
Dept: FAMILY MEDICINE | Facility: CLINIC | Age: 40
End: 2025-04-25

## 2025-05-02 RX ORDER — IMMUNE GLOBULIN,GAMMA(IGG) 10 %
50 VIAL (ML) INTRAVENOUS ONCE
Refills: 0 | Status: COMPLETED | OUTPATIENT
Start: 2025-05-06 | End: 2025-05-06

## 2025-05-02 RX ORDER — METHYLPREDNISOLONE ACETATE 80 MG/ML
1000 INJECTION, SUSPENSION INTRA-ARTICULAR; INTRALESIONAL; INTRAMUSCULAR; SOFT TISSUE ONCE
Refills: 0 | Status: COMPLETED | OUTPATIENT
Start: 2025-05-06 | End: 2025-05-06

## 2025-05-05 ENCOUNTER — OUTPATIENT (OUTPATIENT)
Dept: OUTPATIENT SERVICES | Facility: HOSPITAL | Age: 40
LOS: 1 days | End: 2025-05-05
Payer: COMMERCIAL

## 2025-05-05 ENCOUNTER — APPOINTMENT (OUTPATIENT)
Dept: INFUSION THERAPY | Facility: CLINIC | Age: 40
End: 2025-05-05

## 2025-05-05 VITALS
RESPIRATION RATE: 18 BRPM | DIASTOLIC BLOOD PRESSURE: 85 MMHG | HEIGHT: 72 IN | WEIGHT: 218.92 LBS | SYSTOLIC BLOOD PRESSURE: 127 MMHG | OXYGEN SATURATION: 98 % | TEMPERATURE: 98 F | HEART RATE: 85 BPM

## 2025-05-05 VITALS
OXYGEN SATURATION: 97 % | HEART RATE: 92 BPM | RESPIRATION RATE: 18 BRPM | SYSTOLIC BLOOD PRESSURE: 108 MMHG | TEMPERATURE: 98 F | DIASTOLIC BLOOD PRESSURE: 70 MMHG

## 2025-05-05 DIAGNOSIS — G04.81 OTHER ENCEPHALITIS AND ENCEPHALOMYELITIS: ICD-10-CM

## 2025-05-05 PROCEDURE — 96365 THER/PROPH/DIAG IV INF INIT: CPT

## 2025-05-05 PROCEDURE — 96367 TX/PROPH/DG ADDL SEQ IV INF: CPT

## 2025-05-05 PROCEDURE — 96366 THER/PROPH/DIAG IV INF ADDON: CPT

## 2025-05-05 RX ORDER — ACETAMINOPHEN 500 MG/5ML
650 LIQUID (ML) ORAL ONCE
Refills: 0 | Status: COMPLETED | OUTPATIENT
Start: 2025-05-05 | End: 2025-05-05

## 2025-05-05 RX ORDER — IMMUNE GLOBULIN,GAMMA(IGG) 10 %
50 VIAL (ML) INTRAVENOUS ONCE
Refills: 0 | Status: COMPLETED | OUTPATIENT
Start: 2025-05-05 | End: 2025-05-05

## 2025-05-05 RX ORDER — DIPHENHYDRAMINE HCL 12.5MG/5ML
50 ELIXIR ORAL ONCE
Refills: 0 | Status: COMPLETED | OUTPATIENT
Start: 2025-05-05 | End: 2025-05-05

## 2025-05-05 RX ORDER — MAGNESIUM SULFATE 500 MG/ML
2 SYRINGE (ML) INJECTION ONCE
Refills: 0 | Status: COMPLETED | OUTPATIENT
Start: 2025-05-05 | End: 2025-05-05

## 2025-05-05 RX ORDER — METHYLPREDNISOLONE ACETATE 80 MG/ML
1000 INJECTION, SUSPENSION INTRA-ARTICULAR; INTRALESIONAL; INTRAMUSCULAR; SOFT TISSUE ONCE
Refills: 0 | Status: COMPLETED | OUTPATIENT
Start: 2025-05-05 | End: 2025-05-05

## 2025-05-05 RX ADMIN — Medication 1000 MILLILITER(S): at 08:36

## 2025-05-05 RX ADMIN — METHYLPREDNISOLONE ACETATE 1000 MILLIGRAM(S): 80 INJECTION, SUSPENSION INTRA-ARTICULAR; INTRALESIONAL; INTRAMUSCULAR; SOFT TISSUE at 09:36

## 2025-05-05 RX ADMIN — Medication 650 MILLIGRAM(S): at 08:36

## 2025-05-05 RX ADMIN — Medication 500 MILLILITER(S): at 09:06

## 2025-05-05 RX ADMIN — Medication 2 GRAM(S): at 14:16

## 2025-05-05 RX ADMIN — Medication 125 GRAM(S): at 09:40

## 2025-05-05 RX ADMIN — Medication 650 MILLIGRAM(S): at 09:08

## 2025-05-05 RX ADMIN — Medication 250 GRAM(S): at 13:16

## 2025-05-05 RX ADMIN — Medication 50 MILLIGRAM(S): at 08:36

## 2025-05-05 RX ADMIN — Medication 1000 MILLILITER(S): at 13:17

## 2025-05-05 RX ADMIN — METHYLPREDNISOLONE ACETATE 500 MILLIGRAM(S): 80 INJECTION, SUSPENSION INTRA-ARTICULAR; INTRALESIONAL; INTRAMUSCULAR; SOFT TISSUE at 09:06

## 2025-05-05 RX ADMIN — Medication 500 MILLILITER(S): at 14:17

## 2025-05-05 RX ADMIN — Medication 50 GRAM(S): at 13:15

## 2025-05-06 ENCOUNTER — APPOINTMENT (OUTPATIENT)
Dept: INFUSION THERAPY | Facility: CLINIC | Age: 40
End: 2025-05-06

## 2025-05-06 ENCOUNTER — OUTPATIENT (OUTPATIENT)
Dept: OUTPATIENT SERVICES | Facility: HOSPITAL | Age: 40
LOS: 1 days | End: 2025-05-06
Payer: COMMERCIAL

## 2025-05-06 VITALS
TEMPERATURE: 98 F | DIASTOLIC BLOOD PRESSURE: 89 MMHG | SYSTOLIC BLOOD PRESSURE: 132 MMHG | HEART RATE: 97 BPM | OXYGEN SATURATION: 98 % | RESPIRATION RATE: 18 BRPM

## 2025-05-06 VITALS
DIASTOLIC BLOOD PRESSURE: 75 MMHG | WEIGHT: 218.04 LBS | HEIGHT: 72 IN | RESPIRATION RATE: 18 BRPM | TEMPERATURE: 98 F | HEART RATE: 83 BPM | OXYGEN SATURATION: 96 % | SYSTOLIC BLOOD PRESSURE: 130 MMHG

## 2025-05-06 DIAGNOSIS — G04.81 OTHER ENCEPHALITIS AND ENCEPHALOMYELITIS: ICD-10-CM

## 2025-05-06 PROCEDURE — 96365 THER/PROPH/DIAG IV INF INIT: CPT

## 2025-05-06 PROCEDURE — 96366 THER/PROPH/DIAG IV INF ADDON: CPT

## 2025-05-06 PROCEDURE — 96367 TX/PROPH/DG ADDL SEQ IV INF: CPT

## 2025-05-06 RX ORDER — ACETAMINOPHEN 500 MG/5ML
650 LIQUID (ML) ORAL ONCE
Refills: 0 | Status: COMPLETED | OUTPATIENT
Start: 2025-05-06 | End: 2025-05-06

## 2025-05-06 RX ORDER — DIPHENHYDRAMINE HCL 12.5MG/5ML
50 ELIXIR ORAL ONCE
Refills: 0 | Status: COMPLETED | OUTPATIENT
Start: 2025-05-06 | End: 2025-05-06

## 2025-05-06 RX ADMIN — Medication 500 MILLILITER(S): at 15:50

## 2025-05-06 RX ADMIN — Medication 650 MILLIGRAM(S): at 10:53

## 2025-05-06 RX ADMIN — Medication 1000 MILLILITER(S): at 10:52

## 2025-05-06 RX ADMIN — Medication 50 MILLIGRAM(S): at 10:43

## 2025-05-06 RX ADMIN — Medication 500 MILLILITER(S): at 11:22

## 2025-05-06 RX ADMIN — METHYLPREDNISOLONE ACETATE 500 MILLIGRAM(S): 80 INJECTION, SUSPENSION INTRA-ARTICULAR; INTRALESIONAL; INTRAMUSCULAR; SOFT TISSUE at 11:24

## 2025-05-06 RX ADMIN — Medication 125 GRAM(S): at 12:00

## 2025-05-06 RX ADMIN — Medication 1000 MILLILITER(S): at 15:20

## 2025-05-06 RX ADMIN — Medication 50 GRAM(S): at 15:15

## 2025-05-06 RX ADMIN — METHYLPREDNISOLONE ACETATE 1000 MILLIGRAM(S): 80 INJECTION, SUSPENSION INTRA-ARTICULAR; INTRALESIONAL; INTRAMUSCULAR; SOFT TISSUE at 11:54

## 2025-05-06 RX ADMIN — Medication 650 MILLIGRAM(S): at 10:43

## 2025-05-07 ENCOUNTER — OUTPATIENT (OUTPATIENT)
Dept: OUTPATIENT SERVICES | Facility: HOSPITAL | Age: 40
LOS: 1 days | End: 2025-05-07
Payer: COMMERCIAL

## 2025-05-07 ENCOUNTER — APPOINTMENT (OUTPATIENT)
Dept: INFUSION THERAPY | Facility: CLINIC | Age: 40
End: 2025-05-07

## 2025-05-07 VITALS
RESPIRATION RATE: 17 BRPM | TEMPERATURE: 98 F | OXYGEN SATURATION: 98 % | SYSTOLIC BLOOD PRESSURE: 133 MMHG | DIASTOLIC BLOOD PRESSURE: 84 MMHG | HEART RATE: 78 BPM

## 2025-05-07 VITALS
HEIGHT: 72 IN | SYSTOLIC BLOOD PRESSURE: 143 MMHG | RESPIRATION RATE: 17 BRPM | DIASTOLIC BLOOD PRESSURE: 98 MMHG | OXYGEN SATURATION: 98 % | TEMPERATURE: 97 F | HEART RATE: 98 BPM | WEIGHT: 220.02 LBS

## 2025-05-07 DIAGNOSIS — G04.81 OTHER ENCEPHALITIS AND ENCEPHALOMYELITIS: ICD-10-CM

## 2025-05-07 PROCEDURE — 96367 TX/PROPH/DG ADDL SEQ IV INF: CPT

## 2025-05-07 PROCEDURE — 96366 THER/PROPH/DIAG IV INF ADDON: CPT

## 2025-05-07 PROCEDURE — 96365 THER/PROPH/DIAG IV INF INIT: CPT

## 2025-05-07 RX ORDER — DIPHENHYDRAMINE HCL 12.5MG/5ML
50 ELIXIR ORAL ONCE
Refills: 0 | Status: COMPLETED | OUTPATIENT
Start: 2025-05-07 | End: 2025-05-07

## 2025-05-07 RX ORDER — IMMUNE GLOBULIN,GAMMA(IGG) 10 %
50 VIAL (ML) INTRAVENOUS ONCE
Refills: 0 | Status: COMPLETED | OUTPATIENT
Start: 2025-05-07 | End: 2025-05-07

## 2025-05-07 RX ORDER — METHYLPREDNISOLONE ACETATE 80 MG/ML
1000 INJECTION, SUSPENSION INTRA-ARTICULAR; INTRALESIONAL; INTRAMUSCULAR; SOFT TISSUE ONCE
Refills: 0 | Status: COMPLETED | OUTPATIENT
Start: 2025-05-07 | End: 2025-05-07

## 2025-05-07 RX ORDER — ACETAMINOPHEN 500 MG/5ML
650 LIQUID (ML) ORAL ONCE
Refills: 0 | Status: COMPLETED | OUTPATIENT
Start: 2025-05-07 | End: 2025-05-07

## 2025-05-07 RX ADMIN — Medication 125 GRAM(S): at 11:00

## 2025-05-07 RX ADMIN — METHYLPREDNISOLONE ACETATE 500 MILLIGRAM(S): 80 INJECTION, SUSPENSION INTRA-ARTICULAR; INTRALESIONAL; INTRAMUSCULAR; SOFT TISSUE at 09:59

## 2025-05-07 RX ADMIN — Medication 650 MILLIGRAM(S): at 09:59

## 2025-05-07 RX ADMIN — Medication 50 MILLIGRAM(S): at 09:59

## 2025-05-07 RX ADMIN — Medication 1000 MILLILITER(S): at 14:22

## 2025-05-07 RX ADMIN — METHYLPREDNISOLONE ACETATE 1000 MILLIGRAM(S): 80 INJECTION, SUSPENSION INTRA-ARTICULAR; INTRALESIONAL; INTRAMUSCULAR; SOFT TISSUE at 10:29

## 2025-05-07 RX ADMIN — Medication 50 GRAM(S): at 14:20

## 2025-05-07 RX ADMIN — Medication 500 MILLILITER(S): at 14:55

## 2025-05-07 RX ADMIN — Medication 1000 MILLILITER(S): at 10:30

## 2025-05-07 RX ADMIN — Medication 500 MILLILITER(S): at 11:00

## 2025-05-12 ENCOUNTER — OUTPATIENT (OUTPATIENT)
Dept: OUTPATIENT SERVICES | Facility: HOSPITAL | Age: 40
LOS: 1 days | End: 2025-05-12

## 2025-05-12 ENCOUNTER — APPOINTMENT (OUTPATIENT)
Dept: INFUSION THERAPY | Facility: CLINIC | Age: 40
End: 2025-05-12

## 2025-05-13 ENCOUNTER — OUTPATIENT (OUTPATIENT)
Dept: OUTPATIENT SERVICES | Facility: HOSPITAL | Age: 40
LOS: 1 days | End: 2025-05-13
Payer: COMMERCIAL

## 2025-05-13 ENCOUNTER — APPOINTMENT (OUTPATIENT)
Dept: RADIOLOGY | Facility: CLINIC | Age: 40
End: 2025-05-13
Payer: COMMERCIAL

## 2025-05-13 ENCOUNTER — APPOINTMENT (OUTPATIENT)
Dept: INTERNAL MEDICINE | Facility: CLINIC | Age: 40
End: 2025-05-13

## 2025-05-13 DIAGNOSIS — G04.81 OTHER ENCEPHALITIS AND ENCEPHALOMYELITIS: ICD-10-CM

## 2025-05-13 PROCEDURE — 71046 X-RAY EXAM CHEST 2 VIEWS: CPT | Mod: 26

## 2025-05-13 PROCEDURE — 71046 X-RAY EXAM CHEST 2 VIEWS: CPT

## 2025-05-14 LAB
ALBUMIN SERPL ELPH-MCNC: 3.9 G/DL
ALP BLD-CCNC: 60 U/L
ALT SERPL-CCNC: 45 U/L
ANION GAP SERPL CALC-SCNC: 13 MMOL/L
AST SERPL-CCNC: 34 U/L
BASOPHILS # BLD AUTO: 0.01 K/UL
BASOPHILS NFR BLD AUTO: 0.1 %
BILIRUB SERPL-MCNC: 0.2 MG/DL
BUN SERPL-MCNC: 13 MG/DL
CALCIUM SERPL-MCNC: 9.3 MG/DL
CHLORIDE SERPL-SCNC: 104 MMOL/L
CO2 SERPL-SCNC: 23 MMOL/L
CREAT SERPL-MCNC: 0.94 MG/DL
EGFRCR SERPLBLD CKD-EPI 2021: 106 ML/MIN/1.73M2
EOSINOPHIL # BLD AUTO: 0.07 K/UL
EOSINOPHIL NFR BLD AUTO: 0.9 %
GLUCOSE SERPL-MCNC: 90 MG/DL
HCT VFR BLD CALC: 45 %
HGB BLD-MCNC: 13.4 G/DL
IMM GRANULOCYTES NFR BLD AUTO: 0.3 %
LYMPHOCYTES # BLD AUTO: 1.33 K/UL
LYMPHOCYTES NFR BLD AUTO: 17.5 %
MAN DIFF?: NORMAL
MCHC RBC-ENTMCNC: 24.3 PG
MCHC RBC-ENTMCNC: 29.8 G/DL
MCV RBC AUTO: 81.5 FL
MONOCYTES # BLD AUTO: 0.73 K/UL
MONOCYTES NFR BLD AUTO: 9.6 %
NEUTROPHILS # BLD AUTO: 5.46 K/UL
NEUTROPHILS NFR BLD AUTO: 71.6 %
PLATELET # BLD AUTO: 242 K/UL
POTASSIUM SERPL-SCNC: 4.2 MMOL/L
PROT SERPL-MCNC: 7.9 G/DL
RBC # BLD: 5.52 M/UL
RBC # FLD: 17.1 %
SODIUM SERPL-SCNC: 141 MMOL/L
WBC # FLD AUTO: 7.62 K/UL

## 2025-06-06 ENCOUNTER — APPOINTMENT (OUTPATIENT)
Dept: BARIATRICS | Facility: CLINIC | Age: 40
End: 2025-06-06

## 2025-06-06 VITALS — WEIGHT: 210 LBS | HEIGHT: 71 IN | BODY MASS INDEX: 29.4 KG/M2

## 2025-06-06 PROCEDURE — G2211 COMPLEX E/M VISIT ADD ON: CPT | Mod: 95

## 2025-06-06 PROCEDURE — 99214 OFFICE O/P EST MOD 30 MIN: CPT | Mod: 95

## 2025-06-09 ENCOUNTER — OUTPATIENT (OUTPATIENT)
Dept: OUTPATIENT SERVICES | Facility: HOSPITAL | Age: 40
LOS: 1 days | End: 2025-06-09

## 2025-06-09 DIAGNOSIS — G04.81 OTHER ENCEPHALITIS AND ENCEPHALOMYELITIS: ICD-10-CM

## 2025-06-10 PROBLEM — E66.3 OVERWEIGHT: Status: ACTIVE | Noted: 2025-06-10

## 2025-06-10 RX ORDER — DULOXETINE HYDROCHLORIDE 30 MG/1
30 CAPSULE, DELAYED RELEASE ORAL
Refills: 0 | Status: ACTIVE | COMMUNITY

## 2025-06-13 PROBLEM — E55.9 VITAMIN D INSUFFICIENCY: Status: ACTIVE | Noted: 2025-06-13

## 2025-06-13 RX ORDER — ERGOCALCIFEROL 1.25 MG/1
1.25 MG CAPSULE, LIQUID FILLED ORAL
Qty: 8 | Refills: 0 | Status: ACTIVE | COMMUNITY
Start: 2025-06-13 | End: 1900-01-01

## 2025-06-16 ENCOUNTER — APPOINTMENT (OUTPATIENT)
Dept: INFUSION THERAPY | Facility: CLINIC | Age: 40
End: 2025-06-16

## 2025-06-16 ENCOUNTER — APPOINTMENT (OUTPATIENT)
Dept: NEUROLOGY | Facility: CLINIC | Age: 40
End: 2025-06-16

## 2025-06-17 ENCOUNTER — APPOINTMENT (OUTPATIENT)
Dept: NEUROLOGY | Facility: CLINIC | Age: 40
End: 2025-06-17
Payer: COMMERCIAL

## 2025-06-17 ENCOUNTER — APPOINTMENT (OUTPATIENT)
Dept: INFUSION THERAPY | Facility: CLINIC | Age: 40
End: 2025-06-17

## 2025-06-17 VITALS
BODY MASS INDEX: 29.4 KG/M2 | DIASTOLIC BLOOD PRESSURE: 105 MMHG | TEMPERATURE: 98 F | HEART RATE: 84 BPM | OXYGEN SATURATION: 98 % | SYSTOLIC BLOOD PRESSURE: 157 MMHG | HEIGHT: 71 IN | WEIGHT: 210 LBS

## 2025-06-17 PROCEDURE — 99214 OFFICE O/P EST MOD 30 MIN: CPT

## 2025-06-17 RX ORDER — ARMODAFINIL 150 MG/1
150 TABLET ORAL
Qty: 30 | Refills: 1 | Status: ACTIVE | COMMUNITY
Start: 2025-06-17 | End: 1900-01-01

## 2025-06-18 ENCOUNTER — APPOINTMENT (OUTPATIENT)
Dept: INFUSION THERAPY | Facility: CLINIC | Age: 40
End: 2025-06-18

## 2025-06-19 ENCOUNTER — NON-APPOINTMENT (OUTPATIENT)
Age: 40
End: 2025-06-19

## 2025-06-23 ENCOUNTER — OUTPATIENT (OUTPATIENT)
Dept: OUTPATIENT SERVICES | Facility: HOSPITAL | Age: 40
LOS: 1 days | End: 2025-06-23
Payer: COMMERCIAL

## 2025-06-23 ENCOUNTER — APPOINTMENT (OUTPATIENT)
Dept: INFUSION THERAPY | Facility: CLINIC | Age: 40
End: 2025-06-23

## 2025-06-23 VITALS
OXYGEN SATURATION: 99 % | DIASTOLIC BLOOD PRESSURE: 82 MMHG | HEART RATE: 68 BPM | SYSTOLIC BLOOD PRESSURE: 134 MMHG | HEIGHT: 72 IN | WEIGHT: 210.1 LBS | TEMPERATURE: 97 F | RESPIRATION RATE: 17 BRPM

## 2025-06-23 VITALS — OXYGEN SATURATION: 99 % | HEART RATE: 64 BPM | TEMPERATURE: 98 F | RESPIRATION RATE: 17 BRPM

## 2025-06-23 DIAGNOSIS — M81.0 AGE-RELATED OSTEOPOROSIS WITHOUT CURRENT PATHOLOGICAL FRACTURE: ICD-10-CM

## 2025-06-23 PROCEDURE — 96375 TX/PRO/DX INJ NEW DRUG ADDON: CPT

## 2025-06-23 PROCEDURE — 96366 THER/PROPH/DIAG IV INF ADDON: CPT

## 2025-06-23 PROCEDURE — 96365 THER/PROPH/DIAG IV INF INIT: CPT

## 2025-06-23 RX ORDER — DIPHENHYDRAMINE HCL 12.5MG/5ML
50 ELIXIR ORAL ONCE
Refills: 0 | Status: COMPLETED | OUTPATIENT
Start: 2025-06-23 | End: 2025-06-23

## 2025-06-23 RX ORDER — IMMUNE GLOBULIN,GAMMA(IGG) 10 %
100 VIAL (ML) INTRAVENOUS ONCE
Refills: 0 | Status: COMPLETED | OUTPATIENT
Start: 2025-06-23 | End: 2025-06-23

## 2025-06-23 RX ORDER — ACETAMINOPHEN 500 MG/5ML
650 LIQUID (ML) ORAL ONCE
Refills: 0 | Status: COMPLETED | OUTPATIENT
Start: 2025-06-23 | End: 2025-06-23

## 2025-06-23 RX ORDER — METHYLPREDNISOLONE ACETATE 80 MG/ML
1000 INJECTION, SUSPENSION INTRA-ARTICULAR; INTRALESIONAL; INTRAMUSCULAR; SOFT TISSUE ONCE
Refills: 0 | Status: COMPLETED | OUTPATIENT
Start: 2025-06-23 | End: 2025-06-23

## 2025-06-23 RX ADMIN — Medication 1000 MILLILITER(S): at 15:31

## 2025-06-23 RX ADMIN — Medication 200 GRAM(S): at 09:06

## 2025-06-23 RX ADMIN — METHYLPREDNISOLONE ACETATE 1000 MILLIGRAM(S): 80 INJECTION, SUSPENSION INTRA-ARTICULAR; INTRALESIONAL; INTRAMUSCULAR; SOFT TISSUE at 09:04

## 2025-06-23 RX ADMIN — Medication 650 MILLIGRAM(S): at 08:27

## 2025-06-23 RX ADMIN — METHYLPREDNISOLONE ACETATE 500 MILLIGRAM(S): 80 INJECTION, SUSPENSION INTRA-ARTICULAR; INTRALESIONAL; INTRAMUSCULAR; SOFT TISSUE at 08:27

## 2025-06-23 RX ADMIN — Medication 100 GRAM(S): at 15:30

## 2025-06-23 RX ADMIN — Medication 500 MILLILITER(S): at 16:05

## 2025-06-23 RX ADMIN — Medication 650 MILLIGRAM(S): at 08:40

## 2025-06-23 RX ADMIN — Medication 50 MILLIGRAM(S): at 08:27

## 2025-06-23 RX ADMIN — Medication 1000 MILLILITER(S): at 08:25

## 2025-06-23 RX ADMIN — Medication 500 MILLILITER(S): at 09:00

## 2025-06-24 ENCOUNTER — APPOINTMENT (OUTPATIENT)
Dept: INFUSION THERAPY | Facility: CLINIC | Age: 40
End: 2025-06-24

## 2025-06-24 ENCOUNTER — OUTPATIENT (OUTPATIENT)
Dept: OUTPATIENT SERVICES | Facility: HOSPITAL | Age: 40
LOS: 1 days | End: 2025-06-24
Payer: COMMERCIAL

## 2025-06-24 VITALS
WEIGHT: 210.1 LBS | SYSTOLIC BLOOD PRESSURE: 152 MMHG | HEART RATE: 89 BPM | OXYGEN SATURATION: 97 % | DIASTOLIC BLOOD PRESSURE: 91 MMHG | HEIGHT: 72 IN | TEMPERATURE: 98 F | RESPIRATION RATE: 18 BRPM

## 2025-06-24 DIAGNOSIS — M81.0 AGE-RELATED OSTEOPOROSIS WITHOUT CURRENT PATHOLOGICAL FRACTURE: ICD-10-CM

## 2025-06-24 PROCEDURE — 96366 THER/PROPH/DIAG IV INF ADDON: CPT

## 2025-06-24 PROCEDURE — 96365 THER/PROPH/DIAG IV INF INIT: CPT

## 2025-06-24 PROCEDURE — 96372 THER/PROPH/DIAG INJ SC/IM: CPT

## 2025-06-24 RX ORDER — DIPHENHYDRAMINE HCL 12.5MG/5ML
50 ELIXIR ORAL ONCE
Refills: 0 | Status: COMPLETED | OUTPATIENT
Start: 2025-06-24 | End: 2025-06-24

## 2025-06-24 RX ORDER — ACETAMINOPHEN 500 MG/5ML
650 LIQUID (ML) ORAL ONCE
Refills: 0 | Status: COMPLETED | OUTPATIENT
Start: 2025-06-24 | End: 2025-06-24

## 2025-06-24 RX ORDER — IMMUNE GLOBULIN,GAMMA(IGG) 10 %
100 VIAL (ML) INTRAVENOUS ONCE
Refills: 0 | Status: COMPLETED | OUTPATIENT
Start: 2025-06-24 | End: 2025-06-24

## 2025-06-24 RX ORDER — METHYLPREDNISOLONE ACETATE 80 MG/ML
1000 INJECTION, SUSPENSION INTRA-ARTICULAR; INTRALESIONAL; INTRAMUSCULAR; SOFT TISSUE ONCE
Refills: 0 | Status: COMPLETED | OUTPATIENT
Start: 2025-06-24 | End: 2025-06-24

## 2025-06-24 RX ADMIN — Medication 1000 MILLILITER(S): at 15:20

## 2025-06-24 RX ADMIN — Medication 500 MILLILITER(S): at 10:46

## 2025-06-24 RX ADMIN — Medication 100 GRAM(S): at 15:55

## 2025-06-24 RX ADMIN — Medication 650 MILLIGRAM(S): at 09:45

## 2025-06-24 RX ADMIN — METHYLPREDNISOLONE ACETATE 1000 MILLIGRAM(S): 80 INJECTION, SUSPENSION INTRA-ARTICULAR; INTRALESIONAL; INTRAMUSCULAR; SOFT TISSUE at 10:19

## 2025-06-24 RX ADMIN — Medication 20 MILLIGRAM(S): at 13:05

## 2025-06-24 RX ADMIN — Medication 250 GRAM(S): at 10:19

## 2025-06-24 RX ADMIN — Medication 50 MILLIGRAM(S): at 09:45

## 2025-06-24 RX ADMIN — Medication 500 MILLILITER(S): at 15:55

## 2025-06-24 RX ADMIN — Medication 1000 MILLILITER(S): at 09:46

## 2025-06-24 RX ADMIN — Medication 650 MILLIGRAM(S): at 11:00

## 2025-06-24 RX ADMIN — METHYLPREDNISOLONE ACETATE 500 MILLIGRAM(S): 80 INJECTION, SUSPENSION INTRA-ARTICULAR; INTRALESIONAL; INTRAMUSCULAR; SOFT TISSUE at 09:45

## 2025-06-30 ENCOUNTER — APPOINTMENT (OUTPATIENT)
Dept: NEUROLOGY | Facility: CLINIC | Age: 40
End: 2025-06-30

## 2025-07-21 DIAGNOSIS — M75.20 BICIPITAL TENDINITIS, UNSPECIFIED SHOULDER: ICD-10-CM

## 2025-07-21 RX ORDER — MELOXICAM 15 MG/1
15 TABLET ORAL DAILY
Qty: 30 | Refills: 2 | Status: ACTIVE | COMMUNITY
Start: 2025-07-21 | End: 1900-01-01

## 2025-07-22 ENCOUNTER — APPOINTMENT (OUTPATIENT)
Dept: INTERNAL MEDICINE | Facility: CLINIC | Age: 40
End: 2025-07-22

## 2025-08-11 ENCOUNTER — APPOINTMENT (OUTPATIENT)
Dept: INFUSION THERAPY | Facility: CLINIC | Age: 40
End: 2025-08-11

## 2025-08-11 ENCOUNTER — OUTPATIENT (OUTPATIENT)
Dept: OUTPATIENT SERVICES | Facility: HOSPITAL | Age: 40
LOS: 1 days | End: 2025-08-11

## 2025-08-11 DIAGNOSIS — M81.0 AGE-RELATED OSTEOPOROSIS WITHOUT CURRENT PATHOLOGICAL FRACTURE: ICD-10-CM

## 2025-08-12 ENCOUNTER — APPOINTMENT (OUTPATIENT)
Dept: INFUSION THERAPY | Facility: CLINIC | Age: 40
End: 2025-08-12

## 2025-08-20 ENCOUNTER — RX RENEWAL (OUTPATIENT)
Age: 40
End: 2025-08-20